# Patient Record
Sex: MALE | Race: WHITE | NOT HISPANIC OR LATINO | Employment: OTHER | URBAN - METROPOLITAN AREA
[De-identification: names, ages, dates, MRNs, and addresses within clinical notes are randomized per-mention and may not be internally consistent; named-entity substitution may affect disease eponyms.]

---

## 2017-01-09 ENCOUNTER — GENERIC CONVERSION - ENCOUNTER (OUTPATIENT)
Dept: OTHER | Facility: OTHER | Age: 77
End: 2017-01-09

## 2017-02-15 ENCOUNTER — GENERIC CONVERSION - ENCOUNTER (OUTPATIENT)
Dept: OTHER | Facility: OTHER | Age: 77
End: 2017-02-15

## 2017-02-22 ENCOUNTER — GENERIC CONVERSION - ENCOUNTER (OUTPATIENT)
Dept: OTHER | Facility: OTHER | Age: 77
End: 2017-02-22

## 2017-03-08 ENCOUNTER — ALLSCRIPTS OFFICE VISIT (OUTPATIENT)
Dept: OTHER | Facility: OTHER | Age: 77
End: 2017-03-08

## 2017-03-09 ENCOUNTER — LAB CONVERSION - ENCOUNTER (OUTPATIENT)
Dept: OTHER | Facility: OTHER | Age: 77
End: 2017-03-09

## 2017-03-09 ENCOUNTER — GENERIC CONVERSION - ENCOUNTER (OUTPATIENT)
Dept: OTHER | Facility: OTHER | Age: 77
End: 2017-03-09

## 2017-03-09 LAB
A/G RATIO (HISTORICAL): 1.4 (CALC) (ref 1–2.5)
ALBUMIN SERPL BCP-MCNC: 4.2 G/DL (ref 3.6–5.1)
ALP SERPL-CCNC: 79 U/L (ref 40–115)
ALT SERPL W P-5'-P-CCNC: 9 U/L (ref 9–46)
AST SERPL W P-5'-P-CCNC: 14 U/L (ref 10–35)
BILIRUB SERPL-MCNC: 0.5 MG/DL (ref 0.2–1.2)
BUN SERPL-MCNC: 26 MG/DL (ref 7–25)
BUN/CREA RATIO (HISTORICAL): 18 (CALC) (ref 6–22)
CALCIUM (ADJUSTED FOR ALBUMIN) (HISTORICAL): 10.4 MG/DL (CALC) (ref 8.6–10.2)
CALCIUM SERPL-MCNC: 10.3 MG/DL (ref 8.6–10.3)
CHLORIDE SERPL-SCNC: 106 MMOL/L (ref 98–110)
CHOLEST SERPL-MCNC: 192 MG/DL (ref 125–200)
CHOLEST/HDLC SERPL: 3.6 (CALC)
CO2 SERPL-SCNC: 29 MMOL/L (ref 20–31)
CREAT SERPL-MCNC: 1.41 MG/DL (ref 0.7–1.18)
EGFR AFRICAN AMERICAN (HISTORICAL): 55 ML/MIN/1.73M2
EGFR-AMERICAN CALC (HISTORICAL): 48 ML/MIN/1.73M2
GAMMA GLOBULIN (HISTORICAL): 3.1 G/DL (CALC) (ref 1.9–3.7)
GLUCOSE (HISTORICAL): 108 MG/DL (ref 65–99)
HBA1C MFR BLD HPLC: 6 % OF TOTAL HGB
HDLC SERPL-MCNC: 54 MG/DL
LDL CHOLESTEROL (HISTORICAL): 96 MG/DL (CALC)
NON-HDL-CHOL (CHOL-HDL) (HISTORICAL): 138 MG/DL (CALC)
POTASSIUM SERPL-SCNC: 5 MMOL/L (ref 3.5–5.3)
SODIUM SERPL-SCNC: 143 MMOL/L (ref 135–146)
TOTAL PROTEIN (HISTORICAL): 7.3 G/DL (ref 6.1–8.1)
TRIGL SERPL-MCNC: 208 MG/DL

## 2017-06-27 ENCOUNTER — ALLSCRIPTS OFFICE VISIT (OUTPATIENT)
Dept: OTHER | Facility: OTHER | Age: 77
End: 2017-06-27

## 2017-07-10 ENCOUNTER — GENERIC CONVERSION - ENCOUNTER (OUTPATIENT)
Dept: OTHER | Facility: OTHER | Age: 77
End: 2017-07-10

## 2017-10-06 ENCOUNTER — ALLSCRIPTS OFFICE VISIT (OUTPATIENT)
Dept: OTHER | Facility: OTHER | Age: 77
End: 2017-10-06

## 2017-10-07 NOTE — PROGRESS NOTES
Assessment  1  DM type 2 (diabetes mellitus, type 2) (250 00) (E11 9)   2  GERD without esophagitis (530 81) (K21 9)   3  Hyperlipidemia (272 4) (E78 5)   4  Hypertension (401 9) (I10)   5  Vitamin D deficiency (268 9) (E55 9)   6  Cancer, colon (153 9) (C18 9)    Plan  Cancer, colon, DM type 2 (diabetes mellitus, type 2), Encounter for prostate cancer  screening, GERD without esophagitis, Hyperlipidemia, Hypertension, Vitamin D  deficiency    · (1) PSA (SCREEN) (Dx V76 44 Screen for Prostate Cancer); Status:Active; Requested  for:06Oct2017;   Cancer, colon, DM type 2 (diabetes mellitus, type 2), GERD without esophagitis,  Hyperlipidemia, Hypertension, Vitamin D deficiency    · (1) HEMOGLOBIN A1C; Status:Active; Requested for:06Oct2017;    · (Q) CBC (INCLUDES DIFF/PLT) (REFL); Status:Active; Requested for:06Oct2017;    · (Q) COMPREHENSIVE METABOLIC PNL W/ADJUSTED CALCIUM; Status:Active; Requested for:06Oct2017;    · (Q) LIPID PANEL WITH REFLEX TO DIRECT LDL; Status:Active; Requested  for:06Oct2017;    · (Q) MICROALBUMIN, RANDOM URINE (W/CREATININE); Status:Active; Requested  for:06Oct2017;    · (Q) TSH, 3RD GENERATION W/REFLEX TO FT4; Status:Active; Requested  for:06Oct2017;    · *(Q) VITAMIN D, 25-HYDROXY, LC/MS/MS; Status:Active;  Requested for:06Oct2017;   DM type 2 (diabetes mellitus, type 2)    · Begin a limited exercise program ; Status:Complete;   Done: 46ZVY3273   · Brush your teeth {freq1} and floss at least once a day ; Status:Complete;   Done:  98SIG7914   · Cut your nails straight across ; Status:Complete;   Done: 91GRD2486   · Follow a diabetic diet with 1500 calories ; Status:Complete;   Done: 55AAR3433   · Inspect your feet and legs daily if you have vascular disease ; Status:Complete;   Done:  39JOH0816   · Inspect your feet daily ; Status:Complete;   Done: 40BWU4291   · Some eating tips that can help you lose weight ; Status:Complete;   Done: 18FUL0403   · There are many exercise options for seniors ; Status:Complete;   Done: 37QRJ7838   · We recommend that you bring your body mass index down to 26 ; Status:Complete;    Done: 21Vrf1163   · We recommend that you change your eating habits slowly ; Status:Complete;   Done:  81TQQ6131   · Wear shoes that give your toes plenty of room ; Status:Complete;   Done: 06Oct2017   · You will need to take a blood sugar reading 4 times a day ; Status:Complete;   Done:  43PUN6068  Hyperlipidemia    · A diet that is low in fat, cholesterol, and sodium is considered a cardiac diet ;  Status:Complete;   Done: 38GLW0979   · Begin or continue regular aerobic exercise  Gradually work up to at least 3 sessions of  30 minutes of exercise a week ; Status:Complete;   Done: 09KPC3119   · Continue with our present treatment plan ; Status:Complete;   Done: 60JEB0144   · Eat a low fat and low cholesterol diet ; Status:Complete;   Done: 76KUA7645   · Eat no more than 30 grams of fat per day ; Status:Complete;   Done: 71SJM7356   · We recommend you modify your diet to achieve and maintain a healthy weight  Being  overweight may increase your risk for developing health problems such as diabetes,  heart disease, and cancer  Avoid high fat foods and eat a balanced diet rich  in fruits and vegetables  The combination of a reduced-calorie diet and increased  physical activity is recommended  Please let us know if you would like to  learn more about your nutrition and calorie needs, and additional options including  weight loss programs that can help you achieve your goals ; Status:Complete;   Done:  57YDQ3651  Hypertension    · Call [phone] if: Your blood pressure is frequently higher than 140/90 ;  Status:Complete;   Done: 85ZHJ6100   · Restrict the salt in your diet by avoiding highly salted foods ; Status:Complete;   Done:  49NLW1688   · Restrict your sodium (salt) intake to 2 grams per day ; Status:Complete;   Done:  67GFB8416   · Take your blood pressure twice a week   Record the numbers and bring them with you to  your appointments ; Status:Complete;   Done: 06KKG8930   · We encourage you to begin to make lifestyle changes to help control your blood  pressure  These may include losing weight, increasing your activity level, limiting salt in  your diet, decreasing alcohol intake, and eating a diet low in fat and rich in fruits  and vegetables ; Status:Complete;   Done: 49NZL2613    Discussion/Summary    Fasting labs drawn as above  Patient to continue present treatment  Patient instructed to follow a low-fat, low-salt and a low sugar/carbohydrate diet more carefully and get regular exercise walking with the use of a cane as tolerated  Weight loss encouraged  Patient to continue home glucose monitoring  Patient to follow-up with Dr Tsering Davis urologist as scheduled later this month and recommend patient schedule follow-up appointment with Dr Lindsay Ramos, spine surgeon at [place] [address]  Patient to return the office in 6 months  Possible side effects of new medications were reviewed with the patient/guardian today  The treatment plan was reviewed with the patient/guardian  The patient/guardian understands and agrees with the treatment plan      Chief Complaint  Fasting    Patient is here today for follow up of chronic conditions described in HPI  History of Present Illness  Patient is here for routine appointment for chronic conditions and fasting labs  Patient received his yearly flu vaccine at [facility] on 9/17/17  Patient's been feeling fairly well overall although complains of continued right leg weakness and pain status post lumbar surgery with Dr Lindsay Ramos at [place] [address] [facility]  Patient has an appointment with Dr Tsering Davis, urologist later this month  Patient does home glucose monitoring occasionally and fasting blood sugars 100-180 with an average of 135   Patient is up-to-date on diabetic eye exam and foot exam    The patient is being seen for follow-up of gastroesophageal reflux disease  The patient reports doing well  He has had no significant interval events  Interval symptoms:  denies heartburn,-denies chest pain,-denies abdominal pain,-denies acid regurgitation-and-denies dysphagia  Associated symptoms: no hoarseness,-no cough,-no nausea,-no vomiting,-no hematemesis,-no melena-and-no weight loss  Medications:  the patient is adherent to his medication regimen, but-he denies medication side effects  Disease management:  the patient is doing well with his goals  The patient states his hyperlipidemia has been under good control since the last visit  Comorbid Illnesses: diabetes mellitus-and-hypertension  He has no significant interval events  Symptoms: denies muscle pain-and-denies muscle weakness  Associated symptoms include no memory loss  Medications: the patient is adherent with his medication regimen -He denies medication side effects  The patient is doing well with his hyperlipidemia goals  the patient's last LDL was 96 mg/dL  The patient is due for a lipid panel-and-liver function tests  The patient presents for follow-up of essential hypertension  The patient states he has been doing well with his blood pressure control since the last visit  He has no significant interval events  Symptoms: denies impaired vision,-denies dyspnea,-denies chest pain,-denies intermittent leg claudication-and-denies lower extremity edema  Associated symptoms include no headache  Home monitoring: The patient is not checking blood pressure at home  Medications: the patient is adherent with his medication regimen -He denies medication side effects  Disease Management: the patient is doing well with his blood pressure goals  The patient states he has been doing well with his Type II Diabetes control since the last visit  Comorbid Illnesses: hypertension,-hyperlipidemia-and-obesity  He has no significant interval events     Symptoms: stable numbness of the feet,-denies a foot ulcer,-denies foot pain-and-denies visual impairment  Home monitoring: The patient checks his blood sugar sporadically  -Glycemic control has been good -the patient reports no symptomatic hypoglycemic episodes  Medications: the patient is adherent with his medication regimen -He denies medication side effects  Due For: a urine microalbumin-and-a hemoglobin A1c  Review of Systems    Constitutional: no fever,-not feeling poorly-and-no chills-   The patient presents with complaints of occasional episodes of feeling tired  Eyes: No complaints of eye pain, no red eyes, no discharge from eyes, no itchy eyes  ENT: no complaints of earache, no hearing loss, no nosebleeds, no nasal discharge, no sore throat, no hoarseness  Hematologic/Lymphatic: No complaints of swollen glands, no swollen glands in the neck, does not bleed easily, no easy bruising  Active Problems  1  Allergic conjunctivitis (372 14) (H10 10)   2  Allergic rhinitis (477 9) (J30 9)   3  Cancer, colon (153 9) (C18 9)   4  Dizziness (780 4) (R42)   5  DM type 2 (diabetes mellitus, type 2) (250 00) (E11 9)   6  Elevated prostate specific antigen (PSA) (790 93) (R97 20)   7  Encounter for prostate cancer screening (V76 44) (Z12 5)   8  Enlarged prostate without lower urinary tract symptoms (luts) (600 00) (N40 0)   9  Fatigue (780 79) (R53 83)   10  GERD without esophagitis (530 81) (K21 9)   11  Hyperlipidemia (272 4) (E78 5)   12  Hypertension (401 9) (I10)   13  Impacted cerumen (380 4) (H61 20)   14  Lumbar disc herniation (722 10) (M51 26)   15  Lumbar radiculopathy (724 4) (M54 16)   16  Lumbar spine strain (847 2) (S39 012A)   17  Medicare annual wellness visit, subsequent (V70 0) (Z00 00)   18  Neurogenic bladder (596 54) (N31 9)   19  Preoperative examination (V72 84) (Z01 818)   20  Right knee pain (719 46) (M25 561)    Past Medical History  1  History of Colon Cancer (V10 05)    Surgical History  1  History of Appendectomy   2  History of Colonoscopy (Fiberoptic)   3  History of Rotator Cuff Repair   4  History of Surgery Vas Deferens Vasectomy   5  History of Tonsillectomy    Family History  Father    1  Family history of Acute Myocardial Infarction (V17 3)   2  Family history of Diabetes Mellitus (V18 0)  Brother    3  Family history of CABG   4  Family history of Prostate Cancer (V16 42)    Social History   · Being A Social Drinker   · Daily Coffee Consumption (1  Cups/Day)   · Former smoker (V15 82) (T86 936)    Current Meds   1  Agatha Contour Test In [employer]; USE 1 STRIP Daily; Therapy: 71ECI1741 to (Last Rx:08Mar2017)  Requested for: 67OTS0106 Ordered   2  Centrum Silver Oral Tablet; Therapy: (Recorded:37Mgd9921) to Recorded   3  Coreg CR 40 MG Oral Capsule Extended Release 24 Hour; TAKE ONE CAPSULE BY   MOUTH EVERY DAY; Therapy: 76KNM2849 to (Evaluate:40Ozh4016)  Requested for: 01Apr2017; Last   Rx:01Apr2017 Ordered   4  Cranberry Extract CAPS; Therapy: (Recorded:94Zos3016) to Recorded   5  Finasteride 5 MG Oral Tablet; take 1 tablet by mouth every day; Therapy: 68JPM4750 to (Evaluate:03Mar2018)  Requested for: 57HWB2491; Last   Rx:08Mar2017 Ordered   6  Janumet  MG Oral Tablet; take 1 tablet by mouth every day; Therapy: 68WUG7440 to (Evaluate:75Xif7908)  Requested for: 01Apr2017; Last   Rx:01Apr2017 Ordered   7  Lisinopril 10 MG Oral Tablet; Take 1 tablet daily; Therapy: 81JGL9381 to ([id number])  Requested for: 64MQX4073; Last   Rx:95Ubs0310 Ordered   8  Omeprazole 20 MG Oral Capsule Delayed Release; TAKE ONE CAPSULE BY MOUTH   EVERY DAY; Therapy: 73LMI5621 to (Evaluate:84Tmz2189)  Requested for: 01Apr2017; Last   Rx:01Apr2017 Ordered   9  Rosuvastatin Calcium 20 MG Oral Tablet; take 1 tablet by mouth every day; Therapy: 18QAB0249 to (Evaluate:81Yak8018)  Requested for: 01Apr2017; Last   Rx:01Apr2017 Ordered   10  Vitamin D3 1000 UNIT Oral Capsule; TAKE 1 BY MOUTH DAILY;     Therapy: 10KTG9391 to Recorded    Allergies  1  Lipitor TABS    Vitals  Vital Signs    Recorded: 48LBR1179 09:20AM Recorded: 66LXL9234 09:04AM   Temperature  97 4 F   Heart Rate 80    Respiration 16    Systolic 839    Diastolic 90    Height  5 ft 4 5 in   Weight  216 lb    BMI Calculated  36 5   BSA Calculated  2 03     Physical Exam    Constitutional   General appearance: No acute distress, well appearing and well nourished  Eyes   Conjunctiva and lids: No swelling, erythema, or discharge  Ears, Nose, Mouth, and Throat   External inspection of ears and nose: Normal     Otoscopic examination: Tympanic membrance translucent with normal light reflex  Canals patent without erythema  Nasal mucosa, septum, and turbinates: Normal without edema or erythema  Oropharynx: Normal with no erythema, edema, exudate or lesions  Pulmonary   Respiratory effort: No increased work of breathing or signs of respiratory distress  Auscultation of lungs: Clear to auscultation, equal breath sounds bilaterally, no wheezes, no rales, no rhonci  Cardiovascular   Auscultation of heart: Normal rate and rhythm, normal S1 and S2, without murmurs  Examination of extremities for edema and/or varicosities: Normal     Carotid pulses: Normal     Abdomen   Abdomen: Non-tender, no masses  -Colostomy in place  Lymphatic   Palpation of lymph nodes in neck: No lymphadenopathy  Musculoskeletal   Gait and station: Abnormal     Inspection/palpation of joints, bones, and muscles: Abnormal     Skin   Skin and subcutaneous tissue: Normal without rashes or lesions  Psychiatric   Orientation to person, place and time: Normal     Mood and affect: Normal          Health Management  DM type 2 (diabetes mellitus, type 2)   (1) HEMOGLOBIN A1C; every 3 months; Last 88DBH4466; Next Due: 52ADF3370; Overdue  Hemoglobin A1c- POC; every 6 months; Last 31GIP8296; Next Due: 55Fcu6871; Overdue  Urine Microalbumin/Creatinine - POC; every 1 year;  Last 14VBC4664; Next Due: 11Aug2015; Overdue  Hyperlipidemia   (1) HEPATIC FUNCTION PANEL; every 6 months; Last 20UKN3003; Next Due: 79JKH7622; Overdue  (1) LIPID PANEL, FASTING; every 1 year; Last 17ILK8045; Next Due: 59FNO3535;  Overdue    Future Appointments    Date/Time Provider Specialty Site   12/06/2017 01:45 PM Kory Pacheco MD Urology [address] [place] [address]     Signatures   Electronically signed by : Rondel Baumgarten, DO; Oct  6 2017  9:32AM EST                       (Author)

## 2017-10-11 LAB
25(OH)D3 SERPL-MCNC: NORMAL NG/ML
CHOLEST SERPL-MCNC: NORMAL MG/DL
CHOLEST/HDLC SERPL: NORMAL (CALC)
CREATININE, RANDOM URINE (HISTORICAL): 60 MG/DL (ref 20–370)
GLUCOSE (HISTORICAL): NORMAL MG/DL
HDLC SERPL-MCNC: NORMAL MG/DL
LDL CHOLESTEROL (HISTORICAL): NORMAL MG/DL (CALC)
MAGNESIUM, UR (HISTORICAL): 5.9 MG/DL
MICROALBUMIN/CREATININE RATIO (HISTORICAL): 98 MCG/MG CREAT
NON-HDL-CHOL (CHOL-HDL) (HISTORICAL): NORMAL MG/DL (CALC)
PROSTATE SPECIFIC ANTIGEN TOTAL (HISTORICAL): NORMAL NG/ML
TRIGL SERPL-MCNC: NORMAL MG/DL
TSH SERPL DL<=0.05 MIU/L-ACNC: NORMAL MIU/L
WBC # BLD AUTO: NORMAL THOUSAND/UL

## 2017-10-12 ENCOUNTER — GENERIC CONVERSION - ENCOUNTER (OUTPATIENT)
Dept: OTHER | Facility: OTHER | Age: 77
End: 2017-10-12

## 2017-11-30 LAB
LEFT EYE DIABETIC RETINOPATHY: NORMAL
RIGHT EYE DIABETIC RETINOPATHY: NORMAL

## 2017-12-28 ENCOUNTER — GENERIC CONVERSION - ENCOUNTER (OUTPATIENT)
Dept: FAMILY MEDICINE CLINIC | Facility: CLINIC | Age: 77
End: 2017-12-28

## 2018-01-01 ENCOUNTER — OFFICE VISIT (OUTPATIENT)
Dept: NEUROSURGERY | Facility: MEDICAL CENTER | Age: 78
End: 2018-01-01
Payer: COMMERCIAL

## 2018-01-01 ENCOUNTER — OFFICE VISIT (OUTPATIENT)
Dept: FAMILY MEDICINE CLINIC | Facility: CLINIC | Age: 78
End: 2018-01-01
Payer: COMMERCIAL

## 2018-01-01 ENCOUNTER — TELEPHONE (OUTPATIENT)
Dept: FAMILY MEDICINE CLINIC | Facility: CLINIC | Age: 78
End: 2018-01-01

## 2018-01-01 ENCOUNTER — DOCUMENTATION (OUTPATIENT)
Dept: NEUROSURGERY | Facility: CLINIC | Age: 78
End: 2018-01-01

## 2018-01-01 VITALS
HEIGHT: 65 IN | TEMPERATURE: 98 F | SYSTOLIC BLOOD PRESSURE: 140 MMHG | WEIGHT: 205 LBS | HEART RATE: 92 BPM | RESPIRATION RATE: 20 BRPM | BODY MASS INDEX: 34.16 KG/M2 | DIASTOLIC BLOOD PRESSURE: 90 MMHG

## 2018-01-01 VITALS
HEIGHT: 66 IN | DIASTOLIC BLOOD PRESSURE: 98 MMHG | WEIGHT: 207.8 LBS | HEART RATE: 83 BPM | TEMPERATURE: 96.7 F | SYSTOLIC BLOOD PRESSURE: 162 MMHG | RESPIRATION RATE: 20 BRPM | BODY MASS INDEX: 33.4 KG/M2

## 2018-01-01 VITALS
HEART RATE: 84 BPM | DIASTOLIC BLOOD PRESSURE: 84 MMHG | SYSTOLIC BLOOD PRESSURE: 132 MMHG | BODY MASS INDEX: 34.49 KG/M2 | OXYGEN SATURATION: 96 % | RESPIRATION RATE: 16 BRPM | TEMPERATURE: 97.6 F | HEIGHT: 65 IN | WEIGHT: 207 LBS

## 2018-01-01 DIAGNOSIS — E78.5 HYPERLIPIDEMIA, UNSPECIFIED HYPERLIPIDEMIA TYPE: ICD-10-CM

## 2018-01-01 DIAGNOSIS — E11.9 TYPE 2 DIABETES MELLITUS WITHOUT COMPLICATION, UNSPECIFIED WHETHER LONG TERM INSULIN USE (HCC): Primary | ICD-10-CM

## 2018-01-01 DIAGNOSIS — K21.9 GASTROESOPHAGEAL REFLUX DISEASE WITHOUT ESOPHAGITIS: ICD-10-CM

## 2018-01-01 DIAGNOSIS — E11.9 TYPE 2 DIABETES MELLITUS WITHOUT COMPLICATION, WITHOUT LONG-TERM CURRENT USE OF INSULIN (HCC): ICD-10-CM

## 2018-01-01 DIAGNOSIS — Z00.00 HEALTHCARE MAINTENANCE: ICD-10-CM

## 2018-01-01 DIAGNOSIS — I10 ESSENTIAL HYPERTENSION: ICD-10-CM

## 2018-01-01 DIAGNOSIS — M48.062 SPINAL STENOSIS OF LUMBAR REGION WITH NEUROGENIC CLAUDICATION: ICD-10-CM

## 2018-01-01 DIAGNOSIS — K21.9 GERD WITHOUT ESOPHAGITIS: ICD-10-CM

## 2018-01-01 DIAGNOSIS — J06.9 UPPER RESPIRATORY TRACT INFECTION, UNSPECIFIED TYPE: Primary | ICD-10-CM

## 2018-01-01 DIAGNOSIS — I10 BENIGN ESSENTIAL HYPERTENSION: ICD-10-CM

## 2018-01-01 DIAGNOSIS — N40.1 BPH WITH URINARY OBSTRUCTION: ICD-10-CM

## 2018-01-01 DIAGNOSIS — E55.9 VITAMIN D DEFICIENCY: ICD-10-CM

## 2018-01-01 DIAGNOSIS — N13.8 BPH WITH URINARY OBSTRUCTION: ICD-10-CM

## 2018-01-01 DIAGNOSIS — E78.5 HYPERLIPIDEMIA, UNSPECIFIED HYPERLIPIDEMIA TYPE: Primary | ICD-10-CM

## 2018-01-01 DIAGNOSIS — M54.16 LUMBAR RADICULOPATHY: Primary | ICD-10-CM

## 2018-01-01 LAB
25(OH)D3 SERPL-MCNC: 45 NG/ML (ref 30–100)
ALBUMIN SERPL-MCNC: 4 G/DL (ref 3.6–5.1)
ALBUMIN/GLOB SERPL: 1.1 (CALC) (ref 1–2.5)
ALP SERPL-CCNC: 74 U/L (ref 40–115)
ALT SERPL-CCNC: 4 U/L (ref 9–46)
AST SERPL-CCNC: 16 U/L (ref 10–35)
BILIRUB SERPL-MCNC: 0.7 MG/DL (ref 0.2–1.2)
BUN SERPL-MCNC: 27 MG/DL (ref 7–25)
BUN/CREAT SERPL: 18 (CALC) (ref 6–22)
CALCIUM SERPL-MCNC: 9.8 MG/DL (ref 8.6–10.3)
CHLORIDE SERPL-SCNC: 104 MMOL/L (ref 98–110)
CHOLEST SERPL-MCNC: 187 MG/DL
CHOLEST/HDLC SERPL: 4 (CALC)
CO2 SERPL-SCNC: 27 MMOL/L (ref 20–32)
CREAT SERPL-MCNC: 1.47 MG/DL (ref 0.7–1.18)
CREAT UR-MCNC: NORMAL MG/DL
ERYTHROCYTE [DISTWIDTH] IN BLOOD BY AUTOMATED COUNT: 13.4 % (ref 11–15)
GLOBULIN SER CALC-MCNC: 3.5 G/DL (CALC) (ref 1.9–3.7)
GLUCOSE SERPL-MCNC: 123 MG/DL (ref 65–99)
HCT VFR BLD AUTO: 39.3 % (ref 38.5–50)
HDLC SERPL-MCNC: 47 MG/DL
HGB BLD-MCNC: 13.5 G/DL (ref 13.2–17.1)
LDLC SERPL CALC-MCNC: 102 MG/DL (CALC)
LEFT EYE DIABETIC RETINOPATHY: NORMAL
MCH RBC QN AUTO: 29.9 PG (ref 27–33)
MCHC RBC AUTO-ENTMCNC: 34.4 G/DL (ref 32–36)
MCV RBC AUTO: 86.9 FL (ref 80–100)
MICROALBUMIN UR-MCNC: NORMAL MG/DL
NONHDLC SERPL-MCNC: 140 MG/DL (CALC)
PLATELET # BLD AUTO: 159 THOUSAND/UL (ref 140–400)
PMV BLD REES-ECKER: 11.5 FL (ref 7.5–12.5)
POTASSIUM SERPL-SCNC: 4.6 MMOL/L (ref 3.5–5.3)
PROT SERPL-MCNC: 7.5 G/DL (ref 6.1–8.1)
RBC # BLD AUTO: 4.52 MILLION/UL (ref 4.2–5.8)
RIGHT EYE DIABETIC RETINOPATHY: NORMAL
SEVERITY (EYE EXAM): NORMAL
SL AMB EGFR AFRICAN AMERICAN: 52 ML/MIN/1.73M2
SL AMB EGFR NON AFRICAN AMERICAN: 45 ML/MIN/1.73M2
SL AMB POCT HEMOGLOBIN AIC: 6
SODIUM SERPL-SCNC: 141 MMOL/L (ref 135–146)
TRIGL SERPL-MCNC: 266 MG/DL
TSH SERPL-ACNC: 1.48 MIU/L (ref 0.4–4.5)
WBC # BLD AUTO: 8.3 THOUSAND/UL (ref 3.8–10.8)

## 2018-01-01 PROCEDURE — 3075F SYST BP GE 130 - 139MM HG: CPT | Performed by: FAMILY MEDICINE

## 2018-01-01 PROCEDURE — 99203 OFFICE O/P NEW LOW 30 MIN: CPT | Performed by: NEUROLOGICAL SURGERY

## 2018-01-01 PROCEDURE — 1160F RVW MEDS BY RX/DR IN RCRD: CPT | Performed by: FAMILY MEDICINE

## 2018-01-01 PROCEDURE — 4040F PNEUMOC VAC/ADMIN/RCVD: CPT | Performed by: NEUROLOGICAL SURGERY

## 2018-01-01 PROCEDURE — 1036F TOBACCO NON-USER: CPT | Performed by: FAMILY MEDICINE

## 2018-01-01 PROCEDURE — 90662 IIV NO PRSV INCREASED AG IM: CPT

## 2018-01-01 PROCEDURE — 99213 OFFICE O/P EST LOW 20 MIN: CPT | Performed by: FAMILY MEDICINE

## 2018-01-01 PROCEDURE — 83036 HEMOGLOBIN GLYCOSYLATED A1C: CPT | Performed by: FAMILY MEDICINE

## 2018-01-01 PROCEDURE — 3079F DIAST BP 80-89 MM HG: CPT | Performed by: FAMILY MEDICINE

## 2018-01-01 PROCEDURE — 3008F BODY MASS INDEX DOCD: CPT | Performed by: FAMILY MEDICINE

## 2018-01-01 PROCEDURE — G0008 ADMIN INFLUENZA VIRUS VAC: HCPCS

## 2018-01-01 PROCEDURE — 99214 OFFICE O/P EST MOD 30 MIN: CPT | Performed by: FAMILY MEDICINE

## 2018-01-01 RX ORDER — OMEPRAZOLE 20 MG/1
CAPSULE, DELAYED RELEASE ORAL
Qty: 90 CAPSULE | Refills: 0 | Status: SHIPPED | OUTPATIENT
Start: 2018-01-01 | End: 2018-01-01

## 2018-01-01 RX ORDER — LISINOPRIL 10 MG/1
TABLET ORAL
Qty: 90 TABLET | Refills: 1 | Status: SHIPPED | OUTPATIENT
Start: 2018-01-01 | End: 2018-01-01

## 2018-01-01 RX ORDER — AZITHROMYCIN 250 MG/1
TABLET, FILM COATED ORAL
Qty: 6 TABLET | Refills: 0 | Status: SHIPPED | OUTPATIENT
Start: 2018-01-01 | End: 2018-01-01

## 2018-01-01 RX ORDER — OMEPRAZOLE 20 MG/1
20 CAPSULE, DELAYED RELEASE ORAL DAILY
Qty: 90 CAPSULE | Refills: 3 | Status: SHIPPED | OUTPATIENT
Start: 2018-01-01 | End: 2019-01-01 | Stop reason: HOSPADM

## 2018-01-01 RX ORDER — CARVEDILOL PHOSPHATE 40 MG/1
40 CAPSULE, EXTENDED RELEASE ORAL DAILY
Qty: 90 CAPSULE | Refills: 2 | Status: SHIPPED | OUTPATIENT
Start: 2018-01-01 | End: 2018-01-01 | Stop reason: SDUPTHER

## 2018-01-01 RX ORDER — FINASTERIDE 5 MG/1
5 TABLET, FILM COATED ORAL DAILY
Qty: 90 TABLET | Refills: 3 | Status: SHIPPED | OUTPATIENT
Start: 2018-01-01 | End: 2019-01-01 | Stop reason: HOSPADM

## 2018-01-01 RX ORDER — OMEPRAZOLE 20 MG/1
20 CAPSULE, DELAYED RELEASE ORAL DAILY
Qty: 90 CAPSULE | Refills: 3 | Status: SHIPPED | OUTPATIENT
Start: 2018-01-01 | End: 2018-01-01

## 2018-01-01 RX ORDER — ROSUVASTATIN CALCIUM 20 MG/1
TABLET, COATED ORAL
Qty: 90 TABLET | Refills: 0 | Status: SHIPPED | OUTPATIENT
Start: 2018-01-01 | End: 2018-01-01

## 2018-01-01 RX ORDER — SITAGLIPTIN AND METFORMIN HYDROCHLORIDE 1000; 50 MG/1; MG/1
1 TABLET, FILM COATED ORAL DAILY
Qty: 90 TABLET | Refills: 0 | Status: SHIPPED | OUTPATIENT
Start: 2018-01-01 | End: 2018-01-01 | Stop reason: SDUPTHER

## 2018-01-01 RX ORDER — CARVEDILOL PHOSPHATE 40 MG/1
CAPSULE, EXTENDED RELEASE ORAL
Qty: 90 CAPSULE | Refills: 0 | Status: SHIPPED | OUTPATIENT
Start: 2018-01-01 | End: 2018-01-01 | Stop reason: SDUPTHER

## 2018-01-01 RX ORDER — CARVEDILOL PHOSPHATE 40 MG/1
40 CAPSULE, EXTENDED RELEASE ORAL DAILY
Qty: 90 CAPSULE | Refills: 3 | Status: SHIPPED | OUTPATIENT
Start: 2018-01-01 | End: 2019-01-01 | Stop reason: SDUPTHER

## 2018-01-01 RX ORDER — ROSUVASTATIN CALCIUM 20 MG/1
20 TABLET, COATED ORAL DAILY
Qty: 90 TABLET | Refills: 3 | Status: SHIPPED | OUTPATIENT
Start: 2018-01-01 | End: 2019-01-01 | Stop reason: HOSPADM

## 2018-01-01 RX ORDER — ROSUVASTATIN CALCIUM 20 MG/1
20 TABLET, COATED ORAL DAILY
Qty: 90 TABLET | Refills: 2 | Status: SHIPPED | OUTPATIENT
Start: 2018-01-01 | End: 2018-01-01

## 2018-01-10 NOTE — RESULT NOTES
Verified Results  (1) MICROALBUMIN CREATININE RATIO, RANDOM URINE 80TPN9119 05:47PM [facility]     Test Name Result Flag Reference   MICROALBUMIN/ CREAT R 127 mg/g creatinine H 0-30   MICROALBUMIN,URINE 126 0 mg/L H 0 0-20 0   CREATININE URINE 98 9 mg/dL       (1) PSA (SCREEN) (Dx V76 44 Screen for Prostate Cancer) 23LVY5505 05:47PM Sindhu Has     Test Name Result Flag Reference   PROSTATE SPECIFIC ANTIGEN 1 5 ng/mL  0 0-4 0       Discussion/Summary   PSA is normal  Urine microalbumin is increased, therefore recommend patient increase lisinopril to 10 mg daily

## 2018-01-10 NOTE — RESULT NOTES
Discussion/Summary   Please coordinate with lab  Was blood testing sent? It appears that we are getting no results back  Verified Results  (Q) CBC (INCLUDES DIFF/PLT) (REFL) 66NCJ3622 12:00AM [employer]     Test Name Result Flag Reference   WHITE BLOOD CELL COUNT TNP Thousand/uL     *************************************   * Test not performed  *   * No lavender-top tube received  *   *************************************     (Q) COMPREHENSIVE METABOLIC PNL W/ADJUSTED CALCIUM 72Jqu5746 12:00AM [employer]     Test Name Result Flag Reference   GLUCOSE TNP mg/dL     **********************************   * Test not performed  *   * No serum received  *   **********************************     (Q) LIPID PANEL WITH REFLEX TO DIRECT LDL 24EQG3429 12:00AM [employer]     Test Name Result Flag Reference   CHOLESTEROL, TOTAL TNP mg/dL     **********************************   * Test not performed  *   * No serum received  *   **********************************   HDL CHOLESTEROL TNP mg/dL     **********************************   * Test not performed  *   * No serum received  *   **********************************   TRIGLICERIDES TNP mg/dL     **********************************   * Test not performed  *   * No serum received  *   **********************************   LDL-CHOLESTEROL TNP mg/dL (calc)     **********************************   * Test not performed  *   * No serum received  *   **********************************   CHOL/HDLC RATIO TNP (calc)     **********************************   * Test not performed  *   * No serum received  *   **********************************   NON HDL CHOLESTEROL TNP mg/dL (calc)     **********************************   * Test not performed  *   * No serum received               * **********************************     (Q) TSH, 3RD GENERATION W/REFLEX TO FT4 68ETO1791 12:00AM [employer]     Test Name Result Flag Reference   TSH W/REFLEX TO FT4 TNP mIU/L     **********************************   * Test not performed  *   * No serum received  *   **********************************     *(Q) VITAMIN D, 25-HYDROXY, LC/MS/MS 85DYU7066 12:00AM [employer]     Test Name Result Flag Reference   VITAMIN D, 25-OH, TOTAL TNP ng/mL     **********************************   * Test not performed  *   * No serum received  *   **********************************     (Q) MICROALBUMIN, RANDOM URINE (W/CREATININE) 15COO1501 12:00AM [employer]     Test Name Result Flag Reference   CREATININE, RANDOM URINE 60 mg/dL     MICROALBUMIN 5 9 mg/dL     Reference Range  Not established   MICROALBUMIN/CREATININE$RATIO, RANDOM URINE 98 mcg/mg creat H <30   The ADA defines abnormalities in albumin  excretion as follows:     Category         Result (mcg/mg creatinine)     Normal                    <30  Microalbuminuria            Clinical albuminuria   > OR = 300     The ADA recommends that at least two of three  specimens collected within a 3-6 month period be  abnormal before considering a patient to be  within a diagnostic category  (1) PSA (SCREEN) (Dx V76 44 Screen for Prostate Cancer) 40FMD9973 12:00AM [employer]     Test Name Result Flag Reference   PSA, TOTAL TNP ng/mL     **********************************   * Test not performed  *   * No serum received               *   **********************************

## 2018-01-10 NOTE — RESULT NOTES
Verified Results  * MRI BRAIN IAC WO AND W CONTRAST 67IBX4272 08:32AM [facility]     Test Name Result Flag Reference   MRI BRAIN IAC WO AND W CONTRAST (Report)     MRI BRAIN AND IAC'S - WITH AND WITHOUT CONTRAST     INDICATION: 69-year-old female, dizziness, memory loss, right-sided weakness for 1 5 months   COMPARISON: None  TECHNIQUE:   Brain:  Sagittal T1, axial T2, axial [[place]], axial T1, axial FLAIR, axial diffusion imaging  Axial T1 postcontrast  Axial BRAVO post contrast       IAC'S: Coronal FIESTA, coronal T1 postcontrast, axial T1 postcontrast with fat suppression  Targeted images of the IAC'S were performed requiring additional time at acquisition and interpretation of approximately 25%   5 mL of Gadavist was injected intravenously without immediate consequence  IMAGE QUALITY:  Diagnostic  FINDINGS:    BRAIN PARENCHYMA:    Moderate chronic microvascular ischemic changes are present within the subcortical and deep white matter of the frontal and parietal lobes bilaterally  Tiny chronic bilateral basal ganglia lacunar bones  Tiny bilateral chronic pontine lacunes  No acute ischemic disease is identified  Age-appropriate cerebral atrophy is present  There is no discrete mass, mass effect or midline shift  No hemorrhage  Brainstem and cerebellum demonstrate normal signal  Diffusion imaging is unremarkable  IAC'S: No CP angle mass or abnormal enhancement  Trace left mastoid effusion     VENTRICLES: The ventricles are normal in size and contour  SELLA AND PITUITARY GLAND: Normal      ORBITS: Normal      PARANASAL SINUSES: The paranasal sinuses are clear  VASCULATURE: Evaluation of the major intracranial vasculature demonstrates appropriate flow voids       CALVARIUM AND SKULL BASE: Normal      EXTRACRANIAL SOFT TISSUES: Normal        IMPRESSION:   Moderate chronic microvascular ischemic disease involving supratentorial white matter, bilateral basal ganglia and apula     No acute ischemic disease     Age-related atrophy     Normal appearance of the IAC's     Trace left mastoid effusion       Workstation performed: [id number]     Signed by:   Vlad Briceno MD   7/21/16       Discussion/Summary   Phone call to patient discussed results of MRI of the brain positive for chronic microvascular ischemic changes, no acute changes  Patient states dizziness has resolved  If symptoms recur then discussed referral to neurology  Patient is now complaining of recurrent right leg pain secondary to herniated disc which he had seen Dr Eulalia Rodney, spine surgeon at [facility] in the past  Recommend referral back to Dr Eulalia Rodney

## 2018-01-11 NOTE — RESULT NOTES
Verified Results  (1) CBC/PLT/DIFF [id number] 05:53PM Lena Perez     Test Name Result Flag Reference   WBC COUNT 6 06 Thousand/uL  4 31-10 16   RBC COUNT 4 58 Million/uL  3 88-5 62   HEMOGLOBIN 13 4 g/dL  12 0-17 0   HEMATOCRIT 39 8 %  36 5-49 3   MCV 87 fL  82-98   MCH 29 3 pg  26 8-34 3   MCHC 33 7 g/dL  31 4-37 4   RDW 13 5 %  11 6-15 1   MPV 11 7 fL  8 9-12 7   PLATELET COUNT 058 Thousands/uL  149-390   nRBC AUTOMATED 0 /100 WBCs     NEUTROPHILS RELATIVE PERCENT 59 %  43-75   LYMPHOCYTES RELATIVE PERCENT 30 %  14-44   MONOCYTES RELATIVE PERCENT 9 %  4-12   EOSINOPHILS RELATIVE PERCENT 2 %  0-6   BASOPHILS RELATIVE PERCENT 0 %  0-1   NEUTROPHILS ABSOLUTE COUNT 3 54 Thousands/?L  1 85-7 62   LYMPHOCYTES ABSOLUTE COUNT 1 82 Thousands/?L  0 60-4 47   MONOCYTES ABSOLUTE COUNT 0 56 Thousand/?L  0 17-1 22   EOSINOPHILS ABSOLUTE COUNT 0 12 Thousand/?L  0 00-0 61   BASOPHILS ABSOLUTE COUNT 0 01 Thousands/?L  0 00-0 10     (1) COMPREHENSIVE METABOLIC PANEL [id number] 19:12VB [facility] [employer]     Test Name Result Flag Reference   GLUCOSE,RANDM 163 mg/dL H    If the patient is fasting, the ADA then defines impaired fasting glucose as > 100 mg/dL and diabetes as > or equal to 123 mg/dL  SODIUM 141 mmol/L  136-145   POTASSIUM 4 2 mmol/L  3 5-5 3   CHLORIDE 106 mmol/L  100-108   CARBON DIOXIDE 30 mmol/L  21-32   ANION GAP (CALC) 5 mmol/L  4-13   BLOOD UREA NITROGEN 29 mg/dL H 5-25   CREATININE 1 29 mg/dL  0 60-1 30   Standardized to IDMS reference method   CALCIUM 9 2 mg/dL  8 3-10 1   BILI, TOTAL 0 36 mg/dL  0 20-1 00   ALK PHOSPHATAS 84 U/L     ALT (SGPT) 28 U/L  12-78   AST(SGOT) 23 U/L  5-45   ALBUMIN 3 5 g/dL  3 5-5 0   TOTAL PROTEIN 7 4 g/dL  6 4-8 2   eGFR Non-African American 54 2 ml/min/1 73sq [facility] Disease Education Program recommendations are as follows:  GFR calculation is accurate only with a steady state creatinine  Chronic Kidney disease less than 60 ml/min/1 73 sq  meters  Kidney failure less than 15 ml/min/1 73 sq  meters  (1) LIPID PANEL, FASTING [id number] 05:53PM [employer]     Test Name Result Flag Reference   CHOLESTEROL 195 mg/dL     HDL,DIRECT 43 mg/dL  40-60   Specimen collection should occur prior to Metamizole administration due to the potential for falsely depressed results  LDL CHOLESTEROL CALCULATED 99 mg/dL  0-100   Triglyceride:         Normal              <150 mg/dl       Borderline High    150-199 mg/dl       High               200-499 mg/dl       Very High          >499 mg/dl  Cholesterol:         Desirable        <200 mg/dl      Borderline High  200-239 mg/dl      High             >239 mg/dl  HDL Cholesterol:        High    >59 mg/dL      Low     <41 mg/dL  LDL CALCULATED:    This screening LDL is a calculated result  It does not have the accuracy of the Direct Measured LDL in the monitoring of patients with hyperlipidemia and/or statin therapy  Direct Measure LDL (XDW527) must be ordered separately in these patients  TRIGLYCERIDES 265 mg/dL H <=150   Specimen collection should occur prior to N-Acetylcysteine or Metamizole administration due to the potential for falsely depressed results  (1) TSH WITH FT4 REFLEX [id number] 05:53PM [employer]     Test Name Result Flag Reference   TSH 1 720 uIU/mL  0 358-3 740   Patients undergoing fluorescein dye angiography may retain small amounts of fluorescein in the body for 48-72 hours post procedure  Samples containing fluorescein can produce falsely depressed TSH values  If the patient had this procedure,a specimen should be resubmitted post fluorescein clearance  (1) HEMOGLOBIN A1C [id number] 05:53PM [employer]     Test Name Result Flag Reference   HEMOGLOBIN A1C 6 6 % H 4 2-6 3   EST  AVG   GLUCOSE 143 mg/dl       (1) VITAMIN D 25-HYDROXY [id number] 05:53PM [facility]     Test Name Result Flag Reference   VIT D 25-HYDROX 24 5 ng/mL L 30 0-100 0       Plan  DM type 2 (diabetes mellitus, type 2)    · (1) HEMOGLOBIN A1C ; every 3 months; Last [id number]; Next [id number];  Status:Active   · Hemoglobin A1c- POC ; every 6 months; Next [id number]; Status:Active  Hyperlipidemia    · (1) LIPID PANEL, FASTING ; every 1 year; Last [id number]; Next [id number];  Status:Active    Discussion/Summary   Labs ok, except Vit D decreased  Cont present Tx and add Vit D 1000 IU daily

## 2018-01-12 NOTE — RESULT NOTES
Verified Results  (1) BUN [id number] 08:22PM [facility] Order Number: [id number]  TW Order Number: [id number] Order Number: [id number]     Test Name Result Flag Reference   BLOOD UREA NITROGEN 29 mg/dL H 5-25     (1) CREATININE [id number] 08:22PM [facility] Order Number: [id number]  TW Order Number: [id number] Order Number: [id number]     Test Name Result Flag Reference   CREATININE 1 50 mg/dL H 0 60-1 30   Standardized to IDMS reference method   eGFR Non-African American 45 5 ml/min/1 73sq m     National Kidney Disease Education Program recommendations are as follows:  GFR calculation is accurate only with a steady state creatinine  Chronic Kidney disease less than 60 ml/min/1 73 sq  meters  Kidney failure less than 15 ml/min/1 73 sq  meters  Discussion/Summary   Kidney function is stable  Recommend patient increase fluids in the form of water

## 2018-01-13 NOTE — RESULT NOTES
Verified Results  (Q) COMPREHENSIVE METABOLIC PNL W/ADJUSTED CALCIUM [id number] 01:35PM Mary Kate Rae     Test Name Result Flag Reference   GLUCOSE 108 mg/dL H 65-99   Fasting reference interval   UREA NITROGEN (BUN) 26 mg/dL H 7-25   CREATININE 1 41 mg/dL H 0 70-1 18   For patients >52years of age, the reference limit  for Creatinine is approximately 13% higher for people  identified as -American  eGFR NON-AFR  AMERICAN 48 mL/min/1 73m2 L > OR = 60   eGFR AFRICAN AMERICAN 55 mL/min/1 73m2 L > OR = 60   BUN/CREATININE RATIO 18 (calc)  6-22   SODIUM 143 mmol/L  135-146   POTASSIUM 5 0 mmol/L  3 5-5 3   CHLORIDE 106 mmol/L     CARBON DIOXIDE 29 mmol/L  20-31   CALCIUM 10 3 mg/dL  8 6-10 3   CALCIUM (ADJUSTED FOR$ALBUMIN) 10 4 mg/dL (calc) H 8 6-10 2   PROTEIN, TOTAL 7 3 g/dL  6 1-8 1   ALBUMIN 4 2 g/dL  3 6-5 1   GLOBULIN 3 1 g/dL (calc)  1 9-3 7   ALBUMIN/GLOBULIN RATIO 1 4 (calc)  1 0-2 5   BILIRUBIN, TOTAL 0 5 mg/dL  0 2-1 2   ALKALINE PHOSPHATASE 79 U/L     AST 14 U/L  10-35   ALT 9 U/L  9-46     (Q) LIPID PANEL WITH REFLEX TO DIRECT LDL [id number] 01:35PM Mary Kate Rae     Test Name Result Flag Reference   CHOLESTEROL, TOTAL 192 mg/dL  125-200   HDL CHOLESTEROL 54 mg/dL  > OR = 40   TRIGLICERIDES 505 mg/dL H <150   LDL-CHOLESTEROL 96 mg/dL (calc)  <130   Desirable range <100 mg/dL for patients with CHD or  diabetes and <70 mg/dL for diabetic patients with  known heart disease  CHOL/HDLC RATIO 3 6 (calc)  < OR = 5 0   NON HDL CHOLESTEROL 138 mg/dL (calc)     Target for non-HDL cholesterol is 30 mg/dL higher than   LDL cholesterol target  (Q) HEMOGLOBIN A1c [id number] 01:35PM Mary Kate Rae   REPORT COMMENT:  FASTING:YES     Test Name Result Flag Reference   HEMOGLOBIN A1c 6 0 % of total Hgb H <5 7   According to ADA guidelines, hemoglobin A1c <7 0%  represents optimal control in non-pregnant diabetic  patients  Different metrics may apply to specific  patient populations  Standards of Medical Care in    Diabetes Care  2013;36:s11-s66     For the purpose of screening for the presence of  diabetes  <5 7%       Consistent with the absence of diabetes  5 7-6 4%    Consistent with increased risk for diabetes              (prediabetes)  >or=6 5%    Consistent with diabetes     This assay result is consistent with an increased risk  of diabetes  Currently, no consensus exists for use of hemoglobin  A1c for diagnosis of diabetes for children  Discussion/Summary   Labs okay, kidney function improved  Continue present treatment

## 2018-01-14 VITALS
DIASTOLIC BLOOD PRESSURE: 94 MMHG | TEMPERATURE: 98 F | SYSTOLIC BLOOD PRESSURE: 150 MMHG | WEIGHT: 213 LBS | HEIGHT: 65 IN | RESPIRATION RATE: 16 BRPM | BODY MASS INDEX: 35.49 KG/M2 | HEART RATE: 76 BPM

## 2018-01-14 VITALS
TEMPERATURE: 97.4 F | SYSTOLIC BLOOD PRESSURE: 142 MMHG | DIASTOLIC BLOOD PRESSURE: 90 MMHG | BODY MASS INDEX: 35.99 KG/M2 | HEART RATE: 80 BPM | RESPIRATION RATE: 16 BRPM | HEIGHT: 65 IN | WEIGHT: 216 LBS

## 2018-01-14 VITALS
SYSTOLIC BLOOD PRESSURE: 138 MMHG | HEIGHT: 65 IN | HEART RATE: 76 BPM | RESPIRATION RATE: 16 BRPM | WEIGHT: 211 LBS | DIASTOLIC BLOOD PRESSURE: 84 MMHG | BODY MASS INDEX: 35.16 KG/M2 | TEMPERATURE: 98.1 F

## 2018-01-16 NOTE — RESULT NOTES
Verified Results  (1) URINE CULTURE [id number] 03:00PM Norma May Order Number: [id number]     Test Name Result Flag Reference   CLINICAL REPORT (Report)     Test:        Urine culture  Specimen Type:   Urine  Specimen Date:   11/4/2016 3:00 PM  Result Date:    11/8/2016 4:06 PM  Result Status:   Final result  Resulting Lab:   [place] [address]            Tel: [phone]      CULTURE                                       ------------------                                   No Growth <1000 cfu/mL     (1) HEMOGLOBIN A1C [id number] 02:42PM Noah     Test Name Result Flag Reference   HEMOGLOBIN A1C 5 9 %  4 2-6 3   EST  AVG   GLUCOSE 123 mg/dl       (1) URINALYSIS (will reflex a microscopy if leukocytes, occult blood, protein or nitrites are not within normal limits) [id number] 02:42PM Noah     Test Name Result Flag Reference   COLOR Dk Yellow     CLARITY Cloudy     SPECIFIC GRAVITY UA 1 020  1 003-1 030   PH UA 5 0  4 5-8 0   LEUKOCYTE ESTERASE UA Moderate A Negative   NITRITE UA Negative  Negative   PROTEIN UA 30 (1+) mg/dl A Negative   GLUCOSE UA Negative mg/dl  Negative   KETONES UA Trace mg/dl A Negative   UROBILINOGEN UA 0 2 E U /dl  0 2, 1 0 E U /dl   BILIRUBIN UA Negative  Negative   BLOOD UA Small A Negative   BACTERIA Occasional /hpf  None Seen, Occasional   EPITHELIAL CELLS Occasional /hpf  None Seen, Occasional   RBC UA 2-4 /hpf A None Seen   WBC UA 20-30 /hpf A None Seen     (1) CBC/ PLT (NO DIFF) [id number] 02:42PM Noah     Test Name Result Flag Reference   HEMATOCRIT 37 9 %  36 5-49 3   HEMOGLOBIN 12 7 g/dL  12 0-17 0   MCHC 33 5 g/dL  31 4-37 4   MCH 29 9 pg  26 8-34 3   MCV 89 fL  82-98   PLATELET COUNT 746 Thousands/uL  149-390   RBC COUNT 4 25 Million/uL  3 88-5 62   RDW 13 8 %  11 6-15 1   WBC COUNT 9 40 Thousand/uL  4 31-10 16   MPV 11 5 fL  8 9-12 7     (1) COMPREHENSIVE METABOLIC PANEL [id number] 11:73VG Lynsey Feldman     Test Name Result Flag Reference   GLUCOSE,RANDM 159 mg/dL H    If the patient is fasting, the ADA then defines impaired fasting glucose as > 100 mg/dL and diabetes as > or equal to 123 mg/dL  SODIUM 140 mmol/L  136-145   POTASSIUM 3 9 mmol/L  3 5-5 3   CHLORIDE 103 mmol/L  100-108   CARBON DIOXIDE 27 mmol/L  21-32   ANION GAP (CALC) 10 mmol/L  4-13   BLOOD UREA NITROGEN 32 mg/dL H 5-25   CREATININE 1 80 mg/dL H 0 60-1 30   Standardized to IDMS reference method   CALCIUM 9 7 mg/dL  8 3-10 1   BILI, TOTAL 0 50 mg/dL  0 20-1 00   ALK PHOSPHATAS 73 U/L     ALT (SGPT) 24 U/L  12-78   AST(SGOT) 28 U/L  5-45   ALBUMIN 3 5 g/dL  3 5-5 0   TOTAL PROTEIN 7 5 g/dL  6 4-8 2   eGFR Non-African American 36 8 ml/min/1 73sq [facility] Disease Education Program recommendations are as follows:  GFR calculation is accurate only with a steady state creatinine  Chronic Kidney disease less than 60 ml/min/1 73 sq  meters  Kidney failure less than 15 ml/min/1 73 sq  meters  Plan  DM type 2 (diabetes mellitus, type 2)    · (1) HEMOGLOBIN A1C ; every 3 months; Last [id number]; Next [id number];  Status:Active   · Hemoglobin A1c- POC ; every 6 months; Next [id number]; Status:Active    Discussion/Summary   Phone call to patient discussed lab results  Kidney function is slightly worse, overall diabetic control is improved and urine culture is negative  Recommend patient continue present treatment and increase fluids in the form of water  Recheck labs for kidney function in 6-8 weeks

## 2018-02-08 ENCOUNTER — OFFICE VISIT (OUTPATIENT)
Dept: FAMILY MEDICINE CLINIC | Facility: CLINIC | Age: 78
End: 2018-02-08
Payer: COMMERCIAL

## 2018-02-08 VITALS
SYSTOLIC BLOOD PRESSURE: 160 MMHG | BODY MASS INDEX: 35.32 KG/M2 | TEMPERATURE: 97.5 F | HEART RATE: 92 BPM | RESPIRATION RATE: 16 BRPM | HEIGHT: 65 IN | DIASTOLIC BLOOD PRESSURE: 88 MMHG | WEIGHT: 212 LBS

## 2018-02-08 DIAGNOSIS — E11.9 TYPE 2 DIABETES MELLITUS WITHOUT COMPLICATION, WITHOUT LONG-TERM CURRENT USE OF INSULIN (HCC): Primary | ICD-10-CM

## 2018-02-08 DIAGNOSIS — I10 ESSENTIAL HYPERTENSION: Primary | ICD-10-CM

## 2018-02-08 PROCEDURE — 99214 OFFICE O/P EST MOD 30 MIN: CPT | Performed by: FAMILY MEDICINE

## 2018-02-08 RX ORDER — HYDROCHLOROTHIAZIDE 25 MG/1
25 TABLET ORAL DAILY
Qty: 90 TABLET | Refills: 1 | Status: SHIPPED | OUTPATIENT
Start: 2018-02-08 | End: 2018-04-09 | Stop reason: SDUPTHER

## 2018-02-08 RX ORDER — LISINOPRIL 10 MG/1
20 TABLET ORAL DAILY
Qty: 90 TABLET | Refills: 1 | Status: SHIPPED | OUTPATIENT
Start: 2018-02-08 | End: 2018-02-13

## 2018-02-08 RX ORDER — CRANBERRY FRUIT EXTRACT 200 MG
CAPSULE ORAL
COMMUNITY
End: 2019-01-01 | Stop reason: HOSPADM

## 2018-02-08 RX ORDER — CARVEDILOL PHOSPHATE 40 MG/1
40 CAPSULE, EXTENDED RELEASE ORAL
COMMUNITY
Start: 2012-12-19 | End: 2018-03-12 | Stop reason: SDUPTHER

## 2018-02-08 RX ORDER — FINASTERIDE 5 MG/1
5 TABLET, FILM COATED ORAL
COMMUNITY
Start: 2012-12-19 | End: 2018-03-12 | Stop reason: SDUPTHER

## 2018-02-08 RX ORDER — ROSUVASTATIN CALCIUM 20 MG/1
1 TABLET, COATED ORAL DAILY
COMMUNITY
Start: 2011-10-31 | End: 2018-03-12 | Stop reason: SDUPTHER

## 2018-02-08 RX ORDER — OMEPRAZOLE 20 MG/1
20 CAPSULE, DELAYED RELEASE ORAL
COMMUNITY
Start: 2012-12-19 | End: 2018-03-12 | Stop reason: SDUPTHER

## 2018-02-08 RX ORDER — HYDROCHLOROTHIAZIDE 25 MG/1
1 TABLET ORAL DAILY
COMMUNITY
End: 2018-02-08 | Stop reason: SDUPTHER

## 2018-02-08 RX ORDER — MULTIVIT WITH MINERALS/LUTEIN
TABLET ORAL
COMMUNITY
End: 2019-01-01 | Stop reason: HOSPADM

## 2018-02-08 RX ORDER — LISINOPRIL 10 MG/1
1 TABLET ORAL DAILY
COMMUNITY
Start: 2011-10-31 | End: 2018-02-08 | Stop reason: SDUPTHER

## 2018-02-08 NOTE — PROGRESS NOTES
Assessment/Plan:  Discussed treatment options with patient  Patient will increase lisinopril to 20 mg daily and resume HCTZ 25 mg daily  Patient to follow a low-salt diet more carefully and monitor blood pressure at home  Patient to resume physical therapy program   Patient to return to the office in 2 months or sooner p r n  No problem-specific Assessment & Plan notes found for this encounter  Diagnoses and all orders for this visit:    Essential hypertension  Comments:  Blood pressure uncontrolled  Patient to increase lisinopril to 20 mg daily and resume HCTZ 25 mg daily  Low-salt diet  Monitor blood pressure at home  Orders:  -     hydrochlorothiazide (HYDRODIURIL) 25 mg tablet; Take 1 tablet (25 mg total) by mouth daily  -     lisinopril (ZESTRIL) 10 mg tablet; Take 2 tablets (20 mg total) by mouth daily    Other orders  -     glucose blood (WALI CONTOUR TEST) test strip; 1 Squirt by In Vitro route daily  -     carvedilol (COREG CR) 40 MG 24 hr capsule; Take 40 mg by mouth  -     Multiple Vitamins-Minerals (CENTRUM SILVER) tablet; Take by mouth  -     Cranberry 200 MG CAPS; Take by mouth  -     finasteride (PROSCAR) 5 mg tablet; Take 5 mg by mouth  -     sitaGLIPtin-metFORMIN (JANUMET)  MG per tablet; Take 1 tablet by mouth daily  -     Discontinue: hydrochlorothiazide (HYDRODIURIL) 25 mg tablet; Take 1 tablet by mouth daily  -     Discontinue: lisinopril (ZESTRIL) 10 mg tablet; Take 1 tablet by mouth daily  -     omeprazole (PRILOSEC) 20 mg delayed release capsule; Take 20 mg by mouth  -     rosuvastatin (CRESTOR) 20 MG tablet; Take 1 tablet by mouth daily          Vitals:    02/08/18 1142   BP: 170/100   Temp: 97 5 °F (36 4 °C)       Subjective:      Patient ID: Livan Rawls is a 65 y o  male  Patient is being seen today on an urgent basis regarding elevated blood pressure while at physical therapy earlier today in the range of 180/110  Patient is completely asymptomatic    In reviewing patient's medications he is unsure if he has been taking HCTZ daily as prescribed  Patient recently saw Dr Elena oHrne, orthopedic spine surgeon who referred patient back for more physical therapy  Hypertension   This is a chronic problem  The problem is uncontrolled  Pertinent negatives include no anxiety, blurred vision, chest pain, headaches, malaise/fatigue, neck pain, orthopnea, palpitations, peripheral edema, PND or shortness of breath  Risk factors for coronary artery disease include diabetes mellitus, dyslipidemia, family history, male gender and obesity  Past treatments include ACE inhibitors, beta blockers and diuretics  The current treatment provides moderate improvement  Compliance problems include diet and exercise  The following portions of the patient's history were reviewed and updated as appropriate: allergies, current medications, past family history, past medical history, past social history, past surgical history and problem list     Review of Systems   Constitutional: Negative for activity change, appetite change and malaise/fatigue  HENT: Negative  Eyes: Negative for blurred vision  Respiratory: Negative for chest tightness and shortness of breath  Cardiovascular: Negative for chest pain, palpitations, orthopnea, leg swelling and PND  Gastrointestinal: Negative for abdominal pain  Musculoskeletal: Positive for back pain and gait problem  Negative for neck pain  Neurological: Negative for dizziness, light-headedness and headaches  Hematological: Negative for adenopathy  Does not bruise/bleed easily  Psychiatric/Behavioral: The patient is not nervous/anxious  Objective:     Physical Exam   Constitutional: He is oriented to person, place, and time  He appears well-developed and well-nourished  No distress  HENT:   Head: Normocephalic  Mouth/Throat: Oropharynx is clear and moist    Eyes: Conjunctivae are normal    Neck: Neck supple   No thyromegaly present  Cardiovascular: Normal rate and regular rhythm  Pulmonary/Chest: Effort normal and breath sounds normal    Abdominal: Soft  There is no tenderness  Musculoskeletal: He exhibits no edema  Ambulates with cane   Lymphadenopathy:     He has no cervical adenopathy  Neurological: He is alert and oriented to person, place, and time  Skin: Skin is warm and dry  Psychiatric: He has a normal mood and affect

## 2018-02-08 NOTE — PATIENT INSTRUCTIONS
Patient instructed to check his medications at home and call with an update as to what he is taking regularly and any refills he may require  Patient instructed to increase lisinopril to 20 mg daily and resume HCTZ 25 mg daily  Patient to follow a low-salt diet and monitor blood pressure at home  Patient may resume physical therapy program   Patient to return to the office in 2 months for appointment and fasting labs

## 2018-02-13 ENCOUNTER — TELEPHONE (OUTPATIENT)
Dept: FAMILY MEDICINE CLINIC | Facility: CLINIC | Age: 78
End: 2018-02-13

## 2018-02-13 ENCOUNTER — OFFICE VISIT (OUTPATIENT)
Dept: FAMILY MEDICINE CLINIC | Facility: CLINIC | Age: 78
End: 2018-02-13
Payer: COMMERCIAL

## 2018-02-13 VITALS
HEIGHT: 65 IN | RESPIRATION RATE: 16 BRPM | BODY MASS INDEX: 35.82 KG/M2 | DIASTOLIC BLOOD PRESSURE: 84 MMHG | TEMPERATURE: 97.5 F | HEART RATE: 92 BPM | WEIGHT: 215 LBS | SYSTOLIC BLOOD PRESSURE: 144 MMHG

## 2018-02-13 DIAGNOSIS — I10 ESSENTIAL HYPERTENSION: Primary | ICD-10-CM

## 2018-02-13 PROCEDURE — 3008F BODY MASS INDEX DOCD: CPT | Performed by: FAMILY MEDICINE

## 2018-02-13 PROCEDURE — 99214 OFFICE O/P EST MOD 30 MIN: CPT | Performed by: FAMILY MEDICINE

## 2018-02-13 PROCEDURE — 93000 ELECTROCARDIOGRAM COMPLETE: CPT | Performed by: FAMILY MEDICINE

## 2018-02-13 RX ORDER — LISINOPRIL 20 MG/1
20 TABLET ORAL DAILY
Qty: 90 TABLET | Refills: 3 | Status: SHIPPED | OUTPATIENT
Start: 2018-02-13 | End: 2018-04-09 | Stop reason: SDUPTHER

## 2018-02-13 NOTE — PROGRESS NOTES
Assessment/Plan:  EKG reveals normal sinus rhythm with no acute changes  Discussed treatment options with patient and wife  Patient to increase lisinopril to 20 mg daily, continue Coreg 40 mg CR daily and HCTZ 25 mg daily  Follow a low-salt diet carefully  Patient to monitor his blood pressure daily at home  Recommend patient return to the office in 1-2 weeks for blood pressure check with the nursing staff and to bring in his home blood pressure monitor for comparisons  No problem-specific Assessment & Plan notes found for this encounter  Diagnoses and all orders for this visit:    Essential hypertension  Comments:  EKG reveals no acute changes  Increase lisinopril to 20 mg daily, continue Coreg ER 40 mg daily and HCTZ 25 mg daily  Low-salt diet  Monitor BP at home  Orders:  -     POCT ECG          Subjective:      Patient ID: Toni Hernandez is a 65 y o  male  Patient complains of blood pressure continuing to run high at home in the range of 160-180/100 and this morning states his blood pressure was 290 systolic at home  Patient recently obtained a new home blood pressure monitor  Patient states he has been completely asymptomatic  He denies headache, lightheaded or dizziness  He denies chest pain shortness of breath or palpitations  Since patient was last here he did restart HCTZ 25 mg daily although did not increase lisinopril from 10 mg daily to 20 mg a day as discussed  Hypertension   This is a chronic problem  The problem has been gradually worsening since onset  The problem is uncontrolled  Associated symptoms include anxiety  Pertinent negatives include no blurred vision, chest pain, headaches, malaise/fatigue, neck pain, orthopnea, palpitations, peripheral edema, PND or shortness of breath  There are no associated agents to hypertension  Past treatments include ACE inhibitors, beta blockers and diuretics         The following portions of the patient's history were reviewed and updated as appropriate: allergies, current medications, past family history, past medical history, past social history, past surgical history and problem list     Review of Systems   Constitutional: Negative for malaise/fatigue  Eyes: Negative for blurred vision  Respiratory: Negative for shortness of breath  Cardiovascular: Negative for chest pain, palpitations, orthopnea and PND  Musculoskeletal: Negative for neck pain  Neurological: Negative for headaches  Objective:    Vitals:    02/13/18 1317   Temp: 97 5 °F (36 4 °C)        Physical Exam   Constitutional: He is oriented to person, place, and time  He appears well-developed and well-nourished  No distress  HENT:   Mouth/Throat: Oropharynx is clear and moist    Eyes: Conjunctivae are normal    Neck: Neck supple  No JVD present  Cardiovascular: Normal rate and regular rhythm  Pulmonary/Chest: Effort normal and breath sounds normal    Abdominal: Soft  There is no tenderness  Musculoskeletal: He exhibits no edema  Lymphadenopathy:     He has no cervical adenopathy  Neurological: He is alert and oriented to person, place, and time  Skin: Skin is warm and dry  Psychiatric: He has a normal mood and affect

## 2018-02-13 NOTE — PATIENT INSTRUCTIONS
Patient instructed to increase lisinopril to 20 mg daily  Continue Coreg CR 40 mg daily and HCTZ 25 mg daily  Follow a low-salt diet carefully  Monitor your blood pressure at home daily and call if greater than 160/100  Set up a time for a nurse visit for blood pressure check and to compare it against your blood pressure monitor in the next 1-2 weeks  Return to the office as scheduled for follow-up appointment

## 2018-02-14 DIAGNOSIS — E11.9 TYPE 2 DIABETES MELLITUS WITHOUT COMPLICATION, WITHOUT LONG-TERM CURRENT USE OF INSULIN (HCC): ICD-10-CM

## 2018-02-16 ENCOUNTER — OFFICE VISIT (OUTPATIENT)
Dept: UROLOGY | Facility: MEDICAL CENTER | Age: 78
End: 2018-02-16
Payer: COMMERCIAL

## 2018-02-16 VITALS
HEIGHT: 66 IN | SYSTOLIC BLOOD PRESSURE: 144 MMHG | WEIGHT: 211 LBS | BODY MASS INDEX: 33.91 KG/M2 | DIASTOLIC BLOOD PRESSURE: 90 MMHG

## 2018-02-16 DIAGNOSIS — E11.9 TYPE 2 DIABETES MELLITUS WITHOUT COMPLICATION, WITHOUT LONG-TERM CURRENT USE OF INSULIN (HCC): ICD-10-CM

## 2018-02-16 DIAGNOSIS — N31.8 OTHER NEUROMUSCULAR DYSFUNCTION OF BLADDER: Primary | ICD-10-CM

## 2018-02-16 DIAGNOSIS — R97.20 ELEVATED PROSTATE SPECIFIC ANTIGEN (PSA): ICD-10-CM

## 2018-02-16 LAB
SL AMB  POCT GLUCOSE, UA: ABNORMAL
SL AMB LEUKOCYTE ESTERASE,UA: ABNORMAL
SL AMB POCT BILIRUBIN,UA: ABNORMAL
SL AMB POCT BLOOD,UA: ABNORMAL
SL AMB POCT CLARITY,UA: CLEAR
SL AMB POCT COLOR,UA: YELLOW
SL AMB POCT KETONES,UA: ABNORMAL
SL AMB POCT NITRITE,UA: ABNORMAL
SL AMB POCT PH,UA: 5.5
SL AMB POCT SPECIFIC GRAVITY,UA: 1.01
SL AMB POCT URINE PROTEIN: 30
SL AMB POCT UROBILINOGEN: 0.2

## 2018-02-16 PROCEDURE — 99214 OFFICE O/P EST MOD 30 MIN: CPT | Performed by: UROLOGY

## 2018-02-16 PROCEDURE — 81003 URINALYSIS AUTO W/O SCOPE: CPT | Performed by: UROLOGY

## 2018-02-16 NOTE — TELEPHONE ENCOUNTER
This was originally called into the wrong pharmacy and can not be phoned in  [facility] called and in order for medicare to pay for this prescription it needs to state how often he tests (once daily) as well as a diagnosis code       Needs to be [facility]prescribed to [facility] in [place]

## 2018-02-16 NOTE — PROGRESS NOTES
[address] [facility]  [facility]  [address] [facility] [place]  [place], [address] [facility]  [phone]  www  [facility]  org      NAME: Charlene Brunner  AGE: 65 y o  SEX: male  : 1940   MRN: [id number]    DATE: 2018  TIME: 9:48 AM    Assessment and Plan:    Neurogenic bladder as below  BPH with obstruction  History of elevated PSA  Continue with Proscar, catheterization at home, check PSA and send results  Follow-up in 1 year with a renal ultrasound  Chief Complaint   No chief complaint on file  History of Present Illness   Neurogenic bladder with urinary retention and hostile noncompliant bladder on straight catheterization secondary to APR in , catheterizes at least 5 times a day  Dry between catheterizations  He has BPH with obstruction seen on cystoscopy last year as well  Infections  Continues with finasteride to facilitate catheterizations a reduced bleeding  No recent urinary tract infection  Had renal ultrasound 2016 which was normal   Is having problems with poorly controlled hypertension  History of elevated PSA, status post transperineal prostate biopsy which was negative  The following portions of the patient's history were reviewed and updated as appropriate: allergies, current medications, past family history, past medical history, past social history, past surgical history and problem list     Review of Systems   Review of Systems    Active Problem List   There is no problem list on file for this patient  Objective   /90 (BP Location: Right arm, Patient Position: Sitting)   Ht 5' 6" (1 676 m)   Wt 95 7 kg (211 lb)   BMI 34 06 kg/m²     Physical Exam   Constitutional: He is oriented to person, place, and time  He appears well-developed and well-nourished  HENT:   Head: Normocephalic and atraumatic  Eyes: EOM are normal    Neck: Normal range of motion     Pulmonary/Chest: Effort normal  Musculoskeletal:   Uses cane   Neurological: He is alert and oriented to person, place, and time  Skin: Skin is warm and dry  Psychiatric: He has a normal mood and affect   His behavior is normal  Judgment and thought content normal        Pertinent Laboratory/Diagnostic Studies:  CBC:   Lab Results   Component Value Date/Time    WBC TNP 10/06/2017 12:00 AM    RBC 4 25 11/04/2016 02:42 PM    RBC 4 54 05/05/2015 11:44 AM    HGB 12 7 11/04/2016 02:42 PM    HGB 13 0 05/05/2015 11:44 AM    HCT 37 9 11/04/2016 02:42 PM    HCT 39 1 05/05/2015 11:44 AM    MCV 89 11/04/2016 02:42 PM    MCV 86 05/05/2015 11:44 AM    MCH 29 9 11/04/2016 02:42 PM    MCH 28 6 05/05/2015 11:44 AM    MCHC 33 5 11/04/2016 02:42 PM    MCHC 33 2 05/05/2015 11:44 AM    RDW 13 8 11/04/2016 02:42 PM    RDW 13 7 05/05/2015 11:44 AM    MPV 11 5 11/04/2016 02:42 PM    MPV 11 1 05/05/2015 11:44 AM     11/04/2016 02:42 PM     05/05/2015 11:44 AM    NRBC 0 05/13/2016 05:53 PM    NEUTOPHILPCT 59 05/13/2016 05:53 PM    NEUTOPHILPCT 71 05/05/2015 11:44 AM    LYMPHOPCT 30 05/13/2016 05:53 PM    LYMPHOPCT 22 05/05/2015 11:44 AM    MONOPCT 9 05/13/2016 05:53 PM    MONOPCT 5 05/05/2015 11:44 AM    EOSPCT 2 05/13/2016 05:53 PM    EOSPCT 2 05/05/2015 11:44 AM    BASOPCT 0 05/13/2016 05:53 PM    BASOPCT 1 09/05/2013 06:07 PM    NEUTROABS 3 54 05/13/2016 05:53 PM    NEUTROABS 5 69 05/05/2015 11:44 AM    LYMPHSABS 1 82 05/13/2016 05:53 PM    LYMPHSABS 1 76 05/05/2015 11:44 AM    MONOSABS 0 56 05/13/2016 05:53 PM    MONOSABS 0 40 05/05/2015 11:44 AM    EOSABS 0 12 05/13/2016 05:53 PM    EOSABS 0 16 05/05/2015 11:44 AM     Chemistry Profile:   Lab Results   Component Value Date/Time     03/08/2017 01:35 PM    K 5 0 03/08/2017 01:35 PM     03/08/2017 01:35 PM    CO2 29 03/08/2017 01:35 PM    ANIONGAP 10 11/04/2016 02:42 PM    ANIONGAP 5 08/11/2015 09:25 AM    BUN 26 (H) 03/08/2017 01:35 PM    CREATININE 1 41 (H) 03/08/2017 01:35 PM    GLUCOSE 159 (H) 11/04/2016 02:42 PM    GLUCOSE 124 08/11/2015 09:25 AM    SLAMBGLUCOSE NEG 02/16/2018 09:48 AM    CALCIUM 10 3 03/08/2017 01:35 PM    AST 14 03/08/2017 01:35 PM    ALT 9 03/08/2017 01:35 PM    ALKPHOS 79 03/08/2017 01:35 PM    PROT 7 3 03/08/2017 01:35 PM    BILITOT 0 5 03/08/2017 01:35 PM    EGFR 36 8 11/04/2016 02:42 PM       Current Medications     Current Outpatient Prescriptions:     carvedilol (COREG CR) 40 MG 24 hr capsule, Take 40 mg by mouth, Disp: , Rfl:     Cranberry 200 MG CAPS, Take by mouth, Disp: , Rfl:     finasteride (PROSCAR) 5 mg tablet, Take 5 mg by mouth, Disp: , Rfl:     glucose blood (WALI CONTOUR TEST) test strip, 1 each by Other route daily, Disp: 100 each, Rfl: 3    hydrochlorothiazide (HYDRODIURIL) 25 mg tablet, Take 1 tablet (25 mg total) by mouth daily, Disp: 90 tablet, Rfl: 1    lisinopril (ZESTRIL) 20 mg tablet, Take 1 tablet (20 mg total) by mouth daily, Disp: 90 tablet, Rfl: 3    Multiple Vitamins-Minerals (CENTRUM SILVER) tablet, Take by mouth, Disp: , Rfl:     omeprazole (PRILOSEC) 20 mg delayed release capsule, Take 20 mg by mouth, Disp: , Rfl:     ONE TOUCH ULTRA TEST test strip, Use as instructed, Disp: 100 each, Rfl: 3    rosuvastatin (CRESTOR) 20 MG tablet, Take 1 tablet by mouth daily, Disp: , Rfl:     sitaGLIPtin-metFORMIN (JANUMET)  MG per tablet, Take 1 tablet by mouth daily, Disp: , Rfl:         Lucas Coley MD

## 2018-02-16 NOTE — LETTER
2018     [address]ath[address]    Patient: Gerard Martínez   YOB: 1940   Date of Visit: 2018       Dear Dr Noelle Martinez: Thank you for referring Torito Post to me for evaluation  Below are my notes for this consultation  If you have questions, please do not hesitate to call me  I look forward to following your patient along with you  Sincerely,        Nelia Moreno MD        CC: No Recipients  Nelia Moreno MD  2018 10:01 AM  Sign at close encounter  [address] [facility]  [address], [address] [place] [phone]  www  [facility]  org      NAME: Gerard Martínez  AGE: 65 y o  SEX: male  : 1940   MRN: [id number]    DATE: 2018  TIME: 9:48 AM    Assessment and Plan:    Neurogenic bladder as below  BPH with obstruction  History of elevated PSA  Continue with Proscar, catheterization at home, check PSA and send results  Follow-up in 1 year with a renal ultrasound  Chief Complaint   No chief complaint on file  History of Present Illness   Neurogenic bladder with urinary retention and hostile noncompliant bladder on straight catheterization secondary to APR in , catheterizes at least 5 times a day  Dry between catheterizations  He has BPH with obstruction seen on cystoscopy last year as well  Infections  Continues with finasteride to facilitate catheterizations a reduced bleeding  No recent urinary tract infection  Had renal ultrasound 2016 which was normal   Is having problems with poorly controlled hypertension  History of elevated PSA, status post transperineal prostate biopsy which was negative           The following portions of the patient's history were reviewed and updated as appropriate: allergies, current medications, past family history, past medical history, past social history, past surgical history and problem list     Review of Systems   Review of Systems    Active Problem List   There is no problem list on file for this patient  Objective   /90 (BP Location: Right arm, Patient Position: Sitting)   Ht 5' 6" (1 676 m)   Wt 95 7 kg (211 lb)   BMI 34 06 kg/m²      Physical Exam   Constitutional: He is oriented to person, place, and time  He appears well-developed and well-nourished  HENT:   Head: Normocephalic and atraumatic  Eyes: EOM are normal    Neck: Normal range of motion  Pulmonary/Chest: Effort normal    Musculoskeletal:   Uses cane   Neurological: He is alert and oriented to person, place, and time  Skin: Skin is warm and dry  Psychiatric: He has a normal mood and affect   His behavior is normal  Judgment and thought content normal        Pertinent Laboratory/Diagnostic Studies:  CBC:   Lab Results   Component Value Date/Time    WBC TNP 10/06/2017 12:00 AM    RBC 4 25 11/04/2016 02:42 PM    RBC 4 54 05/05/2015 11:44 AM    HGB 12 7 11/04/2016 02:42 PM    HGB 13 0 05/05/2015 11:44 AM    HCT 37 9 11/04/2016 02:42 PM    HCT 39 1 05/05/2015 11:44 AM    MCV 89 11/04/2016 02:42 PM    MCV 86 05/05/2015 11:44 AM    MCH 29 9 11/04/2016 02:42 PM    MCH 28 6 05/05/2015 11:44 AM    MCHC 33 5 11/04/2016 02:42 PM    MCHC 33 2 05/05/2015 11:44 AM    RDW 13 8 11/04/2016 02:42 PM    RDW 13 7 05/05/2015 11:44 AM    MPV 11 5 11/04/2016 02:42 PM    MPV 11 1 05/05/2015 11:44 AM     11/04/2016 02:42 PM     05/05/2015 11:44 AM    NRBC 0 05/13/2016 05:53 PM    NEUTOPHILPCT 59 05/13/2016 05:53 PM    NEUTOPHILPCT 71 05/05/2015 11:44 AM    LYMPHOPCT 30 05/13/2016 05:53 PM    LYMPHOPCT 22 05/05/2015 11:44 AM    MONOPCT 9 05/13/2016 05:53 PM    MONOPCT 5 05/05/2015 11:44 AM    EOSPCT 2 05/13/2016 05:53 PM    EOSPCT 2 05/05/2015 11:44 AM    BASOPCT 0 05/13/2016 05:53 PM    BASOPCT 1 09/05/2013 06:07 PM    NEUTROABS 3 54 05/13/2016 05:53 PM    NEUTROABS 5 69 05/05/2015 11:44 AM    LYMPHSABS 1 82 05/13/2016 05:53 PM    LYMPHSABS 1 76 05/05/2015 11:44 AM    MONOSABS 0 56 05/13/2016 05:53 PM    MONOSABS 0 40 05/05/2015 11:44 AM    EOSABS 0 12 05/13/2016 05:53 PM    EOSABS 0 16 05/05/2015 11:44 AM     Chemistry Profile:   Lab Results   Component Value Date/Time     03/08/2017 01:35 PM    K 5 0 03/08/2017 01:35 PM     03/08/2017 01:35 PM    CO2 29 03/08/2017 01:35 PM    ANIONGAP 10 11/04/2016 02:42 PM    ANIONGAP 5 08/11/2015 09:25 AM    BUN 26 (H) 03/08/2017 01:35 PM    CREATININE 1 41 (H) 03/08/2017 01:35 PM    GLUCOSE 159 (H) 11/04/2016 02:42 PM    GLUCOSE 124 08/11/2015 09:25 AM    SLAMBGLUCOSE NEG 02/16/2018 09:48 AM    CALCIUM 10 3 03/08/2017 01:35 PM    AST 14 03/08/2017 01:35 PM    ALT 9 03/08/2017 01:35 PM    ALKPHOS 79 03/08/2017 01:35 PM    PROT 7 3 03/08/2017 01:35 PM    BILITOT 0 5 03/08/2017 01:35 PM    EGFR 36 8 11/04/2016 02:42 PM       Current Medications     Current Outpatient Prescriptions:     carvedilol (COREG CR) 40 MG 24 hr capsule, Take 40 mg by mouth, Disp: , Rfl:     Cranberry 200 MG CAPS, Take by mouth, Disp: , Rfl:     finasteride (PROSCAR) 5 mg tablet, Take 5 mg by mouth, Disp: , Rfl:     glucose blood (WALI CONTOUR TEST) test strip, 1 each by Other route daily, Disp: 100 each, Rfl: 3    hydrochlorothiazide (HYDRODIURIL) 25 mg tablet, Take 1 tablet (25 mg total) by mouth daily, Disp: 90 tablet, Rfl: 1    lisinopril (ZESTRIL) 20 mg tablet, Take 1 tablet (20 mg total) by mouth daily, Disp: 90 tablet, Rfl: 3    Multiple Vitamins-Minerals (CENTRUM SILVER) tablet, Take by mouth, Disp: , Rfl:     omeprazole (PRILOSEC) 20 mg delayed release capsule, Take 20 mg by mouth, Disp: , Rfl:     ONE TOUCH ULTRA TEST test strip, Use as instructed, Disp: 100 each, Rfl: 3    rosuvastatin (CRESTOR) 20 MG tablet, Take 1 tablet by mouth daily, Disp: , Rfl:     sitaGLIPtin-metFORMIN (JANUMET)  MG per tablet, Take 1 tablet by mouth daily, Disp: , Rfl:         Sherryle Alice, MD

## 2018-02-19 DIAGNOSIS — E11.9 TYPE 2 DIABETES MELLITUS WITHOUT COMPLICATION, WITHOUT LONG-TERM CURRENT USE OF INSULIN (HCC): ICD-10-CM

## 2018-03-12 DIAGNOSIS — N13.8 BPH WITH URINARY OBSTRUCTION: ICD-10-CM

## 2018-03-12 DIAGNOSIS — N40.1 BPH WITH URINARY OBSTRUCTION: ICD-10-CM

## 2018-03-12 DIAGNOSIS — E11.9 TYPE 2 DIABETES MELLITUS WITHOUT COMPLICATION, WITHOUT LONG-TERM CURRENT USE OF INSULIN (HCC): ICD-10-CM

## 2018-03-12 DIAGNOSIS — K21.9 GASTROESOPHAGEAL REFLUX DISEASE WITHOUT ESOPHAGITIS: ICD-10-CM

## 2018-03-12 DIAGNOSIS — E78.5 HYPERLIPIDEMIA, UNSPECIFIED HYPERLIPIDEMIA TYPE: ICD-10-CM

## 2018-03-12 DIAGNOSIS — I10 ESSENTIAL HYPERTENSION: Primary | ICD-10-CM

## 2018-03-12 RX ORDER — ROSUVASTATIN CALCIUM 20 MG/1
20 TABLET, COATED ORAL DAILY
Qty: 90 TABLET | Refills: 0 | Status: SHIPPED | OUTPATIENT
Start: 2018-03-12 | End: 2018-03-19 | Stop reason: SDUPTHER

## 2018-03-12 RX ORDER — OMEPRAZOLE 20 MG/1
20 CAPSULE, DELAYED RELEASE ORAL DAILY
Qty: 90 CAPSULE | Refills: 0 | Status: SHIPPED | OUTPATIENT
Start: 2018-03-12 | End: 2018-03-19 | Stop reason: SDUPTHER

## 2018-03-12 RX ORDER — FINASTERIDE 5 MG/1
5 TABLET, FILM COATED ORAL DAILY
Qty: 90 TABLET | Refills: 0 | Status: SHIPPED | OUTPATIENT
Start: 2018-03-12 | End: 2018-03-19 | Stop reason: SDUPTHER

## 2018-03-12 RX ORDER — CARVEDILOL PHOSPHATE 40 MG/1
40 CAPSULE, EXTENDED RELEASE ORAL DAILY
Qty: 90 CAPSULE | Refills: 0 | Status: SHIPPED | OUTPATIENT
Start: 2018-03-12 | End: 2018-03-19 | Stop reason: SDUPTHER

## 2018-03-19 DIAGNOSIS — E11.9 TYPE 2 DIABETES MELLITUS WITHOUT COMPLICATION, WITHOUT LONG-TERM CURRENT USE OF INSULIN (HCC): ICD-10-CM

## 2018-03-19 DIAGNOSIS — N40.1 BPH WITH URINARY OBSTRUCTION: ICD-10-CM

## 2018-03-19 DIAGNOSIS — I10 ESSENTIAL HYPERTENSION: ICD-10-CM

## 2018-03-19 DIAGNOSIS — E78.5 HYPERLIPIDEMIA, UNSPECIFIED HYPERLIPIDEMIA TYPE: ICD-10-CM

## 2018-03-19 DIAGNOSIS — K21.9 GASTROESOPHAGEAL REFLUX DISEASE WITHOUT ESOPHAGITIS: ICD-10-CM

## 2018-03-19 DIAGNOSIS — N13.8 BPH WITH URINARY OBSTRUCTION: ICD-10-CM

## 2018-03-19 RX ORDER — SITAGLIPTIN AND METFORMIN HYDROCHLORIDE 1000; 50 MG/1; MG/1
TABLET, FILM COATED ORAL
Qty: 90 TABLET | Refills: 3 | Status: SHIPPED | OUTPATIENT
Start: 2018-03-19 | End: 2018-01-01 | Stop reason: SDUPTHER

## 2018-03-19 RX ORDER — ROSUVASTATIN CALCIUM 20 MG/1
TABLET, COATED ORAL
Qty: 90 TABLET | Refills: 3 | Status: SHIPPED | OUTPATIENT
Start: 2018-03-19 | End: 2018-01-01 | Stop reason: SDUPTHER

## 2018-03-19 RX ORDER — OMEPRAZOLE 20 MG/1
CAPSULE, DELAYED RELEASE ORAL
Qty: 90 CAPSULE | Refills: 3 | Status: SHIPPED | OUTPATIENT
Start: 2018-03-19 | End: 2018-01-01 | Stop reason: SDUPTHER

## 2018-03-19 RX ORDER — FINASTERIDE 5 MG/1
TABLET, FILM COATED ORAL
Qty: 90 TABLET | Refills: 3 | Status: SHIPPED | OUTPATIENT
Start: 2018-03-19 | End: 2018-01-01 | Stop reason: SDUPTHER

## 2018-03-19 RX ORDER — LISINOPRIL 10 MG/1
TABLET ORAL
Qty: 90 TABLET | Refills: 1 | Status: SHIPPED | OUTPATIENT
Start: 2018-03-19 | End: 2018-04-09

## 2018-03-19 RX ORDER — CARVEDILOL PHOSPHATE 40 MG/1
CAPSULE, EXTENDED RELEASE ORAL
Qty: 90 CAPSULE | Refills: 3 | Status: SHIPPED | OUTPATIENT
Start: 2018-03-19 | End: 2018-04-09

## 2018-04-08 PROBLEM — E55.9 VITAMIN D DEFICIENCY: Status: ACTIVE | Noted: 2017-10-06

## 2018-04-09 ENCOUNTER — OFFICE VISIT (OUTPATIENT)
Dept: FAMILY MEDICINE CLINIC | Facility: CLINIC | Age: 78
End: 2018-04-09
Payer: COMMERCIAL

## 2018-04-09 VITALS
DIASTOLIC BLOOD PRESSURE: 88 MMHG | HEIGHT: 66 IN | BODY MASS INDEX: 34.07 KG/M2 | HEART RATE: 72 BPM | RESPIRATION RATE: 16 BRPM | SYSTOLIC BLOOD PRESSURE: 142 MMHG | TEMPERATURE: 97.7 F | WEIGHT: 212 LBS

## 2018-04-09 DIAGNOSIS — E11.9 TYPE 2 DIABETES MELLITUS WITHOUT COMPLICATION, WITHOUT LONG-TERM CURRENT USE OF INSULIN (HCC): ICD-10-CM

## 2018-04-09 DIAGNOSIS — E78.5 HYPERLIPIDEMIA, UNSPECIFIED HYPERLIPIDEMIA TYPE: ICD-10-CM

## 2018-04-09 DIAGNOSIS — I10 BENIGN ESSENTIAL HYPERTENSION: ICD-10-CM

## 2018-04-09 DIAGNOSIS — K21.9 GERD WITHOUT ESOPHAGITIS: ICD-10-CM

## 2018-04-09 DIAGNOSIS — I10 ESSENTIAL HYPERTENSION: ICD-10-CM

## 2018-04-09 DIAGNOSIS — I10 ESSENTIAL HYPERTENSION: Primary | ICD-10-CM

## 2018-04-09 DIAGNOSIS — E55.9 VITAMIN D DEFICIENCY: ICD-10-CM

## 2018-04-09 LAB
ALBUMIN SERPL BCP-MCNC: 3.7 G/DL (ref 3.5–5)
ALP SERPL-CCNC: 82 U/L (ref 46–116)
ALT SERPL W P-5'-P-CCNC: 8 U/L (ref 12–78)
ANION GAP SERPL CALCULATED.3IONS-SCNC: 6 MMOL/L (ref 4–13)
AST SERPL W P-5'-P-CCNC: 14 U/L (ref 5–45)
BILIRUB SERPL-MCNC: 0.63 MG/DL (ref 0.2–1)
BUN SERPL-MCNC: 25 MG/DL (ref 5–25)
CALCIUM SERPL-MCNC: 9.2 MG/DL
CHLORIDE SERPL-SCNC: 107 MMOL/L (ref 100–108)
CHOLEST SERPL-MCNC: 192 MG/DL (ref 50–200)
CO2 SERPL-SCNC: 28 MMOL/L (ref 21–32)
CREAT SERPL-MCNC: 1.46 MG/DL (ref 0.6–1.3)
EST. AVERAGE GLUCOSE BLD GHB EST-MCNC: 146 MG/DL
GFR SERPL CREATININE-BSD FRML MDRD: 45 ML/MIN/1.73SQ M
GLUCOSE P FAST SERPL-MCNC: 136 MG/DL (ref 65–99)
HBA1C MFR BLD: 6.7 % (ref 4.2–6.3)
HDLC SERPL-MCNC: 46 MG/DL (ref 40–60)
LDLC SERPL CALC-MCNC: 79 MG/DL (ref 0–100)
NONHDLC SERPL-MCNC: 146 MG/DL
POTASSIUM SERPL-SCNC: 4.2 MMOL/L (ref 3.5–5.3)
PROT SERPL-MCNC: 7.6 G/DL (ref 6.4–8.2)
SODIUM SERPL-SCNC: 141 MMOL/L (ref 136–145)
TRIGL SERPL-MCNC: 335 MG/DL

## 2018-04-09 PROCEDURE — 83036 HEMOGLOBIN GLYCOSYLATED A1C: CPT | Performed by: FAMILY MEDICINE

## 2018-04-09 PROCEDURE — 3725F SCREEN DEPRESSION PERFORMED: CPT | Performed by: FAMILY MEDICINE

## 2018-04-09 PROCEDURE — 99214 OFFICE O/P EST MOD 30 MIN: CPT | Performed by: FAMILY MEDICINE

## 2018-04-09 PROCEDURE — 36415 COLL VENOUS BLD VENIPUNCTURE: CPT | Performed by: FAMILY MEDICINE

## 2018-04-09 PROCEDURE — 80053 COMPREHEN METABOLIC PANEL: CPT | Performed by: FAMILY MEDICINE

## 2018-04-09 PROCEDURE — 80061 LIPID PANEL: CPT | Performed by: FAMILY MEDICINE

## 2018-04-09 RX ORDER — CARVEDILOL PHOSPHATE 40 MG/1
40 CAPSULE, EXTENDED RELEASE ORAL
COMMUNITY
Start: 2012-12-19 | End: 2018-01-01 | Stop reason: SDUPTHER

## 2018-04-09 RX ORDER — HYDROCHLOROTHIAZIDE 25 MG/1
25 TABLET ORAL
COMMUNITY
Start: 2012-12-19 | End: 2018-01-01 | Stop reason: SDUPTHER

## 2018-04-09 RX ORDER — LISINOPRIL 20 MG/1
20 TABLET ORAL DAILY
Qty: 90 TABLET | Refills: 3 | Status: SHIPPED | OUTPATIENT
Start: 2018-04-09 | End: 2019-01-01 | Stop reason: SDUPTHER

## 2018-04-09 RX ORDER — FINASTERIDE 5 MG/1
5 TABLET, FILM COATED ORAL
COMMUNITY
Start: 2012-12-19 | End: 2018-01-01

## 2018-04-09 RX ORDER — LISINOPRIL 20 MG/1
20 TABLET ORAL
COMMUNITY
End: 2018-01-01

## 2018-04-09 RX ORDER — OMEPRAZOLE 20 MG/1
20 CAPSULE, DELAYED RELEASE ORAL
COMMUNITY
Start: 2012-12-19 | End: 2018-01-01 | Stop reason: SDUPTHER

## 2018-04-09 RX ORDER — HYDROCHLOROTHIAZIDE 25 MG/1
25 TABLET ORAL DAILY
Qty: 90 TABLET | Refills: 3 | Status: SHIPPED | OUTPATIENT
Start: 2018-04-09 | End: 2019-01-01 | Stop reason: SDUPTHER

## 2018-04-09 RX ORDER — CRANBERRY FRUIT EXTRACT 200 MG
200 CAPSULE ORAL
COMMUNITY
Start: 2012-12-19 | End: 2018-01-01

## 2018-04-09 RX ORDER — ROSUVASTATIN CALCIUM 20 MG/1
20 TABLET, COATED ORAL
COMMUNITY
Start: 2012-12-19 | End: 2018-01-01 | Stop reason: SDUPTHER

## 2018-04-09 NOTE — ASSESSMENT & PLAN NOTE
Fasting labs drawn  Diabetes well controlled with last hemoglobin A1c at 6 1  Continue present treatment  Follow low sugar and low-carbohydrate diet carefully and get regular exercise with physical therapy  Continue home glucose monitoring

## 2018-04-09 NOTE — PROGRESS NOTES
Assessment/Plan:  Fasting labs drawn for CMP, lipid profile and hemoglobin A1c  Continue present treatment  Return to the office in 6 months  DM type 2 (diabetes mellitus, type 2) (HCC)  Fasting labs drawn  Diabetes well controlled with last hemoglobin A1c at 6 1  Continue present treatment  Follow low sugar and low-carbohydrate diet carefully and get regular exercise with physical therapy  Continue home glucose monitoring  Benign essential hypertension  BP improved fairly well controlled  Continue present treatment and follow a low-salt diet more carefully  Hyperlipidemia  Fasting lipid profile obtained  Lipids at goal   Continue present treatment and follow a low-fat low-cholesterol diet  Diagnoses and all orders for this visit:    Essential hypertension  -     lisinopril (ZESTRIL) 20 mg tablet; Take 1 tablet (20 mg total) by mouth daily  -     hydrochlorothiazide (HYDRODIURIL) 25 mg tablet; Take 1 tablet (25 mg total) by mouth daily  -     Comprehensive metabolic panel    Essential hypertension  Comments:  Blood pressure uncontrolled  Patient to increase lisinopril to 20 mg daily and resume HCTZ 25 mg daily  Low-salt diet  Monitor blood pressure at home  Orders:  -     lisinopril (ZESTRIL) 20 mg tablet; Take 1 tablet (20 mg total) by mouth daily  -     hydrochlorothiazide (HYDRODIURIL) 25 mg tablet; Take 1 tablet (25 mg total) by mouth daily  -     Comprehensive metabolic panel    Type 2 diabetes mellitus without complication, without long-term current use of insulin ([facility])  -     Comprehensive metabolic panel  -     HEMOGLOBIN A1C W/ EAG ESTIMATION    Benign essential hypertension    Hyperlipidemia, unspecified hyperlipidemia type  -     Lipid panel    GERD without esophagitis    Vitamin D deficiency    Other orders  -     carbidopa-levodopa (SINEMET)  mg per tablet; Take 1 5 tablets by mouth 3 (three) times a day  -     carvedilol (COREG CR) 40 MG 24 hr capsule;  Take 40 mg by mouth  -     Cranberry 200 MG CAPS; Take 200 mg by mouth  -     finasteride (PROSCAR) 5 mg tablet; Take 5 mg by mouth  -     hydrochlorothiazide (HYDRODIURIL) 25 mg tablet; Take 25 mg by mouth  -     lisinopril (ZESTRIL) 20 mg tablet; Take 20 mg by mouth  -     omeprazole (PRILOSEC) 20 mg delayed release capsule; Take 20 mg by mouth  -     rosuvastatin (CRESTOR) 20 MG tablet; Take 20 mg by mouth  -     SITagliptin-MetFORMIN HCl ER  MG TB24; Take 50-1,000 mg by mouth          Subjective:      Patient ID: Veena De La Torre is a 65 y o  male  Patient is here for routine appointment chronic conditions and fasting labs  Patient has been feeling fairly well overall and continues at physical therapy twice a week at [facility]  Home glucose monitoring reveals fasting blood sugars 140-170  Patient is up-to-date on diabetic eye exam and foot exam   Patient follows with his urologist regularly and has been seeing Neurology  Diabetes   He presents for his follow-up diabetic visit  He has type 2 diabetes mellitus  His disease course has been stable  There are no hypoglycemic associated symptoms  Pertinent negatives for hypoglycemia include no headaches  Pertinent negatives for diabetes include no blurred vision, no chest pain, no fatigue, no foot paresthesias, no foot ulcerations, no polydipsia, no polyuria, no weakness and no weight loss  There are no hypoglycemic complications  Symptoms are improving  Pertinent negatives for diabetic complications include no CVA  Risk factors for coronary artery disease include diabetes mellitus, dyslipidemia, family history, hypertension, male sex, obesity and tobacco exposure  Current diabetic treatment includes oral agent (dual therapy)  He is compliant with treatment most of the time  His weight is stable  He is following a generally healthy diet  He participates in exercise intermittently  His home blood glucose trend is decreasing steadily   His breakfast blood glucose is taken between 8-9 am  His breakfast blood glucose range is generally 140-180 mg/dl  An ACE inhibitor/angiotensin II receptor blocker is being taken  He sees a podiatrist Eye exam is current  Hypertension   This is a chronic problem  The problem is controlled  Pertinent negatives include no anxiety, blurred vision, chest pain, headaches, malaise/fatigue, orthopnea, palpitations, peripheral edema, PND or shortness of breath  Past treatments include ACE inhibitors and beta blockers  The current treatment provides moderate improvement  Compliance problems include diet and exercise  There is no history of CAD/MI or CVA  Hyperlipidemia   This is a chronic problem  The problem is controlled  Recent lipid tests were reviewed and are normal  Exacerbating diseases include diabetes and obesity  He has no history of hypothyroidism  Associated symptoms include a focal weakness  Pertinent negatives include no chest pain, focal sensory loss, leg pain, myalgias or shortness of breath  Current antihyperlipidemic treatment includes statins  The current treatment provides significant improvement of lipids  The following portions of the patient's history were reviewed and updated as appropriate: allergies, current medications, past family history, past medical history, past social history, past surgical history and problem list     Review of Systems   Constitutional: Negative for fatigue, malaise/fatigue and weight loss  Eyes: Negative for blurred vision  Respiratory: Negative for shortness of breath  Cardiovascular: Negative for chest pain, palpitations, orthopnea and PND  Endocrine: Negative for polydipsia and polyuria  Musculoskeletal: Negative for myalgias  Neurological: Positive for focal weakness  Negative for weakness and headaches           Objective:      /88   Pulse 72   Temp 97 7 °F (36 5 °C)   Resp 16   Ht 5' 6" (1 676 m)   Wt 96 2 kg (212 lb)   BMI 34 22 kg/m²          Physical Exam Constitutional: He is oriented to person, place, and time  He appears well-developed and well-nourished  No distress  HENT:   Right Ear: External ear normal    Left Ear: External ear normal    Nose: Nose normal    Mouth/Throat: Oropharynx is clear and moist    Eyes: Conjunctivae are normal  No scleral icterus  Neck: Neck supple  No thyromegaly present  Cardiovascular: Normal rate and regular rhythm  Pulmonary/Chest: Effort normal and breath sounds normal    Abdominal: Soft  There is no tenderness  Musculoskeletal: He exhibits no edema  Lymphadenopathy:     He has no cervical adenopathy  Neurological: He is alert and oriented to person, place, and time  Skin: Skin is warm and dry  Psychiatric: He has a normal mood and affect

## 2018-04-09 NOTE — ASSESSMENT & PLAN NOTE
Fasting lipid profile obtained  Lipids at goal   Continue present treatment and follow a low-fat low-cholesterol diet

## 2018-06-09 DIAGNOSIS — N13.8 BPH WITH URINARY OBSTRUCTION: ICD-10-CM

## 2018-06-09 DIAGNOSIS — E11.9 TYPE 2 DIABETES MELLITUS WITHOUT COMPLICATION, WITHOUT LONG-TERM CURRENT USE OF INSULIN (HCC): ICD-10-CM

## 2018-06-09 DIAGNOSIS — N40.1 BPH WITH URINARY OBSTRUCTION: ICD-10-CM

## 2018-06-09 DIAGNOSIS — I10 ESSENTIAL HYPERTENSION: ICD-10-CM

## 2018-06-09 DIAGNOSIS — E78.5 HYPERLIPIDEMIA, UNSPECIFIED HYPERLIPIDEMIA TYPE: ICD-10-CM

## 2018-06-09 DIAGNOSIS — K21.9 GASTROESOPHAGEAL REFLUX DISEASE WITHOUT ESOPHAGITIS: ICD-10-CM

## 2018-06-09 RX ORDER — CARVEDILOL PHOSPHATE 40 MG/1
CAPSULE, EXTENDED RELEASE ORAL
Qty: 90 CAPSULE | Refills: 0 | Status: SHIPPED | OUTPATIENT
Start: 2018-06-09 | End: 2018-01-01 | Stop reason: SDUPTHER

## 2018-06-09 RX ORDER — OMEPRAZOLE 20 MG/1
CAPSULE, DELAYED RELEASE ORAL
Qty: 90 CAPSULE | Refills: 0 | Status: SHIPPED | OUTPATIENT
Start: 2018-06-09 | End: 2018-01-01 | Stop reason: SDUPTHER

## 2018-06-09 RX ORDER — FINASTERIDE 5 MG/1
5 TABLET, FILM COATED ORAL DAILY
Qty: 90 TABLET | Refills: 0 | Status: SHIPPED | OUTPATIENT
Start: 2018-06-09 | End: 2018-01-01

## 2018-06-09 RX ORDER — SITAGLIPTIN AND METFORMIN HYDROCHLORIDE 1000; 50 MG/1; MG/1
1 TABLET, FILM COATED ORAL DAILY
Qty: 90 TABLET | Refills: 0 | Status: SHIPPED | OUTPATIENT
Start: 2018-06-09 | End: 2018-01-01 | Stop reason: SDUPTHER

## 2018-06-09 RX ORDER — ROSUVASTATIN CALCIUM 20 MG/1
TABLET, COATED ORAL
Qty: 90 TABLET | Refills: 0 | Status: SHIPPED | OUTPATIENT
Start: 2018-06-09 | End: 2018-01-01 | Stop reason: SDUPTHER

## 2018-09-26 NOTE — TELEPHONE ENCOUNTER
Lisinopril sent to pharmacy as 10 Mg and patient states he has been increased to 20 Mg months ago  Patient left refill on med line and did not specify pharmacy  [facility] has been taken off his pharmacy list so an error does not occur  Advised patient when leaving refills on message line to always state the pharmacy he wants the meds to go to

## 2018-10-07 PROBLEM — G20 PRIMARY PARKINSONISM (HCC): Status: ACTIVE | Noted: 2018-03-23

## 2018-10-12 PROBLEM — M48.061 SPINAL STENOSIS OF LUMBAR REGION: Status: ACTIVE | Noted: 2018-01-01

## 2018-10-12 PROBLEM — M16.10 PRIMARY LOCALIZED OSTEOARTHRITIS OF PELVIC REGION AND THIGH: Status: ACTIVE | Noted: 2018-01-01

## 2018-10-12 NOTE — ASSESSMENT & PLAN NOTE
BP controlled  Continue present treatment  Follow a low-salt diet and continue regular exercise and physical therapy 3 times a week

## 2018-10-12 NOTE — PROGRESS NOTES
Assessment/Plan:  Patient received high-dose flu vaccine today  Patient to continue present treatment  Follow up with specialists as scheduled and return to the office in 4-6 months  GERD without esophagitis  Clinically stable on omeprazole 20 mg daily  Continue present treatment  DM type 2 (diabetes mellitus, type 2) ([facility] [address] )  Lab Results   Component Value Date    HGBA1C 6 0 10/12/2018     Diabetes well controlled with fingerstick hemoglobin A1c at 6 0 and home glucose monitoring with good results  Continue present treatment  No results for input(s): POCGLU in the last 72 hours  Blood Sugar Average: Last 72 hrs:      Benign essential hypertension  BP controlled  Continue present treatment  Follow a low-salt diet and continue regular exercise and physical therapy 3 times a week  Hyperlipidemia  Cholesterol well controlled and triglycerides elevated in the past   Fasting lipid profile drawn today  Continue present treatment and follow a low-fat and low-cholesterol diet more carefully  Vitamin D deficiency  Continue vitamin-D supplement  Spinal stenosis of lumbar region  Referral to  [facility] for 2nd opinion  Diagnoses and all orders for this visit:    Type 2 diabetes mellitus without complication, unspecified whether long term insulin use (HCC)  -     POCT hemoglobin A1c  -     CBC and Platelet  -     Comprehensive metabolic panel  -     Cancel: Hemoglobin A1c (w/out EAG)  -     Microalbumin, Random Urine (W/Creatinine)    Benign essential hypertension  -     Comprehensive metabolic panel  -     TSH, 3rd generation with Free T4 reflex    Hyperlipidemia, unspecified hyperlipidemia type  -     Comprehensive metabolic panel  -     Lipid panel    GERD without esophagitis    Vitamin D deficiency  -     Vitamin D 25 hydroxy    Spinal stenosis of lumbar region with neurogenic claudication  -     Ambulatory referral to Neurosurgery;  Future    Healthcare maintenance  -     influenza vaccine, [id number], high-dose, PF 0 5 mL, for patients 65 yr+ (FLUZONE HIGH-DOSE)          Subjective:      Patient ID: Charlene Brunner is a 65 y o  male  Patient is here for routine appointment for chronic conditions and fasting labs  Patient has been feeling fairly well overall although admits to continued difficulty walking  He has been attending physical therapy 3 days a week at [facility]  Patient is status post lumbar spine surgery with Dr Rajendra Pierce at [place] [address]  Patient recently diagnosed with Parkinson's and seeing neurologist at [facility]   Patient has a follow-up appoint with Dr Patrick Singh urologist in December of this year  Patient is up-to-date on diabetic eye exam with Dr Kelly Cabral ophthalmologist and foot exam with Dr Beti Díaz podiatrist   Home glucose monitoring reveals fasting blood sugars 130-160  Patient requests flu vaccine  Diabetes   He presents for his follow-up diabetic visit  He has type 2 diabetes mellitus  His disease course has been stable  Pertinent negatives for hypoglycemia include no headaches  Associated symptoms include fatigue and weakness  Pertinent negatives for diabetes include no blurred vision, no chest pain, no foot paresthesias, no foot ulcerations, no polydipsia, no polyuria, no visual change and no weight loss  There are no hypoglycemic complications  Symptoms are stable  Pertinent negatives for diabetic complications include no CVA, heart disease, peripheral neuropathy or retinopathy  Risk factors for coronary artery disease include dyslipidemia, diabetes mellitus, family history, hypertension, male sex, obesity and tobacco exposure  Current diabetic treatment includes oral agent (dual therapy)  He is compliant with treatment all of the time  His weight is stable  He participates in exercise three times a week  His breakfast blood glucose is taken between 7-8 am  His breakfast blood glucose range is generally 130-140 mg/dl   An ACE inhibitor/angiotensin II receptor blocker is being taken  He sees a podiatrist Eye exam is current  Hypertension   This is a chronic problem  The problem is uncontrolled  Associated symptoms include anxiety and malaise/fatigue  Pertinent negatives include no blurred vision, chest pain, headaches, orthopnea, palpitations, peripheral edema, PND or shortness of breath  Past treatments include ACE inhibitors and diuretics  The current treatment provides moderate improvement  There are no compliance problems  There is no history of CAD/MI, CVA or retinopathy  The following portions of the patient's history were reviewed and updated as appropriate: allergies, current medications, past family history, past medical history, past social history, past surgical history and problem list     Review of Systems   Constitutional: Positive for fatigue and malaise/fatigue  Negative for weight loss  Eyes: Negative for blurred vision  Respiratory: Negative for shortness of breath  Cardiovascular: Negative for chest pain, palpitations, orthopnea and PND  Endocrine: Negative for polydipsia and polyuria  Neurological: Positive for weakness  Negative for headaches  Objective:      /84   Pulse 84   Temp 97 6 °F (36 4 °C) (Tympanic)   Resp 16   Ht 5' 5 35" (1 66 m)   Wt 93 9 kg (207 lb)   SpO2 96%   BMI 34 07 kg/m²          Physical Exam   Constitutional: He is oriented to person, place, and time  He appears well-developed and well-nourished  No distress  HENT:   Head: Normocephalic  Right Ear: External ear normal    Left Ear: External ear normal    Nose: Nose normal    Mouth/Throat: Oropharynx is clear and moist    Eyes: Conjunctivae are normal  No scleral icterus  Neck: Neck supple  No thyromegaly present  Cardiovascular: Normal rate and regular rhythm  Pulmonary/Chest: Effort normal and breath sounds normal    Abdominal: Soft  There is no tenderness     Musculoskeletal: He exhibits no edema    Lymphadenopathy:     He has no cervical adenopathy  Neurological: He is alert and oriented to person, place, and time  Ambulates with cane  Skin: Skin is warm and dry  Psychiatric: He has a normal mood and affect

## 2018-10-12 NOTE — ASSESSMENT & PLAN NOTE
Lab Results   Component Value Date    HGBA1C 6 0 10/12/2018     Diabetes well controlled with fingerstick hemoglobin A1c at 6 0 and home glucose monitoring with good results  Continue present treatment  No results for input(s): POCGLU in the last 72 hours      Blood Sugar Average: Last 72 hrs:

## 2018-10-12 NOTE — ASSESSMENT & PLAN NOTE
Cholesterol well controlled and triglycerides elevated in the past   Fasting lipid profile drawn today  Continue present treatment and follow a low-fat and low-cholesterol diet more carefully

## 2018-10-16 NOTE — TELEPHONE ENCOUNTER
PATIENT LABS DRAWN IN 10/12/2018 [facility] CALLED LEAKED IN TRANSIT SHOULD WE HAVE PATIENT COME IN FOR RE DRAW? PLEASE ADVISE FOR CREATININE, URINE    Amelia Thompson

## 2018-11-09 PROBLEM — Z98.890 STATUS POST LUMBAR DISCECTOMY: Status: ACTIVE | Noted: 2018-01-01

## 2018-11-09 NOTE — LETTER
November 9, 2018     Shane Shine, [address] [facility]  [address] [place] [address]    Patient: Danielle Oleary   YOB: 1940   Date of Visit: 11/9/2018       Dear Dr Socorro Robert: Thank you for referring Allisoncornelio Boogie to me for evaluation  Below are my notes for this consultation  If you have questions, please do not hesitate to call me  I look forward to following your patient along with you  Sincerely,        Ho Dickinson MD        CC: No Recipients  Ho Dickinson MD  11/9/2018  2:31 PM  Sign at close encounter  Office Note - Neurosurgery   Danielle Oleary 77 y o  male MRN: [id number]      Assessment:    Patient is stable  78-year-old gentleman with persistent but improved leg weakness post right lumbar microdiskectomy  I explained that this is consistent with the natural history as weakness is less likely to improve compared to radicular pain following surgical intervention  Fortunately he has had improvement since surgery but is unlikely that he will ever have full strength on his right hip flexion and is likely at his neurological baseline  I encouraged him to remain active and maintain range of motion is hip  He will follow up through this office on a p r n  Basis  History, physical examination and diagnostic tests were reviewed and questions answered  Diagnosis, care plan and treatment options were discussed  The patient and spouse/SO understand instructions and will follow up as directed  Plan:    Follow-up: prn    Problem List Items Addressed This Visit        Nervous and Auditory    Lumbar radiculopathy - Primary       Other    Spinal stenosis of lumbar region          Subjective/Objective     Chief Complaint    Right leg weakness  HPI    78-year-old gentleman who 1st noticed right leg weakness in 2016  He could not flex his hip to lift his foot off the ground    He underwent surgery in the form of right L3-4 microdiskectomy with Dr Remberto Wright and had significant improvement in his right hip flexor and quadriceps function  However after about a year it plateaued and has not improved despite ongoing physical therapy and activity  He has some numbness in his right anterior thigh  Otherwise he denies any pain, weakness or numbness in his legs or lower back pain  He has a neurogenic bladder for the past 8 years and has a colostomy  He presents today for 2nd opinion regarding potential for improvement in his right leg weakness  ROS    Review of Systems   Constitutional: Positive for fatigue  HENT: Negative  Eyes: Negative  Respiratory: Negative  Cardiovascular: Negative  Gastrointestinal: Negative  Endocrine: Negative  Genitourinary: Negative  Neurogenic bladder, self catheter   Musculoskeletal: Positive for back pain (right side lower back, right leg/thigh)  Skin: Negative  Allergic/Immunologic: Negative  Neurological: Positive for weakness (severe weakness in the right leg ) and numbness (right leg, below the knee )  Negative for dizziness, seizures, syncope and headaches  Hematological: Negative  Psychiatric/Behavioral: Positive for confusion  Negative for sleep disturbance  Family History    Family History   Problem Relation Age of Onset    Kidney failure Mother         renal failure    Heart attack Father         MI    Diabetes Father         DM    Hyperlipidemia Father         hypercholesterolemia    Other Sister         mesothelioma    Coronary artery disease Brother         CABG    Cancer Brother         urinary bladder    Prostate cancer Brother        Social History    Social History     Social History    Marital status: /Civil Union     Spouse name: N/A    Number of children: N/A    Years of education: N/A     Occupational History    Not on file       Social History Main Topics    Smoking status: Former Smoker     Quit date: 1980    Smokeless tobacco: Never Used      Comment: per [employer] 'former smoker- negative tobacco x 25 yrs, prev 1 ppd x 25 rs'    Alcohol use Yes      Comment: SOCIAL    Drug use: No    Sexual activity: Not on file     Other Topics Concern    Not on file     Social History Narrative    Daily coffee consumption (1 cup/day)           Past Medical History    Past Medical History:   Diagnosis Date    Benign prostatic hyperplasia with urinary obstruction and other lower urinary tract symptoms     Colon cancer ([facility] ) 1998    Elevated PSA     last assessed 3/11/2014    Hypercholesterolemia     Hypertension     Malignant neoplasm of skin     Microhematuria     Neurogenic bladder     last assessed 6/13/2014    Type 2 diabetes mellitus ([facility] )     Urinary incontinence        Surgical History    Past Surgical History:   Procedure Laterality Date    APPENDECTOMY      5 y/o    COLOSTOMY      PROSTATE BIOPSY      ROTATOR CUFF REPAIR      resolved 1999    SKIN BIOPSY      TONSILLECTOMY      7 y/o    VASECTOMY         Medications      Current Outpatient Prescriptions:     carbidopa-levodopa (SINEMET)  mg per tablet, Take 1 5 tablets by mouth 3 (three) times a day, Disp: , Rfl: 3    carvedilol (COREG CR) 40 MG 24 hr capsule, Take 1 capsule (40 mg total) by mouth daily, Disp: 90 capsule, Rfl: 2    Cranberry 200 MG CAPS, Take by mouth, Disp: , Rfl:     finasteride (PROSCAR) 5 mg tablet, Take 1 tablet (5 mg total) by mouth daily, Disp: 90 tablet, Rfl: 3    glucose blood (WALI CONTOUR TEST) test strip, 1 each by Other route daily, Disp: 100 each, Rfl: 3    hydrochlorothiazide (HYDRODIURIL) 25 mg tablet, Take 1 tablet (25 mg total) by mouth daily, Disp: 90 tablet, Rfl: 3    lisinopril (ZESTRIL) 20 mg tablet, Take 1 tablet (20 mg total) by mouth daily, Disp: 90 tablet, Rfl: 3    Multiple Vitamins-Minerals (CENTRUM SILVER) tablet, Take by mouth, Disp: , Rfl:     omeprazole (PriLOSEC) 20 mg delayed release capsule, Take 1 capsule (20 mg total) by mouth daily, Disp: 90 capsule, Rfl: 3    rosuvastatin (CRESTOR) 20 MG tablet, Take 1 tablet (20 mg total) by mouth daily, Disp: 90 tablet, Rfl: 3    sitaGLIPtin-metFORMIN (JANUMET)  MG per tablet, Take 1 tablet by mouth daily, Disp: 90 tablet, Rfl: 3    Allergies    Allergies   Allergen Reactions    Atorvastatin Myalgia       The following portions of the patient's history were reviewed and updated as appropriate: allergies, current medications, past family history, past medical history, past social history, past surgical history and problem list     Physical Exam    Vitals:  Blood pressure 162/98, pulse 83, temperature (!) 96 7 °F (35 9 °C), resp  rate 20, height 5' 6" (1 676 m), weight 94 3 kg (207 lb 12 8 oz)  ,Body mass index is 33 54 kg/m²  Physical Exam   Constitutional: He is oriented to person, place, and time  He appears well-developed and well-nourished  No distress  Musculoskeletal:        Right hip: He exhibits decreased range of motion  No pain and right hip with external and internal rotation  Neurological: He is alert and oriented to person, place, and time  5/5 power in lower extremities aside from 3 to 4/5 power on right hip flexor  Walks with a steady gait with mild limp on the right  Reports normal light touch sensation lower extremities aside from anterior right thigh  Skin: Skin is warm and dry  Psychiatric: He has a normal mood and affect  His behavior is normal    Vitals reviewed  Neurologic Exam     Mental Status   Oriented to person, place, and time

## 2018-11-09 NOTE — PROGRESS NOTES
Office Note - Neurosurgery   Amari Davalos 77 y o  male MRN: [id number]      Assessment:    Patient is stable  23-year-old gentleman with persistent but improved leg weakness post right lumbar microdiskectomy  I explained that this is consistent with the natural history as weakness is less likely to improve compared to radicular pain following surgical intervention  Fortunately he has had improvement since surgery but is unlikely that he will ever have full strength on his right hip flexion and is likely at his neurological baseline  I encouraged him to remain active and maintain range of motion is hip  He will follow up through this office on a p r n  Basis  History, physical examination and diagnostic tests were reviewed and questions answered  Diagnosis, care plan and treatment options were discussed  The patient and spouse/SO understand instructions and will follow up as directed  Plan:    Follow-up: prn    Problem List Items Addressed This Visit        Nervous and Auditory    Lumbar radiculopathy - Primary       Other    Spinal stenosis of lumbar region          Subjective/Objective     Chief Complaint    Right leg weakness  HPI    23-year-old gentleman who 1st noticed right leg weakness in 2016  He could not flex his hip to lift his foot off the ground  He underwent surgery in the form of right L3-4 microdiskectomy with Dr Ade Mancia and had significant improvement in his right hip flexor and quadriceps function  However after about a year it plateaued and has not improved despite ongoing physical therapy and activity  He has some numbness in his right anterior thigh  Otherwise he denies any pain, weakness or numbness in his legs or lower back pain  He has a neurogenic bladder for the past 8 years and has a colostomy  He presents today for 2nd opinion regarding potential for improvement in his right leg weakness  ROS    Review of Systems   Constitutional: Positive for fatigue  HENT: Negative  Eyes: Negative  Respiratory: Negative  Cardiovascular: Negative  Gastrointestinal: Negative  Endocrine: Negative  Genitourinary: Negative  Neurogenic bladder, self catheter   Musculoskeletal: Positive for back pain (right side lower back, right leg/thigh)  Skin: Negative  Allergic/Immunologic: Negative  Neurological: Positive for weakness (severe weakness in the right leg ) and numbness (right leg, below the knee )  Negative for dizziness, seizures, syncope and headaches  Hematological: Negative  Psychiatric/Behavioral: Positive for confusion  Negative for sleep disturbance  Family History    Family History   Problem Relation Age of Onset    Kidney failure Mother         renal failure    Heart attack Father         MI    Diabetes Father         DM    Hyperlipidemia Father         hypercholesterolemia    Other Sister         mesothelioma    Coronary artery disease Brother         CABG    Cancer Brother         urinary bladder    Prostate cancer Brother        Social History    Social History     Social History    Marital status: /Civil Union     Spouse name: N/A    Number of children: N/A    Years of education: N/A     Occupational History    Not on file       Social History Main Topics    Smoking status: Former Smoker     Quit date: 1980    Smokeless tobacco: Never Used      Comment: per [employer] 'former smoker- negative tobacco x 25 yrs, prev 1 ppd x 25 rs'    Alcohol use Yes      Comment: SOCIAL    Drug use: No    Sexual activity: Not on file     Other Topics Concern    Not on file     Social History Narrative    Daily coffee consumption (1 cup/day)           Past Medical History    Past Medical History:   Diagnosis Date    Benign prostatic hyperplasia with urinary obstruction and other lower urinary tract symptoms     Colon cancer ([facility] [address] ) 1998    Elevated PSA     last assessed 3/11/2014    Hypercholesterolemia     Hypertension     Malignant neoplasm of skin     Microhematuria     Neurogenic bladder     last assessed 6/13/2014    Type 2 diabetes mellitus ([facility] [address] )     Urinary incontinence        Surgical History    Past Surgical History:   Procedure Laterality Date    APPENDECTOMY      3 y/o    COLOSTOMY      PROSTATE BIOPSY      ROTATOR CUFF REPAIR      resolved 1999    SKIN BIOPSY      TONSILLECTOMY      7 y/o    VASECTOMY         Medications      Current Outpatient Prescriptions:     carbidopa-levodopa (SINEMET)  mg per tablet, Take 1 5 tablets by mouth 3 (three) times a day, Disp: , Rfl: 3    carvedilol (COREG CR) 40 MG 24 hr capsule, Take 1 capsule (40 mg total) by mouth daily, Disp: 90 capsule, Rfl: 2    Cranberry 200 MG CAPS, Take by mouth, Disp: , Rfl:     finasteride (PROSCAR) 5 mg tablet, Take 1 tablet (5 mg total) by mouth daily, Disp: 90 tablet, Rfl: 3    glucose blood (WALI CONTOUR TEST) test strip, 1 each by Other route daily, Disp: 100 each, Rfl: 3    hydrochlorothiazide (HYDRODIURIL) 25 mg tablet, Take 1 tablet (25 mg total) by mouth daily, Disp: 90 tablet, Rfl: 3    lisinopril (ZESTRIL) 20 mg tablet, Take 1 tablet (20 mg total) by mouth daily, Disp: 90 tablet, Rfl: 3    Multiple Vitamins-Minerals (CENTRUM SILVER) tablet, Take by mouth, Disp: , Rfl:     omeprazole (PriLOSEC) 20 mg delayed release capsule, Take 1 capsule (20 mg total) by mouth daily, Disp: 90 capsule, Rfl: 3    rosuvastatin (CRESTOR) 20 MG tablet, Take 1 tablet (20 mg total) by mouth daily, Disp: 90 tablet, Rfl: 3    sitaGLIPtin-metFORMIN (JANUMET)  MG per tablet, Take 1 tablet by mouth daily, Disp: 90 tablet, Rfl: 3    Allergies    Allergies   Allergen Reactions    Atorvastatin Myalgia       The following portions of the patient's history were reviewed and updated as appropriate: allergies, current medications, past family history, past medical history, past social history, past surgical history and problem list     Physical Exam    Vitals:  Blood pressure 162/98, pulse 83, temperature (!) 96 7 °F (35 9 °C), resp  rate 20, height 5' 6" (1 676 m), weight 94 3 kg (207 lb 12 8 oz)  ,Body mass index is 33 54 kg/m²  Physical Exam   Constitutional: He is oriented to person, place, and time  He appears well-developed and well-nourished  No distress  Musculoskeletal:        Right hip: He exhibits decreased range of motion  No pain and right hip with external and internal rotation  Neurological: He is alert and oriented to person, place, and time  5/5 power in lower extremities aside from 3 to 4/5 power on right hip flexor  Walks with a steady gait with mild limp on the right  Reports normal light touch sensation lower extremities aside from anterior right thigh  Skin: Skin is warm and dry  Psychiatric: He has a normal mood and affect  His behavior is normal    Vitals reviewed  Neurologic Exam     Mental Status   Oriented to person, place, and time

## 2018-11-13 NOTE — TELEPHONE ENCOUNTER
Spoke with a provider representative with patients insurance and verified on [address] that patients preferred lab is [facility], so I am not sure if this has anything to do with how it was coded

## 2018-11-13 NOTE — PROGRESS NOTES
Received MRI L-Spine disc from patient at 11/9/18 appointment  Returned disc to patient at the time of appointment  Mailing patient copy of report

## 2018-11-13 NOTE — TELEPHONE ENCOUNTER
Patient called stating that for his office visit on 10/12/18 his bloodwork was submitted to [address] which his insurance does not cover as he just received a bill for almost $600  If possible, he would like this to be reviewed  He stated he has the [facility]

## 2018-11-14 NOTE — TELEPHONE ENCOUNTER
I called and spoke with patient who confirmed the bill came from [place]  I called [facility], spoke to Aisha Guajardo who verified it was billed to [employer] and not his current insurance of [employer]  Per Aisha Guajardo, patient can disregard bill that was received  She will resubmit to the correct insurance  I did call Aaliyah Bennett and notify him to disregard the bill

## 2018-11-14 NOTE — TELEPHONE ENCOUNTER
Please call billing to see what the patient was billed for and why he was billed (deductible, coins, etc)  Ask if it was due to diagnosis related denial  Please call patient to advise you are working on this for him

## 2018-12-21 NOTE — PROGRESS NOTES
Assessment/Plan:    Patient was started on a Z-Edwin and instructed to take Mucinex DM p r n  He is encouraged to drink plenty of fluids and rest   Return to the office in 1 week or sooner p r n  Kari Poe Diagnoses and all orders for this visit:    Upper respiratory tract infection, unspecified type  Comments:  Z-Edwin and Mucinex DM p r n   Increase fluids and rest   Orders:  -     azithromycin (ZITHROMAX) 250 mg tablet; Take 2 tablets today then 1 tablet daily x 4 days    Essential hypertension  -     carvedilol (COREG CR) 40 MG 24 hr capsule; Take 1 capsule (40 mg total) by mouth daily    Benign essential hypertension          Subjective:      Patient ID: Danielle Oleary is a 77 y o  male  Patient complains of cold symptoms for the past 3 days  He complains of nasal congestion, sore throat and cough productive of slightly yellowish mucus  Patient denies fever  URI    This is a new problem  The current episode started in the past 7 days  The problem has been unchanged  There has been no fever  Associated symptoms include congestion, coughing, rhinorrhea, sneezing and a sore throat  Pertinent negatives include no ear pain, headaches, plugged ear sensation, sinus pain or wheezing  He has tried nothing for the symptoms  Cough   Associated symptoms include rhinorrhea and a sore throat  Pertinent negatives include no ear pain, headaches or wheezing  The following portions of the patient's history were reviewed and updated as appropriate: allergies, current medications, past family history, past medical history, past social history, past surgical history and problem list     Review of Systems   HENT: Positive for congestion, rhinorrhea, sneezing and sore throat  Negative for ear pain and sinus pain  Respiratory: Positive for cough  Negative for wheezing  Neurological: Negative for headaches           Objective:      /90 (BP Location: Left arm, Patient Position: Sitting, Cuff Size: Large) Pulse 92   Temp 98 °F (36 7 °C) (Tympanic)   Resp 20   Ht 5' 4 5" (1 638 m)   Wt 93 kg (205 lb)   BMI 34 64 kg/m²          Physical Exam   Constitutional: He is oriented to person, place, and time  He appears well-developed and well-nourished  HENT:   Head: Normocephalic  Right Ear: External ear normal    Left Ear: External ear normal    Positive turbinates swelling with mucoid drainage  Throat postnasal drainage and injected  Mucous membranes moist    Eyes: Conjunctivae are normal  No scleral icterus  Neck: Neck supple  Cardiovascular: Normal rate and regular rhythm  Pulmonary/Chest: Effort normal and breath sounds normal  He has no wheezes  Abdominal: Soft  There is no tenderness  Musculoskeletal: He exhibits no edema  Lymphadenopathy:     He has no cervical adenopathy  Neurological: He is alert and oriented to person, place, and time  Skin: Skin is warm and dry  Psychiatric: He has a normal mood and affect

## 2019-01-01 ENCOUNTER — APPOINTMENT (INPATIENT)
Dept: RADIOLOGY | Facility: HOSPITAL | Age: 79
DRG: 329 | End: 2019-01-01
Payer: COMMERCIAL

## 2019-01-01 ENCOUNTER — APPOINTMENT (OUTPATIENT)
Dept: NEUROLOGY | Facility: CLINIC | Age: 79
DRG: 329 | End: 2019-01-01
Payer: COMMERCIAL

## 2019-01-01 ENCOUNTER — TELEPHONE (OUTPATIENT)
Dept: UROLOGY | Facility: AMBULATORY SURGERY CENTER | Age: 79
End: 2019-01-01

## 2019-01-01 ENCOUNTER — APPOINTMENT (INPATIENT)
Dept: NON INVASIVE DIAGNOSTICS | Facility: HOSPITAL | Age: 79
DRG: 329 | End: 2019-01-01
Payer: COMMERCIAL

## 2019-01-01 ENCOUNTER — ANESTHESIA (INPATIENT)
Dept: PERIOP | Facility: HOSPITAL | Age: 79
DRG: 329 | End: 2019-01-01
Payer: COMMERCIAL

## 2019-01-01 ENCOUNTER — OFFICE VISIT (OUTPATIENT)
Dept: UROLOGY | Facility: MEDICAL CENTER | Age: 79
End: 2019-01-01
Payer: COMMERCIAL

## 2019-01-01 ENCOUNTER — ANESTHESIA EVENT (INPATIENT)
Dept: PERIOP | Facility: HOSPITAL | Age: 79
DRG: 329 | End: 2019-01-01
Payer: COMMERCIAL

## 2019-01-01 ENCOUNTER — TELEPHONE (OUTPATIENT)
Dept: FAMILY MEDICINE CLINIC | Facility: CLINIC | Age: 79
End: 2019-01-01

## 2019-01-01 ENCOUNTER — HOSPITAL ENCOUNTER (EMERGENCY)
Facility: HOSPITAL | Age: 79
DRG: 329 | End: 2019-07-15
Attending: EMERGENCY MEDICINE | Admitting: EMERGENCY MEDICINE
Payer: COMMERCIAL

## 2019-01-01 ENCOUNTER — OFFICE VISIT (OUTPATIENT)
Dept: FAMILY MEDICINE CLINIC | Facility: CLINIC | Age: 79
End: 2019-01-01
Payer: COMMERCIAL

## 2019-01-01 ENCOUNTER — APPOINTMENT (EMERGENCY)
Dept: CT IMAGING | Facility: HOSPITAL | Age: 79
DRG: 329 | End: 2019-01-01
Payer: COMMERCIAL

## 2019-01-01 ENCOUNTER — TELEPHONE (OUTPATIENT)
Dept: UROLOGY | Facility: MEDICAL CENTER | Age: 79
End: 2019-01-01

## 2019-01-01 ENCOUNTER — HOSPITAL ENCOUNTER (OUTPATIENT)
Dept: ULTRASOUND IMAGING | Facility: MEDICAL CENTER | Age: 79
Discharge: HOME | End: 2019-02-25
Attending: UROLOGY
Payer: COMMERCIAL

## 2019-01-01 ENCOUNTER — HOSPITAL ENCOUNTER (INPATIENT)
Facility: HOSPITAL | Age: 79
LOS: 22 days | DRG: 329 | End: 2019-08-06
Attending: COLON & RECTAL SURGERY | Admitting: COLON & RECTAL SURGERY
Payer: COMMERCIAL

## 2019-01-01 ENCOUNTER — APPOINTMENT (OUTPATIENT)
Dept: LAB | Facility: MEDICAL CENTER | Age: 79
End: 2019-01-01
Attending: UROLOGY
Payer: COMMERCIAL

## 2019-01-01 VITALS
SYSTOLIC BLOOD PRESSURE: 160 MMHG | HEIGHT: 65 IN | HEART RATE: 93 BPM | BODY MASS INDEX: 34.99 KG/M2 | DIASTOLIC BLOOD PRESSURE: 88 MMHG | WEIGHT: 210 LBS

## 2019-01-01 VITALS
HEART RATE: 107 BPM | WEIGHT: 197.97 LBS | BODY MASS INDEX: 32.44 KG/M2 | TEMPERATURE: 98 F | OXYGEN SATURATION: 96 % | RESPIRATION RATE: 17 BRPM | DIASTOLIC BLOOD PRESSURE: 86 MMHG | SYSTOLIC BLOOD PRESSURE: 112 MMHG

## 2019-01-01 VITALS
WEIGHT: 211 LBS | HEART RATE: 84 BPM | RESPIRATION RATE: 16 BRPM | BODY MASS INDEX: 36.02 KG/M2 | SYSTOLIC BLOOD PRESSURE: 118 MMHG | HEIGHT: 64 IN | DIASTOLIC BLOOD PRESSURE: 74 MMHG | TEMPERATURE: 97.6 F

## 2019-01-01 VITALS
HEIGHT: 66 IN | DIASTOLIC BLOOD PRESSURE: 84 MMHG | BODY MASS INDEX: 33.91 KG/M2 | SYSTOLIC BLOOD PRESSURE: 132 MMHG | TEMPERATURE: 97.8 F | WEIGHT: 211 LBS | RESPIRATION RATE: 16 BRPM | HEART RATE: 76 BPM

## 2019-01-01 VITALS
TEMPERATURE: 97.8 F | HEIGHT: 66 IN | HEART RATE: 88 BPM | RESPIRATION RATE: 33 BRPM | OXYGEN SATURATION: 94 % | BODY MASS INDEX: 38.58 KG/M2 | DIASTOLIC BLOOD PRESSURE: 77 MMHG | WEIGHT: 240.08 LBS | SYSTOLIC BLOOD PRESSURE: 116 MMHG

## 2019-01-01 DIAGNOSIS — G20 PRIMARY PARKINSONISM (HCC): ICD-10-CM

## 2019-01-01 DIAGNOSIS — R65.10 SIRS (SYSTEMIC INFLAMMATORY RESPONSE SYNDROME) (HCC): ICD-10-CM

## 2019-01-01 DIAGNOSIS — K43.5 PARASTOMAL HERNIA WITHOUT OBSTRUCTION OR GANGRENE: ICD-10-CM

## 2019-01-01 DIAGNOSIS — I10 HYPERTENSION, UNSPECIFIED TYPE: ICD-10-CM

## 2019-01-01 DIAGNOSIS — A41.9 SEPTIC SHOCK (HCC): ICD-10-CM

## 2019-01-01 DIAGNOSIS — E78.5 HYPERLIPIDEMIA, UNSPECIFIED HYPERLIPIDEMIA TYPE: ICD-10-CM

## 2019-01-01 DIAGNOSIS — I10 ESSENTIAL HYPERTENSION: ICD-10-CM

## 2019-01-01 DIAGNOSIS — N18.9 CHRONIC KIDNEY DISEASE, UNSPECIFIED CKD STAGE: ICD-10-CM

## 2019-01-01 DIAGNOSIS — K43.6 INCARCERATED VENTRAL HERNIA: Primary | ICD-10-CM

## 2019-01-01 DIAGNOSIS — R97.20 ELEVATED PSA: ICD-10-CM

## 2019-01-01 DIAGNOSIS — E11.9 TYPE 2 DIABETES MELLITUS WITHOUT COMPLICATION, WITHOUT LONG-TERM CURRENT USE OF INSULIN (HCC): Primary | ICD-10-CM

## 2019-01-01 DIAGNOSIS — J96.00 ACUTE RESPIRATORY FAILURE (HCC): ICD-10-CM

## 2019-01-01 DIAGNOSIS — K56.609 SMALL BOWEL OBSTRUCTION (HCC): ICD-10-CM

## 2019-01-01 DIAGNOSIS — E55.9 VITAMIN D DEFICIENCY: ICD-10-CM

## 2019-01-01 DIAGNOSIS — N31.8 OTHER NEUROMUSCULAR DYSFUNCTION OF BLADDER: ICD-10-CM

## 2019-01-01 DIAGNOSIS — N17.9 ACUTE KIDNEY INJURY (HCC): ICD-10-CM

## 2019-01-01 DIAGNOSIS — H61.23 IMPACTED CERUMEN OF BOTH EARS: Primary | ICD-10-CM

## 2019-01-01 DIAGNOSIS — Z00.00 MEDICARE ANNUAL WELLNESS VISIT, SUBSEQUENT: ICD-10-CM

## 2019-01-01 DIAGNOSIS — R97.20 ELEVATED PSA: Primary | ICD-10-CM

## 2019-01-01 DIAGNOSIS — C18.9 MALIGNANT NEOPLASM OF COLON, UNSPECIFIED PART OF COLON (HCC): Primary | ICD-10-CM

## 2019-01-01 DIAGNOSIS — I10 BENIGN ESSENTIAL HYPERTENSION: ICD-10-CM

## 2019-01-01 DIAGNOSIS — R65.21 SEPTIC SHOCK (HCC): ICD-10-CM

## 2019-01-01 DIAGNOSIS — M16.10 PRIMARY LOCALIZED OSTEOARTHRITIS OF PELVIC REGION AND THIGH: ICD-10-CM

## 2019-01-01 DIAGNOSIS — N31.8 OTHER NEUROMUSCULAR DYSFUNCTION OF BLADDER: Primary | ICD-10-CM

## 2019-01-01 DIAGNOSIS — K21.9 GERD WITHOUT ESOPHAGITIS: ICD-10-CM

## 2019-01-01 LAB
ABO GROUP BLD BPU: NORMAL
ABO GROUP BLD: NORMAL
ALBUMIN SERPL BCP-MCNC: 1 G/DL (ref 3.5–5)
ALBUMIN SERPL BCP-MCNC: 1.2 G/DL (ref 3.5–5)
ALBUMIN SERPL BCP-MCNC: 4 G/DL (ref 3.5–5)
ALBUMIN SERPL-MCNC: 4.2 G/DL (ref 3.6–5.1)
ALBUMIN/GLOB SERPL: 1.4 (CALC) (ref 1–2.5)
ALP SERPL-CCNC: 108 U/L (ref 46–116)
ALP SERPL-CCNC: 66 U/L (ref 46–116)
ALP SERPL-CCNC: 77 U/L (ref 40–115)
ALP SERPL-CCNC: 81 U/L (ref 46–116)
ALT SERPL W P-5'-P-CCNC: 12 U/L (ref 12–78)
ALT SERPL W P-5'-P-CCNC: 15 U/L (ref 12–78)
ALT SERPL W P-5'-P-CCNC: <6 U/L (ref 12–78)
ALT SERPL-CCNC: 5 U/L (ref 9–46)
ANION GAP SERPL CALCULATED.3IONS-SCNC: 10 MMOL/L (ref 4–13)
ANION GAP SERPL CALCULATED.3IONS-SCNC: 11 MMOL/L (ref 4–13)
ANION GAP SERPL CALCULATED.3IONS-SCNC: 12 MMOL/L (ref 4–13)
ANION GAP SERPL CALCULATED.3IONS-SCNC: 12 MMOL/L (ref 4–13)
ANION GAP SERPL CALCULATED.3IONS-SCNC: 13 MMOL/L (ref 4–13)
ANION GAP SERPL CALCULATED.3IONS-SCNC: 14 MMOL/L (ref 4–13)
ANION GAP SERPL CALCULATED.3IONS-SCNC: 14 MMOL/L (ref 4–13)
ANION GAP SERPL CALCULATED.3IONS-SCNC: 15 MMOL/L (ref 4–13)
ANION GAP SERPL CALCULATED.3IONS-SCNC: 18 MMOL/L (ref 4–13)
ANION GAP SERPL CALCULATED.3IONS-SCNC: 19 MMOL/L (ref 4–13)
ANION GAP SERPL CALCULATED.3IONS-SCNC: 5 MMOL/L (ref 4–13)
ANION GAP SERPL CALCULATED.3IONS-SCNC: 5 MMOL/L (ref 4–13)
ANION GAP SERPL CALCULATED.3IONS-SCNC: 6 MMOL/L (ref 4–13)
ANION GAP SERPL CALCULATED.3IONS-SCNC: 7 MMOL/L (ref 4–13)
ANION GAP SERPL CALCULATED.3IONS-SCNC: 8 MMOL/L (ref 4–13)
ANION GAP SERPL CALCULATED.3IONS-SCNC: 9 MMOL/L (ref 4–13)
ANISOCYTOSIS BLD QL SMEAR: PRESENT
APTT PPP: 28 SECONDS (ref 23–37)
ARTERIAL PATENCY WRIST A: YES
ARTERIAL PATENCY WRIST A: YES
AST SERPL W P-5'-P-CCNC: 20 U/L (ref 5–45)
AST SERPL W P-5'-P-CCNC: 21 U/L (ref 5–45)
AST SERPL W P-5'-P-CCNC: 24 U/L (ref 5–45)
AST SERPL-CCNC: 14 U/L (ref 10–35)
ATRIAL RATE: 101 BPM
ATRIAL RATE: 149 BPM
ATRIAL RATE: 65 BPM
ATRIAL RATE: 81 BPM
ATRIAL RATE: 96 BPM
BACTERIA BLD CULT: ABNORMAL
BACTERIA BLD CULT: NORMAL
BACTERIA UR CULT: ABNORMAL
BACTERIA UR CULT: ABNORMAL
BACTERIA UR QL AUTO: ABNORMAL /HPF
BASE EX.OXY STD BLDV CALC-SCNC: 60 % (ref 60–80)
BASE EX.OXY STD BLDV CALC-SCNC: 68.4 % (ref 60–80)
BASE EX.OXY STD BLDV CALC-SCNC: 79.2 % (ref 60–80)
BASE EX.OXY STD BLDV CALC-SCNC: 89.1 % (ref 60–80)
BASE EXCESS BLDA CALC-SCNC: -1.5 MMOL/L
BASE EXCESS BLDA CALC-SCNC: -1.8 MMOL/L
BASE EXCESS BLDA CALC-SCNC: -10 MMOL/L (ref -2–3)
BASE EXCESS BLDA CALC-SCNC: -12.4 MMOL/L
BASE EXCESS BLDA CALC-SCNC: -12.4 MMOL/L
BASE EXCESS BLDA CALC-SCNC: -13.1 MMOL/L
BASE EXCESS BLDA CALC-SCNC: -2 MMOL/L (ref -2–3)
BASE EXCESS BLDA CALC-SCNC: -4 MMOL/L (ref -2–3)
BASE EXCESS BLDA CALC-SCNC: -5 MMOL/L (ref -2–3)
BASE EXCESS BLDA CALC-SCNC: -6 MMOL/L (ref -2–3)
BASE EXCESS BLDA CALC-SCNC: -7 MMOL/L (ref -2–3)
BASE EXCESS BLDA CALC-SCNC: -7.1 MMOL/L
BASE EXCESS BLDA CALC-SCNC: -7.4 MMOL/L
BASE EXCESS BLDV CALC-SCNC: -0.6 MMOL/L
BASE EXCESS BLDV CALC-SCNC: -13.4 MMOL/L
BASE EXCESS BLDV CALC-SCNC: -13.4 MMOL/L
BASE EXCESS BLDV CALC-SCNC: -6.1 MMOL/L
BASOPHILS # BLD AUTO: 0.01 THOUSANDS/ΜL (ref 0–0.1)
BASOPHILS # BLD AUTO: 0.02 THOUSANDS/ΜL (ref 0–0.1)
BASOPHILS # BLD AUTO: 0.03 THOUSANDS/ΜL (ref 0–0.1)
BASOPHILS # BLD AUTO: 0.04 THOUSANDS/ΜL (ref 0–0.1)
BASOPHILS # BLD AUTO: 0.05 THOUSANDS/ΜL (ref 0–0.1)
BASOPHILS # BLD AUTO: 0.05 THOUSANDS/ΜL (ref 0–0.1)
BASOPHILS # BLD MANUAL: 0 THOUSAND/UL (ref 0–0.1)
BASOPHILS NFR BLD AUTO: 0 % (ref 0–1)
BASOPHILS NFR MAR MANUAL: 0 % (ref 0–1)
BILIRUB DIRECT SERPL-MCNC: 0.17 MG/DL (ref 0–0.2)
BILIRUB SERPL-MCNC: 0.32 MG/DL (ref 0.2–1)
BILIRUB SERPL-MCNC: 0.5 MG/DL (ref 0.2–1.2)
BILIRUB SERPL-MCNC: 0.63 MG/DL (ref 0.2–1)
BILIRUB SERPL-MCNC: 0.91 MG/DL (ref 0.2–1)
BILIRUB UR QL STRIP: ABNORMAL
BILIRUB UR QL STRIP: NEGATIVE
BILIRUB UR QL STRIP: NEGATIVE
BLD GP AB SCN SERPL QL: NEGATIVE
BLD SMEAR INTERP: NORMAL
BODY TEMPERATURE: 100.1 DEGREES FEHRENHEIT
BODY TEMPERATURE: 94.5 DEGREES FEHRENHEIT
BODY TEMPERATURE: 97.4 DEGREES FEHRENHEIT
BODY TEMPERATURE: 98.8 DEGREES FEHRENHEIT
BODY TEMPERATURE: 99.1 DEGREES FEHRENHEIT
BPU ID: NORMAL
BUN SERPL-MCNC: 23 MG/DL (ref 5–25)
BUN SERPL-MCNC: 25 MG/DL (ref 5–25)
BUN SERPL-MCNC: 26 MG/DL (ref 5–25)
BUN SERPL-MCNC: 29 MG/DL (ref 5–25)
BUN SERPL-MCNC: 30 MG/DL (ref 5–25)
BUN SERPL-MCNC: 30 MG/DL (ref 7–25)
BUN SERPL-MCNC: 31 MG/DL (ref 5–25)
BUN SERPL-MCNC: 32 MG/DL (ref 5–25)
BUN SERPL-MCNC: 32 MG/DL (ref 5–25)
BUN SERPL-MCNC: 33 MG/DL (ref 5–25)
BUN SERPL-MCNC: 34 MG/DL (ref 5–25)
BUN SERPL-MCNC: 35 MG/DL (ref 5–25)
BUN SERPL-MCNC: 37 MG/DL (ref 5–25)
BUN SERPL-MCNC: 38 MG/DL (ref 5–25)
BUN SERPL-MCNC: 40 MG/DL (ref 5–25)
BUN SERPL-MCNC: 41 MG/DL (ref 5–25)
BUN SERPL-MCNC: 41 MG/DL (ref 5–25)
BUN SERPL-MCNC: 46 MG/DL (ref 5–25)
BUN SERPL-MCNC: 49 MG/DL (ref 5–25)
BUN SERPL-MCNC: 51 MG/DL (ref 5–25)
BUN SERPL-MCNC: 55 MG/DL (ref 5–25)
BUN SERPL-MCNC: 55 MG/DL (ref 5–25)
BUN SERPL-MCNC: 56 MG/DL (ref 5–25)
BUN SERPL-MCNC: 56 MG/DL (ref 5–25)
BUN SERPL-MCNC: 58 MG/DL (ref 5–25)
BUN SERPL-MCNC: 58 MG/DL (ref 5–25)
BUN SERPL-MCNC: 59 MG/DL (ref 5–25)
BUN SERPL-MCNC: 60 MG/DL (ref 5–25)
BUN SERPL-MCNC: 61 MG/DL (ref 5–25)
BUN SERPL-MCNC: 62 MG/DL (ref 5–25)
BUN SERPL-MCNC: 62 MG/DL (ref 5–25)
BUN SERPL-MCNC: 63 MG/DL (ref 5–25)
BUN SERPL-MCNC: 64 MG/DL (ref 5–25)
BUN/CREAT SERPL: 20 (CALC) (ref 6–22)
CA-I BLD-SCNC: 0.99 MMOL/L (ref 1.12–1.32)
CA-I BLD-SCNC: 1.02 MMOL/L (ref 1.12–1.32)
CA-I BLD-SCNC: 1.04 MMOL/L (ref 1.12–1.32)
CA-I BLD-SCNC: 1.07 MMOL/L (ref 1.12–1.32)
CA-I BLD-SCNC: 1.09 MMOL/L (ref 1.12–1.32)
CA-I BLD-SCNC: 1.1 MMOL/L (ref 1.12–1.32)
CA-I BLD-SCNC: 1.11 MMOL/L (ref 1.12–1.32)
CA-I BLD-SCNC: 1.12 MMOL/L (ref 1.12–1.32)
CA-I BLD-SCNC: 1.12 MMOL/L (ref 1.12–1.32)
CA-I BLD-SCNC: 1.13 MMOL/L (ref 1.12–1.32)
CA-I BLD-SCNC: 1.13 MMOL/L (ref 1.12–1.32)
CA-I BLD-SCNC: 1.14 MMOL/L (ref 1.12–1.32)
CA-I BLD-SCNC: 1.15 MMOL/L (ref 1.12–1.32)
CA-I BLD-SCNC: 1.18 MMOL/L (ref 1.12–1.32)
CA-I BLD-SCNC: 1.18 MMOL/L (ref 1.12–1.32)
CA-I BLD-SCNC: 1.19 MMOL/L (ref 1.12–1.32)
CA-I BLD-SCNC: 1.2 MMOL/L (ref 1.12–1.32)
CALCIUM SERPL-MCNC: 11.3 MG/DL (ref 8.3–10.1)
CALCIUM SERPL-MCNC: 7.2 MG/DL (ref 8.3–10.1)
CALCIUM SERPL-MCNC: 7.2 MG/DL (ref 8.3–10.1)
CALCIUM SERPL-MCNC: 7.3 MG/DL (ref 8.3–10.1)
CALCIUM SERPL-MCNC: 7.4 MG/DL (ref 8.3–10.1)
CALCIUM SERPL-MCNC: 7.5 MG/DL (ref 8.3–10.1)
CALCIUM SERPL-MCNC: 7.6 MG/DL (ref 8.3–10.1)
CALCIUM SERPL-MCNC: 7.7 MG/DL (ref 8.3–10.1)
CALCIUM SERPL-MCNC: 7.8 MG/DL (ref 8.3–10.1)
CALCIUM SERPL-MCNC: 7.9 MG/DL (ref 8.3–10.1)
CALCIUM SERPL-MCNC: 8 MG/DL (ref 8.3–10.1)
CALCIUM SERPL-MCNC: 8.1 MG/DL (ref 8.3–10.1)
CALCIUM SERPL-MCNC: 8.2 MG/DL (ref 8.3–10.1)
CALCIUM SERPL-MCNC: 8.2 MG/DL (ref 8.3–10.1)
CALCIUM SERPL-MCNC: 8.3 MG/DL (ref 8.3–10.1)
CALCIUM SERPL-MCNC: 8.3 MG/DL (ref 8.3–10.1)
CALCIUM SERPL-MCNC: 8.4 MG/DL (ref 8.3–10.1)
CALCIUM SERPL-MCNC: 8.5 MG/DL (ref 8.3–10.1)
CALCIUM SERPL-MCNC: 8.5 MG/DL (ref 8.3–10.1)
CALCIUM SERPL-MCNC: 8.8 MG/DL (ref 8.3–10.1)
CALCIUM SERPL-MCNC: 9.6 MG/DL (ref 8.6–10.3)
CAOX CRY URNS QL MICRO: ABNORMAL /HPF
CHLORIDE SERPL-SCNC: 104 MMOL/L (ref 100–108)
CHLORIDE SERPL-SCNC: 104 MMOL/L (ref 98–110)
CHLORIDE SERPL-SCNC: 106 MMOL/L (ref 100–108)
CHLORIDE SERPL-SCNC: 107 MMOL/L (ref 100–108)
CHLORIDE SERPL-SCNC: 107 MMOL/L (ref 100–108)
CHLORIDE SERPL-SCNC: 108 MMOL/L (ref 100–108)
CHLORIDE SERPL-SCNC: 109 MMOL/L (ref 100–108)
CHLORIDE SERPL-SCNC: 110 MMOL/L (ref 100–108)
CHLORIDE SERPL-SCNC: 111 MMOL/L (ref 100–108)
CHLORIDE SERPL-SCNC: 112 MMOL/L (ref 100–108)
CHLORIDE SERPL-SCNC: 113 MMOL/L (ref 100–108)
CHLORIDE SERPL-SCNC: 114 MMOL/L (ref 100–108)
CHLORIDE SERPL-SCNC: 116 MMOL/L (ref 100–108)
CHLORIDE SERPL-SCNC: 116 MMOL/L (ref 100–108)
CHLORIDE SERPL-SCNC: 118 MMOL/L (ref 100–108)
CHLORIDE SERPL-SCNC: 118 MMOL/L (ref 100–108)
CHLORIDE SERPL-SCNC: 120 MMOL/L (ref 100–108)
CHLORIDE SERPL-SCNC: 121 MMOL/L (ref 100–108)
CHLORIDE SERPL-SCNC: 121 MMOL/L (ref 100–108)
CHLORIDE SERPL-SCNC: 96 MMOL/L (ref 100–108)
CHOLEST SERPL-MCNC: 184 MG/DL
CHOLEST/HDLC SERPL: 4 (CALC)
CLARITY UR: ABNORMAL
CO2 SERPL-SCNC: 13 MMOL/L (ref 21–32)
CO2 SERPL-SCNC: 13 MMOL/L (ref 21–32)
CO2 SERPL-SCNC: 14 MMOL/L (ref 21–32)
CO2 SERPL-SCNC: 15 MMOL/L (ref 21–32)
CO2 SERPL-SCNC: 16 MMOL/L (ref 21–32)
CO2 SERPL-SCNC: 19 MMOL/L (ref 21–32)
CO2 SERPL-SCNC: 20 MMOL/L (ref 21–32)
CO2 SERPL-SCNC: 21 MMOL/L (ref 21–32)
CO2 SERPL-SCNC: 22 MMOL/L (ref 21–32)
CO2 SERPL-SCNC: 23 MMOL/L (ref 21–32)
CO2 SERPL-SCNC: 24 MMOL/L (ref 20–32)
CO2 SERPL-SCNC: 24 MMOL/L (ref 21–32)
CO2 SERPL-SCNC: 25 MMOL/L (ref 21–32)
CO2 SERPL-SCNC: 25 MMOL/L (ref 21–32)
COARSE GRAN CASTS URNS QL MICRO: ABNORMAL /LPF
COLOR UR: YELLOW
CREAT SERPL-MCNC: 1.28 MG/DL (ref 0.6–1.3)
CREAT SERPL-MCNC: 1.32 MG/DL (ref 0.6–1.3)
CREAT SERPL-MCNC: 1.41 MG/DL (ref 0.6–1.3)
CREAT SERPL-MCNC: 1.45 MG/DL (ref 0.6–1.3)
CREAT SERPL-MCNC: 1.48 MG/DL (ref 0.6–1.3)
CREAT SERPL-MCNC: 1.49 MG/DL (ref 0.7–1.18)
CREAT SERPL-MCNC: 1.57 MG/DL (ref 0.6–1.3)
CREAT SERPL-MCNC: 1.59 MG/DL (ref 0.6–1.3)
CREAT SERPL-MCNC: 1.62 MG/DL (ref 0.6–1.3)
CREAT SERPL-MCNC: 1.64 MG/DL (ref 0.6–1.3)
CREAT SERPL-MCNC: 1.69 MG/DL (ref 0.6–1.3)
CREAT SERPL-MCNC: 1.69 MG/DL (ref 0.6–1.3)
CREAT SERPL-MCNC: 1.75 MG/DL (ref 0.6–1.3)
CREAT SERPL-MCNC: 1.79 MG/DL (ref 0.6–1.3)
CREAT SERPL-MCNC: 1.81 MG/DL (ref 0.6–1.3)
CREAT SERPL-MCNC: 1.82 MG/DL (ref 0.6–1.3)
CREAT SERPL-MCNC: 1.89 MG/DL (ref 0.6–1.3)
CREAT SERPL-MCNC: 1.93 MG/DL (ref 0.6–1.3)
CREAT SERPL-MCNC: 1.97 MG/DL (ref 0.6–1.3)
CREAT SERPL-MCNC: 2.01 MG/DL (ref 0.6–1.3)
CREAT SERPL-MCNC: 2.1 MG/DL (ref 0.6–1.3)
CREAT SERPL-MCNC: 2.16 MG/DL (ref 0.6–1.3)
CREAT SERPL-MCNC: 2.22 MG/DL (ref 0.6–1.3)
CREAT SERPL-MCNC: 2.24 MG/DL (ref 0.6–1.3)
CREAT SERPL-MCNC: 2.25 MG/DL (ref 0.6–1.3)
CREAT SERPL-MCNC: 2.45 MG/DL (ref 0.6–1.3)
CREAT SERPL-MCNC: 2.45 MG/DL (ref 0.6–1.3)
CREAT SERPL-MCNC: 2.61 MG/DL (ref 0.6–1.3)
CREAT SERPL-MCNC: 2.61 MG/DL (ref 0.6–1.3)
CREAT SERPL-MCNC: 2.62 MG/DL (ref 0.6–1.3)
CREAT SERPL-MCNC: 2.65 MG/DL (ref 0.6–1.3)
CREAT SERPL-MCNC: 2.72 MG/DL (ref 0.6–1.3)
CREAT SERPL-MCNC: 2.76 MG/DL (ref 0.6–1.3)
CREAT SERPL-MCNC: 2.83 MG/DL (ref 0.6–1.3)
CREAT SERPL-MCNC: 2.96 MG/DL (ref 0.6–1.3)
CREAT SERPL-MCNC: 3.16 MG/DL (ref 0.6–1.3)
CREAT SERPL-MCNC: 3.18 MG/DL (ref 0.6–1.3)
CREAT SERPL-MCNC: 3.43 MG/DL (ref 0.6–1.3)
CREAT SERPL-MCNC: 3.66 MG/DL (ref 0.6–1.3)
CREAT UR-MCNC: 193 MG/DL
CROSSMATCH: NORMAL
EOSINOPHIL # BLD AUTO: 0 THOUSAND/ΜL (ref 0–0.61)
EOSINOPHIL # BLD AUTO: 0.01 THOUSAND/ΜL (ref 0–0.61)
EOSINOPHIL # BLD AUTO: 0.02 THOUSAND/ΜL (ref 0–0.61)
EOSINOPHIL # BLD AUTO: 0.02 THOUSAND/ΜL (ref 0–0.61)
EOSINOPHIL # BLD AUTO: 0.03 THOUSAND/ΜL (ref 0–0.61)
EOSINOPHIL # BLD AUTO: 0.05 THOUSAND/ΜL (ref 0–0.61)
EOSINOPHIL # BLD AUTO: 0.06 THOUSAND/ΜL (ref 0–0.61)
EOSINOPHIL # BLD AUTO: 0.09 THOUSAND/ΜL (ref 0–0.61)
EOSINOPHIL # BLD AUTO: 0.1 THOUSAND/ΜL (ref 0–0.61)
EOSINOPHIL # BLD AUTO: 0.11 THOUSAND/ΜL (ref 0–0.61)
EOSINOPHIL # BLD AUTO: 0.12 THOUSAND/ΜL (ref 0–0.61)
EOSINOPHIL # BLD AUTO: 0.12 THOUSAND/ΜL (ref 0–0.61)
EOSINOPHIL # BLD AUTO: 0.14 THOUSAND/ΜL (ref 0–0.61)
EOSINOPHIL # BLD MANUAL: 0 THOUSAND/UL (ref 0–0.4)
EOSINOPHIL # BLD MANUAL: 0.33 THOUSAND/UL (ref 0–0.4)
EOSINOPHIL NFR BLD AUTO: 0 % (ref 0–6)
EOSINOPHIL NFR BLD AUTO: 1 % (ref 0–6)
EOSINOPHIL NFR BLD AUTO: 2 % (ref 0–6)
EOSINOPHIL NFR BLD MANUAL: 0 % (ref 0–6)
EOSINOPHIL NFR BLD MANUAL: 2 % (ref 0–6)
ERYTHROCYTE [DISTWIDTH] IN BLOOD BY AUTOMATED COUNT: 13.2 % (ref 11.6–15.1)
ERYTHROCYTE [DISTWIDTH] IN BLOOD BY AUTOMATED COUNT: 13.4 % (ref 11.6–15.1)
ERYTHROCYTE [DISTWIDTH] IN BLOOD BY AUTOMATED COUNT: 13.6 % (ref 11.6–15.1)
ERYTHROCYTE [DISTWIDTH] IN BLOOD BY AUTOMATED COUNT: 13.7 % (ref 11.6–15.1)
ERYTHROCYTE [DISTWIDTH] IN BLOOD BY AUTOMATED COUNT: 13.9 % (ref 11.6–15.1)
ERYTHROCYTE [DISTWIDTH] IN BLOOD BY AUTOMATED COUNT: 13.9 % (ref 11.6–15.1)
ERYTHROCYTE [DISTWIDTH] IN BLOOD BY AUTOMATED COUNT: 14.1 % (ref 11.6–15.1)
ERYTHROCYTE [DISTWIDTH] IN BLOOD BY AUTOMATED COUNT: 14.2 % (ref 11.6–15.1)
ERYTHROCYTE [DISTWIDTH] IN BLOOD BY AUTOMATED COUNT: 14.3 % (ref 11.6–15.1)
ERYTHROCYTE [DISTWIDTH] IN BLOOD BY AUTOMATED COUNT: 14.7 % (ref 11.6–15.1)
ERYTHROCYTE [DISTWIDTH] IN BLOOD BY AUTOMATED COUNT: 14.7 % (ref 11.6–15.1)
ERYTHROCYTE [DISTWIDTH] IN BLOOD BY AUTOMATED COUNT: 14.9 % (ref 11.6–15.1)
ERYTHROCYTE [DISTWIDTH] IN BLOOD BY AUTOMATED COUNT: 15.1 % (ref 11.6–15.1)
ERYTHROCYTE [DISTWIDTH] IN BLOOD BY AUTOMATED COUNT: 15.3 % (ref 11.6–15.1)
ERYTHROCYTE [DISTWIDTH] IN BLOOD BY AUTOMATED COUNT: 15.3 % (ref 11.6–15.1)
ERYTHROCYTE [DISTWIDTH] IN BLOOD BY AUTOMATED COUNT: 15.4 % (ref 11.6–15.1)
EST. AVERAGE GLUCOSE BLD GHB EST-MCNC: 128 (CALC)
EST. AVERAGE GLUCOSE BLD GHB EST-SCNC: 7.1 (CALC)
FINE GRAN CASTS URNS QL MICRO: ABNORMAL /LPF
FIO2 GAS DIL.REBREATH: 55 L
GFR SERPL CREATININE-BSD FRML MDRD: 15 ML/MIN/1.73SQ M
GFR SERPL CREATININE-BSD FRML MDRD: 16 ML/MIN/1.73SQ M
GFR SERPL CREATININE-BSD FRML MDRD: 18 ML/MIN/1.73SQ M
GFR SERPL CREATININE-BSD FRML MDRD: 18 ML/MIN/1.73SQ M
GFR SERPL CREATININE-BSD FRML MDRD: 19 ML/MIN/1.73SQ M
GFR SERPL CREATININE-BSD FRML MDRD: 20 ML/MIN/1.73SQ M
GFR SERPL CREATININE-BSD FRML MDRD: 21 ML/MIN/1.73SQ M
GFR SERPL CREATININE-BSD FRML MDRD: 21 ML/MIN/1.73SQ M
GFR SERPL CREATININE-BSD FRML MDRD: 22 ML/MIN/1.73SQ M
GFR SERPL CREATININE-BSD FRML MDRD: 24 ML/MIN/1.73SQ M
GFR SERPL CREATININE-BSD FRML MDRD: 24 ML/MIN/1.73SQ M
GFR SERPL CREATININE-BSD FRML MDRD: 27 ML/MIN/1.73SQ M
GFR SERPL CREATININE-BSD FRML MDRD: 28 ML/MIN/1.73SQ M
GFR SERPL CREATININE-BSD FRML MDRD: 29 ML/MIN/1.73SQ M
GFR SERPL CREATININE-BSD FRML MDRD: 31 ML/MIN/1.73SQ M
GFR SERPL CREATININE-BSD FRML MDRD: 31 ML/MIN/1.73SQ M
GFR SERPL CREATININE-BSD FRML MDRD: 32 ML/MIN/1.73SQ M
GFR SERPL CREATININE-BSD FRML MDRD: 33 ML/MIN/1.73SQ M
GFR SERPL CREATININE-BSD FRML MDRD: 35 ML/MIN/1.73SQ M
GFR SERPL CREATININE-BSD FRML MDRD: 36 ML/MIN/1.73SQ M
GFR SERPL CREATININE-BSD FRML MDRD: 38 ML/MIN/1.73SQ M
GFR SERPL CREATININE-BSD FRML MDRD: 38 ML/MIN/1.73SQ M
GFR SERPL CREATININE-BSD FRML MDRD: 39 ML/MIN/1.73SQ M
GFR SERPL CREATININE-BSD FRML MDRD: 40 ML/MIN/1.73SQ M
GFR SERPL CREATININE-BSD FRML MDRD: 41 ML/MIN/1.73SQ M
GFR SERPL CREATININE-BSD FRML MDRD: 41 ML/MIN/1.73SQ M
GFR SERPL CREATININE-BSD FRML MDRD: 44 ML/MIN/1.73SQ M
GFR SERPL CREATININE-BSD FRML MDRD: 45 ML/MIN/1.73SQ M
GFR SERPL CREATININE-BSD FRML MDRD: 47 ML/MIN/1.73SQ M
GFR SERPL CREATININE-BSD FRML MDRD: 51 ML/MIN/1.73SQ M
GFR SERPL CREATININE-BSD FRML MDRD: 53 ML/MIN/1.73SQ M
GIANT PLATELETS BLD QL SMEAR: PRESENT
GLOBULIN SER CALC-MCNC: 3 G/DL (CALC) (ref 1.9–3.7)
GLUCOSE SERPL-MCNC: 100 MG/DL (ref 65–140)
GLUCOSE SERPL-MCNC: 101 MG/DL (ref 65–140)
GLUCOSE SERPL-MCNC: 101 MG/DL (ref 65–140)
GLUCOSE SERPL-MCNC: 102 MG/DL (ref 65–140)
GLUCOSE SERPL-MCNC: 108 MG/DL (ref 65–140)
GLUCOSE SERPL-MCNC: 108 MG/DL (ref 65–140)
GLUCOSE SERPL-MCNC: 110 MG/DL (ref 65–140)
GLUCOSE SERPL-MCNC: 112 MG/DL (ref 65–140)
GLUCOSE SERPL-MCNC: 113 MG/DL (ref 65–140)
GLUCOSE SERPL-MCNC: 115 MG/DL (ref 65–140)
GLUCOSE SERPL-MCNC: 116 MG/DL (ref 65–140)
GLUCOSE SERPL-MCNC: 116 MG/DL (ref 65–140)
GLUCOSE SERPL-MCNC: 117 MG/DL (ref 65–140)
GLUCOSE SERPL-MCNC: 117 MG/DL (ref 65–140)
GLUCOSE SERPL-MCNC: 118 MG/DL (ref 65–140)
GLUCOSE SERPL-MCNC: 119 MG/DL (ref 65–140)
GLUCOSE SERPL-MCNC: 121 MG/DL (ref 65–140)
GLUCOSE SERPL-MCNC: 122 MG/DL (ref 65–140)
GLUCOSE SERPL-MCNC: 123 MG/DL (ref 65–140)
GLUCOSE SERPL-MCNC: 124 MG/DL (ref 65–140)
GLUCOSE SERPL-MCNC: 125 MG/DL (ref 65–140)
GLUCOSE SERPL-MCNC: 125 MG/DL (ref 65–140)
GLUCOSE SERPL-MCNC: 126 MG/DL (ref 65–140)
GLUCOSE SERPL-MCNC: 127 MG/DL (ref 65–140)
GLUCOSE SERPL-MCNC: 130 MG/DL (ref 65–140)
GLUCOSE SERPL-MCNC: 130 MG/DL (ref 65–99)
GLUCOSE SERPL-MCNC: 131 MG/DL (ref 65–140)
GLUCOSE SERPL-MCNC: 131 MG/DL (ref 65–140)
GLUCOSE SERPL-MCNC: 132 MG/DL (ref 65–140)
GLUCOSE SERPL-MCNC: 133 MG/DL (ref 65–140)
GLUCOSE SERPL-MCNC: 134 MG/DL (ref 65–140)
GLUCOSE SERPL-MCNC: 134 MG/DL (ref 65–140)
GLUCOSE SERPL-MCNC: 136 MG/DL (ref 65–140)
GLUCOSE SERPL-MCNC: 136 MG/DL (ref 65–140)
GLUCOSE SERPL-MCNC: 137 MG/DL (ref 65–140)
GLUCOSE SERPL-MCNC: 137 MG/DL (ref 65–140)
GLUCOSE SERPL-MCNC: 138 MG/DL (ref 65–140)
GLUCOSE SERPL-MCNC: 139 MG/DL (ref 65–140)
GLUCOSE SERPL-MCNC: 140 MG/DL (ref 65–140)
GLUCOSE SERPL-MCNC: 141 MG/DL (ref 65–140)
GLUCOSE SERPL-MCNC: 143 MG/DL (ref 65–140)
GLUCOSE SERPL-MCNC: 144 MG/DL (ref 65–140)
GLUCOSE SERPL-MCNC: 145 MG/DL (ref 65–140)
GLUCOSE SERPL-MCNC: 146 MG/DL (ref 65–140)
GLUCOSE SERPL-MCNC: 146 MG/DL (ref 65–140)
GLUCOSE SERPL-MCNC: 147 MG/DL (ref 65–140)
GLUCOSE SERPL-MCNC: 147 MG/DL (ref 65–140)
GLUCOSE SERPL-MCNC: 148 MG/DL (ref 65–140)
GLUCOSE SERPL-MCNC: 148 MG/DL (ref 65–140)
GLUCOSE SERPL-MCNC: 149 MG/DL (ref 65–140)
GLUCOSE SERPL-MCNC: 149 MG/DL (ref 65–140)
GLUCOSE SERPL-MCNC: 150 MG/DL (ref 65–140)
GLUCOSE SERPL-MCNC: 150 MG/DL (ref 65–140)
GLUCOSE SERPL-MCNC: 151 MG/DL (ref 65–140)
GLUCOSE SERPL-MCNC: 152 MG/DL (ref 65–140)
GLUCOSE SERPL-MCNC: 153 MG/DL (ref 65–140)
GLUCOSE SERPL-MCNC: 154 MG/DL (ref 65–140)
GLUCOSE SERPL-MCNC: 154 MG/DL (ref 65–140)
GLUCOSE SERPL-MCNC: 155 MG/DL (ref 65–140)
GLUCOSE SERPL-MCNC: 156 MG/DL (ref 65–140)
GLUCOSE SERPL-MCNC: 156 MG/DL (ref 65–140)
GLUCOSE SERPL-MCNC: 157 MG/DL (ref 65–140)
GLUCOSE SERPL-MCNC: 158 MG/DL (ref 65–140)
GLUCOSE SERPL-MCNC: 159 MG/DL (ref 65–140)
GLUCOSE SERPL-MCNC: 159 MG/DL (ref 65–140)
GLUCOSE SERPL-MCNC: 160 MG/DL (ref 65–140)
GLUCOSE SERPL-MCNC: 160 MG/DL (ref 65–140)
GLUCOSE SERPL-MCNC: 161 MG/DL (ref 65–140)
GLUCOSE SERPL-MCNC: 162 MG/DL (ref 65–140)
GLUCOSE SERPL-MCNC: 164 MG/DL (ref 65–140)
GLUCOSE SERPL-MCNC: 165 MG/DL (ref 65–140)
GLUCOSE SERPL-MCNC: 166 MG/DL (ref 65–140)
GLUCOSE SERPL-MCNC: 167 MG/DL (ref 65–140)
GLUCOSE SERPL-MCNC: 167 MG/DL (ref 65–140)
GLUCOSE SERPL-MCNC: 169 MG/DL (ref 65–140)
GLUCOSE SERPL-MCNC: 171 MG/DL (ref 65–140)
GLUCOSE SERPL-MCNC: 172 MG/DL (ref 65–140)
GLUCOSE SERPL-MCNC: 173 MG/DL (ref 65–140)
GLUCOSE SERPL-MCNC: 173 MG/DL (ref 65–140)
GLUCOSE SERPL-MCNC: 174 MG/DL (ref 65–140)
GLUCOSE SERPL-MCNC: 176 MG/DL (ref 65–140)
GLUCOSE SERPL-MCNC: 177 MG/DL (ref 65–140)
GLUCOSE SERPL-MCNC: 179 MG/DL (ref 65–140)
GLUCOSE SERPL-MCNC: 180 MG/DL (ref 65–140)
GLUCOSE SERPL-MCNC: 181 MG/DL (ref 65–140)
GLUCOSE SERPL-MCNC: 181 MG/DL (ref 65–140)
GLUCOSE SERPL-MCNC: 182 MG/DL (ref 65–140)
GLUCOSE SERPL-MCNC: 183 MG/DL (ref 65–140)
GLUCOSE SERPL-MCNC: 184 MG/DL (ref 65–140)
GLUCOSE SERPL-MCNC: 185 MG/DL (ref 65–140)
GLUCOSE SERPL-MCNC: 185 MG/DL (ref 65–140)
GLUCOSE SERPL-MCNC: 186 MG/DL (ref 65–140)
GLUCOSE SERPL-MCNC: 192 MG/DL (ref 65–140)
GLUCOSE SERPL-MCNC: 192 MG/DL (ref 65–140)
GLUCOSE SERPL-MCNC: 194 MG/DL (ref 65–140)
GLUCOSE SERPL-MCNC: 194 MG/DL (ref 65–140)
GLUCOSE SERPL-MCNC: 195 MG/DL (ref 65–140)
GLUCOSE SERPL-MCNC: 196 MG/DL (ref 65–140)
GLUCOSE SERPL-MCNC: 199 MG/DL (ref 65–140)
GLUCOSE SERPL-MCNC: 200 MG/DL (ref 65–140)
GLUCOSE SERPL-MCNC: 200 MG/DL (ref 65–140)
GLUCOSE SERPL-MCNC: 202 MG/DL (ref 65–140)
GLUCOSE SERPL-MCNC: 204 MG/DL (ref 65–140)
GLUCOSE SERPL-MCNC: 205 MG/DL (ref 65–140)
GLUCOSE SERPL-MCNC: 208 MG/DL (ref 65–140)
GLUCOSE SERPL-MCNC: 209 MG/DL (ref 65–140)
GLUCOSE SERPL-MCNC: 210 MG/DL (ref 65–140)
GLUCOSE SERPL-MCNC: 213 MG/DL (ref 65–140)
GLUCOSE SERPL-MCNC: 217 MG/DL (ref 65–140)
GLUCOSE SERPL-MCNC: 217 MG/DL (ref 65–140)
GLUCOSE SERPL-MCNC: 221 MG/DL (ref 65–140)
GLUCOSE SERPL-MCNC: 221 MG/DL (ref 65–140)
GLUCOSE SERPL-MCNC: 222 MG/DL (ref 65–140)
GLUCOSE SERPL-MCNC: 226 MG/DL (ref 65–140)
GLUCOSE SERPL-MCNC: 227 MG/DL (ref 65–140)
GLUCOSE SERPL-MCNC: 228 MG/DL (ref 65–140)
GLUCOSE SERPL-MCNC: 229 MG/DL (ref 65–140)
GLUCOSE SERPL-MCNC: 229 MG/DL (ref 65–140)
GLUCOSE SERPL-MCNC: 230 MG/DL (ref 65–140)
GLUCOSE SERPL-MCNC: 235 MG/DL (ref 65–140)
GLUCOSE SERPL-MCNC: 244 MG/DL (ref 65–140)
GLUCOSE SERPL-MCNC: 253 MG/DL (ref 65–140)
GLUCOSE SERPL-MCNC: 255 MG/DL (ref 65–140)
GLUCOSE SERPL-MCNC: 256 MG/DL (ref 65–140)
GLUCOSE SERPL-MCNC: 268 MG/DL (ref 65–140)
GLUCOSE SERPL-MCNC: 277 MG/DL (ref 65–140)
GLUCOSE SERPL-MCNC: 285 MG/DL (ref 65–140)
GLUCOSE SERPL-MCNC: 287 MG/DL (ref 65–140)
GLUCOSE SERPL-MCNC: 310 MG/DL (ref 65–140)
GLUCOSE SERPL-MCNC: 324 MG/DL (ref 65–140)
GLUCOSE SERPL-MCNC: 333 MG/DL (ref 65–140)
GLUCOSE SERPL-MCNC: 91 MG/DL (ref 65–140)
GLUCOSE SERPL-MCNC: 99 MG/DL (ref 65–140)
GLUCOSE UR STRIP-MCNC: NEGATIVE MG/DL
GRAM STN SPEC: ABNORMAL
HBA1C MFR BLD: 6.1 % OF TOTAL HGB
HCO3 BLDA-SCNC: 12 MMOL/L (ref 22–28)
HCO3 BLDA-SCNC: 12.9 MMOL/L (ref 22–28)
HCO3 BLDA-SCNC: 12.9 MMOL/L (ref 22–28)
HCO3 BLDA-SCNC: 15.1 MMOL/L (ref 24–30)
HCO3 BLDA-SCNC: 17.8 MMOL/L (ref 22–28)
HCO3 BLDA-SCNC: 17.9 MMOL/L (ref 22–28)
HCO3 BLDA-SCNC: 18.6 MMOL/L (ref 24–30)
HCO3 BLDA-SCNC: 19.8 MMOL/L (ref 22–28)
HCO3 BLDA-SCNC: 20.1 MMOL/L (ref 22–28)
HCO3 BLDA-SCNC: 20.9 MMOL/L (ref 22–28)
HCO3 BLDA-SCNC: 21.4 MMOL/L (ref 22–28)
HCO3 BLDA-SCNC: 22.4 MMOL/L (ref 22–28)
HCO3 BLDA-SCNC: 23.1 MMOL/L (ref 24–30)
HCO3 BLDV-SCNC: 12.7 MMOL/L (ref 24–30)
HCO3 BLDV-SCNC: 13.1 MMOL/L (ref 24–30)
HCO3 BLDV-SCNC: 21.2 MMOL/L (ref 24–30)
HCO3 BLDV-SCNC: 25.1 MMOL/L (ref 24–30)
HCT VFR BLD AUTO: 24.1 % (ref 36.5–49.3)
HCT VFR BLD AUTO: 24.2 % (ref 36.5–49.3)
HCT VFR BLD AUTO: 24.7 % (ref 36.5–49.3)
HCT VFR BLD AUTO: 25 % (ref 36.5–49.3)
HCT VFR BLD AUTO: 25.1 % (ref 36.5–49.3)
HCT VFR BLD AUTO: 25.5 % (ref 36.5–49.3)
HCT VFR BLD AUTO: 26 % (ref 36.5–49.3)
HCT VFR BLD AUTO: 26.2 % (ref 36.5–49.3)
HCT VFR BLD AUTO: 26.4 % (ref 36.5–49.3)
HCT VFR BLD AUTO: 26.5 % (ref 36.5–49.3)
HCT VFR BLD AUTO: 27.2 % (ref 36.5–49.3)
HCT VFR BLD AUTO: 27.3 % (ref 36.5–49.3)
HCT VFR BLD AUTO: 28.1 % (ref 36.5–49.3)
HCT VFR BLD AUTO: 28.4 % (ref 36.5–49.3)
HCT VFR BLD AUTO: 28.7 % (ref 36.5–49.3)
HCT VFR BLD AUTO: 28.7 % (ref 36.5–49.3)
HCT VFR BLD AUTO: 29.9 % (ref 36.5–49.3)
HCT VFR BLD AUTO: 30 % (ref 36.5–49.3)
HCT VFR BLD AUTO: 30 % (ref 36.5–49.3)
HCT VFR BLD AUTO: 30.8 % (ref 36.5–49.3)
HCT VFR BLD AUTO: 30.9 % (ref 36.5–49.3)
HCT VFR BLD AUTO: 31.3 % (ref 36.5–49.3)
HCT VFR BLD AUTO: 31.4 % (ref 36.5–49.3)
HCT VFR BLD AUTO: 31.5 % (ref 36.5–49.3)
HCT VFR BLD AUTO: 31.6 % (ref 36.5–49.3)
HCT VFR BLD AUTO: 31.9 % (ref 36.5–49.3)
HCT VFR BLD AUTO: 33.4 % (ref 36.5–49.3)
HCT VFR BLD AUTO: 33.9 % (ref 36.5–49.3)
HCT VFR BLD AUTO: 34.6 % (ref 36.5–49.3)
HCT VFR BLD AUTO: 39.3 % (ref 36.5–49.3)
HCT VFR BLD AUTO: 47 % (ref 36.5–49.3)
HCT VFR BLD CALC: 21 % (ref 36.5–49.3)
HCT VFR BLD CALC: 25 % (ref 36.5–49.3)
HCT VFR BLD CALC: 26 % (ref 36.5–49.3)
HCT VFR BLD CALC: 37 % (ref 36.5–49.3)
HDLC SERPL-MCNC: 46 MG/DL
HGB BLD-MCNC: 10 G/DL (ref 12–17)
HGB BLD-MCNC: 10 G/DL (ref 12–17)
HGB BLD-MCNC: 10.2 G/DL (ref 12–17)
HGB BLD-MCNC: 10.8 G/DL (ref 12–17)
HGB BLD-MCNC: 10.8 G/DL (ref 12–17)
HGB BLD-MCNC: 11 G/DL (ref 12–17)
HGB BLD-MCNC: 12.8 G/DL (ref 12–17)
HGB BLD-MCNC: 15.4 G/DL (ref 12–17)
HGB BLD-MCNC: 7 G/DL (ref 12–17)
HGB BLD-MCNC: 7.4 G/DL (ref 12–17)
HGB BLD-MCNC: 7.5 G/DL (ref 12–17)
HGB BLD-MCNC: 7.6 G/DL (ref 12–17)
HGB BLD-MCNC: 7.6 G/DL (ref 12–17)
HGB BLD-MCNC: 7.8 G/DL (ref 12–17)
HGB BLD-MCNC: 7.8 G/DL (ref 12–17)
HGB BLD-MCNC: 7.9 G/DL (ref 12–17)
HGB BLD-MCNC: 8 G/DL (ref 12–17)
HGB BLD-MCNC: 8.2 G/DL (ref 12–17)
HGB BLD-MCNC: 8.3 G/DL (ref 12–17)
HGB BLD-MCNC: 8.4 G/DL (ref 12–17)
HGB BLD-MCNC: 8.5 G/DL (ref 12–17)
HGB BLD-MCNC: 8.9 G/DL (ref 12–17)
HGB BLD-MCNC: 9.2 G/DL (ref 12–17)
HGB BLD-MCNC: 9.4 G/DL (ref 12–17)
HGB BLD-MCNC: 9.7 G/DL (ref 12–17)
HGB BLD-MCNC: 9.9 G/DL (ref 12–17)
HGB BLDA-MCNC: 12.6 G/DL (ref 12–17)
HGB BLDA-MCNC: 7.1 G/DL (ref 12–17)
HGB BLDA-MCNC: 8.5 G/DL (ref 12–17)
HGB BLDA-MCNC: 8.8 G/DL (ref 12–17)
HGB UR QL STRIP.AUTO: ABNORMAL
HOROWITZ INDEX BLDA+IHG-RTO: 30 MM[HG]
HOROWITZ INDEX BLDA+IHG-RTO: 90 MM[HG]
HYALINE CASTS #/AREA URNS LPF: ABNORMAL /LPF
I-TIME: 0.9
IMM GRANULOCYTES # BLD AUTO: 0.03 THOUSAND/UL (ref 0–0.2)
IMM GRANULOCYTES # BLD AUTO: 0.06 THOUSAND/UL (ref 0–0.2)
IMM GRANULOCYTES # BLD AUTO: 0.08 THOUSAND/UL (ref 0–0.2)
IMM GRANULOCYTES # BLD AUTO: 0.09 THOUSAND/UL (ref 0–0.2)
IMM GRANULOCYTES # BLD AUTO: 0.09 THOUSAND/UL (ref 0–0.2)
IMM GRANULOCYTES # BLD AUTO: 0.1 THOUSAND/UL (ref 0–0.2)
IMM GRANULOCYTES # BLD AUTO: 0.11 THOUSAND/UL (ref 0–0.2)
IMM GRANULOCYTES # BLD AUTO: 0.11 THOUSAND/UL (ref 0–0.2)
IMM GRANULOCYTES # BLD AUTO: 0.12 THOUSAND/UL (ref 0–0.2)
IMM GRANULOCYTES # BLD AUTO: 0.12 THOUSAND/UL (ref 0–0.2)
IMM GRANULOCYTES # BLD AUTO: 0.14 THOUSAND/UL (ref 0–0.2)
IMM GRANULOCYTES # BLD AUTO: 0.17 THOUSAND/UL (ref 0–0.2)
IMM GRANULOCYTES # BLD AUTO: 0.21 THOUSAND/UL (ref 0–0.2)
IMM GRANULOCYTES # BLD AUTO: 0.31 THOUSAND/UL (ref 0–0.2)
IMM GRANULOCYTES # BLD AUTO: 0.33 THOUSAND/UL (ref 0–0.2)
IMM GRANULOCYTES # BLD AUTO: 0.43 THOUSAND/UL (ref 0–0.2)
IMM GRANULOCYTES # BLD AUTO: 0.44 THOUSAND/UL (ref 0–0.2)
IMM GRANULOCYTES # BLD AUTO: 0.5 THOUSAND/UL (ref 0–0.2)
IMM GRANULOCYTES # BLD AUTO: >0.5 THOUSAND/UL (ref 0–0.2)
IMM GRANULOCYTES NFR BLD AUTO: 0 % (ref 0–2)
IMM GRANULOCYTES NFR BLD AUTO: 1 % (ref 0–2)
IMM GRANULOCYTES NFR BLD AUTO: 2 % (ref 0–2)
IMM GRANULOCYTES NFR BLD AUTO: 3 % (ref 0–2)
IMM GRANULOCYTES NFR BLD AUTO: 5 % (ref 0–2)
IMM GRANULOCYTES NFR BLD AUTO: 6 % (ref 0–2)
IMM GRANULOCYTES NFR BLD AUTO: 8 % (ref 0–2)
INR PPP: 1.06 (ref 0.84–1.19)
INR PPP: 1.69 (ref 0.84–1.19)
KETONES UR STRIP-MCNC: ABNORMAL MG/DL
KETONES UR STRIP-MCNC: NEGATIVE MG/DL
KETONES UR STRIP-MCNC: NEGATIVE MG/DL
LACTATE SERPL-SCNC: 1 MMOL/L (ref 0.5–2)
LACTATE SERPL-SCNC: 1 MMOL/L (ref 0.5–2)
LACTATE SERPL-SCNC: 1.1 MMOL/L (ref 0.5–2)
LACTATE SERPL-SCNC: 1.1 MMOL/L (ref 0.5–2)
LACTATE SERPL-SCNC: 1.2 MMOL/L (ref 0.5–2)
LACTATE SERPL-SCNC: 1.7 MMOL/L (ref 0.5–2)
LACTATE SERPL-SCNC: 2.2 MMOL/L (ref 0.5–2)
LACTATE SERPL-SCNC: 2.5 MMOL/L (ref 0.5–2)
LDLC SERPL CALC-MCNC: 103 MG/DL (CALC)
LEUKOCYTE ESTERASE UR QL STRIP: ABNORMAL
LEUKOCYTE ESTERASE UR QL STRIP: ABNORMAL
LEUKOCYTE ESTERASE UR QL STRIP: NEGATIVE
LIPASE SERPL-CCNC: 209 U/L (ref 73–393)
LYMPHOCYTES # BLD AUTO: 0.17 THOUSAND/UL (ref 0.6–4.47)
LYMPHOCYTES # BLD AUTO: 0.31 THOUSANDS/ΜL (ref 0.6–4.47)
LYMPHOCYTES # BLD AUTO: 0.33 THOUSAND/UL (ref 0.6–4.47)
LYMPHOCYTES # BLD AUTO: 0.42 THOUSANDS/ΜL (ref 0.6–4.47)
LYMPHOCYTES # BLD AUTO: 0.43 THOUSAND/UL (ref 0.6–4.47)
LYMPHOCYTES # BLD AUTO: 0.44 THOUSAND/UL (ref 0.6–4.47)
LYMPHOCYTES # BLD AUTO: 0.47 THOUSANDS/ΜL (ref 0.6–4.47)
LYMPHOCYTES # BLD AUTO: 0.49 THOUSANDS/ΜL (ref 0.6–4.47)
LYMPHOCYTES # BLD AUTO: 0.52 THOUSANDS/ΜL (ref 0.6–4.47)
LYMPHOCYTES # BLD AUTO: 0.66 THOUSANDS/ΜL (ref 0.6–4.47)
LYMPHOCYTES # BLD AUTO: 0.67 THOUSANDS/ΜL (ref 0.6–4.47)
LYMPHOCYTES # BLD AUTO: 0.69 THOUSANDS/ΜL (ref 0.6–4.47)
LYMPHOCYTES # BLD AUTO: 0.72 THOUSANDS/ΜL (ref 0.6–4.47)
LYMPHOCYTES # BLD AUTO: 0.74 THOUSANDS/ΜL (ref 0.6–4.47)
LYMPHOCYTES # BLD AUTO: 0.8 THOUSANDS/ΜL (ref 0.6–4.47)
LYMPHOCYTES # BLD AUTO: 0.84 THOUSANDS/ΜL (ref 0.6–4.47)
LYMPHOCYTES # BLD AUTO: 0.88 THOUSANDS/ΜL (ref 0.6–4.47)
LYMPHOCYTES # BLD AUTO: 0.93 THOUSANDS/ΜL (ref 0.6–4.47)
LYMPHOCYTES # BLD AUTO: 0.96 THOUSANDS/ΜL (ref 0.6–4.47)
LYMPHOCYTES # BLD AUTO: 0.99 THOUSANDS/ΜL (ref 0.6–4.47)
LYMPHOCYTES # BLD AUTO: 1.02 THOUSANDS/ΜL (ref 0.6–4.47)
LYMPHOCYTES # BLD AUTO: 1.06 THOUSANDS/ΜL (ref 0.6–4.47)
LYMPHOCYTES # BLD AUTO: 1.09 THOUSANDS/ΜL (ref 0.6–4.47)
LYMPHOCYTES # BLD AUTO: 1.31 THOUSANDS/ΜL (ref 0.6–4.47)
LYMPHOCYTES # BLD AUTO: 1.54 THOUSANDS/ΜL (ref 0.6–4.47)
LYMPHOCYTES # BLD AUTO: 1.77 THOUSANDS/ΜL (ref 0.6–4.47)
LYMPHOCYTES # BLD AUTO: 2 % (ref 14–44)
LYMPHOCYTES # BLD AUTO: 3 % (ref 14–44)
LYMPHOCYTES # BLD AUTO: 3 % (ref 14–44)
LYMPHOCYTES # BLD AUTO: 4 % (ref 14–44)
LYMPHOCYTES NFR BLD AUTO: 10 % (ref 14–44)
LYMPHOCYTES NFR BLD AUTO: 2 % (ref 14–44)
LYMPHOCYTES NFR BLD AUTO: 3 % (ref 14–44)
LYMPHOCYTES NFR BLD AUTO: 4 % (ref 14–44)
LYMPHOCYTES NFR BLD AUTO: 5 % (ref 14–44)
LYMPHOCYTES NFR BLD AUTO: 7 % (ref 14–44)
LYMPHOCYTES NFR BLD AUTO: 7 % (ref 14–44)
LYMPHOCYTES NFR BLD AUTO: 8 % (ref 14–44)
LYMPHOCYTES NFR BLD AUTO: 9 % (ref 14–44)
LYMPHOCYTES NFR BLD AUTO: 9 % (ref 14–44)
MAGNESIUM SERPL-MCNC: 1.3 MG/DL (ref 1.6–2.6)
MAGNESIUM SERPL-MCNC: 1.4 MG/DL (ref 1.6–2.6)
MAGNESIUM SERPL-MCNC: 1.7 MG/DL (ref 1.6–2.6)
MAGNESIUM SERPL-MCNC: 1.7 MG/DL (ref 1.6–2.6)
MAGNESIUM SERPL-MCNC: 1.8 MG/DL (ref 1.6–2.6)
MAGNESIUM SERPL-MCNC: 1.9 MG/DL (ref 1.6–2.6)
MAGNESIUM SERPL-MCNC: 2 MG/DL (ref 1.6–2.6)
MAGNESIUM SERPL-MCNC: 2.1 MG/DL (ref 1.6–2.6)
MAGNESIUM SERPL-MCNC: 2.2 MG/DL (ref 1.6–2.6)
MAGNESIUM SERPL-MCNC: 2.3 MG/DL (ref 1.6–2.6)
MAGNESIUM SERPL-MCNC: 2.4 MG/DL (ref 1.6–2.6)
MCH RBC QN AUTO: 28.1 PG (ref 26.8–34.3)
MCH RBC QN AUTO: 28.1 PG (ref 26.8–34.3)
MCH RBC QN AUTO: 28.2 PG (ref 26.8–34.3)
MCH RBC QN AUTO: 28.3 PG (ref 26.8–34.3)
MCH RBC QN AUTO: 28.4 PG (ref 26.8–34.3)
MCH RBC QN AUTO: 28.4 PG (ref 26.8–34.3)
MCH RBC QN AUTO: 28.5 PG (ref 26.8–34.3)
MCH RBC QN AUTO: 28.5 PG (ref 26.8–34.3)
MCH RBC QN AUTO: 28.6 PG (ref 26.8–34.3)
MCH RBC QN AUTO: 28.7 PG (ref 26.8–34.3)
MCH RBC QN AUTO: 28.8 PG (ref 26.8–34.3)
MCH RBC QN AUTO: 28.8 PG (ref 26.8–34.3)
MCH RBC QN AUTO: 28.9 PG (ref 26.8–34.3)
MCH RBC QN AUTO: 29.2 PG (ref 26.8–34.3)
MCH RBC QN AUTO: 29.2 PG (ref 26.8–34.3)
MCH RBC QN AUTO: 29.3 PG (ref 26.8–34.3)
MCH RBC QN AUTO: 29.3 PG (ref 26.8–34.3)
MCH RBC QN AUTO: 29.4 PG (ref 26.8–34.3)
MCHC RBC AUTO-ENTMCNC: 29.4 G/DL (ref 31.4–37.4)
MCHC RBC AUTO-ENTMCNC: 29.6 G/DL (ref 31.4–37.4)
MCHC RBC AUTO-ENTMCNC: 29.8 G/DL (ref 31.4–37.4)
MCHC RBC AUTO-ENTMCNC: 29.9 G/DL (ref 31.4–37.4)
MCHC RBC AUTO-ENTMCNC: 29.9 G/DL (ref 31.4–37.4)
MCHC RBC AUTO-ENTMCNC: 30 G/DL (ref 31.4–37.4)
MCHC RBC AUTO-ENTMCNC: 30.1 G/DL (ref 31.4–37.4)
MCHC RBC AUTO-ENTMCNC: 30.2 G/DL (ref 31.4–37.4)
MCHC RBC AUTO-ENTMCNC: 30.2 G/DL (ref 31.4–37.4)
MCHC RBC AUTO-ENTMCNC: 30.4 G/DL (ref 31.4–37.4)
MCHC RBC AUTO-ENTMCNC: 30.4 G/DL (ref 31.4–37.4)
MCHC RBC AUTO-ENTMCNC: 30.6 G/DL (ref 31.4–37.4)
MCHC RBC AUTO-ENTMCNC: 30.8 G/DL (ref 31.4–37.4)
MCHC RBC AUTO-ENTMCNC: 31 G/DL (ref 31.4–37.4)
MCHC RBC AUTO-ENTMCNC: 31.1 G/DL (ref 31.4–37.4)
MCHC RBC AUTO-ENTMCNC: 31.3 G/DL (ref 31.4–37.4)
MCHC RBC AUTO-ENTMCNC: 31.4 G/DL (ref 31.4–37.4)
MCHC RBC AUTO-ENTMCNC: 31.5 G/DL (ref 31.4–37.4)
MCHC RBC AUTO-ENTMCNC: 31.5 G/DL (ref 31.4–37.4)
MCHC RBC AUTO-ENTMCNC: 31.6 G/DL (ref 31.4–37.4)
MCHC RBC AUTO-ENTMCNC: 31.8 G/DL (ref 31.4–37.4)
MCHC RBC AUTO-ENTMCNC: 31.9 G/DL (ref 31.4–37.4)
MCHC RBC AUTO-ENTMCNC: 32.1 G/DL (ref 31.4–37.4)
MCHC RBC AUTO-ENTMCNC: 32.3 G/DL (ref 31.4–37.4)
MCHC RBC AUTO-ENTMCNC: 32.4 G/DL (ref 31.4–37.4)
MCHC RBC AUTO-ENTMCNC: 32.6 G/DL (ref 31.4–37.4)
MCHC RBC AUTO-ENTMCNC: 32.8 G/DL (ref 31.4–37.4)
MCV RBC AUTO: 88 FL (ref 82–98)
MCV RBC AUTO: 89 FL (ref 82–98)
MCV RBC AUTO: 90 FL (ref 82–98)
MCV RBC AUTO: 90 FL (ref 82–98)
MCV RBC AUTO: 91 FL (ref 82–98)
MCV RBC AUTO: 92 FL (ref 82–98)
MCV RBC AUTO: 93 FL (ref 82–98)
MCV RBC AUTO: 94 FL (ref 82–98)
MCV RBC AUTO: 95 FL (ref 82–98)
MCV RBC AUTO: 96 FL (ref 82–98)
METAMYELOCYTES NFR BLD MANUAL: 1 % (ref 0–1)
METAMYELOCYTES NFR BLD MANUAL: 1 % (ref 0–1)
MICROCYTES BLD QL AUTO: PRESENT
MONOCYTES # BLD AUTO: 0.09 THOUSAND/UL (ref 0–1.22)
MONOCYTES # BLD AUTO: 0.18 THOUSAND/ΜL (ref 0.17–1.22)
MONOCYTES # BLD AUTO: 0.29 THOUSAND/UL (ref 0–1.22)
MONOCYTES # BLD AUTO: 0.61 THOUSAND/ΜL (ref 0.17–1.22)
MONOCYTES # BLD AUTO: 0.63 THOUSAND/ΜL (ref 0.17–1.22)
MONOCYTES # BLD AUTO: 0.63 THOUSAND/ΜL (ref 0.17–1.22)
MONOCYTES # BLD AUTO: 0.65 THOUSAND/UL (ref 0–1.22)
MONOCYTES # BLD AUTO: 0.66 THOUSAND/ΜL (ref 0.17–1.22)
MONOCYTES # BLD AUTO: 0.73 THOUSAND/UL (ref 0–1.22)
MONOCYTES # BLD AUTO: 0.81 THOUSAND/ΜL (ref 0.17–1.22)
MONOCYTES # BLD AUTO: 0.81 THOUSAND/ΜL (ref 0.17–1.22)
MONOCYTES # BLD AUTO: 0.85 THOUSAND/ΜL (ref 0.17–1.22)
MONOCYTES # BLD AUTO: 0.89 THOUSAND/ΜL (ref 0.17–1.22)
MONOCYTES # BLD AUTO: 0.93 THOUSAND/ΜL (ref 0.17–1.22)
MONOCYTES # BLD AUTO: 0.93 THOUSAND/ΜL (ref 0.17–1.22)
MONOCYTES # BLD AUTO: 1.08 THOUSAND/ΜL (ref 0.17–1.22)
MONOCYTES # BLD AUTO: 1.1 THOUSAND/ΜL (ref 0.17–1.22)
MONOCYTES # BLD AUTO: 1.12 THOUSAND/ΜL (ref 0.17–1.22)
MONOCYTES # BLD AUTO: 1.16 THOUSAND/ΜL (ref 0.17–1.22)
MONOCYTES # BLD AUTO: 1.19 THOUSAND/ΜL (ref 0.17–1.22)
MONOCYTES # BLD AUTO: 1.22 THOUSAND/ΜL (ref 0.17–1.22)
MONOCYTES # BLD AUTO: 1.4 THOUSAND/ΜL (ref 0.17–1.22)
MONOCYTES # BLD AUTO: 1.61 THOUSAND/ΜL (ref 0.17–1.22)
MONOCYTES # BLD AUTO: 1.62 THOUSAND/ΜL (ref 0.17–1.22)
MONOCYTES NFR BLD AUTO: 10 % (ref 4–12)
MONOCYTES NFR BLD AUTO: 12 % (ref 4–12)
MONOCYTES NFR BLD AUTO: 2 % (ref 4–12)
MONOCYTES NFR BLD AUTO: 3 % (ref 4–12)
MONOCYTES NFR BLD AUTO: 4 % (ref 4–12)
MONOCYTES NFR BLD AUTO: 4 % (ref 4–12)
MONOCYTES NFR BLD AUTO: 5 % (ref 4–12)
MONOCYTES NFR BLD AUTO: 6 % (ref 4–12)
MONOCYTES NFR BLD AUTO: 7 % (ref 4–12)
MONOCYTES NFR BLD AUTO: 8 % (ref 4–12)
MONOCYTES NFR BLD AUTO: 8 % (ref 4–12)
MONOCYTES NFR BLD AUTO: 9 % (ref 4–12)
MONOCYTES NFR BLD: 2 % (ref 4–12)
MONOCYTES NFR BLD: 2 % (ref 4–12)
MONOCYTES NFR BLD: 4 % (ref 4–12)
MONOCYTES NFR BLD: 5 % (ref 4–12)
NASAL CANNULA: 6
NEUTROPHILS # BLD AUTO: 10 THOUSANDS/ΜL (ref 1.85–7.62)
NEUTROPHILS # BLD AUTO: 11.33 THOUSANDS/ΜL (ref 1.85–7.62)
NEUTROPHILS # BLD AUTO: 11.4 THOUSANDS/ΜL (ref 1.85–7.62)
NEUTROPHILS # BLD AUTO: 11.63 THOUSANDS/ΜL (ref 1.85–7.62)
NEUTROPHILS # BLD AUTO: 11.68 THOUSANDS/ΜL (ref 1.85–7.62)
NEUTROPHILS # BLD AUTO: 12.51 THOUSANDS/ΜL (ref 1.85–7.62)
NEUTROPHILS # BLD AUTO: 12.7 THOUSANDS/ΜL (ref 1.85–7.62)
NEUTROPHILS # BLD AUTO: 14.49 THOUSANDS/ΜL (ref 1.85–7.62)
NEUTROPHILS # BLD AUTO: 16.05 THOUSANDS/ΜL (ref 1.85–7.62)
NEUTROPHILS # BLD AUTO: 17.01 THOUSANDS/ΜL (ref 1.85–7.62)
NEUTROPHILS # BLD AUTO: 17.74 THOUSANDS/ΜL (ref 1.85–7.62)
NEUTROPHILS # BLD AUTO: 18.45 THOUSANDS/ΜL (ref 1.85–7.62)
NEUTROPHILS # BLD AUTO: 18.9 THOUSANDS/ΜL (ref 1.85–7.62)
NEUTROPHILS # BLD AUTO: 20.07 THOUSANDS/ΜL (ref 1.85–7.62)
NEUTROPHILS # BLD AUTO: 20.78 THOUSANDS/ΜL (ref 1.85–7.62)
NEUTROPHILS # BLD AUTO: 6.63 THOUSANDS/ΜL (ref 1.85–7.62)
NEUTROPHILS # BLD AUTO: 6.74 THOUSANDS/ΜL (ref 1.85–7.62)
NEUTROPHILS # BLD AUTO: 6.98 THOUSANDS/ΜL (ref 1.85–7.62)
NEUTROPHILS # BLD AUTO: 7.36 THOUSANDS/ΜL (ref 1.85–7.62)
NEUTROPHILS # BLD AUTO: 7.4 THOUSANDS/ΜL (ref 1.85–7.62)
NEUTROPHILS # BLD AUTO: 8.4 THOUSANDS/ΜL (ref 1.85–7.62)
NEUTROPHILS # BLD AUTO: 9.37 THOUSANDS/ΜL (ref 1.85–7.62)
NEUTROPHILS # BLD MANUAL: 13.34 THOUSAND/UL (ref 1.85–7.62)
NEUTROPHILS # BLD MANUAL: 13.62 THOUSAND/UL (ref 1.85–7.62)
NEUTROPHILS # BLD MANUAL: 14.55 THOUSAND/UL (ref 1.85–7.62)
NEUTROPHILS # BLD MANUAL: 3.95 THOUSAND/UL (ref 1.85–7.62)
NEUTS BAND NFR BLD MANUAL: 1 % (ref 0–8)
NEUTS BAND NFR BLD MANUAL: 12 % (ref 0–8)
NEUTS SEG NFR BLD AUTO: 76 % (ref 43–75)
NEUTS SEG NFR BLD AUTO: 76 % (ref 43–75)
NEUTS SEG NFR BLD AUTO: 77 % (ref 43–75)
NEUTS SEG NFR BLD AUTO: 79 % (ref 43–75)
NEUTS SEG NFR BLD AUTO: 80 % (ref 43–75)
NEUTS SEG NFR BLD AUTO: 81 % (ref 43–75)
NEUTS SEG NFR BLD AUTO: 81 % (ref 43–75)
NEUTS SEG NFR BLD AUTO: 83 % (ref 43–75)
NEUTS SEG NFR BLD AUTO: 84 % (ref 43–75)
NEUTS SEG NFR BLD AUTO: 85 % (ref 43–75)
NEUTS SEG NFR BLD AUTO: 86 % (ref 43–75)
NEUTS SEG NFR BLD AUTO: 88 % (ref 43–75)
NEUTS SEG NFR BLD AUTO: 88 % (ref 43–75)
NEUTS SEG NFR BLD AUTO: 90 % (ref 43–75)
NEUTS SEG NFR BLD AUTO: 91 % (ref 43–75)
NEUTS SEG NFR BLD AUTO: 92 % (ref 43–75)
NEUTS SEG NFR BLD AUTO: 94 % (ref 43–75)
NEUTS SEG NFR BLD AUTO: 94 % (ref 43–75)
NITRITE UR QL STRIP: NEGATIVE
NON-SQ EPI CELLS URNS QL MICRO: ABNORMAL /HPF
NONHDLC SERPL-MCNC: 138 MG/DL (CALC)
NRBC BLD AUTO-RTO: 0 /100 WBCS
O2 CT BLDA-SCNC: 12.3 ML/DL (ref 16–23)
O2 CT BLDA-SCNC: 12.4 ML/DL (ref 16–23)
O2 CT BLDA-SCNC: 12.5 ML/DL (ref 16–23)
O2 CT BLDA-SCNC: 12.5 ML/DL (ref 16–23)
O2 CT BLDA-SCNC: 12.8 ML/DL (ref 16–23)
O2 CT BLDA-SCNC: 12.8 ML/DL (ref 16–23)
O2 CT BLDA-SCNC: 12.9 ML/DL (ref 16–23)
O2 CT BLDV-SCNC: 10.1 ML/DL
O2 CT BLDV-SCNC: 12 ML/DL
O2 CT BLDV-SCNC: 8.6 ML/DL
O2 CT BLDV-SCNC: 9.4 ML/DL
OXYHGB MFR BLDA: 93.2 % (ref 94–97)
OXYHGB MFR BLDA: 93.2 % (ref 94–97)
OXYHGB MFR BLDA: 93.9 % (ref 94–97)
OXYHGB MFR BLDA: 94.3 % (ref 94–97)
OXYHGB MFR BLDA: 95 % (ref 94–97)
OXYHGB MFR BLDA: 97.9 % (ref 94–97)
OXYHGB MFR BLDA: 98.9 % (ref 94–97)
P AXIS: 23 DEGREES
P AXIS: 32 DEGREES
P AXIS: 47 DEGREES
P AXIS: 56 DEGREES
PCO2 BLD: 16 MMOL/L (ref 21–32)
PCO2 BLD: 20 MMOL/L (ref 21–32)
PCO2 BLD: 21 MMOL/L (ref 21–32)
PCO2 BLD: 21 MMOL/L (ref 21–32)
PCO2 BLD: 23 MMOL/L (ref 21–32)
PCO2 BLD: 24 MMOL/L (ref 21–32)
PCO2 BLD: 31.3 MM HG (ref 42–50)
PCO2 BLD: 36.1 MM HG (ref 36–44)
PCO2 BLD: 37.5 MM HG (ref 42–50)
PCO2 BLD: 38.4 MM HG (ref 36–44)
PCO2 BLD: 41.9 MM HG (ref 36–44)
PCO2 BLD: 41.9 MM HG (ref 42–50)
PCO2 BLDA: 25.6 MM HG (ref 36–44)
PCO2 BLDA: 27.2 MM HG (ref 36–44)
PCO2 BLDA: 27.3 MM HG (ref 36–44)
PCO2 BLDA: 27.7 MM HG (ref 36–44)
PCO2 BLDA: 34.1 MM HG (ref 36–44)
PCO2 BLDA: 34.6 MM HG (ref 36–44)
PCO2 BLDA: 35.9 MM HG (ref 36–44)
PCO2 BLDV: 30.2 MM HG (ref 42–50)
PCO2 BLDV: 32.6 MM HG (ref 42–50)
PCO2 BLDV: 46 MM HG (ref 42–50)
PCO2 BLDV: 50.4 MM HG (ref 42–50)
PCO2 TEMP ADJ BLDA: 34.6 MM HG (ref 36–44)
PCO2 TEMP ADJ BLDA: 35.8 MM HG (ref 36–44)
PEEP RESPIRATORY: 8 CM[H2O]
PH BLD: 7.29 [PH] (ref 7.3–7.4)
PH BLD: 7.3 [PH] (ref 7.3–7.4)
PH BLD: 7.32 [PH] (ref 7.35–7.45)
PH BLD: 7.33 [PH] (ref 7.35–7.45)
PH BLD: 7.35 [PH] (ref 7.35–7.45)
PH BLD: 7.35 [PH] (ref 7.3–7.4)
PH BLDA: 7.29 [PH] (ref 7.35–7.45)
PH BLDA: 7.33 [PH] (ref 7.35–7.45)
PH BLDA: 7.34 [PH] (ref 7.35–7.45)
PH BLDA: 7.41 [PH] (ref 7.35–7.45)
PH BLDA: 7.5 [PH] (ref 7.35–7.45)
PH BLDV: 7.22 [PH] (ref 7.3–7.4)
PH BLDV: 7.24 [PH] (ref 7.3–7.4)
PH BLDV: 7.24 [PH] (ref 7.3–7.4)
PH BLDV: 7.36 [PH] (ref 7.3–7.4)
PH UR STRIP.AUTO: 5 [PH]
PH UR STRIP.AUTO: 5.5 [PH]
PH UR STRIP.AUTO: 5.5 [PH]
PHOSPHATE SERPL-MCNC: 1.4 MG/DL (ref 2.3–4.1)
PHOSPHATE SERPL-MCNC: 2 MG/DL (ref 2.3–4.1)
PHOSPHATE SERPL-MCNC: 2.2 MG/DL (ref 2.3–4.1)
PHOSPHATE SERPL-MCNC: 2.9 MG/DL (ref 2.3–4.1)
PHOSPHATE SERPL-MCNC: 3 MG/DL (ref 2.3–4.1)
PHOSPHATE SERPL-MCNC: 3 MG/DL (ref 2.3–4.1)
PHOSPHATE SERPL-MCNC: 3.1 MG/DL (ref 2.3–4.1)
PHOSPHATE SERPL-MCNC: 3.3 MG/DL (ref 2.3–4.1)
PHOSPHATE SERPL-MCNC: 3.7 MG/DL (ref 2.3–4.1)
PHOSPHATE SERPL-MCNC: 4 MG/DL (ref 2.3–4.1)
PHOSPHATE SERPL-MCNC: 4.2 MG/DL (ref 2.3–4.1)
PHOSPHATE SERPL-MCNC: 4.2 MG/DL (ref 2.3–4.1)
PHOSPHATE SERPL-MCNC: 4.3 MG/DL (ref 2.3–4.1)
PHOSPHATE SERPL-MCNC: 4.3 MG/DL (ref 2.3–4.1)
PHOSPHATE SERPL-MCNC: 4.4 MG/DL (ref 2.3–4.1)
PHOSPHATE SERPL-MCNC: 4.7 MG/DL (ref 2.3–4.1)
PHOSPHATE SERPL-MCNC: 4.8 MG/DL (ref 2.3–4.1)
PHOSPHATE SERPL-MCNC: 5 MG/DL (ref 2.3–4.1)
PHOSPHATE SERPL-MCNC: 5.1 MG/DL (ref 2.3–4.1)
PHOSPHATE SERPL-MCNC: 5.2 MG/DL (ref 2.3–4.1)
PHOSPHATE SERPL-MCNC: 5.3 MG/DL (ref 2.3–4.1)
PLATELET # BLD AUTO: 100 THOUSANDS/UL (ref 149–390)
PLATELET # BLD AUTO: 102 THOUSANDS/UL (ref 149–390)
PLATELET # BLD AUTO: 110 THOUSANDS/UL (ref 149–390)
PLATELET # BLD AUTO: 118 THOUSANDS/UL (ref 149–390)
PLATELET # BLD AUTO: 128 THOUSANDS/UL (ref 149–390)
PLATELET # BLD AUTO: 142 THOUSANDS/UL (ref 149–390)
PLATELET # BLD AUTO: 158 THOUSANDS/UL (ref 149–390)
PLATELET # BLD AUTO: 163 THOUSANDS/UL (ref 149–390)
PLATELET # BLD AUTO: 163 THOUSANDS/UL (ref 149–390)
PLATELET # BLD AUTO: 164 THOUSANDS/UL (ref 149–390)
PLATELET # BLD AUTO: 175 THOUSANDS/UL (ref 149–390)
PLATELET # BLD AUTO: 179 THOUSANDS/UL (ref 149–390)
PLATELET # BLD AUTO: 183 THOUSANDS/UL (ref 149–390)
PLATELET # BLD AUTO: 184 THOUSANDS/UL (ref 149–390)
PLATELET # BLD AUTO: 209 THOUSANDS/UL (ref 149–390)
PLATELET # BLD AUTO: 215 THOUSANDS/UL (ref 149–390)
PLATELET # BLD AUTO: 217 THOUSANDS/UL (ref 149–390)
PLATELET # BLD AUTO: 220 THOUSANDS/UL (ref 149–390)
PLATELET # BLD AUTO: 237 THOUSANDS/UL (ref 149–390)
PLATELET # BLD AUTO: 250 THOUSANDS/UL (ref 149–390)
PLATELET # BLD AUTO: 255 THOUSANDS/UL (ref 149–390)
PLATELET # BLD AUTO: 260 THOUSANDS/UL (ref 149–390)
PLATELET # BLD AUTO: 263 THOUSANDS/UL (ref 149–390)
PLATELET # BLD AUTO: 273 THOUSANDS/UL (ref 149–390)
PLATELET # BLD AUTO: 50 THOUSANDS/UL (ref 149–390)
PLATELET # BLD AUTO: 58 THOUSANDS/UL (ref 149–390)
PLATELET # BLD AUTO: 62 THOUSANDS/UL (ref 149–390)
PLATELET # BLD AUTO: 67 THOUSANDS/UL (ref 149–390)
PLATELET # BLD AUTO: 79 THOUSANDS/UL (ref 149–390)
PLATELET BLD QL SMEAR: ABNORMAL
PLATELET BLD QL SMEAR: ABNORMAL
PLATELET BLD QL SMEAR: ADEQUATE
PLATELET BLD QL SMEAR: ADEQUATE
PMV BLD AUTO: 10.4 FL (ref 8.9–12.7)
PMV BLD AUTO: 10.5 FL (ref 8.9–12.7)
PMV BLD AUTO: 10.5 FL (ref 8.9–12.7)
PMV BLD AUTO: 10.7 FL (ref 8.9–12.7)
PMV BLD AUTO: 10.8 FL (ref 8.9–12.7)
PMV BLD AUTO: 10.9 FL (ref 8.9–12.7)
PMV BLD AUTO: 11 FL (ref 8.9–12.7)
PMV BLD AUTO: 11.1 FL (ref 8.9–12.7)
PMV BLD AUTO: 11.2 FL (ref 8.9–12.7)
PMV BLD AUTO: 11.2 FL (ref 8.9–12.7)
PMV BLD AUTO: 11.4 FL (ref 8.9–12.7)
PMV BLD AUTO: 11.5 FL (ref 8.9–12.7)
PMV BLD AUTO: 11.6 FL (ref 8.9–12.7)
PMV BLD AUTO: 11.9 FL (ref 8.9–12.7)
PMV BLD AUTO: 12.1 FL (ref 8.9–12.7)
PMV BLD AUTO: 12.1 FL (ref 8.9–12.7)
PMV BLD AUTO: 12.2 FL (ref 8.9–12.7)
PMV BLD AUTO: 12.6 FL (ref 8.9–12.7)
PO2 BLD: 121 MM HG (ref 35–45)
PO2 BLD: 60 MM HG (ref 35–45)
PO2 BLD: 74 MM HG (ref 75–129)
PO2 BLD: 75 MM HG (ref 75–129)
PO2 BLD: 78.9 MM HG (ref 75–129)
PO2 BLD: 80.2 MM HG (ref 75–129)
PO2 BLD: 84 MM HG (ref 35–45)
PO2 BLD: 95 MM HG (ref 75–129)
PO2 BLDA: 126.3 MM HG (ref 75–129)
PO2 BLDA: 176.6 MM HG (ref 75–129)
PO2 BLDA: 69 MM HG (ref 75–129)
PO2 BLDA: 74.8 MM HG (ref 75–129)
PO2 BLDA: 78.1 MM HG (ref 75–129)
PO2 BLDA: 78.6 MM HG (ref 75–129)
PO2 BLDA: 83.4 MM HG (ref 75–129)
PO2 BLDV: 32.5 MM HG (ref 35–45)
PO2 BLDV: 42.7 MM HG (ref 35–45)
PO2 BLDV: 51.3 MM HG (ref 35–45)
PO2 BLDV: 72.6 MM HG (ref 35–45)
POIKILOCYTOSIS BLD QL SMEAR: PRESENT
POLYCHROMASIA BLD QL SMEAR: PRESENT
POLYCHROMASIA BLD QL SMEAR: PRESENT
POTASSIUM BLD-SCNC: 3.7 MMOL/L (ref 3.5–5.3)
POTASSIUM BLD-SCNC: 3.8 MMOL/L (ref 3.5–5.3)
POTASSIUM BLD-SCNC: 3.9 MMOL/L (ref 3.5–5.3)
POTASSIUM BLD-SCNC: 3.9 MMOL/L (ref 3.5–5.3)
POTASSIUM BLD-SCNC: 4 MMOL/L (ref 3.5–5.3)
POTASSIUM BLD-SCNC: 4.5 MMOL/L (ref 3.5–5.3)
POTASSIUM SERPL-SCNC: 3.3 MMOL/L (ref 3.5–5.3)
POTASSIUM SERPL-SCNC: 3.4 MMOL/L (ref 3.5–5.3)
POTASSIUM SERPL-SCNC: 3.5 MMOL/L (ref 3.5–5.3)
POTASSIUM SERPL-SCNC: 3.5 MMOL/L (ref 3.5–5.3)
POTASSIUM SERPL-SCNC: 3.6 MMOL/L (ref 3.5–5.3)
POTASSIUM SERPL-SCNC: 3.7 MMOL/L (ref 3.5–5.3)
POTASSIUM SERPL-SCNC: 3.7 MMOL/L (ref 3.5–5.3)
POTASSIUM SERPL-SCNC: 3.8 MMOL/L (ref 3.5–5.3)
POTASSIUM SERPL-SCNC: 3.9 MMOL/L (ref 3.5–5.3)
POTASSIUM SERPL-SCNC: 3.9 MMOL/L (ref 3.5–5.3)
POTASSIUM SERPL-SCNC: 4 MMOL/L (ref 3.5–5.3)
POTASSIUM SERPL-SCNC: 4.1 MMOL/L (ref 3.5–5.3)
POTASSIUM SERPL-SCNC: 4.1 MMOL/L (ref 3.5–5.3)
POTASSIUM SERPL-SCNC: 4.2 MMOL/L (ref 3.5–5.3)
POTASSIUM SERPL-SCNC: 4.2 MMOL/L (ref 3.5–5.3)
POTASSIUM SERPL-SCNC: 4.3 MMOL/L (ref 3.5–5.3)
POTASSIUM SERPL-SCNC: 4.4 MMOL/L (ref 3.5–5.3)
POTASSIUM SERPL-SCNC: 4.4 MMOL/L (ref 3.5–5.3)
POTASSIUM SERPL-SCNC: 4.5 MMOL/L (ref 3.5–5.3)
POTASSIUM SERPL-SCNC: 4.5 MMOL/L (ref 3.5–5.3)
PR INTERVAL: 148 MS
PR INTERVAL: 154 MS
PR INTERVAL: 158 MS
PR INTERVAL: 163 MS
PRESSURE CONTROL: 20
PROMYELOCYTES NFR BLD MANUAL: 1 % (ref 0–0)
PROT SERPL-MCNC: 4.7 G/DL (ref 6.4–8.2)
PROT SERPL-MCNC: 5.1 G/DL (ref 6.4–8.2)
PROT SERPL-MCNC: 7.2 G/DL (ref 6.1–8.1)
PROT SERPL-MCNC: 9 G/DL (ref 6.4–8.2)
PROT UR STRIP-MCNC: ABNORMAL MG/DL
PROTHROMBIN TIME: 13.9 SECONDS (ref 11.6–14.5)
PROTHROMBIN TIME: 19.4 SECONDS (ref 11.6–14.5)
PSA SERPL-MCNC: 2 NG/ML (ref 0–4)
QRS AXIS: 1 DEGREES
QRS AXIS: 2 DEGREES
QRS AXIS: 20 DEGREES
QRS AXIS: 23 DEGREES
QRS AXIS: 28 DEGREES
QRSD INTERVAL: 100 MS
QRSD INTERVAL: 75 MS
QRSD INTERVAL: 76 MS
QRSD INTERVAL: 79 MS
QRSD INTERVAL: 80 MS
QT INTERVAL: 263 MS
QT INTERVAL: 350 MS
QT INTERVAL: 360 MS
QT INTERVAL: 362 MS
QT INTERVAL: 438 MS
QTC INTERVAL: 407 MS
QTC INTERVAL: 414 MS
QTC INTERVAL: 454 MS
QTC INTERVAL: 456 MS
QTC INTERVAL: 469 MS
RBC # BLD AUTO: 2.53 MILLION/UL (ref 3.88–5.62)
RBC # BLD AUTO: 2.6 MILLION/UL (ref 3.88–5.62)
RBC # BLD AUTO: 2.65 MILLION/UL (ref 3.88–5.62)
RBC # BLD AUTO: 2.65 MILLION/UL (ref 3.88–5.62)
RBC # BLD AUTO: 2.76 MILLION/UL (ref 3.88–5.62)
RBC # BLD AUTO: 2.78 MILLION/UL (ref 3.88–5.62)
RBC # BLD AUTO: 2.8 MILLION/UL (ref 3.88–5.62)
RBC # BLD AUTO: 2.87 MILLION/UL (ref 3.88–5.62)
RBC # BLD AUTO: 2.89 MILLION/UL (ref 3.88–5.62)
RBC # BLD AUTO: 2.92 MILLION/UL (ref 3.88–5.62)
RBC # BLD AUTO: 2.98 MILLION/UL (ref 3.88–5.62)
RBC # BLD AUTO: 3.01 MILLION/UL (ref 3.88–5.62)
RBC # BLD AUTO: 3.02 MILLION/UL (ref 3.88–5.62)
RBC # BLD AUTO: 3.08 MILLION/UL (ref 3.88–5.62)
RBC # BLD AUTO: 3.21 MILLION/UL (ref 3.88–5.62)
RBC # BLD AUTO: 3.26 MILLION/UL (ref 3.88–5.62)
RBC # BLD AUTO: 3.26 MILLION/UL (ref 3.88–5.62)
RBC # BLD AUTO: 3.27 MILLION/UL (ref 3.88–5.62)
RBC # BLD AUTO: 3.32 MILLION/UL (ref 3.88–5.62)
RBC # BLD AUTO: 3.43 MILLION/UL (ref 3.88–5.62)
RBC # BLD AUTO: 3.45 MILLION/UL (ref 3.88–5.62)
RBC # BLD AUTO: 3.48 MILLION/UL (ref 3.88–5.62)
RBC # BLD AUTO: 3.53 MILLION/UL (ref 3.88–5.62)
RBC # BLD AUTO: 3.55 MILLION/UL (ref 3.88–5.62)
RBC # BLD AUTO: 3.67 MILLION/UL (ref 3.88–5.62)
RBC # BLD AUTO: 3.79 MILLION/UL (ref 3.88–5.62)
RBC # BLD AUTO: 3.87 MILLION/UL (ref 3.88–5.62)
RBC # BLD AUTO: 4.44 MILLION/UL (ref 3.88–5.62)
RBC # BLD AUTO: 5.33 MILLION/UL (ref 3.88–5.62)
RBC #/AREA URNS AUTO: ABNORMAL /HPF
RBC MORPH BLD: NORMAL
RBC MORPH BLD: PRESENT
RH BLD: NEGATIVE
SAO2 % BLD FROM PO2: 88 % (ref 95–98)
SAO2 % BLD FROM PO2: 94 % (ref 95–98)
SAO2 % BLD FROM PO2: 94 % (ref 95–98)
SAO2 % BLD FROM PO2: 96 % (ref 95–98)
SAO2 % BLD FROM PO2: 97 % (ref 95–98)
SAO2 % BLD FROM PO2: 98 % (ref 95–98)
SL AMB  POCT GLUCOSE, UA: ABNORMAL
SL AMB EGFR AFRICAN AMERICAN: 51 ML/MIN/1.73M2
SL AMB EGFR NON AFRICAN AMERICAN: 44 ML/MIN/1.73M2
SL AMB LEUKOCYTE ESTERASE,UA: ABNORMAL
SL AMB POCT BILIRUBIN,UA: ABNORMAL
SL AMB POCT BLOOD,UA: ABNORMAL
SL AMB POCT CLARITY,UA: CLEAR
SL AMB POCT COLOR,UA: YELLOW
SL AMB POCT KETONES,UA: ABNORMAL
SL AMB POCT NITRITE,UA: POSITIVE
SL AMB POCT PH,UA: 5.5
SL AMB POCT SPECIFIC GRAVITY,UA: 1.01
SL AMB POCT URINE PROTEIN: ABNORMAL
SL AMB POCT UROBILINOGEN: 0.2
SODIUM 24H UR-SCNC: 28 MOL/L
SODIUM BLD-SCNC: 137 MMOL/L (ref 136–145)
SODIUM BLD-SCNC: 141 MMOL/L (ref 136–145)
SODIUM BLD-SCNC: 142 MMOL/L (ref 136–145)
SODIUM BLD-SCNC: 143 MMOL/L (ref 136–145)
SODIUM SERPL-SCNC: 136 MMOL/L (ref 136–145)
SODIUM SERPL-SCNC: 136 MMOL/L (ref 136–145)
SODIUM SERPL-SCNC: 138 MMOL/L (ref 136–145)
SODIUM SERPL-SCNC: 139 MMOL/L (ref 136–145)
SODIUM SERPL-SCNC: 140 MMOL/L (ref 135–146)
SODIUM SERPL-SCNC: 140 MMOL/L (ref 136–145)
SODIUM SERPL-SCNC: 140 MMOL/L (ref 136–145)
SODIUM SERPL-SCNC: 141 MMOL/L (ref 136–145)
SODIUM SERPL-SCNC: 142 MMOL/L (ref 136–145)
SODIUM SERPL-SCNC: 143 MMOL/L (ref 136–145)
SODIUM SERPL-SCNC: 144 MMOL/L (ref 136–145)
SODIUM SERPL-SCNC: 145 MMOL/L (ref 136–145)
SODIUM SERPL-SCNC: 146 MMOL/L (ref 136–145)
SODIUM SERPL-SCNC: 147 MMOL/L (ref 136–145)
SODIUM SERPL-SCNC: 148 MMOL/L (ref 136–145)
SODIUM SERPL-SCNC: 149 MMOL/L (ref 136–145)
SODIUM SERPL-SCNC: 150 MMOL/L (ref 136–145)
SP GR UR STRIP.AUTO: 1.01 (ref 1–1.03)
SP GR UR STRIP.AUTO: 1.01 (ref 1–1.03)
SP GR UR STRIP.AUTO: 1.02 (ref 1–1.03)
SPECIMEN EXPIRATION DATE: NORMAL
SPECIMEN SOURCE: ABNORMAL
T WAVE AXIS: 12 DEGREES
T WAVE AXIS: 23 DEGREES
T WAVE AXIS: 24 DEGREES
T WAVE AXIS: 254 DEGREES
T WAVE AXIS: 44 DEGREES
TRIGL SERPL-MCNC: 129 MG/DL
TRIGL SERPL-MCNC: 230 MG/DL
TROPONIN I SERPL-MCNC: 0.02 NG/ML
TROPONIN I SERPL-MCNC: 0.03 NG/ML
TROPONIN I SERPL-MCNC: 0.03 NG/ML
UNIT DISPENSE STATUS: NORMAL
UNIT PRODUCT CODE: NORMAL
UNIT RH: NORMAL
UROBILINOGEN UR QL STRIP.AUTO: 0.2 E.U./DL
UROBILINOGEN UR QL STRIP.AUTO: 1 E.U./DL
UROBILINOGEN UR QL STRIP.AUTO: 1 E.U./DL
VARIANT LYMPHS # BLD AUTO: 2 %
VENT AC: 14
VENT- AC: AC
VENTRICULAR RATE: 101 BPM
VENTRICULAR RATE: 149 BPM
VENTRICULAR RATE: 65 BPM
VENTRICULAR RATE: 81 BPM
VENTRICULAR RATE: 96 BPM
VT SETTING VENT: 550 ML
WBC # BLD AUTO: 11.59 THOUSAND/UL (ref 4.31–10.16)
WBC # BLD AUTO: 12.42 THOUSAND/UL (ref 4.31–10.16)
WBC # BLD AUTO: 12.89 THOUSAND/UL (ref 4.31–10.16)
WBC # BLD AUTO: 13.51 THOUSAND/UL (ref 4.31–10.16)
WBC # BLD AUTO: 14.43 THOUSAND/UL (ref 4.31–10.16)
WBC # BLD AUTO: 14.49 THOUSAND/UL (ref 4.31–10.16)
WBC # BLD AUTO: 14.5 THOUSAND/UL (ref 4.31–10.16)
WBC # BLD AUTO: 14.57 THOUSAND/UL (ref 4.31–10.16)
WBC # BLD AUTO: 14.83 THOUSAND/UL (ref 4.31–10.16)
WBC # BLD AUTO: 15.92 THOUSAND/UL (ref 4.31–10.16)
WBC # BLD AUTO: 16.35 THOUSAND/UL (ref 4.31–10.16)
WBC # BLD AUTO: 16.57 THOUSAND/UL (ref 4.31–10.16)
WBC # BLD AUTO: 18.89 THOUSAND/UL (ref 4.31–10.16)
WBC # BLD AUTO: 19.23 THOUSAND/UL (ref 4.31–10.16)
WBC # BLD AUTO: 19.24 THOUSAND/UL (ref 4.31–10.16)
WBC # BLD AUTO: 20.09 THOUSAND/UL (ref 4.31–10.16)
WBC # BLD AUTO: 20.2 THOUSAND/UL (ref 4.31–10.16)
WBC # BLD AUTO: 22.43 THOUSAND/UL (ref 4.31–10.16)
WBC # BLD AUTO: 22.48 THOUSAND/UL (ref 4.31–10.16)
WBC # BLD AUTO: 23.34 THOUSAND/UL (ref 4.31–10.16)
WBC # BLD AUTO: 4.25 THOUSAND/UL (ref 4.31–10.16)
WBC # BLD AUTO: 8.28 THOUSAND/UL (ref 4.31–10.16)
WBC # BLD AUTO: 8.64 THOUSAND/UL (ref 4.31–10.16)
WBC # BLD AUTO: 8.66 THOUSAND/UL (ref 4.31–10.16)
WBC # BLD AUTO: 8.74 THOUSAND/UL (ref 4.31–10.16)
WBC # BLD AUTO: 9 THOUSAND/UL (ref 4.31–10.16)
WBC # BLD AUTO: 9.23 THOUSAND/UL (ref 4.31–10.16)
WBC # BLD AUTO: 9.23 THOUSAND/UL (ref 4.31–10.16)
WBC # BLD AUTO: 9.32 THOUSAND/UL (ref 4.31–10.16)
WBC #/AREA URNS AUTO: ABNORMAL /HPF

## 2019-01-01 PROCEDURE — NC001 PR NO CHARGE: Performed by: SURGERY

## 2019-01-01 PROCEDURE — C9113 INJ PANTOPRAZOLE SODIUM, VIA: HCPCS | Performed by: SURGERY

## 2019-01-01 PROCEDURE — 85025 COMPLETE CBC W/AUTO DIFF WBC: CPT | Performed by: STUDENT IN AN ORGANIZED HEALTH CARE EDUCATION/TRAINING PROGRAM

## 2019-01-01 PROCEDURE — 82948 REAGENT STRIP/BLOOD GLUCOSE: CPT

## 2019-01-01 PROCEDURE — 99233 SBSQ HOSP IP/OBS HIGH 50: CPT | Performed by: INTERNAL MEDICINE

## 2019-01-01 PROCEDURE — 71045 X-RAY EXAM CHEST 1 VIEW: CPT

## 2019-01-01 PROCEDURE — 0DB80ZZ EXCISION OF SMALL INTESTINE, OPEN APPROACH: ICD-10-PCS | Performed by: COLON & RECTAL SURGERY

## 2019-01-01 PROCEDURE — 69210 REMOVE IMPACTED EAR WAX UNI: CPT | Performed by: FAMILY MEDICINE

## 2019-01-01 PROCEDURE — 97116 GAIT TRAINING THERAPY: CPT

## 2019-01-01 PROCEDURE — 0WQF0ZZ REPAIR ABDOMINAL WALL, OPEN APPROACH: ICD-10-PCS | Performed by: COLON & RECTAL SURGERY

## 2019-01-01 PROCEDURE — 85025 COMPLETE CBC W/AUTO DIFF WBC: CPT | Performed by: PHYSICIAN ASSISTANT

## 2019-01-01 PROCEDURE — 99213 OFFICE O/P EST LOW 20 MIN: CPT | Performed by: FAMILY MEDICINE

## 2019-01-01 PROCEDURE — 85014 HEMATOCRIT: CPT

## 2019-01-01 PROCEDURE — 88307 TISSUE EXAM BY PATHOLOGIST: CPT | Performed by: PATHOLOGY

## 2019-01-01 PROCEDURE — 87186 SC STD MICRODIL/AGAR DIL: CPT | Performed by: INTERNAL MEDICINE

## 2019-01-01 PROCEDURE — 99232 SBSQ HOSP IP/OBS MODERATE 35: CPT | Performed by: SURGERY

## 2019-01-01 PROCEDURE — 84132 ASSAY OF SERUM POTASSIUM: CPT

## 2019-01-01 PROCEDURE — 36415 COLL VENOUS BLD VENIPUNCTURE: CPT | Performed by: EMERGENCY MEDICINE

## 2019-01-01 PROCEDURE — 84100 ASSAY OF PHOSPHORUS: CPT | Performed by: PHYSICIAN ASSISTANT

## 2019-01-01 PROCEDURE — P9016 RBC LEUKOCYTES REDUCED: HCPCS

## 2019-01-01 PROCEDURE — 70450 CT HEAD/BRAIN W/O DYE: CPT

## 2019-01-01 PROCEDURE — 80048 BASIC METABOLIC PNL TOTAL CA: CPT | Performed by: INTERNAL MEDICINE

## 2019-01-01 PROCEDURE — 80048 BASIC METABOLIC PNL TOTAL CA: CPT | Performed by: PHYSICIAN ASSISTANT

## 2019-01-01 PROCEDURE — 94760 N-INVAS EAR/PLS OXIMETRY 1: CPT

## 2019-01-01 PROCEDURE — 99232 SBSQ HOSP IP/OBS MODERATE 35: CPT | Performed by: INTERNAL MEDICINE

## 2019-01-01 PROCEDURE — 93306 TTE W/DOPPLER COMPLETE: CPT | Performed by: INTERNAL MEDICINE

## 2019-01-01 PROCEDURE — 85007 BL SMEAR W/DIFF WBC COUNT: CPT | Performed by: STUDENT IN AN ORGANIZED HEALTH CARE EDUCATION/TRAINING PROGRAM

## 2019-01-01 PROCEDURE — 1170F FXNL STATUS ASSESSED: CPT | Performed by: FAMILY MEDICINE

## 2019-01-01 PROCEDURE — 82803 BLOOD GASES ANY COMBINATION: CPT

## 2019-01-01 PROCEDURE — 86900 BLOOD TYPING SEROLOGIC ABO: CPT | Performed by: SURGERY

## 2019-01-01 PROCEDURE — 99291 CRITICAL CARE FIRST HOUR: CPT | Performed by: SURGERY

## 2019-01-01 PROCEDURE — 94762 N-INVAS EAR/PLS OXIMTRY CONT: CPT

## 2019-01-01 PROCEDURE — 30233N1 TRANSFUSION OF NONAUTOLOGOUS RED BLOOD CELLS INTO PERIPHERAL VEIN, PERCUTANEOUS APPROACH: ICD-10-PCS | Performed by: COLON & RECTAL SURGERY

## 2019-01-01 PROCEDURE — 94003 VENT MGMT INPAT SUBQ DAY: CPT

## 2019-01-01 PROCEDURE — 80048 BASIC METABOLIC PNL TOTAL CA: CPT | Performed by: STUDENT IN AN ORGANIZED HEALTH CARE EDUCATION/TRAINING PROGRAM

## 2019-01-01 PROCEDURE — 97163 PT EVAL HIGH COMPLEX 45 MIN: CPT

## 2019-01-01 PROCEDURE — 81001 URINALYSIS AUTO W/SCOPE: CPT | Performed by: SURGERY

## 2019-01-01 PROCEDURE — 5A1955Z RESPIRATORY VENTILATION, GREATER THAN 96 CONSECUTIVE HOURS: ICD-10-PCS | Performed by: COLON & RECTAL SURGERY

## 2019-01-01 PROCEDURE — 94660 CPAP INITIATION&MGMT: CPT

## 2019-01-01 PROCEDURE — 83735 ASSAY OF MAGNESIUM: CPT | Performed by: COLON & RECTAL SURGERY

## 2019-01-01 PROCEDURE — 90945 DIALYSIS ONE EVALUATION: CPT | Performed by: NURSE PRACTITIONER

## 2019-01-01 PROCEDURE — 83605 ASSAY OF LACTIC ACID: CPT | Performed by: EMERGENCY MEDICINE

## 2019-01-01 PROCEDURE — 97164 PT RE-EVAL EST PLAN CARE: CPT

## 2019-01-01 PROCEDURE — 83735 ASSAY OF MAGNESIUM: CPT | Performed by: PHYSICIAN ASSISTANT

## 2019-01-01 PROCEDURE — 84100 ASSAY OF PHOSPHORUS: CPT | Performed by: INTERNAL MEDICINE

## 2019-01-01 PROCEDURE — 93010 ELECTROCARDIOGRAM REPORT: CPT | Performed by: INTERNAL MEDICINE

## 2019-01-01 PROCEDURE — 97535 SELF CARE MNGMENT TRAINING: CPT

## 2019-01-01 PROCEDURE — 87040 BLOOD CULTURE FOR BACTERIA: CPT | Performed by: EMERGENCY MEDICINE

## 2019-01-01 PROCEDURE — 97168 OT RE-EVAL EST PLAN CARE: CPT

## 2019-01-01 PROCEDURE — C9113 INJ PANTOPRAZOLE SODIUM, VIA: HCPCS | Performed by: EMERGENCY MEDICINE

## 2019-01-01 PROCEDURE — 85014 HEMATOCRIT: CPT | Performed by: PHYSICIAN ASSISTANT

## 2019-01-01 PROCEDURE — 83605 ASSAY OF LACTIC ACID: CPT | Performed by: STUDENT IN AN ORGANIZED HEALTH CARE EDUCATION/TRAINING PROGRAM

## 2019-01-01 PROCEDURE — 87040 BLOOD CULTURE FOR BACTERIA: CPT | Performed by: PHYSICIAN ASSISTANT

## 2019-01-01 PROCEDURE — 93005 ELECTROCARDIOGRAM TRACING: CPT

## 2019-01-01 PROCEDURE — G8979 MOBILITY GOAL STATUS: HCPCS

## 2019-01-01 PROCEDURE — 85007 BL SMEAR W/DIFF WBC COUNT: CPT | Performed by: SURGERY

## 2019-01-01 PROCEDURE — 99284 EMERGENCY DEPT VISIT MOD MDM: CPT | Performed by: EMERGENCY MEDICINE

## 2019-01-01 PROCEDURE — 82330 ASSAY OF CALCIUM: CPT | Performed by: INTERNAL MEDICINE

## 2019-01-01 PROCEDURE — 86900 BLOOD TYPING SEROLOGIC ABO: CPT | Performed by: EMERGENCY MEDICINE

## 2019-01-01 PROCEDURE — 86901 BLOOD TYPING SEROLOGIC RH(D): CPT | Performed by: EMERGENCY MEDICINE

## 2019-01-01 PROCEDURE — 85027 COMPLETE CBC AUTOMATED: CPT | Performed by: STUDENT IN AN ORGANIZED HEALTH CARE EDUCATION/TRAINING PROGRAM

## 2019-01-01 PROCEDURE — 83735 ASSAY OF MAGNESIUM: CPT | Performed by: INTERNAL MEDICINE

## 2019-01-01 PROCEDURE — 97167 OT EVAL HIGH COMPLEX 60 MIN: CPT

## 2019-01-01 PROCEDURE — 82947 ASSAY GLUCOSE BLOOD QUANT: CPT

## 2019-01-01 PROCEDURE — 96365 THER/PROPH/DIAG IV INF INIT: CPT

## 2019-01-01 PROCEDURE — G8997 SWALLOW GOAL STATUS: HCPCS

## 2019-01-01 PROCEDURE — 93306 TTE W/DOPPLER COMPLETE: CPT

## 2019-01-01 PROCEDURE — 97110 THERAPEUTIC EXERCISES: CPT

## 2019-01-01 PROCEDURE — 84100 ASSAY OF PHOSPHORUS: CPT | Performed by: STUDENT IN AN ORGANIZED HEALTH CARE EDUCATION/TRAINING PROGRAM

## 2019-01-01 PROCEDURE — 85007 BL SMEAR W/DIFF WBC COUNT: CPT | Performed by: PHYSICIAN ASSISTANT

## 2019-01-01 PROCEDURE — 80053 COMPREHEN METABOLIC PANEL: CPT | Performed by: PHYSICIAN ASSISTANT

## 2019-01-01 PROCEDURE — 97606 NEG PRS WND THER DME>50 SQCM: CPT | Performed by: COLON & RECTAL SURGERY

## 2019-01-01 PROCEDURE — 92610 EVALUATE SWALLOWING FUNCTION: CPT

## 2019-01-01 PROCEDURE — 82330 ASSAY OF CALCIUM: CPT | Performed by: SURGERY

## 2019-01-01 PROCEDURE — 36556 INSERT NON-TUNNEL CV CATH: CPT | Performed by: SURGERY

## 2019-01-01 PROCEDURE — 82805 BLOOD GASES W/O2 SATURATION: CPT | Performed by: PHYSICIAN ASSISTANT

## 2019-01-01 PROCEDURE — 36600 WITHDRAWAL OF ARTERIAL BLOOD: CPT

## 2019-01-01 PROCEDURE — 94640 AIRWAY INHALATION TREATMENT: CPT | Performed by: SOCIAL WORKER

## 2019-01-01 PROCEDURE — 82805 BLOOD GASES W/O2 SATURATION: CPT | Performed by: STUDENT IN AN ORGANIZED HEALTH CARE EDUCATION/TRAINING PROGRAM

## 2019-01-01 PROCEDURE — 96361 HYDRATE IV INFUSION ADD-ON: CPT

## 2019-01-01 PROCEDURE — 80048 BASIC METABOLIC PNL TOTAL CA: CPT | Performed by: SURGERY

## 2019-01-01 PROCEDURE — 44346 REVISION OF COLOSTOMY: CPT | Performed by: COLON & RECTAL SURGERY

## 2019-01-01 PROCEDURE — 5A1D90Z PERFORMANCE OF URINARY FILTRATION, CONTINUOUS, GREATER THAN 18 HOURS PER DAY: ICD-10-PCS | Performed by: COLON & RECTAL SURGERY

## 2019-01-01 PROCEDURE — 44140 PARTIAL REMOVAL OF COLON: CPT | Performed by: COLON & RECTAL SURGERY

## 2019-01-01 PROCEDURE — 3E0G76Z INTRODUCTION OF NUTRITIONAL SUBSTANCE INTO UPPER GI, VIA NATURAL OR ARTIFICIAL OPENING: ICD-10-PCS | Performed by: COLON & RECTAL SURGERY

## 2019-01-01 PROCEDURE — 82805 BLOOD GASES W/O2 SATURATION: CPT | Performed by: GENERAL PRACTICE

## 2019-01-01 PROCEDURE — 87077 CULTURE AEROBIC IDENTIFY: CPT | Performed by: STUDENT IN AN ORGANIZED HEALTH CARE EDUCATION/TRAINING PROGRAM

## 2019-01-01 PROCEDURE — C9113 INJ PANTOPRAZOLE SODIUM, VIA: HCPCS | Performed by: PHYSICIAN ASSISTANT

## 2019-01-01 PROCEDURE — A6550 NEG PRES WOUND THER DRSG SET: HCPCS | Performed by: COLON & RECTAL SURGERY

## 2019-01-01 PROCEDURE — 87086 URINE CULTURE/COLONY COUNT: CPT | Performed by: INTERNAL MEDICINE

## 2019-01-01 PROCEDURE — 94668 MNPJ CHEST WALL SBSQ: CPT | Performed by: SOCIAL WORKER

## 2019-01-01 PROCEDURE — 81003 URINALYSIS AUTO W/O SCOPE: CPT | Performed by: UROLOGY

## 2019-01-01 PROCEDURE — 86850 RBC ANTIBODY SCREEN: CPT | Performed by: EMERGENCY MEDICINE

## 2019-01-01 PROCEDURE — 86901 BLOOD TYPING SEROLOGIC RH(D): CPT | Performed by: STUDENT IN AN ORGANIZED HEALTH CARE EDUCATION/TRAINING PROGRAM

## 2019-01-01 PROCEDURE — 3079F DIAST BP 80-89 MM HG: CPT | Performed by: FAMILY MEDICINE

## 2019-01-01 PROCEDURE — G8978 MOBILITY CURRENT STATUS: HCPCS

## 2019-01-01 PROCEDURE — 99223 1ST HOSP IP/OBS HIGH 75: CPT | Performed by: SURGERY

## 2019-01-01 PROCEDURE — 83735 ASSAY OF MAGNESIUM: CPT | Performed by: SURGERY

## 2019-01-01 PROCEDURE — 90945 DIALYSIS ONE EVALUATION: CPT | Performed by: INTERNAL MEDICINE

## 2019-01-01 PROCEDURE — NC001 PR NO CHARGE: Performed by: EMERGENCY MEDICINE

## 2019-01-01 PROCEDURE — 96375 TX/PRO/DX INJ NEW DRUG ADDON: CPT

## 2019-01-01 PROCEDURE — 84295 ASSAY OF SERUM SODIUM: CPT

## 2019-01-01 PROCEDURE — G8988 SELF CARE GOAL STATUS: HCPCS

## 2019-01-01 PROCEDURE — 85027 COMPLETE CBC AUTOMATED: CPT | Performed by: SURGERY

## 2019-01-01 PROCEDURE — 99233 SBSQ HOSP IP/OBS HIGH 50: CPT | Performed by: SURGERY

## 2019-01-01 PROCEDURE — 36620 INSERTION CATHETER ARTERY: CPT | Performed by: SURGERY

## 2019-01-01 PROCEDURE — 94760 N-INVAS EAR/PLS OXIMETRY 1: CPT | Performed by: SOCIAL WORKER

## 2019-01-01 PROCEDURE — 94640 AIRWAY INHALATION TREATMENT: CPT

## 2019-01-01 PROCEDURE — 1125F AMNT PAIN NOTED PAIN PRSNT: CPT | Performed by: FAMILY MEDICINE

## 2019-01-01 PROCEDURE — 3075F SYST BP GE 130 - 139MM HG: CPT | Performed by: FAMILY MEDICINE

## 2019-01-01 PROCEDURE — 97530 THERAPEUTIC ACTIVITIES: CPT

## 2019-01-01 PROCEDURE — 82330 ASSAY OF CALCIUM: CPT | Performed by: PHYSICIAN ASSISTANT

## 2019-01-01 PROCEDURE — 84153 ASSAY OF PSA TOTAL: CPT

## 2019-01-01 PROCEDURE — 94660 CPAP INITIATION&MGMT: CPT | Performed by: SOCIAL WORKER

## 2019-01-01 PROCEDURE — 86920 COMPATIBILITY TEST SPIN: CPT

## 2019-01-01 PROCEDURE — 85027 COMPLETE CBC AUTOMATED: CPT | Performed by: COLON & RECTAL SURGERY

## 2019-01-01 PROCEDURE — 82330 ASSAY OF CALCIUM: CPT

## 2019-01-01 PROCEDURE — 99238 HOSP IP/OBS DSCHRG MGMT 30/<: CPT | Performed by: SURGERY

## 2019-01-01 PROCEDURE — 90945 DIALYSIS ONE EVALUATION: CPT

## 2019-01-01 PROCEDURE — 85018 HEMOGLOBIN: CPT | Performed by: PHYSICIAN ASSISTANT

## 2019-01-01 PROCEDURE — 99232 SBSQ HOSP IP/OBS MODERATE 35: CPT | Performed by: UROLOGY

## 2019-01-01 PROCEDURE — 0BH18EZ INSERTION OF ENDOTRACHEAL AIRWAY INTO TRACHEA, VIA NATURAL OR ARTIFICIAL OPENING ENDOSCOPIC: ICD-10-PCS | Performed by: COLON & RECTAL SURGERY

## 2019-01-01 PROCEDURE — 99213 OFFICE O/P EST LOW 20 MIN: CPT | Performed by: UROLOGY

## 2019-01-01 PROCEDURE — 86850 RBC ANTIBODY SCREEN: CPT | Performed by: SURGERY

## 2019-01-01 PROCEDURE — 99232 SBSQ HOSP IP/OBS MODERATE 35: CPT | Performed by: PHYSICIAN ASSISTANT

## 2019-01-01 PROCEDURE — 36600 WITHDRAWAL OF ARTERIAL BLOOD: CPT | Performed by: SOCIAL WORKER

## 2019-01-01 PROCEDURE — 87147 CULTURE TYPE IMMUNOLOGIC: CPT | Performed by: STUDENT IN AN ORGANIZED HEALTH CARE EDUCATION/TRAINING PROGRAM

## 2019-01-01 PROCEDURE — 99214 OFFICE O/P EST MOD 30 MIN: CPT | Performed by: FAMILY MEDICINE

## 2019-01-01 PROCEDURE — 82805 BLOOD GASES W/O2 SATURATION: CPT | Performed by: SURGERY

## 2019-01-01 PROCEDURE — 85730 THROMBOPLASTIN TIME PARTIAL: CPT | Performed by: EMERGENCY MEDICINE

## 2019-01-01 PROCEDURE — G8987 SELF CARE CURRENT STATUS: HCPCS

## 2019-01-01 PROCEDURE — 86900 BLOOD TYPING SEROLOGIC ABO: CPT | Performed by: STUDENT IN AN ORGANIZED HEALTH CARE EDUCATION/TRAINING PROGRAM

## 2019-01-01 PROCEDURE — NC001 PR NO CHARGE: Performed by: INTERNAL MEDICINE

## 2019-01-01 PROCEDURE — 99285 EMERGENCY DEPT VISIT HI MDM: CPT

## 2019-01-01 PROCEDURE — 86850 RBC ANTIBODY SCREEN: CPT | Performed by: STUDENT IN AN ORGANIZED HEALTH CARE EDUCATION/TRAINING PROGRAM

## 2019-01-01 PROCEDURE — 84300 ASSAY OF URINE SODIUM: CPT | Performed by: INTERNAL MEDICINE

## 2019-01-01 PROCEDURE — 81001 URINALYSIS AUTO W/SCOPE: CPT | Performed by: INTERNAL MEDICINE

## 2019-01-01 PROCEDURE — 94668 MNPJ CHEST WALL SBSQ: CPT

## 2019-01-01 PROCEDURE — 86901 BLOOD TYPING SEROLOGIC RH(D): CPT | Performed by: SURGERY

## 2019-01-01 PROCEDURE — 1036F TOBACCO NON-USER: CPT | Performed by: FAMILY MEDICINE

## 2019-01-01 PROCEDURE — 85025 COMPLETE CBC W/AUTO DIFF WBC: CPT | Performed by: EMERGENCY MEDICINE

## 2019-01-01 PROCEDURE — 97535 SELF CARE MNGMENT TRAINING: CPT | Performed by: STUDENT IN AN ORGANIZED HEALTH CARE EDUCATION/TRAINING PROGRAM

## 2019-01-01 PROCEDURE — 85027 COMPLETE CBC AUTOMATED: CPT | Performed by: PHYSICIAN ASSISTANT

## 2019-01-01 PROCEDURE — 85610 PROTHROMBIN TIME: CPT | Performed by: EMERGENCY MEDICINE

## 2019-01-01 PROCEDURE — 84100 ASSAY OF PHOSPHORUS: CPT | Performed by: COLON & RECTAL SURGERY

## 2019-01-01 PROCEDURE — 85025 COMPLETE CBC W/AUTO DIFF WBC: CPT | Performed by: SURGERY

## 2019-01-01 PROCEDURE — G0439 PPPS, SUBSEQ VISIT: HCPCS | Performed by: FAMILY MEDICINE

## 2019-01-01 PROCEDURE — 80053 COMPREHEN METABOLIC PANEL: CPT | Performed by: EMERGENCY MEDICINE

## 2019-01-01 PROCEDURE — 99222 1ST HOSP IP/OBS MODERATE 55: CPT | Performed by: UROLOGY

## 2019-01-01 PROCEDURE — 83690 ASSAY OF LIPASE: CPT | Performed by: EMERGENCY MEDICINE

## 2019-01-01 PROCEDURE — 31500 INSERT EMERGENCY AIRWAY: CPT | Performed by: SURGERY

## 2019-01-01 PROCEDURE — 87077 CULTURE AEROBIC IDENTIFY: CPT | Performed by: INTERNAL MEDICINE

## 2019-01-01 PROCEDURE — 99232 SBSQ HOSP IP/OBS MODERATE 35: CPT | Performed by: EMERGENCY MEDICINE

## 2019-01-01 PROCEDURE — 74018 RADEX ABDOMEN 1 VIEW: CPT

## 2019-01-01 PROCEDURE — 84478 ASSAY OF TRIGLYCERIDES: CPT | Performed by: SURGERY

## 2019-01-01 PROCEDURE — 74176 CT ABD & PELVIS W/O CONTRAST: CPT

## 2019-01-01 PROCEDURE — 44130 BOWEL TO BOWEL FUSION: CPT | Performed by: COLON & RECTAL SURGERY

## 2019-01-01 PROCEDURE — 85610 PROTHROMBIN TIME: CPT | Performed by: PHYSICIAN ASSISTANT

## 2019-01-01 PROCEDURE — 0DNE0ZZ RELEASE LARGE INTESTINE, OPEN APPROACH: ICD-10-PCS | Performed by: COLON & RECTAL SURGERY

## 2019-01-01 PROCEDURE — 95816 EEG AWAKE AND DROWSY: CPT | Performed by: PSYCHIATRY & NEUROLOGY

## 2019-01-01 PROCEDURE — 95819 EEG AWAKE AND ASLEEP: CPT

## 2019-01-01 PROCEDURE — 94002 VENT MGMT INPAT INIT DAY: CPT

## 2019-01-01 PROCEDURE — 3E1M38Z IRRIGATION OF PERITONEAL CAVITY USING IRRIGATING SUBSTANCE, PERCUTANEOUS APPROACH: ICD-10-PCS | Performed by: COLON & RECTAL SURGERY

## 2019-01-01 PROCEDURE — 84484 ASSAY OF TROPONIN QUANT: CPT | Performed by: SURGERY

## 2019-01-01 PROCEDURE — 86923 COMPATIBILITY TEST ELECTRIC: CPT

## 2019-01-01 PROCEDURE — 83605 ASSAY OF LACTIC ACID: CPT | Performed by: SURGERY

## 2019-01-01 PROCEDURE — 82570 ASSAY OF URINE CREATININE: CPT | Performed by: INTERNAL MEDICINE

## 2019-01-01 PROCEDURE — 83735 ASSAY OF MAGNESIUM: CPT | Performed by: STUDENT IN AN ORGANIZED HEALTH CARE EDUCATION/TRAINING PROGRAM

## 2019-01-01 PROCEDURE — 83605 ASSAY OF LACTIC ACID: CPT | Performed by: PHYSICIAN ASSISTANT

## 2019-01-01 PROCEDURE — G8996 SWALLOW CURRENT STATUS: HCPCS

## 2019-01-01 PROCEDURE — 76770 US EXAM ABDO BACK WALL COMP: CPT

## 2019-01-01 PROCEDURE — 84484 ASSAY OF TROPONIN QUANT: CPT | Performed by: PHYSICIAN ASSISTANT

## 2019-01-01 PROCEDURE — 80048 BASIC METABOLIC PNL TOTAL CA: CPT | Performed by: COLON & RECTAL SURGERY

## 2019-01-01 PROCEDURE — 02HV33Z INSERTION OF INFUSION DEVICE INTO SUPERIOR VENA CAVA, PERCUTANEOUS APPROACH: ICD-10-PCS | Performed by: COLON & RECTAL SURGERY

## 2019-01-01 PROCEDURE — 49020 DRAINAGE ABDOM ABSCESS OPEN: CPT | Performed by: COLON & RECTAL SURGERY

## 2019-01-01 PROCEDURE — 87040 BLOOD CULTURE FOR BACTERIA: CPT | Performed by: STUDENT IN AN ORGANIZED HEALTH CARE EDUCATION/TRAINING PROGRAM

## 2019-01-01 PROCEDURE — 99223 1ST HOSP IP/OBS HIGH 75: CPT | Performed by: INTERNAL MEDICINE

## 2019-01-01 PROCEDURE — 84100 ASSAY OF PHOSPHORUS: CPT | Performed by: SURGERY

## 2019-01-01 PROCEDURE — 87186 SC STD MICRODIL/AGAR DIL: CPT | Performed by: STUDENT IN AN ORGANIZED HEALTH CARE EDUCATION/TRAINING PROGRAM

## 2019-01-01 PROCEDURE — 80076 HEPATIC FUNCTION PANEL: CPT | Performed by: STUDENT IN AN ORGANIZED HEALTH CARE EDUCATION/TRAINING PROGRAM

## 2019-01-01 PROCEDURE — C9290 INJ, BUPIVACAINE LIPOSOME: HCPCS | Performed by: COLON & RECTAL SURGERY

## 2019-01-01 PROCEDURE — 0DN80ZZ RELEASE SMALL INTESTINE, OPEN APPROACH: ICD-10-PCS | Performed by: COLON & RECTAL SURGERY

## 2019-01-01 PROCEDURE — 97530 THERAPEUTIC ACTIVITIES: CPT | Performed by: STUDENT IN AN ORGANIZED HEALTH CARE EDUCATION/TRAINING PROGRAM

## 2019-01-01 RX ORDER — POTASSIUM CHLORIDE 14.9 MG/ML
20 INJECTION INTRAVENOUS ONCE
Status: COMPLETED | OUTPATIENT
Start: 2019-01-01 | End: 2019-01-01

## 2019-01-01 RX ORDER — PROPOFOL 10 MG/ML
INJECTION, EMULSION INTRAVENOUS
Status: COMPLETED
Start: 2019-01-01 | End: 2019-01-01

## 2019-01-01 RX ORDER — LORAZEPAM 2 MG/ML
2 INJECTION INTRAMUSCULAR ONCE
Status: COMPLETED | OUTPATIENT
Start: 2019-01-01 | End: 2019-01-01

## 2019-01-01 RX ORDER — MAGNESIUM SULFATE HEPTAHYDRATE 40 MG/ML
2 INJECTION, SOLUTION INTRAVENOUS ONCE
Status: COMPLETED | OUTPATIENT
Start: 2019-01-01 | End: 2019-01-01

## 2019-01-01 RX ORDER — SODIUM CHLORIDE 9 MG/ML
150 INJECTION, SOLUTION INTRAVENOUS CONTINUOUS
Status: DISCONTINUED | OUTPATIENT
Start: 2019-01-01 | End: 2019-01-01

## 2019-01-01 RX ORDER — POTASSIUM CHLORIDE 20 MEQ/1
40 TABLET, EXTENDED RELEASE ORAL ONCE
Status: COMPLETED | OUTPATIENT
Start: 2019-01-01 | End: 2019-01-01

## 2019-01-01 RX ORDER — SODIUM CHLORIDE, SODIUM GLUCONATE, SODIUM ACETATE, POTASSIUM CHLORIDE, MAGNESIUM CHLORIDE, SODIUM PHOSPHATE, DIBASIC, AND POTASSIUM PHOSPHATE .53; .5; .37; .037; .03; .012; .00082 G/100ML; G/100ML; G/100ML; G/100ML; G/100ML; G/100ML; G/100ML
60 INJECTION, SOLUTION INTRAVENOUS CONTINUOUS
Status: DISCONTINUED | OUTPATIENT
Start: 2019-01-01 | End: 2019-01-01

## 2019-01-01 RX ORDER — POTASSIUM CHLORIDE 14.9 MG/ML
20 INJECTION INTRAVENOUS
Status: COMPLETED | OUTPATIENT
Start: 2019-01-01 | End: 2019-01-01

## 2019-01-01 RX ORDER — FUROSEMIDE 10 MG/ML
40 INJECTION INTRAMUSCULAR; INTRAVENOUS ONCE
Status: COMPLETED | OUTPATIENT
Start: 2019-01-01 | End: 2019-01-01

## 2019-01-01 RX ORDER — FENTANYL CITRATE 50 UG/ML
25 INJECTION, SOLUTION INTRAMUSCULAR; INTRAVENOUS EVERY 2 HOUR PRN
Status: DISCONTINUED | OUTPATIENT
Start: 2019-01-01 | End: 2019-01-01

## 2019-01-01 RX ORDER — ATORVASTATIN CALCIUM 40 MG/1
40 TABLET, FILM COATED ORAL
Status: DISCONTINUED | OUTPATIENT
Start: 2019-01-01 | End: 2019-01-01

## 2019-01-01 RX ORDER — OXYCODONE HYDROCHLORIDE 5 MG/1
5 TABLET ORAL EVERY 4 HOURS PRN
Status: DISCONTINUED | OUTPATIENT
Start: 2019-01-01 | End: 2019-01-01

## 2019-01-01 RX ORDER — LANOLIN ALCOHOL/MO/W.PET/CERES
3 CREAM (GRAM) TOPICAL
Status: DISCONTINUED | OUTPATIENT
Start: 2019-01-01 | End: 2019-01-01

## 2019-01-01 RX ORDER — POTASSIUM CHLORIDE 29.8 MG/ML
40 INJECTION INTRAVENOUS ONCE
Status: COMPLETED | OUTPATIENT
Start: 2019-01-01 | End: 2019-01-01

## 2019-01-01 RX ORDER — FENTANYL CITRATE 50 UG/ML
INJECTION, SOLUTION INTRAMUSCULAR; INTRAVENOUS AS NEEDED
Status: DISCONTINUED | OUTPATIENT
Start: 2019-01-01 | End: 2019-01-01 | Stop reason: SURG

## 2019-01-01 RX ORDER — HYDRALAZINE HYDROCHLORIDE 20 MG/ML
5 INJECTION INTRAMUSCULAR; INTRAVENOUS ONCE
Status: COMPLETED | OUTPATIENT
Start: 2019-01-01 | End: 2019-01-01

## 2019-01-01 RX ORDER — HYDROMORPHONE HCL/PF 1 MG/ML
0.5 SYRINGE (ML) INJECTION EVERY 2 HOUR PRN
Status: DISCONTINUED | OUTPATIENT
Start: 2019-01-01 | End: 2019-01-01

## 2019-01-01 RX ORDER — PANTOPRAZOLE SODIUM 40 MG/1
40 INJECTION, POWDER, FOR SOLUTION INTRAVENOUS EVERY 12 HOURS SCHEDULED
Status: DISCONTINUED | OUTPATIENT
Start: 2019-01-01 | End: 2019-01-01

## 2019-01-01 RX ORDER — SODIUM CHLORIDE FOR INHALATION 0.9 %
3 VIAL, NEBULIZER (ML) INHALATION
Status: DISCONTINUED | OUTPATIENT
Start: 2019-01-01 | End: 2019-01-01

## 2019-01-01 RX ORDER — LORAZEPAM 2 MG/ML
1 INJECTION INTRAMUSCULAR
Status: DISCONTINUED | OUTPATIENT
Start: 2019-01-01 | End: 2019-01-01

## 2019-01-01 RX ORDER — SODIUM CHLORIDE, SODIUM GLUCONATE, SODIUM ACETATE, POTASSIUM CHLORIDE, MAGNESIUM CHLORIDE, SODIUM PHOSPHATE, DIBASIC, AND POTASSIUM PHOSPHATE .53; .5; .37; .037; .03; .012; .00082 G/100ML; G/100ML; G/100ML; G/100ML; G/100ML; G/100ML; G/100ML
50 INJECTION, SOLUTION INTRAVENOUS CONTINUOUS
Status: DISCONTINUED | OUTPATIENT
Start: 2019-01-01 | End: 2019-01-01

## 2019-01-01 RX ORDER — ONDANSETRON 2 MG/ML
INJECTION INTRAMUSCULAR; INTRAVENOUS AS NEEDED
Status: DISCONTINUED | OUTPATIENT
Start: 2019-01-01 | End: 2019-01-01 | Stop reason: SURG

## 2019-01-01 RX ORDER — MAGNESIUM SULFATE HEPTAHYDRATE 40 MG/ML
2 INJECTION, SOLUTION INTRAVENOUS ONCE
Status: DISCONTINUED | OUTPATIENT
Start: 2019-01-01 | End: 2019-01-01

## 2019-01-01 RX ORDER — CHLORHEXIDINE GLUCONATE 0.12 MG/ML
15 RINSE ORAL EVERY 12 HOURS SCHEDULED
Status: DISCONTINUED | OUTPATIENT
Start: 2019-01-01 | End: 2019-01-01

## 2019-01-01 RX ORDER — ROCURONIUM BROMIDE 10 MG/ML
INJECTION, SOLUTION INTRAVENOUS AS NEEDED
Status: DISCONTINUED | OUTPATIENT
Start: 2019-01-01 | End: 2019-01-01 | Stop reason: SURG

## 2019-01-01 RX ORDER — MAGNESIUM HYDROXIDE 1200 MG/15ML
LIQUID ORAL AS NEEDED
Status: DISCONTINUED | OUTPATIENT
Start: 2019-01-01 | End: 2019-01-01 | Stop reason: HOSPADM

## 2019-01-01 RX ORDER — HYDRALAZINE HYDROCHLORIDE 20 MG/ML
5 INJECTION INTRAMUSCULAR; INTRAVENOUS EVERY 6 HOURS PRN
Status: DISCONTINUED | OUTPATIENT
Start: 2019-01-01 | End: 2019-01-01

## 2019-01-01 RX ORDER — AMLODIPINE BESYLATE 5 MG/1
5 TABLET ORAL DAILY
Status: DISCONTINUED | OUTPATIENT
Start: 2019-01-01 | End: 2019-01-01

## 2019-01-01 RX ORDER — HYDROMORPHONE HCL/PF 1 MG/ML
SYRINGE (ML) INJECTION AS NEEDED
Status: DISCONTINUED | OUTPATIENT
Start: 2019-01-01 | End: 2019-01-01 | Stop reason: SURG

## 2019-01-01 RX ORDER — GLYCOPYRROLATE 0.2 MG/ML
INJECTION INTRAMUSCULAR; INTRAVENOUS AS NEEDED
Status: DISCONTINUED | OUTPATIENT
Start: 2019-01-01 | End: 2019-01-01 | Stop reason: SURG

## 2019-01-01 RX ORDER — OXYCODONE HYDROCHLORIDE 10 MG/1
10 TABLET ORAL EVERY 6 HOURS PRN
Status: DISCONTINUED | OUTPATIENT
Start: 2019-01-01 | End: 2019-01-01

## 2019-01-01 RX ORDER — CEFAZOLIN SODIUM 1 G/3ML
INJECTION, POWDER, FOR SOLUTION INTRAMUSCULAR; INTRAVENOUS AS NEEDED
Status: DISCONTINUED | OUTPATIENT
Start: 2019-01-01 | End: 2019-01-01 | Stop reason: SURG

## 2019-01-01 RX ORDER — DOCUSATE SODIUM 100 MG/1
100 CAPSULE, LIQUID FILLED ORAL 2 TIMES DAILY
Status: DISCONTINUED | OUTPATIENT
Start: 2019-01-01 | End: 2019-01-01

## 2019-01-01 RX ORDER — ACETAMINOPHEN 160 MG/5ML
650 SUSPENSION, ORAL (FINAL DOSE FORM) ORAL EVERY 6 HOURS PRN
Status: DISCONTINUED | OUTPATIENT
Start: 2019-01-01 | End: 2019-01-01

## 2019-01-01 RX ORDER — HYDROMORPHONE HCL/PF 1 MG/ML
0.5 SYRINGE (ML) INJECTION EVERY 12 HOURS PRN
Status: DISCONTINUED | OUTPATIENT
Start: 2019-01-01 | End: 2019-01-01

## 2019-01-01 RX ORDER — LIDOCAINE HYDROCHLORIDE 10 MG/ML
INJECTION, SOLUTION EPIDURAL; INFILTRATION; INTRACAUDAL; PERINEURAL
Status: COMPLETED
Start: 2019-01-01 | End: 2019-01-01

## 2019-01-01 RX ORDER — PROPOFOL 10 MG/ML
5-50 INJECTION, EMULSION INTRAVENOUS
Status: DISCONTINUED | OUTPATIENT
Start: 2019-01-01 | End: 2019-01-01

## 2019-01-01 RX ORDER — HYDROMORPHONE HCL/PF 1 MG/ML
0.2 SYRINGE (ML) INJECTION EVERY 4 HOURS PRN
Status: DISCONTINUED | OUTPATIENT
Start: 2019-01-01 | End: 2019-01-01

## 2019-01-01 RX ORDER — LISINOPRIL 20 MG/1
TABLET ORAL
Qty: 90 TABLET | Refills: 3 | Status: SHIPPED | OUTPATIENT
Start: 2019-01-01 | End: 2019-01-01 | Stop reason: HOSPADM

## 2019-01-01 RX ORDER — LORAZEPAM 2 MG/ML
INJECTION INTRAMUSCULAR
Status: COMPLETED
Start: 2019-01-01 | End: 2019-01-01

## 2019-01-01 RX ORDER — MAGNESIUM SULFATE 1 G/100ML
1 INJECTION INTRAVENOUS ONCE
Status: COMPLETED | OUTPATIENT
Start: 2019-01-01 | End: 2019-01-01

## 2019-01-01 RX ORDER — SCOLOPAMINE TRANSDERMAL SYSTEM 1 MG/1
1 PATCH, EXTENDED RELEASE TRANSDERMAL
Status: DISCONTINUED | OUTPATIENT
Start: 2019-01-01 | End: 2019-01-01 | Stop reason: HOSPADM

## 2019-01-01 RX ORDER — HALOPERIDOL 5 MG/ML
2 INJECTION INTRAMUSCULAR ONCE
Status: COMPLETED | OUTPATIENT
Start: 2019-01-01 | End: 2019-01-01

## 2019-01-01 RX ORDER — ACETYLCYSTEINE 200 MG/ML
3 SOLUTION ORAL; RESPIRATORY (INHALATION)
Status: DISCONTINUED | OUTPATIENT
Start: 2019-01-01 | End: 2019-01-01

## 2019-01-01 RX ORDER — FINASTERIDE 5 MG/1
5 TABLET, FILM COATED ORAL DAILY
Status: DISCONTINUED | OUTPATIENT
Start: 2019-01-01 | End: 2019-01-01

## 2019-01-01 RX ORDER — FENTANYL CITRATE 50 UG/ML
50 INJECTION, SOLUTION INTRAMUSCULAR; INTRAVENOUS ONCE
Status: COMPLETED | OUTPATIENT
Start: 2019-01-01 | End: 2019-01-01

## 2019-01-01 RX ORDER — HYDROMORPHONE HCL/PF 1 MG/ML
0.5 SYRINGE (ML) INJECTION
Status: DISCONTINUED | OUTPATIENT
Start: 2019-01-01 | End: 2019-01-01

## 2019-01-01 RX ORDER — NEOSTIGMINE METHYLSULFATE 1 MG/ML
INJECTION INTRAVENOUS AS NEEDED
Status: DISCONTINUED | OUTPATIENT
Start: 2019-01-01 | End: 2019-01-01 | Stop reason: SURG

## 2019-01-01 RX ORDER — PANTOPRAZOLE SODIUM 40 MG/1
40 TABLET, DELAYED RELEASE ORAL
Status: DISCONTINUED | OUTPATIENT
Start: 2019-01-01 | End: 2019-01-01

## 2019-01-01 RX ORDER — LORAZEPAM 2 MG/ML
2 INJECTION INTRAMUSCULAR ONCE
Status: DISCONTINUED | OUTPATIENT
Start: 2019-01-01 | End: 2019-01-01

## 2019-01-01 RX ORDER — LIDOCAINE HYDROCHLORIDE 10 MG/ML
5 INJECTION, SOLUTION EPIDURAL; INFILTRATION; INTRACAUDAL; PERINEURAL ONCE
Status: COMPLETED | OUTPATIENT
Start: 2019-01-01 | End: 2019-01-01

## 2019-01-01 RX ORDER — ONDANSETRON 2 MG/ML
4 INJECTION INTRAMUSCULAR; INTRAVENOUS ONCE
Status: COMPLETED | OUTPATIENT
Start: 2019-01-01 | End: 2019-01-01

## 2019-01-01 RX ORDER — MIDAZOLAM HYDROCHLORIDE 1 MG/ML
INJECTION INTRAMUSCULAR; INTRAVENOUS AS NEEDED
Status: DISCONTINUED | OUTPATIENT
Start: 2019-01-01 | End: 2019-01-01 | Stop reason: SURG

## 2019-01-01 RX ORDER — FUROSEMIDE 10 MG/ML
40 INJECTION INTRAMUSCULAR; INTRAVENOUS
Status: DISCONTINUED | OUTPATIENT
Start: 2019-01-01 | End: 2019-01-01

## 2019-01-01 RX ORDER — SODIUM CHLORIDE, SODIUM GLUCONATE, SODIUM ACETATE, POTASSIUM CHLORIDE, MAGNESIUM CHLORIDE, SODIUM PHOSPHATE, DIBASIC, AND POTASSIUM PHOSPHATE .53; .5; .37; .037; .03; .012; .00082 G/100ML; G/100ML; G/100ML; G/100ML; G/100ML; G/100ML; G/100ML
75 INJECTION, SOLUTION INTRAVENOUS CONTINUOUS
Status: DISCONTINUED | OUTPATIENT
Start: 2019-01-01 | End: 2019-01-01

## 2019-01-01 RX ORDER — SODIUM CHLORIDE, SODIUM GLUCONATE, SODIUM ACETATE, POTASSIUM CHLORIDE, MAGNESIUM CHLORIDE, SODIUM PHOSPHATE, DIBASIC, AND POTASSIUM PHOSPHATE .53; .5; .37; .037; .03; .012; .00082 G/100ML; G/100ML; G/100ML; G/100ML; G/100ML; G/100ML; G/100ML
1000 INJECTION, SOLUTION INTRAVENOUS ONCE
Status: COMPLETED | OUTPATIENT
Start: 2019-01-01 | End: 2019-01-01

## 2019-01-01 RX ORDER — POTASSIUM CHLORIDE 29.8 MG/ML
40 INJECTION INTRAVENOUS ONCE
Status: DISCONTINUED | OUTPATIENT
Start: 2019-01-01 | End: 2019-01-01 | Stop reason: SDUPTHER

## 2019-01-01 RX ORDER — SODIUM CHLORIDE 450 MG/100ML
150 INJECTION, SOLUTION INTRAVENOUS CONTINUOUS
Status: DISCONTINUED | OUTPATIENT
Start: 2019-01-01 | End: 2019-01-01

## 2019-01-01 RX ORDER — FUROSEMIDE 10 MG/ML
40 INJECTION INTRAMUSCULAR; INTRAVENOUS ONCE
Status: DISCONTINUED | OUTPATIENT
Start: 2019-01-01 | End: 2019-01-01

## 2019-01-01 RX ORDER — CARVEDILOL 12.5 MG/1
12.5 TABLET ORAL 2 TIMES DAILY WITH MEALS
Status: DISCONTINUED | OUTPATIENT
Start: 2019-01-01 | End: 2019-01-01

## 2019-01-01 RX ORDER — SODIUM CHLORIDE FOR INHALATION 0.9 %
VIAL, NEBULIZER (ML) INHALATION
Status: COMPLETED
Start: 2019-01-01 | End: 2019-01-01

## 2019-01-01 RX ORDER — ACETAMINOPHEN 325 MG/1
650 TABLET ORAL EVERY 6 HOURS PRN
Status: DISCONTINUED | OUTPATIENT
Start: 2019-01-01 | End: 2019-01-01

## 2019-01-01 RX ORDER — DOBUTAMINE HYDROCHLORIDE 200 MG/100ML
5 INJECTION INTRAVENOUS CONTINUOUS
Status: DISCONTINUED | OUTPATIENT
Start: 2019-01-01 | End: 2019-01-01

## 2019-01-01 RX ORDER — HEPARIN SODIUM 5000 [USP'U]/ML
5000 INJECTION, SOLUTION INTRAVENOUS; SUBCUTANEOUS EVERY 8 HOURS SCHEDULED
Status: DISCONTINUED | OUTPATIENT
Start: 2019-01-01 | End: 2019-01-01

## 2019-01-01 RX ORDER — LIDOCAINE HYDROCHLORIDE 10 MG/ML
INJECTION, SOLUTION INFILTRATION; PERINEURAL AS NEEDED
Status: DISCONTINUED | OUTPATIENT
Start: 2019-01-01 | End: 2019-01-01 | Stop reason: SURG

## 2019-01-01 RX ORDER — SODIUM CHLORIDE 450 MG/100ML
75 INJECTION, SOLUTION INTRAVENOUS CONTINUOUS
Status: DISCONTINUED | OUTPATIENT
Start: 2019-01-01 | End: 2019-01-01

## 2019-01-01 RX ORDER — CARVEDILOL 12.5 MG/1
25 TABLET ORAL 2 TIMES DAILY WITH MEALS
Status: DISCONTINUED | OUTPATIENT
Start: 2019-01-01 | End: 2019-01-01

## 2019-01-01 RX ORDER — CALCIUM CARBONATE 200(500)MG
750 TABLET,CHEWABLE ORAL 3 TIMES DAILY PRN
Status: DISCONTINUED | OUTPATIENT
Start: 2019-01-01 | End: 2019-01-01

## 2019-01-01 RX ORDER — FUROSEMIDE 10 MG/ML
20 INJECTION INTRAMUSCULAR; INTRAVENOUS ONCE
Status: DISCONTINUED | OUTPATIENT
Start: 2019-01-01 | End: 2019-01-01

## 2019-01-01 RX ORDER — METOPROLOL TARTRATE 5 MG/5ML
2.5 INJECTION INTRAVENOUS EVERY 6 HOURS PRN
Status: DISCONTINUED | OUTPATIENT
Start: 2019-01-01 | End: 2019-01-01

## 2019-01-01 RX ORDER — HEPARIN SODIUM 5000 [USP'U]/ML
7500 INJECTION, SOLUTION INTRAVENOUS; SUBCUTANEOUS EVERY 8 HOURS SCHEDULED
Status: DISCONTINUED | OUTPATIENT
Start: 2019-01-01 | End: 2019-01-01

## 2019-01-01 RX ORDER — ALBUMIN, HUMAN INJ 5% 5 %
SOLUTION INTRAVENOUS CONTINUOUS PRN
Status: DISCONTINUED | OUTPATIENT
Start: 2019-01-01 | End: 2019-01-01 | Stop reason: SURG

## 2019-01-01 RX ORDER — LEVALBUTEROL 1.25 MG/.5ML
1.25 SOLUTION, CONCENTRATE RESPIRATORY (INHALATION)
Status: DISCONTINUED | OUTPATIENT
Start: 2019-01-01 | End: 2019-01-01

## 2019-01-01 RX ORDER — ADENOSINE 3 MG/ML
INJECTION, SOLUTION INTRAVENOUS
Status: COMPLETED
Start: 2019-01-01 | End: 2019-01-01

## 2019-01-01 RX ORDER — SODIUM CHLORIDE 9 MG/ML
INJECTION, SOLUTION INTRAVENOUS CONTINUOUS PRN
Status: DISCONTINUED | OUTPATIENT
Start: 2019-01-01 | End: 2019-01-01 | Stop reason: SURG

## 2019-01-01 RX ORDER — ONDANSETRON 2 MG/ML
4 INJECTION INTRAMUSCULAR; INTRAVENOUS EVERY 4 HOURS PRN
Status: DISCONTINUED | OUTPATIENT
Start: 2019-01-01 | End: 2019-01-01 | Stop reason: HOSPADM

## 2019-01-01 RX ORDER — OLANZAPINE 10 MG/1
10 TABLET, ORALLY DISINTEGRATING ORAL
Status: DISCONTINUED | OUTPATIENT
Start: 2019-01-01 | End: 2019-01-01 | Stop reason: HOSPADM

## 2019-01-01 RX ORDER — PANTOPRAZOLE SODIUM 40 MG/1
40 INJECTION, POWDER, FOR SOLUTION INTRAVENOUS
Status: DISCONTINUED | OUTPATIENT
Start: 2019-01-01 | End: 2019-01-01 | Stop reason: HOSPADM

## 2019-01-01 RX ORDER — SODIUM PHOSPHATE, DIBASIC AND SODIUM PHOSPHATE, MONOBASIC 7; 19 G/133ML; G/133ML
1 ENEMA RECTAL ONCE
Status: DISCONTINUED | OUTPATIENT
Start: 2019-01-01 | End: 2019-01-01

## 2019-01-01 RX ORDER — FUROSEMIDE 10 MG/ML
60 INJECTION INTRAMUSCULAR; INTRAVENOUS ONCE
Status: DISCONTINUED | OUTPATIENT
Start: 2019-01-01 | End: 2019-01-01

## 2019-01-01 RX ORDER — HALOPERIDOL 5 MG/ML
INJECTION INTRAMUSCULAR
Status: COMPLETED
Start: 2019-01-01 | End: 2019-01-01

## 2019-01-01 RX ORDER — METOPROLOL TARTRATE 5 MG/5ML
2.5 INJECTION INTRAVENOUS EVERY 6 HOURS
Status: DISCONTINUED | OUTPATIENT
Start: 2019-01-01 | End: 2019-01-01

## 2019-01-01 RX ORDER — PANTOPRAZOLE SODIUM 40 MG/1
40 INJECTION, POWDER, FOR SOLUTION INTRAVENOUS
Status: DISCONTINUED | OUTPATIENT
Start: 2019-01-01 | End: 2019-01-01

## 2019-01-01 RX ORDER — SODIUM CHLORIDE, SODIUM GLUCONATE, SODIUM ACETATE, POTASSIUM CHLORIDE, MAGNESIUM CHLORIDE, SODIUM PHOSPHATE, DIBASIC, AND POTASSIUM PHOSPHATE .53; .5; .37; .037; .03; .012; .00082 G/100ML; G/100ML; G/100ML; G/100ML; G/100ML; G/100ML; G/100ML
125 INJECTION, SOLUTION INTRAVENOUS CONTINUOUS
Status: DISCONTINUED | OUTPATIENT
Start: 2019-01-01 | End: 2019-01-01

## 2019-01-01 RX ORDER — HYDROMORPHONE HCL/PF 1 MG/ML
1 SYRINGE (ML) INJECTION
Status: DISCONTINUED | OUTPATIENT
Start: 2019-01-01 | End: 2019-01-01 | Stop reason: HOSPADM

## 2019-01-01 RX ORDER — HYDROMORPHONE HCL/PF 1 MG/ML
0.5 SYRINGE (ML) INJECTION EVERY 4 HOURS PRN
Status: DISCONTINUED | OUTPATIENT
Start: 2019-01-01 | End: 2019-01-01

## 2019-01-01 RX ORDER — LORAZEPAM 2 MG/ML
0.5 INJECTION INTRAMUSCULAR ONCE
Status: COMPLETED | OUTPATIENT
Start: 2019-01-01 | End: 2019-01-01

## 2019-01-01 RX ORDER — FENTANYL CITRATE/PF 50 MCG/ML
25 SYRINGE (ML) INJECTION
Status: DISCONTINUED | OUTPATIENT
Start: 2019-01-01 | End: 2019-01-01 | Stop reason: HOSPADM

## 2019-01-01 RX ORDER — MAGNESIUM HYDROXIDE/ALUMINUM HYDROXICE/SIMETHICONE 120; 1200; 1200 MG/30ML; MG/30ML; MG/30ML
30 SUSPENSION ORAL EVERY 4 HOURS PRN
Status: DISCONTINUED | OUTPATIENT
Start: 2019-01-01 | End: 2019-01-01

## 2019-01-01 RX ORDER — HALOPERIDOL 5 MG/ML
INJECTION INTRAMUSCULAR
Status: DISPENSED
Start: 2019-01-01 | End: 2019-01-01

## 2019-01-01 RX ORDER — DEXTROSE MONOHYDRATE 50 MG/ML
80 INJECTION, SOLUTION INTRAVENOUS CONTINUOUS
Status: DISCONTINUED | OUTPATIENT
Start: 2019-01-01 | End: 2019-01-01

## 2019-01-01 RX ORDER — DEXTROSE, SODIUM CHLORIDE, AND POTASSIUM CHLORIDE 5; .9; .15 G/100ML; G/100ML; G/100ML
125 INJECTION INTRAVENOUS CONTINUOUS
Status: DISCONTINUED | OUTPATIENT
Start: 2019-01-01 | End: 2019-01-01

## 2019-01-01 RX ORDER — OXYCODONE HYDROCHLORIDE 10 MG/1
10 TABLET ORAL EVERY 4 HOURS PRN
Status: DISCONTINUED | OUTPATIENT
Start: 2019-01-01 | End: 2019-01-01

## 2019-01-01 RX ORDER — FENTANYL CITRATE-0.9 % NACL/PF 10 MCG/ML
100 PLASTIC BAG, INJECTION (ML) INTRAVENOUS CONTINUOUS
Status: DISCONTINUED | OUTPATIENT
Start: 2019-01-01 | End: 2019-01-01

## 2019-01-01 RX ORDER — FENTANYL CITRATE 50 UG/ML
INJECTION, SOLUTION INTRAMUSCULAR; INTRAVENOUS
Status: COMPLETED
Start: 2019-01-01 | End: 2019-01-01

## 2019-01-01 RX ORDER — CARVEDILOL PHOSPHATE 40 MG/1
40 CAPSULE, EXTENDED RELEASE ORAL DAILY
Qty: 90 CAPSULE | Refills: 3 | Status: SHIPPED | OUTPATIENT
Start: 2019-01-01 | End: 2019-01-01 | Stop reason: HOSPADM

## 2019-01-01 RX ORDER — DOBUTAMINE HYDROCHLORIDE 200 MG/100ML
2.5 INJECTION INTRAVENOUS CONTINUOUS
Status: DISCONTINUED | OUTPATIENT
Start: 2019-01-01 | End: 2019-01-01

## 2019-01-01 RX ORDER — POTASSIUM CHLORIDE 14.9 MG/ML
60 INJECTION INTRAVENOUS ONCE
Status: DISCONTINUED | OUTPATIENT
Start: 2019-01-01 | End: 2019-01-01

## 2019-01-01 RX ORDER — DEXTROSE, SODIUM CHLORIDE, AND POTASSIUM CHLORIDE 5; .45; .15 G/100ML; G/100ML; G/100ML
75 INJECTION INTRAVENOUS CONTINUOUS
Status: DISCONTINUED | OUTPATIENT
Start: 2019-01-01 | End: 2019-01-01

## 2019-01-01 RX ORDER — LORAZEPAM 2 MG/ML
2 INJECTION INTRAMUSCULAR
Status: DISCONTINUED | OUTPATIENT
Start: 2019-01-01 | End: 2019-01-01 | Stop reason: HOSPADM

## 2019-01-01 RX ORDER — METOCLOPRAMIDE HYDROCHLORIDE 5 MG/ML
INJECTION INTRAMUSCULAR; INTRAVENOUS AS NEEDED
Status: DISCONTINUED | OUTPATIENT
Start: 2019-01-01 | End: 2019-01-01 | Stop reason: SURG

## 2019-01-01 RX ORDER — HYDROCHLOROTHIAZIDE 25 MG/1
25 TABLET ORAL DAILY
Qty: 90 TABLET | Refills: 3 | Status: SHIPPED | OUTPATIENT
Start: 2019-01-01 | End: 2019-01-01 | Stop reason: HOSPADM

## 2019-01-01 RX ORDER — FUROSEMIDE 10 MG/ML
INJECTION INTRAMUSCULAR; INTRAVENOUS
Status: DISPENSED
Start: 2019-01-01 | End: 2019-01-01

## 2019-01-01 RX ORDER — LANOLIN ALCOHOL/MO/W.PET/CERES
6 CREAM (GRAM) TOPICAL
Status: DISCONTINUED | OUTPATIENT
Start: 2019-01-01 | End: 2019-01-01

## 2019-01-01 RX ORDER — SODIUM CHLORIDE 9 MG/ML
250 INJECTION, SOLUTION INTRAVENOUS CONTINUOUS
Status: DISCONTINUED | OUTPATIENT
Start: 2019-01-01 | End: 2019-01-01 | Stop reason: HOSPADM

## 2019-01-01 RX ORDER — POTASSIUM CHLORIDE 14.9 MG/ML
20 INJECTION INTRAVENOUS
Status: DISPENSED | OUTPATIENT
Start: 2019-01-01 | End: 2019-01-01

## 2019-01-01 RX ORDER — FENTANYL CITRATE-0.9 % NACL/PF 10 MCG/ML
100 PLASTIC BAG, INJECTION (ML) INTRAVENOUS CONTINUOUS
Status: DISCONTINUED | OUTPATIENT
Start: 2019-01-01 | End: 2019-01-01 | Stop reason: HOSPADM

## 2019-01-01 RX ORDER — BUPIVACAINE HYDROCHLORIDE 5 MG/ML
INJECTION, SOLUTION PERINEURAL
Status: COMPLETED | OUTPATIENT
Start: 2019-01-01 | End: 2019-01-01

## 2019-01-01 RX ORDER — HYDROMORPHONE HCL/PF 1 MG/ML
1 SYRINGE (ML) INJECTION
Status: DISCONTINUED | OUTPATIENT
Start: 2019-01-01 | End: 2019-01-01

## 2019-01-01 RX ORDER — PROPOFOL 10 MG/ML
INJECTION, EMULSION INTRAVENOUS AS NEEDED
Status: DISCONTINUED | OUTPATIENT
Start: 2019-01-01 | End: 2019-01-01 | Stop reason: SURG

## 2019-01-01 RX ORDER — PRAVASTATIN SODIUM 80 MG/1
80 TABLET ORAL
Status: DISCONTINUED | OUTPATIENT
Start: 2019-01-01 | End: 2019-01-01

## 2019-01-01 RX ORDER — OXYCODONE HYDROCHLORIDE 5 MG/1
2.5 TABLET ORAL EVERY 4 HOURS PRN
Status: DISCONTINUED | OUTPATIENT
Start: 2019-01-01 | End: 2019-01-01

## 2019-01-01 RX ORDER — CEFAZOLIN SODIUM 2 G/50ML
2000 SOLUTION INTRAVENOUS ONCE
Status: CANCELLED | OUTPATIENT
Start: 2019-01-01

## 2019-01-01 RX ADMIN — ASCORBIC ACID: 500 INJECTION, SOLUTION INTRAMUSCULAR; INTRAVENOUS; SUBCUTANEOUS at 21:54

## 2019-01-01 RX ADMIN — POTASSIUM CHLORIDE 20 MEQ: 200 INJECTION, SOLUTION INTRAVENOUS at 12:41

## 2019-01-01 RX ADMIN — PANTOPRAZOLE SODIUM 40 MG: 40 INJECTION, POWDER, FOR SOLUTION INTRAVENOUS at 09:46

## 2019-01-01 RX ADMIN — PROPOFOL 20 MCG/KG/MIN: 10 INJECTION, EMULSION INTRAVENOUS at 07:23

## 2019-01-01 RX ADMIN — CARBIDOPA AND LEVODOPA 1.5 TABLET: 25; 100 TABLET ORAL at 16:37

## 2019-01-01 RX ADMIN — SODIUM PHOSPHATE, MONOBASIC, MONOHYDRATE 6 MMOL: 276; 142 INJECTION, SOLUTION INTRAVENOUS at 06:21

## 2019-01-01 RX ADMIN — ASCORBIC ACID 1500 MG: 500 INJECTION, SOLUTION INTRAMUSCULAR; INTRAVENOUS; SUBCUTANEOUS at 08:43

## 2019-01-01 RX ADMIN — CARBIDOPA AND LEVODOPA 1.5 TABLET: 25; 100 TABLET ORAL at 08:16

## 2019-01-01 RX ADMIN — SODIUM CHLORIDE, SODIUM GLUCONATE, SODIUM ACETATE, POTASSIUM CHLORIDE, MAGNESIUM CHLORIDE, SODIUM PHOSPHATE, DIBASIC, AND POTASSIUM PHOSPHATE 100 ML/HR: .53; .5; .37; .037; .03; .012; .00082 INJECTION, SOLUTION INTRAVENOUS at 12:11

## 2019-01-01 RX ADMIN — HYDROCORTISONE SODIUM SUCCINATE 50 MG: 100 INJECTION, POWDER, FOR SOLUTION INTRAMUSCULAR; INTRAVENOUS at 03:14

## 2019-01-01 RX ADMIN — CARBIDOPA AND LEVODOPA 1.5 TABLET: 25; 100 TABLET ORAL at 17:18

## 2019-01-01 RX ADMIN — SODIUM CHLORIDE, SODIUM GLUCONATE, SODIUM ACETATE, POTASSIUM CHLORIDE, MAGNESIUM CHLORIDE, SODIUM PHOSPHATE, DIBASIC, AND POTASSIUM PHOSPHATE 75 ML/HR: .53; .5; .37; .037; .03; .012; .00082 INJECTION, SOLUTION INTRAVENOUS at 01:35

## 2019-01-01 RX ADMIN — Medication 1 SPRAY: at 08:15

## 2019-01-01 RX ADMIN — SODIUM CHLORIDE, SODIUM GLUCONATE, SODIUM ACETATE, POTASSIUM CHLORIDE, MAGNESIUM CHLORIDE, SODIUM PHOSPHATE, DIBASIC, AND POTASSIUM PHOSPHATE 50 ML/HR: .53; .5; .37; .037; .03; .012; .00082 INJECTION, SOLUTION INTRAVENOUS at 12:56

## 2019-01-01 RX ADMIN — PHENYLEPHRINE HYDROCHLORIDE 200 MCG: 10 INJECTION INTRAVENOUS at 05:46

## 2019-01-01 RX ADMIN — LORAZEPAM 2 MG: 2 INJECTION INTRAMUSCULAR at 17:33

## 2019-01-01 RX ADMIN — AMLODIPINE BESYLATE 5 MG: 5 TABLET ORAL at 11:19

## 2019-01-01 RX ADMIN — CHLORHEXIDINE GLUCONATE 0.12% ORAL RINSE 15 ML: 1.2 LIQUID ORAL at 21:07

## 2019-01-01 RX ADMIN — INSULIN LISPRO 1 UNITS: 100 INJECTION, SOLUTION INTRAVENOUS; SUBCUTANEOUS at 00:26

## 2019-01-01 RX ADMIN — EPINEPHRINE 10 MCG/MIN: 1 INJECTION, SOLUTION, CONCENTRATE INTRAVENOUS at 00:55

## 2019-01-01 RX ADMIN — METHYLNALTREXONE BROMIDE 6 MG: 12 INJECTION, SOLUTION SUBCUTANEOUS at 11:05

## 2019-01-01 RX ADMIN — CARBIDOPA AND LEVODOPA 1.5 TABLET: 25; 100 TABLET ORAL at 11:26

## 2019-01-01 RX ADMIN — HEPARIN SODIUM 5000 UNITS: 5000 INJECTION INTRAVENOUS; SUBCUTANEOUS at 13:18

## 2019-01-01 RX ADMIN — THIAMINE HYDROCHLORIDE 200 MG: 100 INJECTION, SOLUTION INTRAMUSCULAR; INTRAVENOUS at 15:02

## 2019-01-01 RX ADMIN — NOREPINEPHRINE BITARTRATE 30 MCG/MIN: 1 INJECTION INTRAVENOUS at 00:56

## 2019-01-01 RX ADMIN — ATORVASTATIN CALCIUM 40 MG: 40 TABLET, FILM COATED ORAL at 16:37

## 2019-01-01 RX ADMIN — AMPICILLIN 2000 MG: 2 INJECTION, POWDER, FOR SOLUTION INTRAVENOUS at 05:00

## 2019-01-01 RX ADMIN — POTASSIUM CHLORIDE 20 MEQ: 200 INJECTION, SOLUTION INTRAVENOUS at 08:33

## 2019-01-01 RX ADMIN — INSULIN LISPRO 6 UNITS: 100 INJECTION, SOLUTION INTRAVENOUS; SUBCUTANEOUS at 17:23

## 2019-01-01 RX ADMIN — ONDANSETRON 4 MG: 2 INJECTION INTRAMUSCULAR; INTRAVENOUS at 20:21

## 2019-01-01 RX ADMIN — SODIUM CHLORIDE: 0.9 INJECTION, SOLUTION INTRAVENOUS at 07:23

## 2019-01-01 RX ADMIN — GLYCOPYRROLATE 0.6 MG: 0.2 INJECTION, SOLUTION INTRAMUSCULAR; INTRAVENOUS at 12:53

## 2019-01-01 RX ADMIN — PANTOPRAZOLE SODIUM 40 MG: 40 TABLET, DELAYED RELEASE ORAL at 06:14

## 2019-01-01 RX ADMIN — MELATONIN 3 MG: 3 TAB ORAL at 21:22

## 2019-01-01 RX ADMIN — ASCORBIC ACID 1500 MG: 500 INJECTION, SOLUTION INTRAMUSCULAR; INTRAVENOUS; SUBCUTANEOUS at 18:09

## 2019-01-01 RX ADMIN — INSULIN HUMAN 2 UNITS: 100 INJECTION, SOLUTION PARENTERAL at 12:21

## 2019-01-01 RX ADMIN — LEVALBUTEROL HYDROCHLORIDE 1.25 MG: 1.25 SOLUTION, CONCENTRATE RESPIRATORY (INHALATION) at 07:25

## 2019-01-01 RX ADMIN — THIAMINE HYDROCHLORIDE 200 MG: 100 INJECTION, SOLUTION INTRAMUSCULAR; INTRAVENOUS at 01:07

## 2019-01-01 RX ADMIN — EPINEPHRINE 22 MCG/MIN: 1 INJECTION, SOLUTION, CONCENTRATE INTRAVENOUS at 11:38

## 2019-01-01 RX ADMIN — ASCORBIC ACID 1500 MG: 500 INJECTION, SOLUTION INTRAMUSCULAR; INTRAVENOUS; SUBCUTANEOUS at 01:58

## 2019-01-01 RX ADMIN — NOREPINEPHRINE BITARTRATE 35 MCG/MIN: 1 INJECTION INTRAVENOUS at 13:19

## 2019-01-01 RX ADMIN — CHLORHEXIDINE GLUCONATE 0.12% ORAL RINSE 15 ML: 1.2 LIQUID ORAL at 09:24

## 2019-01-01 RX ADMIN — PANTOPRAZOLE SODIUM 40 MG: 40 INJECTION, POWDER, FOR SOLUTION INTRAVENOUS at 09:31

## 2019-01-01 RX ADMIN — OXYCODONE HYDROCHLORIDE 5 MG: 5 TABLET ORAL at 20:19

## 2019-01-01 RX ADMIN — CARBIDOPA AND LEVODOPA 1.5 TABLET: 25; 100 TABLET ORAL at 17:38

## 2019-01-01 RX ADMIN — CEFEPIME HYDROCHLORIDE 2000 MG: 2 INJECTION, POWDER, FOR SOLUTION INTRAVENOUS at 22:04

## 2019-01-01 RX ADMIN — METRONIDAZOLE 500 MG: 500 INJECTION, SOLUTION INTRAVENOUS at 08:56

## 2019-01-01 RX ADMIN — PIPERACILLIN SODIUM AND TAZOBACTAM SODIUM 3.38 G: 36; 4.5 INJECTION, POWDER, FOR SOLUTION INTRAVENOUS at 17:10

## 2019-01-01 RX ADMIN — SODIUM CHLORIDE 6 UNITS/HR: 9 INJECTION, SOLUTION INTRAVENOUS at 01:03

## 2019-01-01 RX ADMIN — METOCLOPRAMIDE 10 MG: 5 INJECTION, SOLUTION INTRAMUSCULAR; INTRAVENOUS at 06:57

## 2019-01-01 RX ADMIN — ROCURONIUM BROMIDE 50 MG: 10 INJECTION INTRAVENOUS at 10:00

## 2019-01-01 RX ADMIN — CARBIDOPA AND LEVODOPA 1.5 TABLET: 25; 100 TABLET ORAL at 08:01

## 2019-01-01 RX ADMIN — SODIUM CHLORIDE 3 UNITS/HR: 9 INJECTION, SOLUTION INTRAVENOUS at 00:42

## 2019-01-01 RX ADMIN — PANTOPRAZOLE SODIUM 40 MG: 40 INJECTION, POWDER, FOR SOLUTION INTRAVENOUS at 10:01

## 2019-01-01 RX ADMIN — MAGNESIUM SULFATE HEPTAHYDRATE 1 G: 1 INJECTION, SOLUTION INTRAVENOUS at 04:10

## 2019-01-01 RX ADMIN — SODIUM BICARBONATE 50 MEQ: 84 INJECTION, SOLUTION INTRAVENOUS at 06:50

## 2019-01-01 RX ADMIN — AMPICILLIN 2000 MG: 2 INJECTION, POWDER, FOR SOLUTION INTRAVENOUS at 17:59

## 2019-01-01 RX ADMIN — HEPARIN SODIUM 7500 UNITS: 5000 INJECTION INTRAVENOUS; SUBCUTANEOUS at 21:34

## 2019-01-01 RX ADMIN — CALCIUM CARBONATE (ANTACID) CHEW TAB 500 MG 750 MG: 500 CHEW TAB at 17:45

## 2019-01-01 RX ADMIN — HYDROCORTISONE SODIUM SUCCINATE 50 MG: 100 INJECTION, POWDER, FOR SOLUTION INTRAMUSCULAR; INTRAVENOUS at 11:59

## 2019-01-01 RX ADMIN — NOREPINEPHRINE BITARTRATE 35 MCG/MIN: 1 INJECTION INTRAVENOUS at 18:49

## 2019-01-01 RX ADMIN — VASOPRESSIN 0.04 UNITS/MIN: 20 INJECTION INTRAVENOUS at 13:19

## 2019-01-01 RX ADMIN — AMPICILLIN 2000 MG: 2 INJECTION, POWDER, FOR SOLUTION INTRAVENOUS at 06:18

## 2019-01-01 RX ADMIN — HEPARIN SODIUM 5000 UNITS: 5000 INJECTION INTRAVENOUS; SUBCUTANEOUS at 05:12

## 2019-01-01 RX ADMIN — POTASSIUM CHLORIDE 40 MEQ: 400 INJECTION, SOLUTION INTRAVENOUS at 09:57

## 2019-01-01 RX ADMIN — ATORVASTATIN CALCIUM 40 MG: 40 TABLET, FILM COATED ORAL at 17:11

## 2019-01-01 RX ADMIN — BUPIVACAINE HYDROCHLORIDE 30 ML: 5 INJECTION, SOLUTION PERINEURAL at 07:35

## 2019-01-01 RX ADMIN — PIPERACILLIN SODIUM AND TAZOBACTAM SODIUM 4.5 G: 4; .5 INJECTION, POWDER, LYOPHILIZED, FOR SOLUTION INTRAVENOUS at 13:41

## 2019-01-01 RX ADMIN — CARBIDOPA AND LEVODOPA 1.5 TABLET: 25; 100 TABLET ORAL at 08:13

## 2019-01-01 RX ADMIN — VASOPRESSIN 0.04 UNITS/MIN: 20 INJECTION INTRAVENOUS at 19:55

## 2019-01-01 RX ADMIN — THIAMINE HYDROCHLORIDE 200 MG: 100 INJECTION, SOLUTION INTRAMUSCULAR; INTRAVENOUS at 01:52

## 2019-01-01 RX ADMIN — MAGNESIUM SULFATE HEPTAHYDRATE 1 G: 1 INJECTION, SOLUTION INTRAVENOUS at 21:28

## 2019-01-01 RX ADMIN — HEPARIN SODIUM 5000 UNITS: 5000 INJECTION INTRAVENOUS; SUBCUTANEOUS at 05:05

## 2019-01-01 RX ADMIN — NOREPINEPHRINE BITARTRATE 35 MCG/MIN: 1 INJECTION INTRAVENOUS at 11:38

## 2019-01-01 RX ADMIN — FENTANYL CITRATE 25 MCG: 50 INJECTION INTRAMUSCULAR; INTRAVENOUS at 16:48

## 2019-01-01 RX ADMIN — NEOSTIGMINE METHYLSULFATE 3 MG: 1 INJECTION, SOLUTION INTRAVENOUS at 12:53

## 2019-01-01 RX ADMIN — THIAMINE HYDROCHLORIDE 200 MG: 100 INJECTION, SOLUTION INTRAMUSCULAR; INTRAVENOUS at 14:21

## 2019-01-01 RX ADMIN — CARVEDILOL 12.5 MG: 12.5 TABLET, FILM COATED ORAL at 08:01

## 2019-01-01 RX ADMIN — HEPARIN SODIUM 5000 UNITS: 5000 INJECTION INTRAVENOUS; SUBCUTANEOUS at 21:08

## 2019-01-01 RX ADMIN — HYDROMORPHONE HYDROCHLORIDE 0.5 MG: 1 INJECTION, SOLUTION INTRAMUSCULAR; INTRAVENOUS; SUBCUTANEOUS at 09:46

## 2019-01-01 RX ADMIN — CARVEDILOL 12.5 MG: 12.5 TABLET, FILM COATED ORAL at 15:53

## 2019-01-01 RX ADMIN — HYDROCORTISONE SODIUM SUCCINATE 50 MG: 100 INJECTION, POWDER, FOR SOLUTION INTRAMUSCULAR; INTRAVENOUS at 05:48

## 2019-01-01 RX ADMIN — Medication 75 MCG/HR: at 02:57

## 2019-01-01 RX ADMIN — INSULIN HUMAN 2 UNITS: 100 INJECTION, SOLUTION PARENTERAL at 10:55

## 2019-01-01 RX ADMIN — CARBIDOPA AND LEVODOPA 1.5 TABLET: 25; 100 TABLET ORAL at 11:15

## 2019-01-01 RX ADMIN — LORAZEPAM 1 MG: 2 INJECTION INTRAMUSCULAR; INTRAVENOUS at 21:07

## 2019-01-01 RX ADMIN — CHLORHEXIDINE GLUCONATE 0.12% ORAL RINSE 15 ML: 1.2 LIQUID ORAL at 21:12

## 2019-01-01 RX ADMIN — NOREPINEPHRINE BITARTRATE 13 MCG/MIN: 1 INJECTION INTRAVENOUS at 18:33

## 2019-01-01 RX ADMIN — HALOPERIDOL 2 MG: 5 INJECTION INTRAMUSCULAR at 15:48

## 2019-01-01 RX ADMIN — POTASSIUM CHLORIDE 20 MEQ: 200 INJECTION, SOLUTION INTRAVENOUS at 04:07

## 2019-01-01 RX ADMIN — HYDROCORTISONE SODIUM SUCCINATE 50 MG: 100 INJECTION, POWDER, FOR SOLUTION INTRAMUSCULAR; INTRAVENOUS at 17:45

## 2019-01-01 RX ADMIN — HEPARIN SODIUM 7500 UNITS: 5000 INJECTION INTRAVENOUS; SUBCUTANEOUS at 21:17

## 2019-01-01 RX ADMIN — ACETAMINOPHEN 650 MG: 160 SUSPENSION ORAL at 04:43

## 2019-01-01 RX ADMIN — CALCIUM GLUCONATE: 94 INJECTION, SOLUTION INTRAVENOUS at 20:29

## 2019-01-01 RX ADMIN — PANTOPRAZOLE SODIUM 40 MG: 40 INJECTION, POWDER, FOR SOLUTION INTRAVENOUS at 08:17

## 2019-01-01 RX ADMIN — PANTOPRAZOLE SODIUM 40 MG: 40 TABLET, DELAYED RELEASE ORAL at 17:19

## 2019-01-01 RX ADMIN — CALCIUM GLUCONATE 1 G: 98 INJECTION, SOLUTION INTRAVENOUS at 08:12

## 2019-01-01 RX ADMIN — Medication 20000 ML: at 07:03

## 2019-01-01 RX ADMIN — HYDROMORPHONE HYDROCHLORIDE 1 MG: 1 INJECTION, SOLUTION INTRAMUSCULAR; INTRAVENOUS; SUBCUTANEOUS at 21:12

## 2019-01-01 RX ADMIN — ASCORBIC ACID 1500 MG: 500 INJECTION, SOLUTION INTRAMUSCULAR; INTRAVENOUS; SUBCUTANEOUS at 07:27

## 2019-01-01 RX ADMIN — Medication 20000 ML: at 17:02

## 2019-01-01 RX ADMIN — NOREPINEPHRINE BITARTRATE 35 MCG/MIN: 1 INJECTION INTRAVENOUS at 06:01

## 2019-01-01 RX ADMIN — CARVEDILOL 12.5 MG: 12.5 TABLET, FILM COATED ORAL at 17:21

## 2019-01-01 RX ADMIN — CHLORHEXIDINE GLUCONATE 0.12% ORAL RINSE 15 ML: 1.2 LIQUID ORAL at 09:45

## 2019-01-01 RX ADMIN — PANTOPRAZOLE SODIUM 40 MG: 40 INJECTION, POWDER, FOR SOLUTION INTRAVENOUS at 09:24

## 2019-01-01 RX ADMIN — CARVEDILOL 12.5 MG: 12.5 TABLET, FILM COATED ORAL at 08:36

## 2019-01-01 RX ADMIN — CALCIUM GLUCONATE 1 G: 98 INJECTION, SOLUTION INTRAVENOUS at 05:33

## 2019-01-01 RX ADMIN — PANTOPRAZOLE SODIUM 40 MG: 40 INJECTION, POWDER, FOR SOLUTION INTRAVENOUS at 08:11

## 2019-01-01 RX ADMIN — HEPARIN SODIUM 5000 UNITS: 5000 INJECTION INTRAVENOUS; SUBCUTANEOUS at 22:29

## 2019-01-01 RX ADMIN — FUROSEMIDE 40 MG: 10 INJECTION, SOLUTION INTRAMUSCULAR; INTRAVENOUS at 10:58

## 2019-01-01 RX ADMIN — DOBUTAMINE IN DEXTROSE 2.5 MCG/KG/MIN: 200 INJECTION, SOLUTION INTRAVENOUS at 17:51

## 2019-01-01 RX ADMIN — CARVEDILOL 12.5 MG: 12.5 TABLET, FILM COATED ORAL at 18:10

## 2019-01-01 RX ADMIN — CARBIDOPA AND LEVODOPA 1.5 TABLET: 25; 100 TABLET ORAL at 08:47

## 2019-01-01 RX ADMIN — HEPARIN SODIUM 5000 UNITS: 5000 INJECTION INTRAVENOUS; SUBCUTANEOUS at 21:47

## 2019-01-01 RX ADMIN — NOREPINEPHRINE BITARTRATE 5 MCG/MIN: 1 INJECTION INTRAVENOUS at 22:50

## 2019-01-01 RX ADMIN — HEPARIN SODIUM 7500 UNITS: 5000 INJECTION INTRAVENOUS; SUBCUTANEOUS at 15:57

## 2019-01-01 RX ADMIN — PROPOFOL 200 MG: 10 INJECTION, EMULSION INTRAVENOUS at 05:33

## 2019-01-01 RX ADMIN — SODIUM CHLORIDE 1000 ML: 0.9 INJECTION, SOLUTION INTRAVENOUS at 06:33

## 2019-01-01 RX ADMIN — SODIUM CHLORIDE, SODIUM GLUCONATE, SODIUM ACETATE, POTASSIUM CHLORIDE, MAGNESIUM CHLORIDE, SODIUM PHOSPHATE, DIBASIC, AND POTASSIUM PHOSPHATE 75 ML/HR: .53; .5; .37; .037; .03; .012; .00082 INJECTION, SOLUTION INTRAVENOUS at 11:15

## 2019-01-01 RX ADMIN — INSULIN LISPRO 15 UNITS: 100 INJECTION, SOLUTION INTRAVENOUS; SUBCUTANEOUS at 05:45

## 2019-01-01 RX ADMIN — CARVEDILOL 12.5 MG: 12.5 TABLET, FILM COATED ORAL at 08:13

## 2019-01-01 RX ADMIN — ATORVASTATIN CALCIUM 40 MG: 40 TABLET, FILM COATED ORAL at 17:21

## 2019-01-01 RX ADMIN — PHENYLEPHRINE HYDROCHLORIDE 200 MCG: 10 INJECTION INTRAVENOUS at 01:07

## 2019-01-01 RX ADMIN — CARBIDOPA AND LEVODOPA 1.5 TABLET: 25; 100 TABLET ORAL at 21:34

## 2019-01-01 RX ADMIN — HYDROMORPHONE HYDROCHLORIDE 0.5 MG: 1 INJECTION, SOLUTION INTRAMUSCULAR; INTRAVENOUS; SUBCUTANEOUS at 20:20

## 2019-01-01 RX ADMIN — PROPOFOL 20 MCG/KG/MIN: 10 INJECTION, EMULSION INTRAVENOUS at 13:00

## 2019-01-01 RX ADMIN — HYDROMORPHONE HYDROCHLORIDE 1 MG: 1 INJECTION, SOLUTION INTRAMUSCULAR; INTRAVENOUS; SUBCUTANEOUS at 17:13

## 2019-01-01 RX ADMIN — SODIUM CHLORIDE, SODIUM GLUCONATE, SODIUM ACETATE, POTASSIUM CHLORIDE, MAGNESIUM CHLORIDE, SODIUM PHOSPHATE, DIBASIC, AND POTASSIUM PHOSPHATE 75 ML/HR: .53; .5; .37; .037; .03; .012; .00082 INJECTION, SOLUTION INTRAVENOUS at 12:35

## 2019-01-01 RX ADMIN — AMPICILLIN 2000 MG: 2 INJECTION, POWDER, FOR SOLUTION INTRAVENOUS at 21:37

## 2019-01-01 RX ADMIN — HEPARIN SODIUM 5000 UNITS: 5000 INJECTION INTRAVENOUS; SUBCUTANEOUS at 21:38

## 2019-01-01 RX ADMIN — FUROSEMIDE 40 MG: 10 INJECTION, SOLUTION INTRAMUSCULAR; INTRAVENOUS at 14:10

## 2019-01-01 RX ADMIN — HEPARIN SODIUM 5000 UNITS: 5000 INJECTION INTRAVENOUS; SUBCUTANEOUS at 06:31

## 2019-01-01 RX ADMIN — MAGNESIUM SULFATE HEPTAHYDRATE 2 G: 40 INJECTION, SOLUTION INTRAVENOUS at 06:20

## 2019-01-01 RX ADMIN — EPINEPHRINE 10 MCG/MIN: 1 INJECTION, SOLUTION, CONCENTRATE INTRAVENOUS at 23:10

## 2019-01-01 RX ADMIN — FENTANYL CITRATE 25 MCG: 50 INJECTION INTRAMUSCULAR; INTRAVENOUS at 21:56

## 2019-01-01 RX ADMIN — HYDROMORPHONE HYDROCHLORIDE 1 MG: 1 INJECTION, SOLUTION INTRAMUSCULAR; INTRAVENOUS; SUBCUTANEOUS at 21:55

## 2019-01-01 RX ADMIN — Medication 100 MCG/HR: at 09:04

## 2019-01-01 RX ADMIN — CARBIDOPA AND LEVODOPA 1.5 TABLET: 25; 100 TABLET ORAL at 13:25

## 2019-01-01 RX ADMIN — LIDOCAINE HYDROCHLORIDE 1 ML: 10 INJECTION, SOLUTION EPIDURAL; INFILTRATION; INTRACAUDAL; PERINEURAL at 15:19

## 2019-01-01 RX ADMIN — SODIUM CHLORIDE: 0.9 INJECTION, SOLUTION INTRAVENOUS at 07:09

## 2019-01-01 RX ADMIN — ASCORBIC ACID 48.6 ML/HR: 500 INJECTION, SOLUTION INTRAMUSCULAR; INTRAVENOUS; SUBCUTANEOUS at 09:26

## 2019-01-01 RX ADMIN — CARBIDOPA AND LEVODOPA 1.5 TABLET: 25; 100 TABLET ORAL at 08:02

## 2019-01-01 RX ADMIN — CHLORHEXIDINE GLUCONATE 0.12% ORAL RINSE 15 ML: 1.2 LIQUID ORAL at 08:11

## 2019-01-01 RX ADMIN — HYDROMORPHONE HYDROCHLORIDE 1 MG: 1 INJECTION, SOLUTION INTRAMUSCULAR; INTRAVENOUS; SUBCUTANEOUS at 18:58

## 2019-01-01 RX ADMIN — POTASSIUM CHLORIDE 20 MEQ: 200 INJECTION, SOLUTION INTRAVENOUS at 12:43

## 2019-01-01 RX ADMIN — FENTANYL CITRATE 25 MCG: 50 INJECTION INTRAMUSCULAR; INTRAVENOUS at 04:07

## 2019-01-01 RX ADMIN — SODIUM CHLORIDE, SODIUM GLUCONATE, SODIUM ACETATE, POTASSIUM CHLORIDE, MAGNESIUM CHLORIDE, SODIUM PHOSPHATE, DIBASIC, AND POTASSIUM PHOSPHATE 125 ML/HR: .53; .5; .37; .037; .03; .012; .00082 INJECTION, SOLUTION INTRAVENOUS at 09:21

## 2019-01-01 RX ADMIN — CEFEPIME HYDROCHLORIDE 2000 MG: 2 INJECTION, POWDER, FOR SOLUTION INTRAVENOUS at 10:05

## 2019-01-01 RX ADMIN — SODIUM CHLORIDE 150 ML/HR: 0.45 INJECTION, SOLUTION INTRAVENOUS at 00:13

## 2019-01-01 RX ADMIN — HYDROMORPHONE HYDROCHLORIDE 1 MG: 1 INJECTION, SOLUTION INTRAMUSCULAR; INTRAVENOUS; SUBCUTANEOUS at 20:30

## 2019-01-01 RX ADMIN — DOBUTAMINE IN DEXTROSE 2.5 MCG/KG/MIN: 200 INJECTION, SOLUTION INTRAVENOUS at 03:26

## 2019-01-01 RX ADMIN — PIPERACILLIN SODIUM AND TAZOBACTAM SODIUM 3.38 G: 36; 4.5 INJECTION, POWDER, FOR SOLUTION INTRAVENOUS at 05:14

## 2019-01-01 RX ADMIN — LORAZEPAM 2 MG: 2 INJECTION INTRAMUSCULAR; INTRAVENOUS at 17:33

## 2019-01-01 RX ADMIN — POTASSIUM CHLORIDE 20 MEQ: 200 INJECTION, SOLUTION INTRAVENOUS at 10:02

## 2019-01-01 RX ADMIN — ROCURONIUM BROMIDE 50 MG: 10 INJECTION, SOLUTION INTRAVENOUS at 05:33

## 2019-01-01 RX ADMIN — NOREPINEPHRINE BITARTRATE 14 MCG/MIN: 1 INJECTION INTRAVENOUS at 23:22

## 2019-01-01 RX ADMIN — POTASSIUM CHLORIDE 20 MEQ: 200 INJECTION, SOLUTION INTRAVENOUS at 11:38

## 2019-01-01 RX ADMIN — HYDROMORPHONE HYDROCHLORIDE 0.2 MG: 1 INJECTION, SOLUTION INTRAMUSCULAR; INTRAVENOUS; SUBCUTANEOUS at 21:20

## 2019-01-01 RX ADMIN — AMLODIPINE BESYLATE 5 MG: 5 TABLET ORAL at 08:29

## 2019-01-01 RX ADMIN — HEPARIN SODIUM 5000 UNITS: 5000 INJECTION INTRAVENOUS; SUBCUTANEOUS at 22:47

## 2019-01-01 RX ADMIN — EPINEPHRINE 24 MCG/MIN: 1 INJECTION, SOLUTION, CONCENTRATE INTRAVENOUS at 13:19

## 2019-01-01 RX ADMIN — PROPOFOL 20 MCG/KG/MIN: 10 INJECTION, EMULSION INTRAVENOUS at 18:24

## 2019-01-01 RX ADMIN — MAGNESIUM SULFATE HEPTAHYDRATE 2 G: 40 INJECTION, SOLUTION INTRAVENOUS at 09:30

## 2019-01-01 RX ADMIN — ATORVASTATIN CALCIUM 40 MG: 40 TABLET, FILM COATED ORAL at 17:19

## 2019-01-01 RX ADMIN — HYDROMORPHONE HYDROCHLORIDE 0.5 MG: 1 INJECTION, SOLUTION INTRAMUSCULAR; INTRAVENOUS; SUBCUTANEOUS at 07:11

## 2019-01-01 RX ADMIN — LORAZEPAM 0.5 MG: 2 INJECTION INTRAMUSCULAR; INTRAVENOUS at 11:56

## 2019-01-01 RX ADMIN — NOREPINEPHRINE BITARTRATE 40 MCG/MIN: 1 INJECTION INTRAVENOUS at 02:37

## 2019-01-01 RX ADMIN — EPINEPHRINE 24 MCG/MIN: 1 INJECTION, SOLUTION, CONCENTRATE INTRAVENOUS at 07:04

## 2019-01-01 RX ADMIN — HYDROMORPHONE HYDROCHLORIDE 0.5 MG: 1 INJECTION, SOLUTION INTRAMUSCULAR; INTRAVENOUS; SUBCUTANEOUS at 07:23

## 2019-01-01 RX ADMIN — CARVEDILOL 12.5 MG: 12.5 TABLET, FILM COATED ORAL at 09:21

## 2019-01-01 RX ADMIN — Medication 500 MG: at 05:48

## 2019-01-01 RX ADMIN — HEPARIN SODIUM 5000 UNITS: 5000 INJECTION INTRAVENOUS; SUBCUTANEOUS at 21:00

## 2019-01-01 RX ADMIN — Medication 20000 ML: at 13:39

## 2019-01-01 RX ADMIN — Medication 20000 ML: at 23:57

## 2019-01-01 RX ADMIN — NOREPINEPHRINE BITARTRATE 24 MCG/MIN: 1 INJECTION INTRAVENOUS at 03:34

## 2019-01-01 RX ADMIN — FUROSEMIDE 40 MG: 10 INJECTION, SOLUTION INTRAMUSCULAR; INTRAVENOUS at 16:33

## 2019-01-01 RX ADMIN — ROCURONIUM BROMIDE 30 MG: 10 INJECTION INTRAVENOUS at 12:15

## 2019-01-01 RX ADMIN — EPINEPHRINE 24 MCG/MIN: 1 INJECTION, SOLUTION, CONCENTRATE INTRAVENOUS at 05:05

## 2019-01-01 RX ADMIN — FENTANYL CITRATE 100 MCG/HR: 50 INJECTION INTRAVENOUS at 19:14

## 2019-01-01 RX ADMIN — CHLORHEXIDINE GLUCONATE 0.12% ORAL RINSE 15 ML: 1.2 LIQUID ORAL at 08:18

## 2019-01-01 RX ADMIN — HYDROMORPHONE HYDROCHLORIDE 1 MG: 1 INJECTION, SOLUTION INTRAMUSCULAR; INTRAVENOUS; SUBCUTANEOUS at 00:21

## 2019-01-01 RX ADMIN — LEVALBUTEROL HYDROCHLORIDE 1.25 MG: 1.25 SOLUTION, CONCENTRATE RESPIRATORY (INHALATION) at 14:12

## 2019-01-01 RX ADMIN — EPINEPHRINE 22 MCG/MIN: 1 INJECTION, SOLUTION, CONCENTRATE INTRAVENOUS at 09:33

## 2019-01-01 RX ADMIN — CARVEDILOL 12.5 MG: 12.5 TABLET, FILM COATED ORAL at 13:18

## 2019-01-01 RX ADMIN — POTASSIUM CHLORIDE 20 MEQ: 200 INJECTION, SOLUTION INTRAVENOUS at 02:15

## 2019-01-01 RX ADMIN — THIAMINE HYDROCHLORIDE 200 MG: 100 INJECTION, SOLUTION INTRAMUSCULAR; INTRAVENOUS at 13:32

## 2019-01-01 RX ADMIN — AMPICILLIN 2000 MG: 2 INJECTION, POWDER, FOR SOLUTION INTRAVENOUS at 17:03

## 2019-01-01 RX ADMIN — ASCORBIC ACID 1500 MG: 500 INJECTION, SOLUTION INTRAMUSCULAR; INTRAVENOUS; SUBCUTANEOUS at 07:16

## 2019-01-01 RX ADMIN — PANTOPRAZOLE SODIUM 40 MG: 40 INJECTION, POWDER, FOR SOLUTION INTRAVENOUS at 21:18

## 2019-01-01 RX ADMIN — CARBIDOPA AND LEVODOPA 1.5 TABLET: 25; 100 TABLET ORAL at 12:19

## 2019-01-01 RX ADMIN — OXYCODONE HYDROCHLORIDE 5 MG: 5 TABLET ORAL at 18:28

## 2019-01-01 RX ADMIN — SODIUM CHLORIDE 11 UNITS/HR: 9 INJECTION, SOLUTION INTRAVENOUS at 11:55

## 2019-01-01 RX ADMIN — POTASSIUM CHLORIDE 20 MEQ: 200 INJECTION, SOLUTION INTRAVENOUS at 15:07

## 2019-01-01 RX ADMIN — HEPARIN SODIUM 7500 UNITS: 5000 INJECTION INTRAVENOUS; SUBCUTANEOUS at 05:56

## 2019-01-01 RX ADMIN — SODIUM CHLORIDE, SODIUM GLUCONATE, SODIUM ACETATE, POTASSIUM CHLORIDE, MAGNESIUM CHLORIDE, SODIUM PHOSPHATE, DIBASIC, AND POTASSIUM PHOSPHATE 60 ML/HR: .53; .5; .37; .037; .03; .012; .00082 INJECTION, SOLUTION INTRAVENOUS at 11:58

## 2019-01-01 RX ADMIN — CEFAZOLIN 2000 MG: 1 INJECTION, POWDER, FOR SOLUTION INTRAVENOUS at 05:31

## 2019-01-01 RX ADMIN — CALCIUM GLUCONATE 1 G: 98 INJECTION, SOLUTION INTRAVENOUS at 00:58

## 2019-01-01 RX ADMIN — SODIUM CHLORIDE 150 ML/HR: 0.9 INJECTION, SOLUTION INTRAVENOUS at 20:32

## 2019-01-01 RX ADMIN — HEPARIN SODIUM 5000 UNITS: 5000 INJECTION INTRAVENOUS; SUBCUTANEOUS at 22:04

## 2019-01-01 RX ADMIN — Medication 100 MCG/HR: at 02:39

## 2019-01-01 RX ADMIN — SODIUM CHLORIDE, SODIUM GLUCONATE, SODIUM ACETATE, POTASSIUM CHLORIDE, MAGNESIUM CHLORIDE, SODIUM PHOSPHATE, DIBASIC, AND POTASSIUM PHOSPHATE 125 ML/HR: .53; .5; .37; .037; .03; .012; .00082 INJECTION, SOLUTION INTRAVENOUS at 16:26

## 2019-01-01 RX ADMIN — Medication 75 MCG/HR: at 17:36

## 2019-01-01 RX ADMIN — NOREPINEPHRINE BITARTRATE 35 MCG/MIN: 1 INJECTION INTRAVENOUS at 20:44

## 2019-01-01 RX ADMIN — POTASSIUM CHLORIDE 20 MEQ: 200 INJECTION, SOLUTION INTRAVENOUS at 12:44

## 2019-01-01 RX ADMIN — ONDANSETRON 4 MG: 2 INJECTION INTRAMUSCULAR; INTRAVENOUS at 06:57

## 2019-01-01 RX ADMIN — SODIUM CHLORIDE: 0.9 INJECTION, SOLUTION INTRAVENOUS at 06:00

## 2019-01-01 RX ADMIN — HEPARIN SODIUM 5000 UNITS: 5000 INJECTION INTRAVENOUS; SUBCUTANEOUS at 13:19

## 2019-01-01 RX ADMIN — Medication 100 MCG/HR: at 05:01

## 2019-01-01 RX ADMIN — HEPARIN SODIUM 5000 UNITS: 5000 INJECTION INTRAVENOUS; SUBCUTANEOUS at 22:36

## 2019-01-01 RX ADMIN — PIPERACILLIN SODIUM AND TAZOBACTAM SODIUM 3.38 G: 36; 4.5 INJECTION, POWDER, FOR SOLUTION INTRAVENOUS at 16:59

## 2019-01-01 RX ADMIN — CARVEDILOL 12.5 MG: 12.5 TABLET, FILM COATED ORAL at 17:19

## 2019-01-01 RX ADMIN — HYDRALAZINE HYDROCHLORIDE 5 MG: 20 INJECTION INTRAMUSCULAR; INTRAVENOUS at 19:07

## 2019-01-01 RX ADMIN — NOREPINEPHRINE BITARTRATE 22 MCG/MIN: 1 INJECTION INTRAVENOUS at 06:59

## 2019-01-01 RX ADMIN — POTASSIUM CHLORIDE 40 MEQ: 1500 TABLET, EXTENDED RELEASE ORAL at 09:30

## 2019-01-01 RX ADMIN — FUROSEMIDE 40 MG: 10 INJECTION, SOLUTION INTRAMUSCULAR; INTRAVENOUS at 14:17

## 2019-01-01 RX ADMIN — HYDROMORPHONE HYDROCHLORIDE 1 MG: 1 INJECTION, SOLUTION INTRAMUSCULAR; INTRAVENOUS; SUBCUTANEOUS at 23:51

## 2019-01-01 RX ADMIN — DOBUTAMINE IN DEXTROSE 5 MCG/KG/MIN: 200 INJECTION, SOLUTION INTRAVENOUS at 21:28

## 2019-01-01 RX ADMIN — AMPICILLIN 2000 MG: 2 INJECTION, POWDER, FOR SOLUTION INTRAVENOUS at 21:54

## 2019-01-01 RX ADMIN — FINASTERIDE 5 MG: 5 TABLET, FILM COATED ORAL at 09:21

## 2019-01-01 RX ADMIN — HYDROMORPHONE HYDROCHLORIDE 0.5 MG: 1 INJECTION, SOLUTION INTRAMUSCULAR; INTRAVENOUS; SUBCUTANEOUS at 01:19

## 2019-01-01 RX ADMIN — ACETAMINOPHEN 650 MG: 160 SUSPENSION ORAL at 20:02

## 2019-01-01 RX ADMIN — DEXTROSE 80 ML/HR: 5 SOLUTION INTRAVENOUS at 10:21

## 2019-01-01 RX ADMIN — INSULIN LISPRO 6 UNITS: 100 INJECTION, SOLUTION INTRAVENOUS; SUBCUTANEOUS at 13:42

## 2019-01-01 RX ADMIN — CARBIDOPA AND LEVODOPA 1.5 TABLET: 25; 100 TABLET ORAL at 15:52

## 2019-01-01 RX ADMIN — PANTOPRAZOLE SODIUM 40 MG: 40 INJECTION, POWDER, FOR SOLUTION INTRAVENOUS at 08:56

## 2019-01-01 RX ADMIN — CALCIUM GLUCONATE 2 G: 98 INJECTION, SOLUTION INTRAVENOUS at 21:39

## 2019-01-01 RX ADMIN — PANTOPRAZOLE SODIUM 40 MG: 40 INJECTION, POWDER, FOR SOLUTION INTRAVENOUS at 08:19

## 2019-01-01 RX ADMIN — INSULIN LISPRO 1 UNITS: 100 INJECTION, SOLUTION INTRAVENOUS; SUBCUTANEOUS at 19:22

## 2019-01-01 RX ADMIN — PIPERACILLIN SODIUM AND TAZOBACTAM SODIUM 3.38 G: 36; 4.5 INJECTION, POWDER, FOR SOLUTION INTRAVENOUS at 04:45

## 2019-01-01 RX ADMIN — HEPARIN SODIUM 5000 UNITS: 5000 INJECTION INTRAVENOUS; SUBCUTANEOUS at 05:09

## 2019-01-01 RX ADMIN — METOPROLOL TARTRATE 2.5 MG: 5 INJECTION INTRAVENOUS at 20:33

## 2019-01-01 RX ADMIN — PIPERACILLIN SODIUM AND TAZOBACTAM SODIUM 3.38 G: 36; 4.5 INJECTION, POWDER, FOR SOLUTION INTRAVENOUS at 10:22

## 2019-01-01 RX ADMIN — PANTOPRAZOLE SODIUM 40 MG: 40 TABLET, DELAYED RELEASE ORAL at 16:37

## 2019-01-01 RX ADMIN — HYDROCORTISONE SODIUM SUCCINATE 50 MG: 100 INJECTION, POWDER, FOR SOLUTION INTRAMUSCULAR; INTRAVENOUS at 00:50

## 2019-01-01 RX ADMIN — ACETYLCYSTEINE 600 MG: 200 SOLUTION ORAL; RESPIRATORY (INHALATION) at 19:37

## 2019-01-01 RX ADMIN — SODIUM CHLORIDE 6 UNITS/HR: 9 INJECTION, SOLUTION INTRAVENOUS at 19:23

## 2019-01-01 RX ADMIN — CHLORHEXIDINE GLUCONATE 0.12% ORAL RINSE 15 ML: 1.2 LIQUID ORAL at 21:47

## 2019-01-01 RX ADMIN — LORAZEPAM 2 MG: 2 INJECTION INTRAMUSCULAR; INTRAVENOUS at 09:43

## 2019-01-01 RX ADMIN — SODIUM CHLORIDE 150 ML/HR: 0.45 INJECTION, SOLUTION INTRAVENOUS at 18:51

## 2019-01-01 RX ADMIN — FUROSEMIDE 40 MG: 10 INJECTION, SOLUTION INTRAMUSCULAR; INTRAVENOUS at 18:13

## 2019-01-01 RX ADMIN — PHENYLEPHRINE HYDROCHLORIDE 50 MCG/MIN: 10 INJECTION INTRAVENOUS at 05:46

## 2019-01-01 RX ADMIN — SODIUM CHLORIDE 125 ML/HR: 0.9 INJECTION, SOLUTION INTRAVENOUS at 13:30

## 2019-01-01 RX ADMIN — ADENOSINE 6 MG: 3 INJECTION, SOLUTION INTRAVENOUS at 06:28

## 2019-01-01 RX ADMIN — SODIUM CHLORIDE, SODIUM GLUCONATE, SODIUM ACETATE, POTASSIUM CHLORIDE, MAGNESIUM CHLORIDE, SODIUM PHOSPHATE, DIBASIC, AND POTASSIUM PHOSPHATE 125 ML/HR: .53; .5; .37; .037; .03; .012; .00082 INJECTION, SOLUTION INTRAVENOUS at 00:13

## 2019-01-01 RX ADMIN — ACETYLCYSTEINE 600 MG: 200 SOLUTION ORAL; RESPIRATORY (INHALATION) at 10:26

## 2019-01-01 RX ADMIN — HEPARIN SODIUM 5000 UNITS: 5000 INJECTION INTRAVENOUS; SUBCUTANEOUS at 15:02

## 2019-01-01 RX ADMIN — HYDROCORTISONE SODIUM SUCCINATE 50 MG: 100 INJECTION, POWDER, FOR SOLUTION INTRAMUSCULAR; INTRAVENOUS at 00:32

## 2019-01-01 RX ADMIN — HEPARIN SODIUM 7500 UNITS: 5000 INJECTION INTRAVENOUS; SUBCUTANEOUS at 05:09

## 2019-01-01 RX ADMIN — PANTOPRAZOLE SODIUM 40 MG: 40 INJECTION, POWDER, FOR SOLUTION INTRAVENOUS at 09:13

## 2019-01-01 RX ADMIN — PANTOPRAZOLE SODIUM 40 MG: 40 INJECTION, POWDER, FOR SOLUTION INTRAVENOUS at 21:10

## 2019-01-01 RX ADMIN — HEPARIN SODIUM 5000 UNITS: 5000 INJECTION INTRAVENOUS; SUBCUTANEOUS at 21:12

## 2019-01-01 RX ADMIN — ACETYLCYSTEINE 600 MG: 200 SOLUTION ORAL; RESPIRATORY (INHALATION) at 14:12

## 2019-01-01 RX ADMIN — DEXTROSE, SODIUM CHLORIDE, AND POTASSIUM CHLORIDE 75 ML/HR: 5; .45; .15 INJECTION INTRAVENOUS at 08:29

## 2019-01-01 RX ADMIN — NEOSTIGMINE METHYLSULFATE 5 MG: 1 INJECTION, SOLUTION INTRAVENOUS at 07:15

## 2019-01-01 RX ADMIN — SODIUM CHLORIDE 500 ML: 0.9 INJECTION, SOLUTION INTRAVENOUS at 09:31

## 2019-01-01 RX ADMIN — POTASSIUM CHLORIDE 20 MEQ: 200 INJECTION, SOLUTION INTRAVENOUS at 12:59

## 2019-01-01 RX ADMIN — CHLORHEXIDINE GLUCONATE 0.12% ORAL RINSE 15 ML: 1.2 LIQUID ORAL at 20:05

## 2019-01-01 RX ADMIN — PANTOPRAZOLE SODIUM 40 MG: 40 TABLET, DELAYED RELEASE ORAL at 06:25

## 2019-01-01 RX ADMIN — FENTANYL CITRATE 100 MCG/HR: 50 INJECTION INTRAVENOUS at 22:11

## 2019-01-01 RX ADMIN — HEPARIN SODIUM 7500 UNITS: 5000 INJECTION INTRAVENOUS; SUBCUTANEOUS at 21:20

## 2019-01-01 RX ADMIN — VANCOMYCIN HYDROCHLORIDE 2000 MG: 10 INJECTION, POWDER, LYOPHILIZED, FOR SOLUTION INTRAVENOUS at 10:41

## 2019-01-01 RX ADMIN — POTASSIUM CHLORIDE 40 MEQ: 1500 TABLET, EXTENDED RELEASE ORAL at 08:13

## 2019-01-01 RX ADMIN — ASCORBIC ACID: 500 INJECTION, SOLUTION INTRAMUSCULAR; INTRAVENOUS; SUBCUTANEOUS at 21:09

## 2019-01-01 RX ADMIN — PHENYLEPHRINE HYDROCHLORIDE 50 MCG/MIN: 10 INJECTION INTRAVENOUS at 10:06

## 2019-01-01 RX ADMIN — CARBIDOPA AND LEVODOPA 1.5 TABLET: 25; 100 TABLET ORAL at 08:53

## 2019-01-01 RX ADMIN — POTASSIUM CHLORIDE 40 MEQ: 1500 TABLET, EXTENDED RELEASE ORAL at 12:31

## 2019-01-01 RX ADMIN — SODIUM CHLORIDE 1000 ML: 0.9 INJECTION, SOLUTION INTRAVENOUS at 20:14

## 2019-01-01 RX ADMIN — ROCURONIUM BROMIDE 30 MG: 10 INJECTION, SOLUTION INTRAVENOUS at 06:28

## 2019-01-01 RX ADMIN — SODIUM CHLORIDE: 0.9 INJECTION, SOLUTION INTRAVENOUS at 09:48

## 2019-01-01 RX ADMIN — PHENYLEPHRINE HYDROCHLORIDE 200 MCG: 10 INJECTION INTRAVENOUS at 05:56

## 2019-01-01 RX ADMIN — CALCIUM GLUCONATE 2 G: 98 INJECTION, SOLUTION INTRAVENOUS at 14:22

## 2019-01-01 RX ADMIN — PIPERACILLIN SODIUM AND TAZOBACTAM SODIUM 3.38 G: 36; 4.5 INJECTION, POWDER, FOR SOLUTION INTRAVENOUS at 23:30

## 2019-01-01 RX ADMIN — AMLODIPINE BESYLATE 5 MG: 5 TABLET ORAL at 11:16

## 2019-01-01 RX ADMIN — MAGNESIUM SULFATE HEPTAHYDRATE 1 G: 1 INJECTION, SOLUTION INTRAVENOUS at 03:28

## 2019-01-01 RX ADMIN — PANTOPRAZOLE SODIUM 40 MG: 40 INJECTION, POWDER, FOR SOLUTION INTRAVENOUS at 08:18

## 2019-01-01 RX ADMIN — ALBUMIN (HUMAN): 12.5 SOLUTION INTRAVENOUS at 01:23

## 2019-01-01 RX ADMIN — PIPERACILLIN SODIUM AND TAZOBACTAM SODIUM 3.38 G: 36; 4.5 INJECTION, POWDER, FOR SOLUTION INTRAVENOUS at 10:42

## 2019-01-01 RX ADMIN — CARVEDILOL 12.5 MG: 12.5 TABLET, FILM COATED ORAL at 11:19

## 2019-01-01 RX ADMIN — HEPARIN SODIUM 5000 UNITS: 5000 INJECTION INTRAVENOUS; SUBCUTANEOUS at 06:06

## 2019-01-01 RX ADMIN — LEVALBUTEROL HYDROCHLORIDE 1.25 MG: 1.25 SOLUTION, CONCENTRATE RESPIRATORY (INHALATION) at 10:25

## 2019-01-01 RX ADMIN — CARVEDILOL 12.5 MG: 12.5 TABLET, FILM COATED ORAL at 08:31

## 2019-01-01 RX ADMIN — VASOPRESSIN 0.04 UNITS/MIN: 20 INJECTION INTRAVENOUS at 06:26

## 2019-01-01 RX ADMIN — POTASSIUM CHLORIDE 20 MEQ: 200 INJECTION, SOLUTION INTRAVENOUS at 09:00

## 2019-01-01 RX ADMIN — SODIUM CHLORIDE: 0.9 INJECTION, SOLUTION INTRAVENOUS at 05:45

## 2019-01-01 RX ADMIN — HYDROMORPHONE HYDROCHLORIDE 1 MG: 1 INJECTION, SOLUTION INTRAMUSCULAR; INTRAVENOUS; SUBCUTANEOUS at 04:43

## 2019-01-01 RX ADMIN — CALCIUM GLUCONATE 1 G: 98 INJECTION, SOLUTION INTRAVENOUS at 03:21

## 2019-01-01 RX ADMIN — HYDROMORPHONE HYDROCHLORIDE 0.5 MG: 1 INJECTION, SOLUTION INTRAMUSCULAR; INTRAVENOUS; SUBCUTANEOUS at 18:14

## 2019-01-01 RX ADMIN — SODIUM CHLORIDE, SODIUM GLUCONATE, SODIUM ACETATE, POTASSIUM CHLORIDE, MAGNESIUM CHLORIDE, SODIUM PHOSPHATE, DIBASIC, AND POTASSIUM PHOSPHATE 125 ML/HR: .53; .5; .37; .037; .03; .012; .00082 INJECTION, SOLUTION INTRAVENOUS at 16:57

## 2019-01-01 RX ADMIN — AMLODIPINE BESYLATE 5 MG: 5 TABLET ORAL at 08:09

## 2019-01-01 RX ADMIN — HYDROCORTISONE SODIUM SUCCINATE 50 MG: 100 INJECTION, POWDER, FOR SOLUTION INTRAMUSCULAR; INTRAVENOUS at 18:15

## 2019-01-01 RX ADMIN — ASCORBIC ACID 1500 MG: 500 INJECTION, SOLUTION INTRAMUSCULAR; INTRAVENOUS; SUBCUTANEOUS at 12:19

## 2019-01-01 RX ADMIN — HEPARIN SODIUM 5000 UNITS: 5000 INJECTION INTRAVENOUS; SUBCUTANEOUS at 15:22

## 2019-01-01 RX ADMIN — CEFEPIME HYDROCHLORIDE 500 MG: 1 INJECTION, POWDER, FOR SOLUTION INTRAMUSCULAR; INTRAVENOUS at 17:12

## 2019-01-01 RX ADMIN — PANTOPRAZOLE SODIUM 40 MG: 40 INJECTION, POWDER, FOR SOLUTION INTRAVENOUS at 08:02

## 2019-01-01 RX ADMIN — POTASSIUM CHLORIDE 40 MEQ: 400 INJECTION, SOLUTION INTRAVENOUS at 21:16

## 2019-01-01 RX ADMIN — ASCORBIC ACID 48.6 ML/HR: 500 INJECTION, SOLUTION INTRAMUSCULAR; INTRAVENOUS; SUBCUTANEOUS at 10:26

## 2019-01-01 RX ADMIN — SODIUM BICARBONATE 50 MEQ: 84 INJECTION, SOLUTION INTRAVENOUS at 06:49

## 2019-01-01 RX ADMIN — POTASSIUM CHLORIDE 20 MEQ: 200 INJECTION, SOLUTION INTRAVENOUS at 12:49

## 2019-01-01 RX ADMIN — SODIUM CHLORIDE: 0.9 INJECTION, SOLUTION INTRAVENOUS at 00:54

## 2019-01-01 RX ADMIN — PROPOFOL 20 MCG/KG/MIN: 10 INJECTION, EMULSION INTRAVENOUS at 11:32

## 2019-01-01 RX ADMIN — POTASSIUM CHLORIDE 40 MEQ: 400 INJECTION, SOLUTION INTRAVENOUS at 10:22

## 2019-01-01 RX ADMIN — HEPARIN SODIUM 5000 UNITS: 5000 INJECTION INTRAVENOUS; SUBCUTANEOUS at 13:40

## 2019-01-01 RX ADMIN — HYDROMORPHONE HYDROCHLORIDE 1 MG: 1 INJECTION, SOLUTION INTRAMUSCULAR; INTRAVENOUS; SUBCUTANEOUS at 12:16

## 2019-01-01 RX ADMIN — HYDRALAZINE HYDROCHLORIDE 5 MG: 20 INJECTION INTRAMUSCULAR; INTRAVENOUS at 05:09

## 2019-01-01 RX ADMIN — HYDROMORPHONE HYDROCHLORIDE: 10 INJECTION INTRAMUSCULAR; INTRAVENOUS; SUBCUTANEOUS at 08:18

## 2019-01-01 RX ADMIN — AMLODIPINE BESYLATE 5 MG: 5 TABLET ORAL at 08:36

## 2019-01-01 RX ADMIN — HEPARIN SODIUM 5000 UNITS: 5000 INJECTION INTRAVENOUS; SUBCUTANEOUS at 05:00

## 2019-01-01 RX ADMIN — SODIUM CHLORIDE 150 ML/HR: 0.9 INJECTION, SOLUTION INTRAVENOUS at 08:46

## 2019-01-01 RX ADMIN — SODIUM CHLORIDE, SODIUM GLUCONATE, SODIUM ACETATE, POTASSIUM CHLORIDE, MAGNESIUM CHLORIDE, SODIUM PHOSPHATE, DIBASIC, AND POTASSIUM PHOSPHATE 50 ML/HR: .53; .5; .37; .037; .03; .012; .00082 INJECTION, SOLUTION INTRAVENOUS at 11:16

## 2019-01-01 RX ADMIN — CARBIDOPA AND LEVODOPA 1.5 TABLET: 25; 100 TABLET ORAL at 08:36

## 2019-01-01 RX ADMIN — CARVEDILOL 12.5 MG: 12.5 TABLET, FILM COATED ORAL at 16:37

## 2019-01-01 RX ADMIN — ONDANSETRON 4 MG: 2 INJECTION INTRAMUSCULAR; INTRAVENOUS at 04:10

## 2019-01-01 RX ADMIN — SODIUM CHLORIDE, SODIUM GLUCONATE, SODIUM ACETATE, POTASSIUM CHLORIDE, MAGNESIUM CHLORIDE, SODIUM PHOSPHATE, DIBASIC, AND POTASSIUM PHOSPHATE 75 ML/HR: .53; .5; .37; .037; .03; .012; .00082 INJECTION, SOLUTION INTRAVENOUS at 06:18

## 2019-01-01 RX ADMIN — POTASSIUM CHLORIDE 20 MEQ: 200 INJECTION, SOLUTION INTRAVENOUS at 11:04

## 2019-01-01 RX ADMIN — POTASSIUM CHLORIDE 20 MEQ: 200 INJECTION, SOLUTION INTRAVENOUS at 09:30

## 2019-01-01 RX ADMIN — FINASTERIDE 5 MG: 5 TABLET, FILM COATED ORAL at 08:01

## 2019-01-01 RX ADMIN — HEPARIN SODIUM 5000 UNITS: 5000 INJECTION INTRAVENOUS; SUBCUTANEOUS at 13:16

## 2019-01-01 RX ADMIN — CALCIUM GLUCONATE 1 G: 98 INJECTION, SOLUTION INTRAVENOUS at 15:19

## 2019-01-01 RX ADMIN — CARBIDOPA AND LEVODOPA 1.5 TABLET: 25; 100 TABLET ORAL at 17:21

## 2019-01-01 RX ADMIN — CARBIDOPA AND LEVODOPA 1.5 TABLET: 25; 100 TABLET ORAL at 17:14

## 2019-01-01 RX ADMIN — HEPARIN SODIUM 5000 UNITS: 5000 INJECTION INTRAVENOUS; SUBCUTANEOUS at 13:26

## 2019-01-01 RX ADMIN — HYDROCORTISONE SODIUM SUCCINATE 50 MG: 100 INJECTION, POWDER, FOR SOLUTION INTRAMUSCULAR; INTRAVENOUS at 06:51

## 2019-01-01 RX ADMIN — PIPERACILLIN SODIUM AND TAZOBACTAM SODIUM 3.38 G: 36; 4.5 INJECTION, POWDER, FOR SOLUTION INTRAVENOUS at 04:16

## 2019-01-01 RX ADMIN — HYDROCORTISONE SODIUM SUCCINATE 50 MG: 100 INJECTION, POWDER, FOR SOLUTION INTRAMUSCULAR; INTRAVENOUS at 00:45

## 2019-01-01 RX ADMIN — AMPICILLIN 2000 MG: 2 INJECTION, POWDER, FOR SOLUTION INTRAVENOUS at 21:21

## 2019-01-01 RX ADMIN — CHLORHEXIDINE GLUCONATE 0.12% ORAL RINSE 15 ML: 1.2 LIQUID ORAL at 20:14

## 2019-01-01 RX ADMIN — PHENYLEPHRINE HYDROCHLORIDE 200 MCG: 10 INJECTION INTRAVENOUS at 06:36

## 2019-01-01 RX ADMIN — HYDROMORPHONE HYDROCHLORIDE: 10 INJECTION INTRAMUSCULAR; INTRAVENOUS; SUBCUTANEOUS at 05:27

## 2019-01-01 RX ADMIN — VASOPRESSIN 0.04 UNITS/MIN: 20 INJECTION INTRAVENOUS at 14:24

## 2019-01-01 RX ADMIN — HYDROMORPHONE HYDROCHLORIDE 1 MG: 1 INJECTION, SOLUTION INTRAMUSCULAR; INTRAVENOUS; SUBCUTANEOUS at 19:29

## 2019-01-01 RX ADMIN — CALCIUM GLUCONATE 1 G: 98 INJECTION, SOLUTION INTRAVENOUS at 14:06

## 2019-01-01 RX ADMIN — FENTANYL CITRATE 25 MCG: 50 INJECTION INTRAMUSCULAR; INTRAVENOUS at 22:15

## 2019-01-01 RX ADMIN — FENTANYL CITRATE 100 MCG: 50 INJECTION, SOLUTION INTRAMUSCULAR; INTRAVENOUS at 05:33

## 2019-01-01 RX ADMIN — FENTANYL CITRATE 25 MCG: 50 INJECTION INTRAMUSCULAR; INTRAVENOUS at 00:00

## 2019-01-01 RX ADMIN — ISODIUM CHLORIDE 3 ML: 0.03 SOLUTION RESPIRATORY (INHALATION) at 13:37

## 2019-01-01 RX ADMIN — SODIUM CHLORIDE 500 ML: 0.9 INJECTION, SOLUTION INTRAVENOUS at 18:21

## 2019-01-01 RX ADMIN — SODIUM CHLORIDE 500 ML: 0.9 INJECTION, SOLUTION INTRAVENOUS at 17:59

## 2019-01-01 RX ADMIN — OXYCODONE HYDROCHLORIDE 5 MG: 5 TABLET ORAL at 21:29

## 2019-01-01 RX ADMIN — SODIUM CHLORIDE, SODIUM GLUCONATE, SODIUM ACETATE, POTASSIUM CHLORIDE, MAGNESIUM CHLORIDE, SODIUM PHOSPHATE, DIBASIC, AND POTASSIUM PHOSPHATE 75 ML/HR: .53; .5; .37; .037; .03; .012; .00082 INJECTION, SOLUTION INTRAVENOUS at 21:42

## 2019-01-01 RX ADMIN — MAGNESIUM SULFATE HEPTAHYDRATE 2 G: 40 INJECTION, SOLUTION INTRAVENOUS at 07:55

## 2019-01-01 RX ADMIN — ASCORBIC ACID 1500 MG: 500 INJECTION, SOLUTION INTRAMUSCULAR; INTRAVENOUS; SUBCUTANEOUS at 01:10

## 2019-01-01 RX ADMIN — ALUMINUM HYDROXIDE, MAGNESIUM HYDROXIDE, AND SIMETHICONE 30 ML: 200; 200; 20 SUSPENSION ORAL at 23:09

## 2019-01-01 RX ADMIN — HEPARIN SODIUM 5000 UNITS: 5000 INJECTION INTRAVENOUS; SUBCUTANEOUS at 06:14

## 2019-01-01 RX ADMIN — SODIUM CHLORIDE: 0.9 INJECTION, SOLUTION INTRAVENOUS at 05:31

## 2019-01-01 RX ADMIN — AMPICILLIN 2000 MG: 2 INJECTION, POWDER, FOR SOLUTION INTRAVENOUS at 00:24

## 2019-01-01 RX ADMIN — SODIUM PHOSPHATE, MONOBASIC, MONOHYDRATE 6 MMOL: 276; 142 INJECTION, SOLUTION INTRAVENOUS at 14:04

## 2019-01-01 RX ADMIN — PIPERACILLIN SODIUM AND TAZOBACTAM SODIUM 3.38 G: 36; 4.5 INJECTION, POWDER, FOR SOLUTION INTRAVENOUS at 10:03

## 2019-01-01 RX ADMIN — SODIUM CHLORIDE, SODIUM GLUCONATE, SODIUM ACETATE, POTASSIUM CHLORIDE, MAGNESIUM CHLORIDE, SODIUM PHOSPHATE, DIBASIC, AND POTASSIUM PHOSPHATE 50 ML/HR: .53; .5; .37; .037; .03; .012; .00082 INJECTION, SOLUTION INTRAVENOUS at 20:33

## 2019-01-01 RX ADMIN — CHLORHEXIDINE GLUCONATE 0.12% ORAL RINSE 15 ML: 1.2 LIQUID ORAL at 09:13

## 2019-01-01 RX ADMIN — OXYCODONE HYDROCHLORIDE 10 MG: 10 TABLET ORAL at 23:50

## 2019-01-01 RX ADMIN — HEPARIN SODIUM 5000 UNITS: 5000 INJECTION INTRAVENOUS; SUBCUTANEOUS at 06:10

## 2019-01-01 RX ADMIN — DEXTROSE, SODIUM CHLORIDE, AND POTASSIUM CHLORIDE 125 ML/HR: 5; .9; .15 INJECTION INTRAVENOUS at 14:16

## 2019-01-01 RX ADMIN — SODIUM CHLORIDE 150 ML/HR: 0.45 INJECTION, SOLUTION INTRAVENOUS at 11:55

## 2019-01-01 RX ADMIN — ASCORBIC ACID: 500 INJECTION, SOLUTION INTRAMUSCULAR; INTRAVENOUS; SUBCUTANEOUS at 21:07

## 2019-01-01 RX ADMIN — HEPARIN SODIUM 5000 UNITS: 5000 INJECTION INTRAVENOUS; SUBCUTANEOUS at 14:47

## 2019-01-01 RX ADMIN — INSULIN LISPRO 1 UNITS: 100 INJECTION, SOLUTION INTRAVENOUS; SUBCUTANEOUS at 12:35

## 2019-01-01 RX ADMIN — CARBIDOPA AND LEVODOPA 1.5 TABLET: 25; 100 TABLET ORAL at 11:55

## 2019-01-01 RX ADMIN — CARBIDOPA AND LEVODOPA 1.5 TABLET: 25; 100 TABLET ORAL at 09:21

## 2019-01-01 RX ADMIN — CARBIDOPA AND LEVODOPA 1.5 TABLET: 25; 100 TABLET ORAL at 18:08

## 2019-01-01 RX ADMIN — HEPARIN SODIUM 5000 UNITS: 5000 INJECTION INTRAVENOUS; SUBCUTANEOUS at 15:52

## 2019-01-01 RX ADMIN — CHLORHEXIDINE GLUCONATE 0.12% ORAL RINSE 15 ML: 1.2 LIQUID ORAL at 13:39

## 2019-01-01 RX ADMIN — METRONIDAZOLE 500 MG: 500 INJECTION, SOLUTION INTRAVENOUS at 01:07

## 2019-01-01 RX ADMIN — INSULIN LISPRO 1 UNITS: 100 INJECTION, SOLUTION INTRAVENOUS; SUBCUTANEOUS at 12:02

## 2019-01-01 RX ADMIN — HEPARIN SODIUM 5000 UNITS: 5000 INJECTION INTRAVENOUS; SUBCUTANEOUS at 14:54

## 2019-01-01 RX ADMIN — FENTANYL CITRATE 100 MCG/HR: 50 INJECTION INTRAVENOUS at 01:45

## 2019-01-01 RX ADMIN — HEPARIN SODIUM 5000 UNITS: 5000 INJECTION INTRAVENOUS; SUBCUTANEOUS at 14:18

## 2019-01-01 RX ADMIN — CARBIDOPA AND LEVODOPA 1.5 TABLET: 25; 100 TABLET ORAL at 13:19

## 2019-01-01 RX ADMIN — SODIUM CHLORIDE 3.38 G: 900 INJECTION INTRAVENOUS at 10:04

## 2019-01-01 RX ADMIN — HEPARIN SODIUM 5000 UNITS: 5000 INJECTION INTRAVENOUS; SUBCUTANEOUS at 21:39

## 2019-01-01 RX ADMIN — PIPERACILLIN SODIUM AND TAZOBACTAM SODIUM 3.38 G: 36; 4.5 INJECTION, POWDER, FOR SOLUTION INTRAVENOUS at 14:01

## 2019-01-01 RX ADMIN — HEPARIN SODIUM 5000 UNITS: 5000 INJECTION INTRAVENOUS; SUBCUTANEOUS at 14:26

## 2019-01-01 RX ADMIN — CARVEDILOL 12.5 MG: 12.5 TABLET, FILM COATED ORAL at 08:03

## 2019-01-01 RX ADMIN — HEPARIN SODIUM 5000 UNITS: 5000 INJECTION INTRAVENOUS; SUBCUTANEOUS at 13:49

## 2019-01-01 RX ADMIN — INSULIN LISPRO 20 UNITS: 100 INJECTION, SOLUTION INTRAVENOUS; SUBCUTANEOUS at 00:30

## 2019-01-01 RX ADMIN — CARVEDILOL 12.5 MG: 12.5 TABLET, FILM COATED ORAL at 17:14

## 2019-01-01 RX ADMIN — AMPICILLIN 2000 MG: 2 INJECTION, POWDER, FOR SOLUTION INTRAVENOUS at 13:57

## 2019-01-01 RX ADMIN — HEPARIN SODIUM 5000 UNITS: 5000 INJECTION INTRAVENOUS; SUBCUTANEOUS at 13:45

## 2019-01-01 RX ADMIN — PANTOPRAZOLE SODIUM 40 MG: 40 TABLET, DELAYED RELEASE ORAL at 17:14

## 2019-01-01 RX ADMIN — NOREPINEPHRINE BITARTRATE 35 MCG/MIN: 1 INJECTION INTRAVENOUS at 08:05

## 2019-01-01 RX ADMIN — NOREPINEPHRINE BITARTRATE 19 MCG/MIN: 1 INJECTION INTRAVENOUS at 14:23

## 2019-01-01 RX ADMIN — PANTOPRAZOLE SODIUM 40 MG: 40 INJECTION, POWDER, FOR SOLUTION INTRAVENOUS at 23:12

## 2019-01-01 RX ADMIN — LIDOCAINE HYDROCHLORIDE 50 MG: 10 INJECTION, SOLUTION INFILTRATION; PERINEURAL at 05:33

## 2019-01-01 RX ADMIN — HYDRALAZINE HYDROCHLORIDE 5 MG: 20 INJECTION INTRAMUSCULAR; INTRAVENOUS at 20:38

## 2019-01-01 RX ADMIN — ATORVASTATIN CALCIUM 40 MG: 40 TABLET, FILM COATED ORAL at 17:13

## 2019-01-01 RX ADMIN — LEVALBUTEROL HYDROCHLORIDE 1.25 MG: 1.25 SOLUTION, CONCENTRATE RESPIRATORY (INHALATION) at 19:37

## 2019-01-01 RX ADMIN — NOREPINEPHRINE BITARTRATE 35 MCG/MIN: 1 INJECTION INTRAVENOUS at 10:04

## 2019-01-01 RX ADMIN — SODIUM CHLORIDE 9 UNITS/HR: 9 INJECTION, SOLUTION INTRAVENOUS at 07:49

## 2019-01-01 RX ADMIN — HEPARIN SODIUM 5000 UNITS: 5000 INJECTION INTRAVENOUS; SUBCUTANEOUS at 05:14

## 2019-01-01 RX ADMIN — MELATONIN 3 MG: 3 TAB ORAL at 21:29

## 2019-01-01 RX ADMIN — HEPARIN SODIUM 5000 UNITS: 5000 INJECTION INTRAVENOUS; SUBCUTANEOUS at 05:28

## 2019-01-01 RX ADMIN — FENTANYL CITRATE 25 MCG: 50 INJECTION INTRAMUSCULAR; INTRAVENOUS at 06:15

## 2019-01-01 RX ADMIN — Medication 50 MCG/HR: at 22:17

## 2019-01-01 RX ADMIN — FENTANYL CITRATE 100 MCG/HR: 50 INJECTION INTRAVENOUS at 07:22

## 2019-01-01 RX ADMIN — FENTANYL CITRATE 50 MCG: 50 INJECTION, SOLUTION INTRAMUSCULAR; INTRAVENOUS at 11:54

## 2019-01-01 RX ADMIN — HEPARIN SODIUM 5000 UNITS: 5000 INJECTION INTRAVENOUS; SUBCUTANEOUS at 05:10

## 2019-01-01 RX ADMIN — HEPARIN SODIUM 5000 UNITS: 5000 INJECTION INTRAVENOUS; SUBCUTANEOUS at 21:07

## 2019-01-01 RX ADMIN — POTASSIUM CHLORIDE 20 MEQ: 200 INJECTION, SOLUTION INTRAVENOUS at 11:07

## 2019-01-01 RX ADMIN — SODIUM CHLORIDE 4.5 G: 900 INJECTION INTRAVENOUS at 12:04

## 2019-01-01 RX ADMIN — HYDROCORTISONE SODIUM SUCCINATE 50 MG: 100 INJECTION, POWDER, FOR SOLUTION INTRAMUSCULAR; INTRAVENOUS at 06:25

## 2019-01-01 RX ADMIN — POTASSIUM CHLORIDE 40 MEQ: 400 INJECTION, SOLUTION INTRAVENOUS at 14:06

## 2019-01-01 RX ADMIN — FENTANYL CITRATE 100 MCG/HR: 50 INJECTION INTRAVENOUS at 13:09

## 2019-01-01 RX ADMIN — FINASTERIDE 5 MG: 5 TABLET, FILM COATED ORAL at 08:13

## 2019-01-01 RX ADMIN — LORAZEPAM 2 MG: 2 INJECTION INTRAMUSCULAR; INTRAVENOUS at 22:40

## 2019-01-01 RX ADMIN — HEPARIN SODIUM 5000 UNITS: 5000 INJECTION INTRAVENOUS; SUBCUTANEOUS at 14:14

## 2019-01-01 RX ADMIN — ROCURONIUM BROMIDE 50 MG: 10 INJECTION INTRAVENOUS at 01:10

## 2019-01-01 RX ADMIN — CARBIDOPA AND LEVODOPA 1.5 TABLET: 25; 100 TABLET ORAL at 13:01

## 2019-01-01 RX ADMIN — OXYCODONE HYDROCHLORIDE 5 MG: 5 TABLET ORAL at 22:38

## 2019-01-01 RX ADMIN — NOREPINEPHRINE BITARTRATE 35 MCG/MIN: 1 INJECTION INTRAVENOUS at 16:55

## 2019-01-01 RX ADMIN — HYDROMORPHONE HYDROCHLORIDE 1 MG: 1 INJECTION, SOLUTION INTRAMUSCULAR; INTRAVENOUS; SUBCUTANEOUS at 04:49

## 2019-01-01 RX ADMIN — SODIUM CHLORIDE 1000 ML: 0.9 INJECTION, SOLUTION INTRAVENOUS at 10:41

## 2019-01-01 RX ADMIN — MIDAZOLAM 6 MG: 1 INJECTION INTRAMUSCULAR; INTRAVENOUS at 01:06

## 2019-01-01 RX ADMIN — CEFEPIME HYDROCHLORIDE 500 MG: 1 INJECTION, POWDER, FOR SOLUTION INTRAMUSCULAR; INTRAVENOUS at 05:03

## 2019-01-01 RX ADMIN — FUROSEMIDE 40 MG: 10 INJECTION, SOLUTION INTRAMUSCULAR; INTRAVENOUS at 09:57

## 2019-01-01 RX ADMIN — HEPARIN SODIUM 5000 UNITS: 5000 INJECTION INTRAVENOUS; SUBCUTANEOUS at 06:23

## 2019-01-01 RX ADMIN — NOREPINEPHRINE BITARTRATE 35 MCG/MIN: 1 INJECTION INTRAVENOUS at 14:52

## 2019-01-01 RX ADMIN — CARBIDOPA AND LEVODOPA 1.5 TABLET: 25; 100 TABLET ORAL at 12:31

## 2019-01-01 RX ADMIN — Medication 20000 ML: at 12:46

## 2019-01-01 RX ADMIN — SODIUM CHLORIDE 150 ML/HR: 0.45 INJECTION, SOLUTION INTRAVENOUS at 05:49

## 2019-01-01 RX ADMIN — Medication 50 MEQ: at 06:50

## 2019-01-01 RX ADMIN — MELATONIN 3 MG: 3 TAB ORAL at 22:03

## 2019-01-01 RX ADMIN — CARBIDOPA AND LEVODOPA 1.5 TABLET: 25; 100 TABLET ORAL at 11:37

## 2019-01-01 RX ADMIN — AMPICILLIN 2000 MG: 2 INJECTION, POWDER, FOR SOLUTION INTRAVENOUS at 04:49

## 2019-01-01 RX ADMIN — MAGNESIUM SULFATE HEPTAHYDRATE 1 G: 1 INJECTION, SOLUTION INTRAVENOUS at 01:30

## 2019-01-01 RX ADMIN — HEPARIN SODIUM 5000 UNITS: 5000 INJECTION INTRAVENOUS; SUBCUTANEOUS at 21:54

## 2019-01-01 RX ADMIN — FINASTERIDE 5 MG: 5 TABLET, FILM COATED ORAL at 08:53

## 2019-01-01 RX ADMIN — METHYLENE BLUE 150 MG: 5 INJECTION INTRAVENOUS at 13:16

## 2019-01-01 RX ADMIN — SODIUM CHLORIDE 250 ML/HR: 0.9 INJECTION, SOLUTION INTRAVENOUS at 00:45

## 2019-01-01 RX ADMIN — Medication 100 MCG/HR: at 14:24

## 2019-01-01 RX ADMIN — PANTOPRAZOLE SODIUM 40 MG: 40 TABLET, DELAYED RELEASE ORAL at 05:10

## 2019-01-01 RX ADMIN — PHENYLEPHRINE HYDROCHLORIDE 200 MCG: 10 INJECTION INTRAVENOUS at 01:10

## 2019-01-01 RX ADMIN — EPINEPHRINE 10 MCG/MIN: 1 INJECTION, SOLUTION, CONCENTRATE INTRAVENOUS at 02:36

## 2019-01-01 RX ADMIN — FINASTERIDE 5 MG: 5 TABLET, FILM COATED ORAL at 11:22

## 2019-01-01 RX ADMIN — DOBUTAMINE IN DEXTROSE 2.5 MCG/KG/MIN: 200 INJECTION, SOLUTION INTRAVENOUS at 04:04

## 2019-01-01 RX ADMIN — NOREPINEPHRINE BITARTRATE 10 MCG/MIN: 1 INJECTION INTRAVENOUS at 00:11

## 2019-01-01 RX ADMIN — Medication 100 MCG/HR: at 20:30

## 2019-01-01 RX ADMIN — LORAZEPAM 1 MG: 2 INJECTION INTRAMUSCULAR; INTRAVENOUS at 19:17

## 2019-01-01 RX ADMIN — HYDROMORPHONE HYDROCHLORIDE 0.5 MG: 1 INJECTION, SOLUTION INTRAMUSCULAR; INTRAVENOUS; SUBCUTANEOUS at 06:29

## 2019-01-01 RX ADMIN — HEPARIN SODIUM 5000 UNITS: 5000 INJECTION INTRAVENOUS; SUBCUTANEOUS at 06:51

## 2019-01-01 RX ADMIN — HEPARIN SODIUM 5000 UNITS: 5000 INJECTION INTRAVENOUS; SUBCUTANEOUS at 16:37

## 2019-01-01 RX ADMIN — FUROSEMIDE 40 MG: 10 INJECTION, SOLUTION INTRAMUSCULAR; INTRAVENOUS at 21:16

## 2019-01-01 RX ADMIN — LEVALBUTEROL HYDROCHLORIDE 1.25 MG: 1.25 SOLUTION, CONCENTRATE RESPIRATORY (INHALATION) at 13:37

## 2019-01-01 RX ADMIN — SODIUM CHLORIDE 75 ML/HR: 0.45 INJECTION, SOLUTION INTRAVENOUS at 10:00

## 2019-01-01 RX ADMIN — FUROSEMIDE 40 MG: 10 INJECTION, SOLUTION INTRAMUSCULAR; INTRAVENOUS at 09:46

## 2019-01-01 RX ADMIN — CHLORHEXIDINE GLUCONATE 0.12% ORAL RINSE 15 ML: 1.2 LIQUID ORAL at 08:56

## 2019-01-01 RX ADMIN — POTASSIUM CHLORIDE 20 MEQ: 200 INJECTION, SOLUTION INTRAVENOUS at 07:54

## 2019-01-01 RX ADMIN — PANTOPRAZOLE SODIUM 40 MG: 40 INJECTION, POWDER, FOR SOLUTION INTRAVENOUS at 08:20

## 2019-01-01 RX ADMIN — PANTOPRAZOLE SODIUM 40 MG: 40 INJECTION, POWDER, FOR SOLUTION INTRAVENOUS at 09:57

## 2019-01-01 RX ADMIN — Medication 100 MCG/HR: at 04:01

## 2019-01-01 RX ADMIN — ISODIUM CHLORIDE 3 ML: 0.03 SOLUTION RESPIRATORY (INHALATION) at 07:26

## 2019-01-01 RX ADMIN — AMPICILLIN 2000 MG: 2 INJECTION, POWDER, FOR SOLUTION INTRAVENOUS at 12:46

## 2019-01-01 RX ADMIN — PROPOFOL 20 MCG/KG/MIN: 10 INJECTION, EMULSION INTRAVENOUS at 03:57

## 2019-01-01 RX ADMIN — CHLORHEXIDINE GLUCONATE 0.12% ORAL RINSE 15 ML: 1.2 LIQUID ORAL at 20:22

## 2019-01-01 RX ADMIN — PANTOPRAZOLE SODIUM 40 MG: 40 TABLET, DELAYED RELEASE ORAL at 15:53

## 2019-01-01 RX ADMIN — PIPERACILLIN SODIUM AND TAZOBACTAM SODIUM 3.38 G: 36; 4.5 INJECTION, POWDER, FOR SOLUTION INTRAVENOUS at 19:29

## 2019-01-01 RX ADMIN — HEPARIN SODIUM 5000 UNITS: 5000 INJECTION INTRAVENOUS; SUBCUTANEOUS at 23:12

## 2019-01-01 RX ADMIN — EPINEPHRINE 15 MCG/MIN: 1 INJECTION, SOLUTION, CONCENTRATE INTRAVENOUS at 16:20

## 2019-01-01 RX ADMIN — PRAVASTATIN SODIUM 80 MG: 80 TABLET ORAL at 18:08

## 2019-01-01 RX ADMIN — POTASSIUM CHLORIDE 40 MEQ: 400 INJECTION, SOLUTION INTRAVENOUS at 06:20

## 2019-01-01 RX ADMIN — METRONIDAZOLE 500 MG: 500 INJECTION, SOLUTION INTRAVENOUS at 07:49

## 2019-01-01 RX ADMIN — HEPARIN SODIUM 5000 UNITS: 5000 INJECTION INTRAVENOUS; SUBCUTANEOUS at 06:25

## 2019-01-01 RX ADMIN — HALOPERIDOL LACTATE 2 MG: 5 INJECTION INTRAMUSCULAR at 15:48

## 2019-01-01 RX ADMIN — HEPARIN SODIUM 5000 UNITS: 5000 INJECTION INTRAVENOUS; SUBCUTANEOUS at 21:18

## 2019-01-01 RX ADMIN — POTASSIUM CHLORIDE 20 MEQ: 200 INJECTION, SOLUTION INTRAVENOUS at 16:34

## 2019-01-01 RX ADMIN — CARVEDILOL 12.5 MG: 12.5 TABLET, FILM COATED ORAL at 15:51

## 2019-01-01 RX ADMIN — ASCORBIC ACID 1500 MG: 500 INJECTION, SOLUTION INTRAMUSCULAR; INTRAVENOUS; SUBCUTANEOUS at 12:57

## 2019-01-01 RX ADMIN — Medication 20000 ML: at 14:57

## 2019-01-01 RX ADMIN — HEPARIN SODIUM 5000 UNITS: 5000 INJECTION INTRAVENOUS; SUBCUTANEOUS at 05:17

## 2019-01-01 RX ADMIN — OXYCODONE HYDROCHLORIDE 2.5 MG: 5 TABLET ORAL at 19:59

## 2019-01-01 RX ADMIN — MELATONIN 3 MG: 3 TAB ORAL at 21:00

## 2019-01-01 RX ADMIN — CARBIDOPA AND LEVODOPA 1.5 TABLET: 25; 100 TABLET ORAL at 11:24

## 2019-01-01 RX ADMIN — HYDROCORTISONE SODIUM SUCCINATE 50 MG: 100 INJECTION, POWDER, FOR SOLUTION INTRAMUSCULAR; INTRAVENOUS at 12:09

## 2019-01-01 RX ADMIN — CARBIDOPA AND LEVODOPA 1.5 TABLET: 25; 100 TABLET ORAL at 17:11

## 2019-01-01 RX ADMIN — HEPARIN SODIUM 5000 UNITS: 5000 INJECTION INTRAVENOUS; SUBCUTANEOUS at 21:10

## 2019-01-01 RX ADMIN — HEPARIN SODIUM 5000 UNITS: 5000 INJECTION INTRAVENOUS; SUBCUTANEOUS at 05:26

## 2019-01-01 RX ADMIN — HYDROCORTISONE SODIUM SUCCINATE 50 MG: 100 INJECTION, POWDER, FOR SOLUTION INTRAMUSCULAR; INTRAVENOUS at 17:46

## 2019-01-01 RX ADMIN — THIAMINE HYDROCHLORIDE 200 MG: 100 INJECTION, SOLUTION INTRAMUSCULAR; INTRAVENOUS at 02:30

## 2019-01-01 RX ADMIN — Medication 1 SPRAY: at 12:01

## 2019-01-01 RX ADMIN — SODIUM CHLORIDE 2 UNITS/HR: 9 INJECTION, SOLUTION INTRAVENOUS at 00:02

## 2019-01-01 RX ADMIN — SODIUM CHLORIDE, SODIUM GLUCONATE, SODIUM ACETATE, POTASSIUM CHLORIDE, MAGNESIUM CHLORIDE, SODIUM PHOSPHATE, DIBASIC, AND POTASSIUM PHOSPHATE 1000 ML: .53; .5; .37; .037; .03; .012; .00082 INJECTION, SOLUTION INTRAVENOUS at 02:37

## 2019-01-01 RX ADMIN — HYDROMORPHONE HYDROCHLORIDE 1 MG: 1 INJECTION, SOLUTION INTRAMUSCULAR; INTRAVENOUS; SUBCUTANEOUS at 10:16

## 2019-01-01 RX ADMIN — CARBIDOPA AND LEVODOPA 1.5 TABLET: 25; 100 TABLET ORAL at 11:18

## 2019-01-01 RX ADMIN — PHENYLEPHRINE HYDROCHLORIDE 200 MCG: 10 INJECTION INTRAVENOUS at 05:51

## 2019-01-01 RX ADMIN — PIPERACILLIN SODIUM AND TAZOBACTAM SODIUM 3.38 G: 36; 4.5 INJECTION, POWDER, FOR SOLUTION INTRAVENOUS at 15:54

## 2019-01-01 RX ADMIN — CARVEDILOL 12.5 MG: 12.5 TABLET, FILM COATED ORAL at 17:12

## 2019-01-01 RX ADMIN — SODIUM CHLORIDE, SODIUM GLUCONATE, SODIUM ACETATE, POTASSIUM CHLORIDE, MAGNESIUM CHLORIDE, SODIUM PHOSPHATE, DIBASIC, AND POTASSIUM PHOSPHATE 75 ML/HR: .53; .5; .37; .037; .03; .012; .00082 INJECTION, SOLUTION INTRAVENOUS at 23:25

## 2019-01-01 RX ADMIN — SCOPALAMINE 1 PATCH: 1 PATCH, EXTENDED RELEASE TRANSDERMAL at 17:54

## 2019-01-01 RX ADMIN — PIPERACILLIN SODIUM AND TAZOBACTAM SODIUM 3.38 G: 36; 4.5 INJECTION, POWDER, FOR SOLUTION INTRAVENOUS at 05:12

## 2019-01-01 RX ADMIN — PHENYLEPHRINE HYDROCHLORIDE 200 MCG: 10 INJECTION INTRAVENOUS at 01:04

## 2019-01-01 RX ADMIN — POTASSIUM CHLORIDE 20 MEQ: 200 INJECTION, SOLUTION INTRAVENOUS at 12:32

## 2019-01-01 RX ADMIN — MELATONIN 3 MG: 3 TAB ORAL at 22:36

## 2019-01-01 RX ADMIN — HEPARIN SODIUM 5000 UNITS: 5000 INJECTION INTRAVENOUS; SUBCUTANEOUS at 14:05

## 2019-01-01 RX ADMIN — BUPIVACAINE 20 ML: 13.3 INJECTION, SUSPENSION, LIPOSOMAL INFILTRATION at 07:35

## 2019-01-01 RX ADMIN — PROPOFOL 20 MCG/KG/MIN: 10 INJECTION, EMULSION INTRAVENOUS at 02:15

## 2019-01-01 RX ADMIN — SODIUM CHLORIDE, SODIUM GLUCONATE, SODIUM ACETATE, POTASSIUM CHLORIDE, MAGNESIUM CHLORIDE, SODIUM PHOSPHATE, DIBASIC, AND POTASSIUM PHOSPHATE 125 ML/HR: .53; .5; .37; .037; .03; .012; .00082 INJECTION, SOLUTION INTRAVENOUS at 08:56

## 2019-01-01 RX ADMIN — PANTOPRAZOLE SODIUM 40 MG: 40 INJECTION, POWDER, FOR SOLUTION INTRAVENOUS at 08:31

## 2019-01-01 RX ADMIN — SODIUM CHLORIDE 150 ML/HR: 0.9 INJECTION, SOLUTION INTRAVENOUS at 03:10

## 2019-01-01 RX ADMIN — SODIUM CHLORIDE, SODIUM GLUCONATE, SODIUM ACETATE, POTASSIUM CHLORIDE, MAGNESIUM CHLORIDE, SODIUM PHOSPHATE, DIBASIC, AND POTASSIUM PHOSPHATE 60 ML/HR: .53; .5; .37; .037; .03; .012; .00082 INJECTION, SOLUTION INTRAVENOUS at 05:15

## 2019-01-01 RX ADMIN — MAGNESIUM SULFATE HEPTAHYDRATE 2 G: 40 INJECTION, SOLUTION INTRAVENOUS at 09:00

## 2019-01-01 RX ADMIN — DEXMEDETOMIDINE 0.3 MCG/KG/HR: 100 INJECTION, SOLUTION, CONCENTRATE INTRAVENOUS at 04:37

## 2019-01-01 RX ADMIN — ASCORBIC ACID 1500 MG: 500 INJECTION, SOLUTION INTRAMUSCULAR; INTRAVENOUS; SUBCUTANEOUS at 20:00

## 2019-01-01 RX ADMIN — CARVEDILOL 12.5 MG: 12.5 TABLET, FILM COATED ORAL at 16:32

## 2019-01-01 RX ADMIN — ATORVASTATIN CALCIUM 40 MG: 40 TABLET, FILM COATED ORAL at 16:32

## 2019-01-01 RX ADMIN — HYDROMORPHONE HYDROCHLORIDE 1 MG: 1 INJECTION, SOLUTION INTRAMUSCULAR; INTRAVENOUS; SUBCUTANEOUS at 14:34

## 2019-01-01 RX ADMIN — OXYCODONE HYDROCHLORIDE 5 MG: 5 TABLET ORAL at 13:40

## 2019-01-01 RX ADMIN — HYDRALAZINE HYDROCHLORIDE 5 MG: 20 INJECTION INTRAMUSCULAR; INTRAVENOUS at 03:38

## 2019-01-01 RX ADMIN — HEPARIN SODIUM 5000 UNITS: 5000 INJECTION INTRAVENOUS; SUBCUTANEOUS at 14:16

## 2019-01-01 RX ADMIN — FINASTERIDE 5 MG: 5 TABLET, FILM COATED ORAL at 08:36

## 2019-01-01 RX ADMIN — POTASSIUM PHOSPHATE, MONOBASIC AND POTASSIUM PHOSPHATE, DIBASIC 21 MMOL: 224; 236 INJECTION, SOLUTION INTRAVENOUS at 11:38

## 2019-01-01 RX ADMIN — FINASTERIDE 5 MG: 5 TABLET, FILM COATED ORAL at 08:31

## 2019-01-01 RX ADMIN — CARBIDOPA AND LEVODOPA 1.5 TABLET: 25; 100 TABLET ORAL at 16:32

## 2019-01-01 RX ADMIN — SODIUM CHLORIDE, SODIUM GLUCONATE, SODIUM ACETATE, POTASSIUM CHLORIDE, MAGNESIUM CHLORIDE, SODIUM PHOSPHATE, DIBASIC, AND POTASSIUM PHOSPHATE 125 ML/HR: .53; .5; .37; .037; .03; .012; .00082 INJECTION, SOLUTION INTRAVENOUS at 00:00

## 2019-01-01 RX ADMIN — ASCORBIC ACID 1500 MG: 500 INJECTION, SOLUTION INTRAMUSCULAR; INTRAVENOUS; SUBCUTANEOUS at 00:22

## 2019-01-01 RX ADMIN — GLYCOPYRROLATE 0.8 MG: 0.2 INJECTION, SOLUTION INTRAMUSCULAR; INTRAVENOUS at 07:15

## 2019-01-01 RX ADMIN — MAGNESIUM SULFATE HEPTAHYDRATE 1 G: 1 INJECTION, SOLUTION INTRAVENOUS at 16:07

## 2019-01-01 RX ADMIN — MAGNESIUM SULFATE HEPTAHYDRATE 2 G: 40 INJECTION, SOLUTION INTRAVENOUS at 09:28

## 2019-01-01 RX ADMIN — LORAZEPAM 0.5 MG: 2 INJECTION INTRAMUSCULAR at 11:56

## 2019-01-01 RX ADMIN — SODIUM CHLORIDE, SODIUM GLUCONATE, SODIUM ACETATE, POTASSIUM CHLORIDE, MAGNESIUM CHLORIDE, SODIUM PHOSPHATE, DIBASIC, AND POTASSIUM PHOSPHATE 75 ML/HR: .53; .5; .37; .037; .03; .012; .00082 INJECTION, SOLUTION INTRAVENOUS at 17:09

## 2019-01-01 RX ADMIN — HALOPERIDOL LACTATE 2 MG: 5 INJECTION INTRAMUSCULAR at 06:04

## 2019-01-01 RX ADMIN — ASCORBIC ACID: 500 INJECTION, SOLUTION INTRAMUSCULAR; INTRAVENOUS; SUBCUTANEOUS at 20:42

## 2019-01-01 RX ADMIN — OXYCODONE HYDROCHLORIDE 5 MG: 5 TABLET ORAL at 09:08

## 2019-01-01 RX ADMIN — PROPOFOL 10 MCG/KG/MIN: 10 INJECTION, EMULSION INTRAVENOUS at 21:08

## 2019-01-01 RX ADMIN — DEXMEDETOMIDINE 0.3 MCG/KG/HR: 100 INJECTION, SOLUTION, CONCENTRATE INTRAVENOUS at 15:34

## 2019-01-01 RX ADMIN — POTASSIUM CHLORIDE 20 MEQ: 200 INJECTION, SOLUTION INTRAVENOUS at 15:21

## 2019-01-01 RX ADMIN — Medication 20000 ML: at 06:40

## 2019-01-01 RX ADMIN — Medication 20000 ML: at 00:00

## 2019-01-01 RX ADMIN — HEPARIN SODIUM 5000 UNITS: 5000 INJECTION INTRAVENOUS; SUBCUTANEOUS at 21:29

## 2019-01-01 RX ADMIN — SODIUM CHLORIDE, SODIUM GLUCONATE, SODIUM ACETATE, POTASSIUM CHLORIDE, MAGNESIUM CHLORIDE, SODIUM PHOSPHATE, DIBASIC, AND POTASSIUM PHOSPHATE 1000 ML: .53; .5; .37; .037; .03; .012; .00082 INJECTION, SOLUTION INTRAVENOUS at 05:48

## 2019-01-01 RX ADMIN — NOREPINEPHRINE BITARTRATE 15 MCG/MIN: 1 INJECTION INTRAVENOUS at 10:14

## 2019-01-01 RX ADMIN — Medication 50 MEQ: at 06:49

## 2019-01-01 RX ADMIN — POTASSIUM CHLORIDE 20 MEQ: 200 INJECTION, SOLUTION INTRAVENOUS at 03:41

## 2019-01-01 RX ADMIN — CALCIUM GLUCONATE 2 G: 98 INJECTION, SOLUTION INTRAVENOUS at 03:31

## 2019-01-01 RX ADMIN — NOREPINEPHRINE BITARTRATE 35 MCG/MIN: 1 INJECTION INTRAVENOUS at 04:09

## 2019-01-01 RX ADMIN — CARVEDILOL 12.5 MG: 12.5 TABLET, FILM COATED ORAL at 08:54

## 2019-01-01 RX ADMIN — PIPERACILLIN SODIUM AND TAZOBACTAM SODIUM 3.38 G: 36; 4.5 INJECTION, POWDER, FOR SOLUTION INTRAVENOUS at 00:14

## 2019-01-01 RX ADMIN — Medication 1 SPRAY: at 10:04

## 2019-01-01 RX ADMIN — CEFAZOLIN 2000 MG: 1 INJECTION, POWDER, FOR SOLUTION INTRAVENOUS at 01:07

## 2019-01-01 RX ADMIN — PIPERACILLIN SODIUM AND TAZOBACTAM SODIUM 3.38 G: 36; 4.5 INJECTION, POWDER, FOR SOLUTION INTRAVENOUS at 22:14

## 2019-01-01 RX ADMIN — ASCORBIC ACID 1500 MG: 500 INJECTION, SOLUTION INTRAMUSCULAR; INTRAVENOUS; SUBCUTANEOUS at 18:45

## 2019-01-01 RX ADMIN — CARBIDOPA AND LEVODOPA 1.5 TABLET: 25; 100 TABLET ORAL at 08:29

## 2019-01-01 RX ADMIN — HYDROCORTISONE SODIUM SUCCINATE 50 MG: 100 INJECTION, POWDER, FOR SOLUTION INTRAMUSCULAR; INTRAVENOUS at 06:27

## 2019-01-01 RX ADMIN — AMPICILLIN 2000 MG: 2 INJECTION, POWDER, FOR SOLUTION INTRAVENOUS at 13:09

## 2019-01-01 RX ADMIN — PIPERACILLIN SODIUM AND TAZOBACTAM SODIUM 3.38 G: 36; 4.5 INJECTION, POWDER, FOR SOLUTION INTRAVENOUS at 23:08

## 2019-01-01 RX ADMIN — HEPARIN SODIUM 7500 UNITS: 5000 INJECTION INTRAVENOUS; SUBCUTANEOUS at 06:19

## 2019-01-01 RX ADMIN — VASOPRESSIN 0.04 UNITS/MIN: 20 INJECTION INTRAVENOUS at 23:59

## 2019-01-01 RX ADMIN — ATORVASTATIN CALCIUM 40 MG: 40 TABLET, FILM COATED ORAL at 17:59

## 2019-01-01 RX ADMIN — PANTOPRAZOLE SODIUM 40 MG: 40 INJECTION, POWDER, FOR SOLUTION INTRAVENOUS at 08:53

## 2019-01-01 RX ADMIN — HYDROMORPHONE HYDROCHLORIDE 1 MG: 1 INJECTION, SOLUTION INTRAMUSCULAR; INTRAVENOUS; SUBCUTANEOUS at 02:39

## 2019-01-01 RX ADMIN — FUROSEMIDE 40 MG: 10 INJECTION, SOLUTION INTRAMUSCULAR; INTRAVENOUS at 17:38

## 2019-01-01 RX ADMIN — Medication 100 MCG/HR: at 12:46

## 2019-01-01 RX ADMIN — Medication 50 MCG/HR: at 17:37

## 2019-01-01 RX ADMIN — PANTOPRAZOLE SODIUM 40 MG: 40 TABLET, DELAYED RELEASE ORAL at 15:51

## 2019-01-01 RX ADMIN — Medication 1 SPRAY: at 18:36

## 2019-01-01 RX ADMIN — SODIUM CHLORIDE 500 ML: 0.9 INJECTION, SOLUTION INTRAVENOUS at 23:59

## 2019-01-01 RX ADMIN — LORAZEPAM 2 MG: 2 INJECTION INTRAMUSCULAR; INTRAVENOUS at 21:57

## 2019-01-01 RX ADMIN — CALCIUM GLUCONATE 2 G: 98 INJECTION, SOLUTION INTRAVENOUS at 06:40

## 2019-01-01 RX ADMIN — HEPARIN SODIUM 7500 UNITS: 5000 INJECTION INTRAVENOUS; SUBCUTANEOUS at 15:15

## 2019-01-01 RX ADMIN — ATORVASTATIN CALCIUM 40 MG: 40 TABLET, FILM COATED ORAL at 15:51

## 2019-01-01 RX ADMIN — LORAZEPAM 1 MG: 2 INJECTION INTRAMUSCULAR; INTRAVENOUS at 20:20

## 2019-01-01 RX ADMIN — EPINEPHRINE 11 MCG/MIN: 1 INJECTION, SOLUTION, CONCENTRATE INTRAVENOUS at 20:42

## 2019-01-01 RX ADMIN — CALCIUM GLUCONATE 3 G: 98 INJECTION, SOLUTION INTRAVENOUS at 18:11

## 2019-01-01 RX ADMIN — FINASTERIDE 5 MG: 5 TABLET, FILM COATED ORAL at 08:47

## 2019-01-01 RX ADMIN — VASOPRESSIN 0.04 UNITS/MIN: 20 INJECTION INTRAVENOUS at 00:41

## 2019-01-01 RX ADMIN — VASOPRESSIN 0.04 UNITS/MIN: 20 INJECTION INTRAVENOUS at 05:51

## 2019-01-01 RX ADMIN — LORAZEPAM 1 MG: 2 INJECTION INTRAMUSCULAR; INTRAVENOUS at 19:53

## 2019-01-01 RX ADMIN — HYDROMORPHONE HYDROCHLORIDE 1 MG: 1 INJECTION, SOLUTION INTRAMUSCULAR; INTRAVENOUS; SUBCUTANEOUS at 02:57

## 2019-01-01 RX ADMIN — AMPICILLIN 2000 MG: 2 INJECTION, POWDER, FOR SOLUTION INTRAVENOUS at 04:43

## 2019-01-01 RX ADMIN — PANTOPRAZOLE SODIUM 40 MG: 40 INJECTION, POWDER, FOR SOLUTION INTRAVENOUS at 09:22

## 2019-01-01 RX ADMIN — ROCURONIUM BROMIDE 50 MG: 10 INJECTION INTRAVENOUS at 10:57

## 2019-01-01 RX ADMIN — FINASTERIDE 5 MG: 5 TABLET, FILM COATED ORAL at 08:09

## 2019-01-01 RX ADMIN — PANTOPRAZOLE SODIUM 40 MG: 40 TABLET, DELAYED RELEASE ORAL at 08:04

## 2019-01-01 RX ADMIN — MAGNESIUM SULFATE HEPTAHYDRATE 2 G: 40 INJECTION, SOLUTION INTRAVENOUS at 08:50

## 2019-01-01 RX ADMIN — PANTOPRAZOLE SODIUM 40 MG: 40 TABLET, DELAYED RELEASE ORAL at 06:19

## 2019-01-01 RX ADMIN — CARBIDOPA AND LEVODOPA 1.5 TABLET: 25; 100 TABLET ORAL at 08:19

## 2019-01-01 RX ADMIN — CARBIDOPA AND LEVODOPA 1.5 TABLET: 25; 100 TABLET ORAL at 15:51

## 2019-01-23 NOTE — PROGRESS NOTES
Assessment/Plan:  Ears irrigated to clear with warm water and ear curette bilaterally  Return to the office as scheduled  Diagnoses and all orders for this visit:    Impacted cerumen of both ears  -     Ear cerumen removal          Subjective:      Patient ID: Fidel French is a 77 y o  male  Patient complains of left ear feeling blocked and decreased hearing for the past 1-2 weeks  He denies any ear pain  He has treated this with Debrox drops with some relief  Ear Fullness    There is pain in the left ear  This is a new problem  The current episode started 1 to 4 weeks ago  The problem has been gradually improving  There has been no fever  The patient is experiencing no pain  Associated symptoms include hearing loss  Pertinent negatives include no coughing, ear discharge, headaches, neck pain, rhinorrhea or sore throat  He has tried ear drops for the symptoms  The treatment provided mild relief  The following portions of the patient's history were reviewed and updated as appropriate: allergies, current medications, past family history, past medical history, past social history, past surgical history and problem list     Review of Systems   HENT: Positive for hearing loss  Negative for ear discharge, rhinorrhea and sore throat  Respiratory: Negative for cough  Musculoskeletal: Negative for neck pain  Neurological: Negative for headaches  Objective: There were no vitals taken for this visit  Physical Exam   Constitutional: He is oriented to person, place, and time  He appears well-developed and well-nourished  No distress  HENT:   Head: Normocephalic  Nose: Nose normal    Mouth/Throat: Oropharynx is clear and moist    External auditory canals occluded with cerumen bilaterally  Eyes: Conjunctivae are normal  No scleral icterus  Neck: Neck supple  Cardiovascular: Normal rate and regular rhythm      Pulmonary/Chest: Effort normal and breath sounds normal  Abdominal: Soft  There is no tenderness  Musculoskeletal: He exhibits no edema  Lymphadenopathy:     He has no cervical adenopathy  Neurological: He is alert and oriented to person, place, and time  Skin: Skin is warm and dry  Psychiatric: He has a normal mood and affect

## 2019-01-23 NOTE — PROGRESS NOTES
Ear cerumen removal  Date/Time: 1/23/2019 4:12 PM  Performed by: Cholo Quintanilla by: Melida Lara     Patient location:  Clinic  Consent:     Consent obtained:  Verbal    Consent given by:  Patient    Risks discussed:  Dizziness, incomplete removal and pain    Alternatives discussed:  Alternative treatment  Procedure details:     Local anesthetic:  None    Location:  R ear and L ear    Procedure type: curette      Equipment used:  Irrigated with warm water  Post-procedure details:     Complication:  None    Hearing quality:  Improved    Patient tolerance of procedure:   Tolerated well, no immediate complications

## 2019-02-26 NOTE — PROGRESS NOTES
[address] [facility]  [facility]  [address] [facility]  [place], [address] [facility]  [phone]  www  [facility]  org      NAME: Danielle Oleary  AGE: 77 y o  SEX: male  : 1940   MRN: [id number]    DATE: 2019  TIME: 1:32 PM    Assessment and Plan:    Neurogenic bladder with urinary retention hostile noncompliant bladder on straight catheterization  He is stable from my perspective and has had no recent infections  Continue with catheterization as he is doing  History of elevated PSA, normalized  Follow-up 1 year with PSA  Chief Complaint   No chief complaint on file  History of Present Illness   Follow-up neurogenic bladder with urinary retention and hostile noncompliant bladder on straight catheterization secondary to APR in   Has history of elevated PSA status post  transperineal biopsy which was negative  PSA is 2 0 as of yesterday  Urinalysis today shows small leukocytes and small nitrates  Creatinine was 1 47 as of 2018, and this is basically stable for the past 3 years  He continues with finasteride  He has been diagnosed with Parkinson's disease in the meantime is in on Sinemet  I personally reviewed his renal ultrasound images and I see no hydronephrosis or other abnormalities  We will await the report    The following portions of the patient's history were reviewed and updated as appropriate: allergies, current medications, past family history, past medical history, past social history, past surgical history and problem list     Review of Systems   Review of Systems    Active Problem List     Patient Active Problem List   Diagnosis    Allergic rhinitis    Cancer, colon ([facility] [address] )    DM type 2 (diabetes mellitus, type 2) ([facility] [facility] )    Elevated prostate specific antigen (PSA)    Enlarged prostate without lower urinary tract symptoms (luts)    Benign essential hypertension    GERD without esophagitis    Hyperlipidemia    Displacement of lumbar intervertebral disc without myelopathy    Lumbar radiculopathy    Neurogenic bladder disorder    Right knee pain    Vitamin D deficiency    Primary Parkinsonism ([address] )    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    Spinal stenosis of lumbar region    Status post lumbar discectomy       Objective   /88 (BP Location: Left arm, Patient Position: Sitting, Cuff Size: Adult)   Pulse 93   Ht 5' 5" (1 651 m)   Wt 95 3 kg (210 lb)   BMI 34 95 kg/m²     Physical Exam   Constitutional: He is oriented to person, place, and time  He appears well-developed and well-nourished  HENT:   Head: Normocephalic and atraumatic  Eyes: EOM are normal    Neck: Normal range of motion  Pulmonary/Chest: Effort normal    Musculoskeletal: Normal range of motion  Neurological: He is alert and oriented to person, place, and time  Skin: Skin is warm and dry  Psychiatric: He has a normal mood and affect   His behavior is normal  Judgment and thought content normal            Current Medications     Current Outpatient Medications:     carbidopa-levodopa (SINEMET)  mg per tablet, Take 1 5 tablets by mouth 3 (three) times a day, Disp: , Rfl: 3    carvedilol (COREG CR) 40 MG 24 hr capsule, Take 1 capsule (40 mg total) by mouth daily, Disp: 90 capsule, Rfl: 3    Cranberry 200 MG CAPS, Take by mouth, Disp: , Rfl:     finasteride (PROSCAR) 5 mg tablet, Take 1 tablet (5 mg total) by mouth daily, Disp: 90 tablet, Rfl: 3    glucose blood (WALI CONTOUR TEST) test strip, 1 each by Other route daily, Disp: 100 each, Rfl: 3    hydrochlorothiazide (HYDRODIURIL) 25 mg tablet, Take 1 tablet (25 mg total) by mouth daily, Disp: 90 tablet, Rfl: 3    lisinopril (ZESTRIL) 20 mg tablet, Take 1 tablet (20 mg total) by mouth daily, Disp: 90 tablet, Rfl: 3    Multiple Vitamins-Minerals (CENTRUM SILVER) tablet, Take by mouth, Disp: , Rfl:     omeprazole (PriLOSEC) 20 mg delayed release capsule, Take 1 capsule (20 mg total) by mouth daily, Disp: 90 capsule, Rfl: 3    rosuvastatin (CRESTOR) 20 MG tablet, Take 1 tablet (20 mg total) by mouth daily, Disp: 90 tablet, Rfl: 3    sitaGLIPtin-metFORMIN (JANUMET)  MG per tablet, Take 1 tablet by mouth daily, Disp: 90 tablet, Rfl: 3        Dima Perez MD

## 2019-03-14 NOTE — PROGRESS NOTES
Assessment and Plan:    Problem List Items Addressed This Visit     None        Health Maintenance Due   Topic Date Due    Medicare Annual Wellness Visit (AWV)  1940    BMI: Followup Plan  11/01/1957    HEPATITIS B VACCINES (1 of 3 - Risk 3-dose series) 11/01/1958    DTaP,Tdap,and Td Vaccines (1 - Tdap) 11/01/1960    Fall Risk  11/01/2004    Pneumococcal PPSV23/PCV13 65+ Years / High and Highest Risk (2 of 2 - PPSV23) 04/14/2015    HEMOGLOBIN A1C  01/12/2019         HPI:  Shelly Do is a 77 y o  male here for his Subsequent Wellness Visit      Patient Active Problem List   Diagnosis    Allergic rhinitis    Cancer, colon ([facility] )    DM type 2 (diabetes mellitus, type 2) (HCC)    Elevated prostate specific antigen (PSA)    Enlarged prostate without lower urinary tract symptoms (luts)    Benign essential hypertension    GERD without esophagitis    Hyperlipidemia    Displacement of lumbar intervertebral disc without myelopathy    Lumbar radiculopathy    Neurogenic bladder disorder    Right knee pain    Vitamin D deficiency    Primary Parkinsonism ([address] )    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    Spinal stenosis of lumbar region    Status post lumbar discectomy     Past Medical History:   Diagnosis Date    Benign prostatic hyperplasia with urinary obstruction and other lower urinary tract symptoms     Colon cancer ([address] ) 1998    Elevated PSA     last assessed 3/11/2014    Hypercholesterolemia     Hypertension     Malignant neoplasm of skin     Microhematuria     Neurogenic bladder     last assessed 6/13/2014    Type 2 diabetes mellitus ([address] )     Urinary incontinence      Past Surgical History:   Procedure Laterality Date    APPENDECTOMY      11 y/o    COLOSTOMY      PROSTATE BIOPSY      ROTATOR CUFF REPAIR      resolved 1999    SKIN BIOPSY      TONSILLECTOMY      7 y/o    VASECTOMY       Family History   Problem Relation Age of Onset    Kidney failure Mother         renal failure    Heart attack Father         MI    Diabetes Father         DM    Hyperlipidemia Father         hypercholesterolemia    Other Sister         mesothelioma    Coronary artery disease Brother         CABG    Cancer Brother         urinary bladder    Prostate cancer Brother      Social History     Tobacco Use   Smoking Status Former Smoker    Last attempt to quit: Ceci Roth 38 Years since quittin 2   Smokeless Tobacco Never Used   Tobacco Comment    per [employer] 'former smoker- negative tobacco x 25 yrs, prev 1 ppd x 25 rs'     Social History     Substance and Sexual Activity   Alcohol Use Yes    Comment: SOCIAL      Social History     Substance and Sexual Activity   Drug Use No       Current Outpatient Medications   Medication Sig Dispense Refill    carbidopa-levodopa (SINEMET)  mg per tablet Take 1 5 tablets by mouth 3 (three) times a day  3    carvedilol (COREG CR) 40 MG 24 hr capsule Take 1 capsule (40 mg total) by mouth daily 90 capsule 3    Cranberry 200 MG CAPS Take by mouth      finasteride (PROSCAR) 5 mg tablet Take 1 tablet (5 mg total) by mouth daily 90 tablet 3    glucose blood (WALI CONTOUR TEST) test strip 1 each by Other route daily 100 each 3    hydrochlorothiazide (HYDRODIURIL) 25 mg tablet Take 1 tablet (25 mg total) by mouth daily 90 tablet 3    lisinopril (ZESTRIL) 20 mg tablet Take 1 tablet (20 mg total) by mouth daily 90 tablet 3    Multiple Vitamins-Minerals (CENTRUM SILVER) tablet Take by mouth      omeprazole (PriLOSEC) 20 mg delayed release capsule Take 1 capsule (20 mg total) by mouth daily 90 capsule 3    rosuvastatin (CRESTOR) 20 MG tablet Take 1 tablet (20 mg total) by mouth daily 90 tablet 3    sitaGLIPtin-metFORMIN (JANUMET)  MG per tablet Take 1 tablet by mouth daily 90 tablet 3     No current facility-administered medications for this visit        Allergies   Allergen Reactions    Atorvastatin Myalgia     Immunization History   Administered Date(s) Administered    INFLUENZA 10/10/2007, 2016, 2017    Influenza TIV (IM) 2010, 2016, 2017    Influenza, high dose seasonal 0 5 mL 10/12/2018    Pneumococcal Conjugate 13-Valent 2015       Patient Care Team:  Trudi Reveles DO as PCP - General    Medicare Screening Tests and Risk Assessments:  Codie Hays is here for his Subsequent Wellness visit  Health Risk Assessment:  Patient rates overall health as fair  Patient feels that their physical health rating is Slightly worse  Eyesight was rated as Slightly worse  Hearing was rated as Same  Patient feels that their emotional and mental health rating is Same  Pain experienced by patient in the last 7 days has been Some  Patient's pain rating has been 2/10  Patient states that he has experienced no weight loss or gain in last 6 months  Emotional/Mental Health:  Patient has been feeling nervous/anxious  PHQ-9 Depression Screening:    Frequency of the following problems over the past two weeks:      1  Little interest or pleasure in doing things: 1 - several days      2  Feeling down, depressed, or hopeless: 1 - several days      3  Trouble falling or staying asleep, or sleeping too much: 1 - several days      4  Feeling tired or having little energy: 1 - several days      5  Poor appetite or overeatin - several days      6  Feeling bad about yourself - or that you are a failure or have let yourself or your family down: 1 - several days      7  Trouble concentrating on things, such as reading the newspaper or watching television: 1 - several days      8  Moving or speaking so slowly that other people could have noticed  Or the opposite - being so fidgety or restless that you have been moving around a lot more than usual: 0 - not at all      9   Thoughts that you would be better off dead, or of hurting yourself in some way: 1 - several days  PHQ-2 Score: 2  PHQ-9 Score: 8    Broken Bones/Falls: Fall Risk Assessment:    In the past year, patient has experienced: No history of falling in past year          Bladder/Bowel:  Patient has not leaked urine accidently in the last six months  Patient reports no loss of bowel control  Immunizations:  Patient has had a flu vaccination within the last year  Patient has received a pneumonia shot  Patient has received a shingles shot  (Additional Comments: Not sure about the shingles or tetanus)    Home Safety:  Patient does not have trouble with stairs inside or outside of their home  Patient currently reports that there are safety hazards present in home , working smoke alarms, no working carbon monoxide detectors  Preventative Screenings:   prostate cancer screen performed, colon cancer screen completed, cholesterol screen completed, glaucoma eye exam completed, (Additional Comments: Pt not sure about the prostate test)    Nutrition:  Current diet: Regular and Diabetic with servings of the following:    Medications:  Patient is currently taking over-the-counter supplements  List of OTC medications includes: cranberry, multivitamin  Patient is able to manage medications  Lifestyle Choices:  Patient reports no tobacco use  Patient has smoked or used tobacco in the past   Patient has stopped his tobacco use  Tobacco use quit date: 35 yrs ago  Patient reports alcohol use  Alcohol use per week: varies, sometimes none  Patient drives a vehicle  Patient does not wear seat belt  Current level of exercise of physical activity described by patient as: Goes to fitness program 3 x wk          Activities of Daily Living:  Can get out of bed by his or her self, able to dress self, able to make own meals, able to do own shopping, able to bathe self, can do own laundry/housekeeping, can manage own money, pay bills and track expenses    Previous Hospitalizations:  No hospitalization or ED visit in past 12 months        Advanced Directives:  Patient has decided on a power of   Patient has spoken to designated power of   Patient has completed advanced directive  Preventative Screening/Counseling:      Cardiovascular:      General: Risks and Benefits Discussed and Screening Current      Counseling: Healthy Diet, Healthy Weight, Improve Cholesterol, Improve Blood Pressure and Improve Exercise Tolerance          Diabetes:      General: Risks and Benefits Discussed and Screening Current      Counseling: Healthy Diet, Healthy Weight and Improve Physical Activity      Due for labs: Blood Glucose          Colorectal Cancer:      General: Risks and Benefits Discussed and Screening Current      Counseling: high fiber diet          Prostate Cancer:      General: Risks and Benefits Discussed and Screening Current          Osteoporosis:      General: Risks and Benefits Discussed      Counseling: Calcium and Vitamin D Intake and Regular Weightbearing Exercise          AAA:  Male patient with age over [de-identified] years  Patient has history of tobacco use  General: Risks and Benefits Discussed          Glaucoma:      General: Risks and Benefits Discussed and Screening Current          HIV:      General: Risks and Benefits Discussed          Hepatitis C:      General: Risks and Benefits Discussed        Advanced Directives:   Patient has living will for healthcare, has durable POA for healthcare, patient has an advanced directive       Immunizations:      Influenza: Risks & Benefits Discussed, Influenza UTD This Year and Influenza Recommended Annually      Pneumococcal: Risks & Benefits Discussed and Lifetime Vaccine Completed      Shingrix: Risks & Benefits Discussed      Hepatitis B (Low risk patients): Series Not Indicated      Hepatitis B (Medium to high risk patients): Risks & Benefits Discussed      Zostavax: Zostavax Vaccine UTD      TD: Risks & Benefits Discussed      TDAP: Risks & Benefits Discussed

## 2019-03-14 NOTE — PROGRESS NOTES
Assessment/Plan:  Fasting labs drawn for CMP, lipid profile and hemoglobin A1c  Patient to continue present treatment  Patient instructed to follow a low-fat, low-salt and a low sugar/carbohydrate diet and continue regular exercise program   Continue home glucose monitoring  Follow up with specialists as scheduled and return to the office in 4-6 months for appointment and fasting labs  Diagnoses and all orders for this visit:    Type 2 diabetes mellitus without complication, without long-term current use of insulin (HCC)  -     Hemoglobin A1C With EAG  -     Comprehensive metabolic panel    Benign essential hypertension  -     Comprehensive metabolic panel    Hyperlipidemia, unspecified hyperlipidemia type  -     Comprehensive metabolic panel  -     Lipid panel    GERD without esophagitis    Primary Parkinsonism ([facility] [address] )    Vitamin D deficiency    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    Medicare annual wellness visit, subsequent    Essential hypertension  Comments:  Blood pressure uncontrolled  Patient to increase lisinopril to 20 mg daily and resume HCTZ 25 mg daily  Low-salt diet  Monitor blood pressure at home  Orders:  -     hydrochlorothiazide (HYDRODIURIL) 25 mg tablet; Take 1 tablet (25 mg total) by mouth daily  -     carvedilol (COREG CR) 40 MG 24 hr capsule; Take 1 capsule (40 mg total) by mouth daily    Essential hypertension  -     hydrochlorothiazide (HYDRODIURIL) 25 mg tablet; Take 1 tablet (25 mg total) by mouth daily  -     carvedilol (COREG CR) 40 MG 24 hr capsule; Take 1 capsule (40 mg total) by mouth daily          Subjective:      Patient ID: Destiny Jones is a 77 y o  male  Patient is here for routine appointment for chronic conditions and fasting labs  Also annual Medicare wellness exam   Patient has been feeling fairly well overall has been exercising 3 times a week at [facility] for about 1 hour    Home glucose monitoring reveals fasting blood sugars 120-170  Patient is up-to-date on diabetic eye exam and foot exam   He follows with colorectal surgeon and urologist as well as Neurology  Patient ambulates with the use of a cane  Diabetes   He presents for his follow-up diabetic visit  He has type 2 diabetes mellitus  His disease course has been stable  Pertinent negatives for hypoglycemia include no headaches  Pertinent negatives for diabetes include no blurred vision, no chest pain, no fatigue, no foot paresthesias, no foot ulcerations, no polydipsia, no polyuria, no visual change, no weakness and no weight loss  There are no hypoglycemic complications  Symptoms are stable  Pertinent negatives for diabetic complications include no CVA or heart disease  Risk factors for coronary artery disease include dyslipidemia, diabetes mellitus, family history, male sex, tobacco exposure and obesity  Current diabetic treatment includes oral agent (dual therapy)  He is compliant with treatment all of the time  His weight is stable  He is following a generally healthy diet  He participates in exercise three times a week  There is no change in his home blood glucose trend  His breakfast blood glucose is taken between 8-9 am  His breakfast blood glucose range is generally 130-140 mg/dl  An ACE inhibitor/angiotensin II receptor blocker is being taken  He sees a podiatrist Eye exam is current  Hypertension   This is a chronic problem  The problem is unchanged  Associated symptoms include anxiety  Pertinent negatives include no blurred vision, chest pain, headaches, orthopnea, palpitations, peripheral edema, PND or shortness of breath  Past treatments include ACE inhibitors, diuretics and beta blockers  The current treatment provides moderate improvement  There are no compliance problems  There is no history of CAD/MI or CVA  Hyperlipidemia   This is a chronic problem  The problem is controlled   Recent lipid tests were reviewed and are normal  Exacerbating diseases include diabetes and obesity  He has no history of hypothyroidism  Pertinent negatives include no chest pain, focal sensory loss, focal weakness, leg pain, myalgias or shortness of breath  Current antihyperlipidemic treatment includes statins  The current treatment provides significant improvement of lipids  There are no compliance problems  The following portions of the patient's history were reviewed and updated as appropriate: allergies, current medications, past family history, past medical history, past social history, past surgical history and problem list     Review of Systems   Constitutional: Negative for fatigue and weight loss  Eyes: Negative for blurred vision  Respiratory: Negative for shortness of breath  Cardiovascular: Negative for chest pain, palpitations, orthopnea and PND  Endocrine: Negative for polydipsia and polyuria  Musculoskeletal: Negative for myalgias  Neurological: Negative for focal weakness, weakness and headaches  Objective:      /84   Pulse 76   Temp 97 8 °F (36 6 °C) (Tympanic)   Resp 16   Ht 5' 5 5" (1 664 m)   Wt 95 7 kg (211 lb)   BMI 34 58 kg/m²          Physical Exam   Constitutional: He is oriented to person, place, and time  He appears well-developed and well-nourished  No distress  HENT:   Head: Normocephalic  Right Ear: External ear normal    Left Ear: External ear normal    Nose: Nose normal    Mouth/Throat: Oropharynx is clear and moist    Eyes: Conjunctivae are normal  No scleral icterus  Neck: Neck supple  No thyromegaly present  Cardiovascular: Normal rate and regular rhythm  Pulmonary/Chest: Effort normal and breath sounds normal    Abdominal: Soft  There is no tenderness  Musculoskeletal: He exhibits no edema  Lymphadenopathy:     He has no cervical adenopathy  Neurological: He is alert and oriented to person, place, and time  Skin: Skin is warm and dry     Psychiatric: He has a normal mood and affect

## 2019-07-10 NOTE — TELEPHONE ENCOUNTER
Patient of Dr Mellissa David is calling to say the [facility] medical supply maybe calling to verify that he is on self catherization due to neurogenic bladder  He is giving permission to verify this fact

## 2019-07-15 PROBLEM — N17.9 ACUTE KIDNEY INJURY (HCC): Status: ACTIVE | Noted: 2019-01-01

## 2019-07-15 PROBLEM — K43.5 PARASTOMAL HERNIA: Status: ACTIVE | Noted: 2019-01-01

## 2019-07-15 PROBLEM — C18.9 MALIGNANT NEOPLASM OF COLON (HCC): Status: ACTIVE | Noted: 2019-01-01

## 2019-07-15 NOTE — CONSULTS
Progress Note- Ostomy  Vitaliy Manley 77 y o  male  [id number]  [facility] [address] [address]        Assessment:  POD # 0 - Patient seen this morning for ostomy care and teaching needs  He is in bed with no complaint, states feeling somewhat looping but is able to answer questions  Stoma is to his LUQ, non budded, red and measuring 2 1/8- 21/4 in, irregularly wound with intact peristomal skin  Stoma does not appears revised since incision is not present  Patient agreeable with receiving sample pouch tomorrow, he has his own supplies, cut to fit pouching system however his cut to fit barriers are prepared for stoma measuring 1 3/4 in round  Patient is very well versed in ostomy care, has been managing pouch without any leakage issues despite loose stooling according to patient  Plan: We will see patient again tomorrow to show sample 1 piece pouching system meant for ostomates with parastomal hernia  Subjective:  "I had a colostomy for 20 years"    Objective:  Patient demonstrating good understanding of ostomy care, reports using two piece pouching system with no leakage despite having a non budded stoma  Vitals:    07/15/19 1236   BP: 100/58   Pulse: 90   Resp: 20   Temp: 97 6 °F (36 4 °C)   SpO2: 92%       Wound 07/15/19 Incision Abdomen N/A (Active)   Wound Description Clean; Intact 7/15/2019 10:23 AM   Mariah-wound Assessment Clean; Intact;Dry 7/15/2019 10:23 AM   Closure Staples 7/15/2019 10:23 AM   Drainage Amount Small 7/15/2019 10:23 AM   Drainage Description Bloody 7/15/2019 10:23 AM   Dressing Gauze 7/15/2019 10:23 AM   Dressing Changed Changed by provider 7/15/2019 10:23 AM   Dressing Status Clean;Dry; Intact 7/15/2019  8:45 AM   Number of days: 0     NG/OG/Enteral Tube Nasogastric 16 Fr Left nares (Active)   Placement Reverification Auscultation 7/15/2019 10:01 AM   Site Assessment Clean;Dry; Intact 7/15/2019 10:01 AM   Status Suction-low continuous 7/15/2019  8:45 AM   Drainage Appearance Dana Oats 7/15/2019 8:45 AM   Intake (mL) 50 mL 7/15/2019 10:01 AM   Output (mL) 250 mL 7/15/2019  8:45 AM   Number of days: 0       Colostomy LLQ (Active)   Stomal Appliance 1 piece 7/15/2019 10:41 AM   Stoma Assessment Flushed well;Red 7/15/2019 10:41 AM   Stoma size (in) 2 25 in 7/15/2019 10:41 AM   Stoma Shape Oval;Round 7/15/2019 10:41 AM   Peristomal Assessment DOMINICK 7/15/2019 10:41 AM   Treatment Site care 7/15/2019 10:41 AM   Output (mL) 0 mL 7/15/2019 10:41 AM   Number of days: 0       We will continue following patient as needed for care and support, patient to verbalize nursing staff if needing assisstacne  Please call ext [phone] or [phone] with questions or concerns          Ashley Low, RN, BSN, [facility]

## 2019-07-15 NOTE — EMTALA/ACUTE CARE TRANSFER
[facility] [address] [facility] [address]  Dept: [phone]      EMTALA TRANSFER CONSENT    NAME Gemma Lawson                                         1940                              MRN [id number]    I have been informed of my rights regarding examination, treatment, and transfer   by Dr Javier Richardson DO    Benefits: Specialized equipment and/or services available at the receiving facility (Include comment)________________________    Risks: Potential for delay in receiving treatment, Potential deterioration of medical condition, Increased discomfort during transfer, Possible worsening of condition or death during transfer      Consent for Transfer:  I acknowledge that my medical condition has been evaluated and explained to me by the emergency department physician or other qualified medical person and/or my attending physician, who has recommended that I be transferred to the service of  Accepting Physician: Dr Angie Neumann at [address] [place] [address] Rd Name, [facility] : [facility]  The above potential benefits of such transfer, the potential risks associated with such transfer, and the probable risks of not being transferred have been explained to me, and I fully understand them  The doctor has explained that, in my case, the benefits of transfer outweigh the risks  I agree to be transferred  I authorize the performance of emergency medical procedures and treatments upon me in both transit and upon arrival at the receiving facility  Additionally, I authorize the release of any and all medical records to the receiving facility and request they be transported with me, if possible  I understand that the safest mode of transportation during a medical emergency is an ambulance and that the Hospital advocates the use of this mode of transport   Risks of traveling to the receiving facility by car, including absence of medical control, life sustaining equipment, such as oxygen, and medical personnel has been explained to me and I fully understand them  (STER[place] CORRECT BOX BELOW)  [  ]  I consent to the stated transfer and to be transported by ambulance/helicopter  [  ]  I consent to the stated transfer, but refuse transportation by ambulance and accept full responsibility for my transportation by car  I understand the risks of non-ambulance transfers and I exonerate the Hospital and its staff from any deterioration in my condition that results from this refusal     X___________________________________________    DATE  07/15/19  TIME________  Signature of patient or legally responsible individual signing on patient behalf           RELATIONSHIP TO PATIENT_________________________          Provider Certification    NAME Yann Chan                                         1940                              MRN [id number]    A medical screening exam was performed on the above named patient  Based on the examination:    Condition Necessitating Transfer The primary encounter diagnosis was Incarcerated ventral hernia  A diagnosis of SIRS (systemic inflammatory response syndrome) (HCC) was also pertinent to this visit      Patient Condition: The patient has been stabilized such that within reasonable medical probability, no material deterioration of the patient condition or the condition of the unborn child(adriel) is likely to result from the transfer    Reason for Transfer: Level of Care needed not available at this facility    Transfer Requirements: Facility \[facility] [place] [facility]\""   · Space available and qualified personnel available for treatment as acknowledged by    · Agreed to accept transfer and to provide appropriate medical treatment as acknowledged by       Dr Smith Bloch  · Appropriate medical records of the examination and treatment of the patient are provided at the time of transfer   [address] _______  · Transfer will be performed by qualified personnel from    and appropriate transfer equipment as required, including the use of necessary and appropriate life support measures  Provider Certification: I have examined the patient and explained the following risks and benefits of being transferred/refusing transfer to the patient/family:  General risk, such as traffic hazards, adverse weather conditions, rough terrain or turbulence, possible failure of equipment (including vehicle or aircraft), or consequences of actions of persons outside the control of the transport personnel      Based on these reasonable risks and benefits to the patient and/or the unborn child(adriel), and based upon the information available at the time of the patients examination, I certify that the medical benefits reasonably to be expected from the provision of appropriate medical treatments at another medical facility outweigh the increasing risks, if any, to the individuals medical condition, and in the case of labor to the unborn child, from effecting the transfer      X____________________________________________ DATE 07/15/19        TIME_______      ORIGINAL - SEND TO MEDICAL RECORDS   COPY - SEND WITH PATIENT DURING TRANSFER

## 2019-07-15 NOTE — CONSULTS
2           Consultation - Nephrology   Anushka Martinez 77 y o  male MRN: [id number]  Unit/Bed#: [facility] [address] Encounter: [id number]      Assessment/Plan     Assessment / Plan:  1  Renal  The patient has developed acute renal failure with baseline creatinine that appears to be around 1 4  The patient states that he really has never been told that he had significant problems with his kidneys but he does straight cath himself for many years because of a neurogenic bladder  The patient developed incarcerated hernia was taken to the operating room today postoperatively has had low urine output and increasing creatinine  More than likely there is definitely a component of effective volume depletion due to 3rd spacing of fluid after abdominal surgery he also has NG suction and was not eating  There is no electrolyte or acid-base abnormality that would require urgent dialysis so at this point will treat with fluid support  Given the patient is oliguric can check urine creatinine and urine sodium to assess fractional excretion percentage  Increase normal saline to 150 cc/hour  Monitor renal function and urine output  Supportive care postoperatively  Urine sodium and urine creatinine    2  Status post exploratory laparotomy with bowel resection hernia repair and resection of small bowel  Postop care per surgery  Patient underwent surgery today  History of Present Illness   Physician Requesting Consult: Winnie Bradley MD  Reason for Consult / Principal Problem:  Acute renal failure  Hx and PE limited by:   HPI: Anushka Martinez is a 77y o  year old male who presented to the [facility] emergency room complaining of abdominal pain nausea and vomiting  He apparently has a colostomy and has not seen it put out stool for 5 days prior to this admission  He was transferred here and underwent surgery with some bowel resection repair of hernia    Postoperatively has low urine output with increasing creatinine we were asked to see him for this  History obtained from chart review and the patient  Inpatient consult to Nephrology  Consult performed by: Santiago Hyde MD  Consult ordered by: Annamarie Underwood MD          Review of Systems   Constitutional: Negative for chills and fever  I am tired because I did not sleep on    HENT:        NG tube in  Eyes: Negative  Respiratory: Negative  Negative for cough, chest tightness, shortness of breath and wheezing  Cardiovascular: Negative  Negative for chest pain and leg swelling  No orthopnea  Gastrointestinal:        Patient is postoperative and does not really report significant pain  No stool out ostomy  Genitourinary: Restrepo catheter in  Neurological: Negative  Psychiatric/Behavioral: Negative for agitation and behavioral problems         Historical Information   Patient Active Problem List   Diagnosis    Allergic rhinitis    Cancer, colon ([address] )    DM type 2 (diabetes mellitus, type 2) (HCC)    Elevated prostate specific antigen (PSA)    Enlarged prostate without lower urinary tract symptoms (luts)    Benign essential hypertension    GERD without esophagitis    Hyperlipidemia    Displacement of lumbar intervertebral disc without myelopathy    Lumbar radiculopathy    Neurogenic bladder disorder    Right knee pain    Vitamin D deficiency    Primary Parkinsonism ([address] )    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    Spinal stenosis of lumbar region    Status post lumbar discectomy    Malignant neoplasm of colon ([address] )    Parastomal hernia    Acute kidney injury ([address] )     Past Medical History:   Diagnosis Date    Benign prostatic hyperplasia with urinary obstruction and other lower urinary tract symptoms     Colon cancer ([address] ) 1998    Elevated PSA     last assessed 3/11/2014    Hypercholesterolemia     Hypertension     Malignant neoplasm of skin     Microhematuria     Neurogenic bladder     last assessed 2014    Type 2 diabetes mellitus ([facility] [address] )     Urinary incontinence      Past Surgical History:   Procedure Laterality Date    APPENDECTOMY      9 y/o    COLON SURGERY      COLOSTOMY      PROSTATE BIOPSY      ROTATOR CUFF REPAIR      resolved     SKIN BIOPSY      TONSILLECTOMY      7 y/o    VASECTOMY       Social History   Social History     Substance and Sexual Activity   Alcohol Use Yes    Frequency: Monthly or less    Binge frequency: Less than monthly    Comment: SOCIAL     Social History     Substance and Sexual Activity   Drug Use No     Social History     Tobacco Use   Smoking Status Former Smoker    Last attempt to quit:     Years since quittin 5   Smokeless Tobacco Never Used   Tobacco Comment    per [employer] 'former smoker- negative tobacco x 25 yrs, prev 1 ppd x 25 rs'     Family History   Problem Relation Age of Onset    Kidney failure Mother         renal failure    Heart attack Father         MI    Diabetes Father         DM    Hyperlipidemia Father         hypercholesterolemia    Other Sister         mesothelioma    Coronary artery disease Brother         CABG    Cancer Brother         urinary bladder    Prostate cancer Brother        Meds/Allergies   current meds:   Current Facility-Administered Medications   Medication Dose Route Frequency    carbidopa-levodopa (SINEMET)  mg per tablet 1 5 tablet  1 5 tablet Oral TID With Meals    heparin (porcine) subcutaneous injection 5,000 Units  5,000 Units Subcutaneous Q8H [address]    HYDROmorphone (DILAUDID) 1 mg/mL 50 mL PCA   Intravenous Continuous    insulin lispro (HumaLOG) 100 units/mL subcutaneous injection 1-6 Units  1-6 Units Subcutaneous Q6H [address]    naloxone (NARCAN) 0 04 mg/mL syringe 0 04 mg  0 04 mg Intravenous Q3 min PRN    ondansetron (ZOFRAN) injection 4 mg  4 mg Intravenous Q4H PRN    pantoprazole (PROTONIX) injection 40 mg  40 mg Intravenous Q12H [address]    sodium chloride 0 9 % infusion  150 mL/hr Intravenous Continuous     Allergies   Allergen Reactions    Atorvastatin Myalgia    Lyrica [Pregabalin]        Objective     Intake/Output Summary (Last 24 hours) at 7/15/2019 1616  Last data filed at 7/15/2019 1400  Gross per 24 hour   Intake 4250 ml   Output 460 ml   Net 3790 ml     Body mass index is 35 12 kg/m²  Invasive Devices:   Urethral Catheter Latex 16 Fr  (Active)   Site Assessment Clean;Skin intact 7/15/2019  3:35 PM   Collection Container Standard drainage bag 7/15/2019  3:35 PM   Securement Method Securing device (Describe) 7/15/2019  3:35 PM   Output (mL) 60 mL 7/15/2019  8:45 AM       PHYSICAL EXAM:  /59   Pulse 104   Temp 98 8 °F (37 1 °C)   Resp 20   Ht 5' 6" (1 676 m)   Wt 98 7 kg (217 lb 9 5 oz)   SpO2 93%   BMI 35 12 kg/m²     Physical Exam   Constitutional: He is oriented to person, place, and time  No distress  Eyes: No scleral icterus  Neck: Neck supple  No JVD present  Cardiovascular: Normal rate and regular rhythm  Exam reveals no friction rub  No significant edema  Pulmonary/Chest: Effort normal and breath sounds normal  No respiratory distress  He has no wheezes  He has no rales  Abdominal: Soft  He exhibits no distension  There is no tenderness  Ostomy lower abdomen  Neurological: He is alert and oriented to person, place, and time  Psychiatric: He has a normal mood and affect           Current Weight: Weight - Scale: 98 7 kg (217 lb 9 5 oz)  First Weight: Weight - Scale: 98 7 kg (217 lb 9 5 oz)    Lab Results:    Results from last 7 days   Lab Units 07/15/19  1044   WBC Thousand/uL 8 74   HEMOGLOBIN g/dL 12 8   HEMATOCRIT % 39 3   PLATELETS Thousands/uL 184     Results from last 7 days   Lab Units 07/15/19  1044 07/15/19  0636   POTASSIUM mmol/L 3 8  --    CHLORIDE mmol/L 107  --    CO2 mmol/L 22  --    CO2, I-STAT mmol/L  --  24   BUN mg/dL 56*  --    CREATININE mg/dL 3 16*  --    GLUCOSE, ISTAT mg/dl  --  177*   CALCIUM mg/dL 8 3  -- Results from last 7 days   Lab Units 07/15/19  1044 07/15/19  0636 07/14/19 2017   POTASSIUM mmol/L 3 8  --  3 8   CHLORIDE mmol/L 107  --  96*   CO2 mmol/L 22  --  25   CO2, I-STAT mmol/L  --  24  --    BUN mg/dL 56*  --  41*   CREATININE mg/dL 3 16*  --  2 61*   CALCIUM mg/dL 8 3  --  11 3*   ALK PHOS U/L  --   --  108   ALT U/L  --   --  12   AST U/L  --   --  21   GLUCOSE, ISTAT mg/dl  --  177*  --

## 2019-07-15 NOTE — ASSESSMENT & PLAN NOTE
S/p exploratory laparotomy, lysis of adhesions, small bowel resection, primary repair of parastomal hernia, colonoscopy 7/15    Remove NGT, place on clears  IVF resuscitation  Dilaudid PCA for analgesia  OOB, ambulate  Wound care for ostomy  PT/OT

## 2019-07-15 NOTE — ED PROCEDURE NOTE
PROCEDURE  CriticalCare Time  Performed by: Abrahan Lowe DO  Authorized by: Abrahan Lowe DO     Critical care provider statement:     Critical care time (minutes):  30    Critical care time was exclusive of:  Separately billable procedures and treating other patients and teaching time    Critical care was necessary to treat or prevent imminent or life-threatening deterioration of the following conditions:  Sepsis    Critical care was time spent personally by me on the following activities:  Blood draw for specimens, obtaining history from patient or surrogate, development of treatment plan with patient or surrogate, discussions with consultants, evaluation of patient's response to treatment, examination of patient, interpretation of cardiac output measurements, ordering and performing treatments and interventions, ordering and review of laboratory studies, ordering and review of radiographic studies, re-evaluation of patient's condition and review of old charts    I assumed direction of critical care for this patient from another provider in my specialty: yanet Lowe DO  07/15/19 0014

## 2019-07-15 NOTE — ANESTHESIA POSTPROCEDURE EVALUATION
Post-Op Assessment Note    CV Status:  Stable  Pain Score: 2    Pain management: adequate     Mental Status:  Alert and awake   Hydration Status:  Stable   PONV Controlled:  Controlled   Airway Patency:  Patent   Post Op Vitals Reviewed: Yes      Staff: CRNA, Anesthesiologist   Comments: Patient resting in PACU, answering questions appropriately  No c/o pain or nausea  Airway patent, VSS             BP   138/89 (105)   Temp   96 9   Pulse   83   Resp   18   SpO2   100% on FM

## 2019-07-15 NOTE — ANESTHESIA PREPROCEDURE EVALUATION
Review of Systems/Medical History    Chart reviewed  No history of anesthetic complications     Cardiovascular  Hyperlipidemia, Hypertension ,    Pulmonary  Smoker ex-smoker  Cumulative Pack Years: 25,        GI/Hepatic    GERD ,   Comment: Hx rectal cancer, s/p colostomy  Mechanical fall on side of colostomy 3 weeks ago  Ventral hernia around colostomy site  No output from colostomy since fall  N/V billious vomiting x 2 days, has NGT in place     Prostatic disorder, benign prostatic hyperplasia  Comment: Neurogenic bladder, straight caths     Endo/Other  Diabetes type 2 Oral agent,   Obesity (BMI 35)    GYN       Hematology   Musculoskeletal    Arthritis     Neurology   Psychology                Anesthesia Plan  ASA Score- 3 Emergent    Anesthesia Type- general with ASA Monitors  Additional Monitors:   Airway Plan: ETT  Comment: GA with ETT, IV, NGT in place, antiemetics, T/S ? Postop mechanical ventilation discussed  ? Bilateral TAP blocks for postop pain management discussed   Plan Factors-    Induction- intravenous  Postoperative Plan-   Planned trial extubation    Informed Consent- Anesthetic plan and risks discussed with patient  I personally reviewed this patient with the CRNA  Discussed and agreed on the Anesthesia Plan with the CRNA  Jordan Cardenas

## 2019-07-15 NOTE — ED PROVIDER NOTES
History  Chief Complaint   Patient presents with    Abdominal Problem     Pt fell 3 weeks ago, falling on right side c/o pain on ribs (was not seen), since the fall area around stoma is enlarged, firm, aching, pt states no BM for 3 days, c/o of N/V and poor appetite      21-year-old male with a remote history of rectal cancer with colostomy, diabetes presents to the emergency department with a 3 day history of no output from the colostomy, enlarging ventral hernia around the colostomy, decreased appetite  Patient states he has always had a large hernia associated with the colostomy for the last 20 years, but it is definitely becoming enlarged over the last 3 days  Tonight he had 2 episodes of a large amount of bilious vomiting  No fevers or chills  He did fall 3 weeks ago onto his right side but was never evaluated  He has had some right upper quadrant pain since then but he feels it is getting better  History provided by:  Patient   used: No    Abdominal Cramping   Pain location: Ventral around colostomy    Pain quality: aching    Pain radiates to:  Does not radiate  Pain severity:  Unable to specify  Onset quality:  Gradual  Duration:  3 days  Timing:  Constant  Progression:  Worsening  Chronicity:  New  Context: previous surgery    Context: not eating    Relieved by:  None tried  Worsened by:  Nothing  Ineffective treatments:  None tried  Associated symptoms: anorexia, nausea and vomiting    Associated symptoms: no belching, no chest pain, no chills, no constipation, no cough, no diarrhea, no dysuria, no fatigue, no fever, no flatus, no hematemesis, no hematochezia, no hematuria, no melena and no shortness of breath    Vomiting:     Quality:  Bilious material    Number of occurrences:  2  Risk factors: being elderly    Risk factors: no alcohol abuse, has not had multiple surgeries, no NSAID use and not obese        Prior to Admission Medications   Prescriptions Last Dose Informant Patient Reported? Taking?    Cranberry 200 MG CAPS  Self Yes Yes   Sig: Take by mouth   Multiple Vitamins-Minerals (CENTRUM SILVER) tablet  Self Yes Yes   Sig: Take by mouth   carbidopa-levodopa (SINEMET)  mg per tablet  Self Yes Yes   Sig: Take 1 5 tablets by mouth 3 (three) times a day   carvedilol (COREG CR) 40 MG 24 hr capsule   No Yes   Sig: Take 1 capsule (40 mg total) by mouth daily   finasteride (PROSCAR) 5 mg tablet  Self No Yes   Sig: Take 1 tablet (5 mg total) by mouth daily   glucose blood (WALI CONTOUR TEST) test strip  Self No Yes   Si each by Other route daily   hydrochlorothiazide (HYDRODIURIL) 25 mg tablet   No Yes   Sig: Take 1 tablet (25 mg total) by mouth daily   lisinopril (ZESTRIL) 20 mg tablet   No Yes   Sig: TAKE 1 TABLET BY MOUTH EVERY DAY   omeprazole (PriLOSEC) 20 mg delayed release capsule  Self No Yes   Sig: Take 1 capsule (20 mg total) by mouth daily   rosuvastatin (CRESTOR) 20 MG tablet  Self No Yes   Sig: Take 1 tablet (20 mg total) by mouth daily   sitaGLIPtin-metFORMIN (JANUMET)  MG per tablet  Self No Yes   Sig: Take 1 tablet by mouth daily      Facility-Administered Medications: None       Past Medical History:   Diagnosis Date    Benign prostatic hyperplasia with urinary obstruction and other lower urinary tract symptoms     Colon cancer (HCC)     Elevated PSA     last assessed 3/11/2014    Hypercholesterolemia     Hypertension     Malignant neoplasm of skin     Microhematuria     Neurogenic bladder     last assessed 2014    Type 2 diabetes mellitus ([facility] [address] )     Urinary incontinence        Past Surgical History:   Procedure Laterality Date    APPENDECTOMY      3 y/o    COLOSTOMY      PROSTATE BIOPSY      ROTATOR CUFF REPAIR      resolved     SKIN BIOPSY      TONSILLECTOMY      5 y/o    VASECTOMY         Family History   Problem Relation Age of Onset    Kidney failure Mother         renal failure    Heart attack Father         JOHANNY Dillon Diabetes Father         DM    Hyperlipidemia Father         hypercholesterolemia    Other Sister         mesothelioma    Coronary artery disease Brother         CABG    Cancer Brother         urinary bladder    Prostate cancer Brother      I have reviewed and agree with the history as documented  Social History     Tobacco Use    Smoking status: Former Smoker     Last attempt to quit: 1980     Years since quittin 5    Smokeless tobacco: Never Used    Tobacco comment: per Dilan 'former smoker- negative tobacco x 25 yrs, prev 1 ppd x 25 rs'   Substance Use Topics    Alcohol use: Yes     Comment: SOCIAL    Drug use: No        Review of Systems   Constitutional: Negative  Negative for chills, fatigue and fever  HENT: Negative  Eyes: Negative  Respiratory: Negative  Negative for cough and shortness of breath  Cardiovascular: Negative  Negative for chest pain  Gastrointestinal: Positive for abdominal distention, abdominal pain, anorexia, nausea and vomiting  Negative for blood in stool, constipation, diarrhea, flatus, hematemesis, hematochezia and melena  Genitourinary: Negative  Negative for dysuria and hematuria  Musculoskeletal: Negative for neck pain  Skin: Negative  Allergic/Immunologic: Negative  Neurological: Negative  Negative for weakness, numbness and headaches  Hematological: Negative  Psychiatric/Behavioral: Negative  All other systems reviewed and are negative  Physical Exam  Physical Exam   Constitutional: He is oriented to person, place, and time  He appears well-developed and well-nourished  Non-toxic appearance  He does not have a sickly appearance  He does not appear ill  No distress  HENT:   Head: Normocephalic and atraumatic  Right Ear: External ear normal    Left Ear: External ear normal    Mouth/Throat: Oropharynx is clear and moist    Eyes: Pupils are equal, round, and reactive to light   Conjunctivae are normal  No scleral icterus  Cardiovascular: Regular rhythm and normal heart sounds  Tachycardia present  Pulmonary/Chest: Effort normal and breath sounds normal    Abdominal: Soft  He exhibits no distension and no mass  Bowel sounds are absent  There is tenderness in the right upper quadrant, right lower quadrant, epigastric area and left lower quadrant  There is guarding  There is no rigidity and no rebound  A hernia (Large ventral hernia just under colostomy, non reducible, firm) is present  Neurological: He is alert and oriented to person, place, and time  He has normal strength and normal reflexes  He exhibits normal muscle tone  Skin: Skin is warm and dry  No rash noted  He is not diaphoretic  No erythema  No pallor  Psychiatric: He has a normal mood and affect  Nursing note and vitals reviewed        Vital Signs  ED Triage Vitals   Temperature Pulse Respirations Blood Pressure SpO2   07/14/19 1956 07/14/19 1923 07/14/19 1923 07/14/19 1923 07/14/19 1923   97 5 °F (36 4 °C) 103 17 143/90 95 %      Temp Source Heart Rate Source Patient Position - Orthostatic VS BP Location FiO2 (%)   07/14/19 1956 07/14/19 1923 07/14/19 1923 07/14/19 1923 --   Oral Monitor Lying Left arm       Pain Score       07/14/19 1923       4           Vitals:    07/14/19 1923   BP: 143/90   Pulse: 103   Patient Position - Orthostatic VS: Lying         Visual Acuity      ED Medications  Medications   sodium chloride 0 9 % bolus 1,000 mL (has no administration in time range)   ondansetron (ZOFRAN) injection 4 mg (has no administration in time range)       Diagnostic Studies  Results Reviewed     Procedure Component Value Units Date/Time    CBC and differential [[id number]]     Lab Status:  No result Specimen:  Blood     Comprehensive metabolic panel [[id number]]     Lab Status:  No result Specimen:  Blood     Lipase [[id number]]     Lab Status:  No result Specimen:  Blood     Protime-INR [[id number]]     Lab Status:  No result Specimen:  Blood     APTT [[id number]]     Lab Status:  No result Specimen:  Blood                  CT abdomen pelvis with contrast    (Results Pending)              Procedures  Procedures       ED Course  ED Course as of Jul 15 0014   Sun Jul 14, 2019   0785 Spoke with Dr Jeanne Summers who feels the patient would be better served by colorectal surgery  General surgery does not generally deal with parastomal hernias  2338 Patient and patient's family updated regarding results and treatment plan                                  MDM  Number of Diagnoses or Management Options  Diagnosis management comments: 21-year-old male presents with a 3 day history of no output from his colostomy along with enlarging hernia just under his colostomy  Tonight he had 2 episodes of a large amount of bilious vomiting  No fevers  On exam he does not appear acutely ill  He has a very large ventral hernia under his colostomy which is firm and not reducible  He states he has had this hernia for the last 20 years but it is definitely much larger than it had been  He also has diffuse abdominal tenderness with voluntary guarding and absent bowel sounds  Will check basic labs will also do CT abdomen pelvis mainly to rule out bowel obstruction versus incarcerated hernia or both  Amount and/or Complexity of Data Reviewed  Clinical lab tests: ordered and reviewed  Tests in the radiology section of CPT®: ordered and reviewed        Disposition  Final diagnoses:   None     ED Disposition     None      Follow-up Information    None         Patient's Medications   Discharge Prescriptions    No medications on file     No discharge procedures on file      ED Provider  Electronically Signed by           Hardeep Leung DO  07/15/19 4133

## 2019-07-15 NOTE — PROGRESS NOTES
Received call from nurse concerned that wound started bleeding again and dressings were saturated w blood  Nurse reported that area of the wound had dehisced  Dressings were already taken down when I arrived  Small amount of bloody seepage between staples was present when I examined the patient  Staples all intact, wound well approximated  No areas of dehiscence or evisceration were present  4x4 dressings were reapplied  Will continue to check on patient      Ascencion Tejada, PGY1  Surgery  3:36PM  7/15/2019

## 2019-07-15 NOTE — PROGRESS NOTES
Informed by nursing the patient's BP is soft, (90/60's)  Patient not tachycardic  On exam, patient alert, no chest pains, no SOB  HR 86/min, regular    Abdominal midline dressing saturated with bloody drainage  Dressing changed, staples are intact  No active site of bleeding  Blood was expressed from distal midline wound  Area cleansed, redressed       CBC, BMP ordered    Will continue to check on patient

## 2019-07-15 NOTE — ANESTHESIA PROCEDURE NOTES
Peripheral Block    Patient location during procedure: OR  Start time: 7/15/2019 7:33 AM  Reason for block: procedure for pain, at surgeon's request and post-op pain management  Staffing  Anesthesiologist: Renzo Freitas MD  Performed: anesthesiologist   Preanesthetic Checklist  Completed: patient identified, site marked, surgical consent, pre-op evaluation, timeout performed, IV checked, risks and benefits discussed and monitors and equipment checked  Peripheral Block  Patient position: supine  Prep: ChloraPrep and bilateral flank and abdomen areas  Patient monitoring: heart rate, cardiac monitor, continuous pulse ox and frequent blood pressure checks  Block type: TAP  Laterality: bilateral  Injection technique: single-shot  Procedures: ultrasound guided, Ultrasound guidance required for the procedure to increase accuracy and safety of medication placement and decrease risk of complications  Ultrasound permanent image savedbupivacaine (MARCAINE) 0 5 % perineural infiltration, 30 mL (with 20 cc Exparel used and 10 cc NS)  Needle  Needle type: Stimuplex   Needle gauge: 26 G  Needle length: 4    Needle localization: ultrasound guidance  Needle insertion depth: 2 5 cm  Assessment  Injection assessment: incremental injection, local visualized surrounding nerve on ultrasound and negative aspiration for heme  Paresthesia pain: none  Heart rate change: no  Slow fractionated injection: yes  Post-procedure:  site cleaned  patient tolerated the procedure well with no immediate complications  Additional Notes  Ultrasound guidance  Ultrasound pic in paper chart  Each TAP side received 10 cc Exparel/15 cc 0 5% bupivacaine/5 cc NS  hydrodissection with NS first to confirm TAP plane before injection of mixture

## 2019-07-15 NOTE — PLAN OF CARE
Problem: Nutrition/Hydration-ADULT  Goal: Nutrient/Hydration intake appropriate for improving, restoring or maintaining nutritional needs  Description  Monitor and assess patient's nutrition/hydration status for malnutrition (ex- brittle hair, bruises, dry skin, pale skin and conjunctiva, muscle wasting, smooth red tongue, and disorientation)  Collaborate with interdisciplinary team and initiate plan and interventions as ordered  Monitor patient's weight and dietary intake as ordered or per policy  Utilize nutrition screening tool and intervene per policy  Determine patient's food preferences and provide high-protein, high-caloric foods as appropriate  INTERVENTIONS:  - Monitor oral intake, urinary output, labs, and treatment plans  - Assess nutrition and hydration status and recommend course of action  - Evaluate amount of meals eaten  - Assist patient with eating if necessary   - Allow adequate time for meals  - Recommend/ encourage appropriate diets, oral nutritional supplements, and vitamin/mineral supplements  - Order, calculate, and assess calorie counts as needed  - Recommend, monitor, and adjust tube feedings and TPN/PPN based on assessed needs  - Assess need for intravenous fluids  - Provide specific nutrition/hydration education as appropriate  - Include patient/family/caregiver in decisions related to nutrition  Outcome: Progressing     Problem: Potential for Falls  Goal: Patient will remain free of falls  Description  INTERVENTIONS:  - Assess patient frequently for physical needs  -  Identify cognitive and physical deficits and behaviors that affect risk of falls    -  [place] fall precautions as indicated by assessment   - Educate patient/family on patient safety including physical limitations  - Instruct patient to call for assistance with activity based on assessment  - Modify environment to reduce risk of injury  - Consider OT/PT consult to assist with strengthening/mobility  Outcome: Progressing

## 2019-07-15 NOTE — ASSESSMENT & PLAN NOTE
27-year-old male s/p ex lap w SB resection, repair of parastomal hernia  POD#1  VSS  Afebrile  JELLY  Follow creatinine and uop       -NPO  -NGT  -IVF, 150cc/h per nephro, appreciate recs  -IS/pulmonary toilet  -DVT prophylaxis  -wound checks  -strict I/O's

## 2019-07-15 NOTE — H&P
H&P Exam - Colorectal Surgery   Lloyd David 77 y o  male MRN: [id number]  Unit/Bed#: [facility] 813-01 Encounter: [id number]    Assessment/Plan     Assessment:  25-year-old male with obstruction due to parastomal hernia  He has not had colostomy function for 5 days and has had nausea and vomiting for 1 day  He went into the hospital at [facility] yesterday and CT scan revealed large parastomal hernia with obstruction  Plan:  -take to the operating room for exploratory laparotomy, possible bowel resection, hernia repair  -supportive care  -will likely need PT/OT evaluation  -IS/pulmonary toilet  -DVT prophylaxis    History of Present Illness   HPI:  Lloyd David is a 77 y o  male who presents with nausea and vomiting  He has not had colostomy function for 5 days  He has never had this problem before  He is not really having any pain, and he denies fevers and chills  He has a history of a very low anterior resection with end colostomy for a rectal cancer in 1998  His only other abdominal surgery was an appendectomy  He notices appetite has been reducing over the past 5 days, and he has not been able to keep anything down starting yesterday  He presented with an elevated creatinine and lactic acid  He has past medical history significant for diabetes, colon cancer, and neurogenic bladder for which the patient straight caths himself at home  Review of Systems   Constitutional: Positive for appetite change  Negative for activity change, chills, fever and unexpected weight change  HENT: Negative  Negative for hearing loss and trouble swallowing  Eyes: Negative  Negative for photophobia and redness  Respiratory: Negative  Negative for chest tightness and shortness of breath  Cardiovascular: Negative  Negative for chest pain and palpitations  Gastrointestinal: Positive for abdominal distention, constipation, nausea and vomiting  Negative for abdominal pain and diarrhea     Endocrine: Negative  Negative for cold intolerance and heat intolerance  Genitourinary: Negative  Negative for flank pain and genital sores  Musculoskeletal: Negative  Negative for arthralgias and back pain  Skin: Negative  Negative for color change and pallor  Allergic/Immunologic: Negative  Neurological: Negative  Negative for facial asymmetry, speech difficulty and light-headedness  Hematological: Negative  Negative for adenopathy  Psychiatric/Behavioral: Negative  Negative for agitation and behavioral problems         Historical Information   Past Medical History:   Diagnosis Date    Benign prostatic hyperplasia with urinary obstruction and other lower urinary tract symptoms     Colon cancer ([place] [address] ) 1998    Elevated PSA     last assessed 3/11/2014    Hypercholesterolemia     Hypertension     Malignant neoplasm of skin     Microhematuria     Neurogenic bladder     last assessed 2014    Type 2 diabetes mellitus ([place] [address] )     Urinary incontinence      Past Surgical History:   Procedure Laterality Date    APPENDECTOMY      11 y/o    COLON SURGERY      COLOSTOMY      PROSTATE BIOPSY      ROTATOR CUFF REPAIR      resolved     SKIN BIOPSY      TONSILLECTOMY      7 y/o    VASECTOMY       Social History   Social History     Substance and Sexual Activity   Alcohol Use Yes    Frequency: Monthly or less    Binge frequency: Less than monthly    Comment: SOCIAL     Social History     Substance and Sexual Activity   Drug Use No     Social History     Tobacco Use   Smoking Status Former Smoker    Last attempt to quit: Haylee Samuels Years since quittin 5   Smokeless Tobacco Never Used   Tobacco Comment    per [employer] 'former smoker- negative tobacco x 25 yrs, prev 1 ppd x 25 rs'     Family History:   Family History   Problem Relation Age of Onset    Kidney failure Mother         renal failure    Heart attack Father         MI    Diabetes Father         DM    Hyperlipidemia Father hypercholesterolemia    Other Sister         mesothelioma    Coronary artery disease Brother         CABG    Cancer Brother         urinary bladder    Prostate cancer Brother        Meds/Allergies   PTA meds:   Prior to Admission Medications   Prescriptions Last Dose Informant Patient Reported? Taking?    Cranberry 200 MG CAPS  Self Yes No   Sig: Take by mouth   Multiple Vitamins-Minerals (CENTRUM SILVER) tablet  Self Yes No   Sig: Take by mouth   carbidopa-levodopa (SINEMET)  mg per tablet  Self Yes No   Sig: Take 1 5 tablets by mouth 3 (three) times a day   carvedilol (COREG CR) 40 MG 24 hr capsule   No No   Sig: Take 1 capsule (40 mg total) by mouth daily   finasteride (PROSCAR) 5 mg tablet  Self No No   Sig: Take 1 tablet (5 mg total) by mouth daily   glucose blood (WALI CONTOUR TEST) test strip  Self No No   Si each by Other route daily   hydrochlorothiazide (HYDRODIURIL) 25 mg tablet   No No   Sig: Take 1 tablet (25 mg total) by mouth daily   lisinopril (ZESTRIL) 20 mg tablet   No No   Sig: TAKE 1 TABLET BY MOUTH EVERY DAY   omeprazole (PriLOSEC) 20 mg delayed release capsule  Self No No   Sig: Take 1 capsule (20 mg total) by mouth daily   rosuvastatin (CRESTOR) 20 MG tablet  Self No No   Sig: Take 1 tablet (20 mg total) by mouth daily   sitaGLIPtin-metFORMIN (JANUMET)  MG per tablet  Self No No   Sig: Take 1 tablet by mouth daily      Facility-Administered Medications: None     Allergies   Allergen Reactions    Atorvastatin Myalgia    Lyrica [Pregabalin]        Objective   First Vitals:   Blood Pressure: 140/65 (07/15/19 0231)  Pulse: 105 (07/15/19 0229)  Temperature: (!) 97 °F (36 1 °C) (07/15/19 0229)  SpO2: 96 % (07/15/19 0229)    Current Vitals:   Blood Pressure: 140/65 (07/15/19 0231)  Pulse: 102 (07/15/19 0231)  Temperature: (!) 97 °F (36 1 °C) (07/15/19 0231)  SpO2: 96 % (07/15/19 0231)    No intake or output data in the 24 hours ending 07/15/19 0325    Invasive Devices Peripheral Intravenous Line            Peripheral IV 07/14/19 Left Antecubital less than 1 day          Drain            NG/OG/Enteral Tube Nasogastric 16 Fr Left nares less than 1 day                Physical Exam   Constitutional: He appears well-developed and well-nourished  No distress  HENT:   Head: Normocephalic and atraumatic  Right Ear: External ear normal    Left Ear: External ear normal    Eyes: Pupils are equal, round, and reactive to light  EOM are normal  Right eye exhibits no discharge  Left eye exhibits no discharge  No scleral icterus  Neck: No tracheal deviation present  Cardiovascular: Normal rate  Pulmonary/Chest: Effort normal  No respiratory distress  Abdominal: Soft  He exhibits distension  He exhibits no fluid wave and no pulsatile midline mass  There is generalized tenderness and tenderness in the right lower quadrant  There is rigidity  There is no guarding  A hernia is present  Neurological: He is alert  Coordination normal    Skin: No rash noted  He is not diaphoretic  Vitals reviewed  Lab Results:   CBC:   Lab Results   Component Value Date    WBC 22 48 (H) 07/14/2019    HGB 15 4 07/14/2019    HCT 47 0 07/14/2019    MCV 88 07/14/2019     07/14/2019    MCH 28 9 07/14/2019    MCHC 32 8 07/14/2019    RDW 13 2 07/14/2019    MPV 11 6 07/14/2019    NRBC 0 07/14/2019   , CMP:   Lab Results   Component Value Date    SODIUM 136 07/14/2019    K 3 8 07/14/2019    CL 96 (L) 07/14/2019    CO2 25 07/14/2019    BUN 41 (H) 07/14/2019    CREATININE 2 61 (H) 07/14/2019    CALCIUM 11 3 (H) 07/14/2019    AST 21 07/14/2019    ALT 12 07/14/2019    ALKPHOS 108 07/14/2019    EGFR 22 07/14/2019   , Coagulation:   Lab Results   Component Value Date    INR 1 06 07/14/2019     Imaging: I have personally reviewed pertinent reports  and I have personally reviewed pertinent films in PACS  EKG, Pathology, and Other Studies: I have personally reviewed pertinent reports        Code Status: Level 1 - Full Code  Advance Directive and Living Will:      Power of :    POLST:

## 2019-07-15 NOTE — OP NOTE
OPERATIVE REPORT  PATIENT NAME: Mellissa Castle    :  1940  MRN: [id number]  Pt Location: [facility] OR ROOM 08    SURGERY DATE: 7/15/2019    Surgeon(s) and Role:     * Sherrie Price MD - Primary     * Saima Farias - Assisting    Preop Diagnosis:  Malignant neoplasm of colon, unspecified part of colon ([address] ) [C18 9]    Post-Op Diagnosis Codes:     * Malignant neoplasm of colon, unspecified part of colon ([address] ) [C18 9]    Procedure(s) (LRB):  LAPAROTOMY EXPLORATORY; BOWEL RESECTION; HERNIA REPAIR (N/A)  RESECTION SMALL BOWEL (N/A)    Specimen(s):  ID Type Source Tests Collected by Time Destination   1 : small bowel Tissue Small Bowel, NOS TISSUE EXAM Sherrie Price MD 7/15/2019 2165        Estimated Blood Loss:   Minimal    Drains:  NG/OG/Enteral Tube Nasogastric 16 Fr Left nares (Active)   Placement Reverification Auscultation 7/15/2019 12:11 AM   Site Assessment Clean;Dry; Intact 7/15/2019 12:11 AM   Status Suction-low intermittent 7/15/2019 12:11 AM   Drainage Appearance Rhesa Lips 7/15/2019 12:11 AM   Number of days: 0       Urethral Catheter Latex 16 Fr  (Active)   Number of days: 0       Anesthesia Type:   General    Operative Indications:  Malignant neoplasm of colon, unspecified part of colon ([address] ) [C18 9]      Operative Findings:  Incarcerated small bowel and peristomal hernia    Complications:   None    Procedure and Technique:  After preoperative identification in the preoperative holding area the patient was taken the operating room placed in the supine position  After satisfactory induction of general endotracheal anesthesia appropriate placement of anesthesia monitors, patient was placed in the supine position  Patient was secured to the operating room table  Patient was prepped and draped in usual sterile fashion  Antibiotics were given prior to incision  Time-out was undertaken procedure begun  A examination was performed    On the lateral side there is a small quarter shape purplish area in the stoma  This is away from the mucocutaneous junction  Otherwise the stoma was completely normal   Midline incision was made  This was taken down to level the fascia  Fascia was opened cephalad and extended inferiorly  Were able to lyse adhesions in order to fully open up the anterior abdominal wall  We then were able to encircle the left lower quadrant colostomy site  On the cephalad side, there was a loop of small bowel that was incarcerated into the hernia sac  This was dissected free with a combination of blunt and sharp dissection until the small bowel could be removed from the hernia sac  Care was taken not to injure the colon in doing so  The small bowel was evaluated outside of the hernia sac  On the distal side of the small bowel there is an area that was concerning for full-thickness necrosis  No obvious perforation had taken place  At the exit site of the hernia there was another area that was hemorrhagic but appeared to be largely viable  Given the likely full-thickness necrosis, small bowel resection was entertained  Well-vascularized small bowel was transected proximal to the necrotic site using a GRETCHEN 75 stapler  GRETCHEN 75 was used to transect the proximal site as well  As such all necrotic-appearing bowel was resected  Mesentery was divided between clamps and 2 0 Vicryl ties  Anastomosis was then performed by making enterotomies and then placing a single firing of the GRETCHEN 75 stapler  The end was then closed with Allis clamps and transected using GRETCHNE 75  As such we reanastomosed well-vascularized, well mobilize small bowel and a side-to-side functional end-to-end manner  Mesenteric defect was closed using 3 0 Vicryl  This was then placed back into the abdominal cavity  We then inspected the hernia site  Given the ischemia week copiously irrigated the hernia sac  This extended well laterally and the super fascial position    There was some murky fluid but no signs of intestinal contents  Unfortunately the colon was relatively circuitous within this in the inferior wall of the hernia sac was all colon  In order to prevent early recurrence, it was decided to perform suture hernia repair  I elected not to perform a mesh repair given the small bowel necrosis and high risk of infection  Interrupted 1  Single strand PDS were used to close the cephalad side of the hernia defect this was closed tight enough that only 1 finger could be placed between the stoma and the fascial repair  Abdomen is inspected for hemostasis was noted be excellent now prepared for closure  1 Interrupted single strand PDS was used to close fascia  Fascia was quite attenuated multiple places  Skin was closed using staples  A brief colonoscopy was entertained tissue ensure viability of the colon given the hemorrhagic finding at the stoma os  To approximately 10 cm all mucosa was viable  There was just a 1 small area less than 2 cm in size was hemorrhagic in nature  This was well above the fascia given the mucocutaneous junction was intact appeared viable and was left as is  Dressings were placed    Patient was transferred to recovery room stable condition             I was present for the entire procedure    Patient Disposition:  PACU     SIGNATURE: Sherrie Price MD  DATE: July 15, 2019  TIME: 7:22 AM

## 2019-07-15 NOTE — SOCIAL WORK
Met with pt to discuss the role of CM and to discuss any help pt may need prior to d/c  Pt lives with his wife Henry Aldrich in a 2 story home with 4 [address] and 13 steps to the 2nd floor bedrooms and bathroom, there is a bathroom on the 1st floor  Pt preformed ADL's independently pta  Pt uses a cane  Pt denies hx of VNA/HHC or STR  Pt notes he goes to a local fitness center 3x/week to do exercises  Pt drives  Pt PCP is Dr Starla Fair  Pt preferred pharmacy is [facility] in [place]  Contact: Sofi Browne (wife) [phone] or [phone]  Pt unable to provide details of POA or Living Will  Pt wife will transport home at time of d/c if medically appropriate  CM reviewed d/c planning process including the following: identifying help at home, patient preference for d/c planning needs, Discharge Lounge, [facility] Meds to Bed program, availability of treatment team to discuss questions or concerns patient and/or family may have regarding understanding medications and recognizing signs and symptoms once discharged  CM also encouraged patient to follow up with all recommended appointments after discharge  Patient advised of importance for patient and family to participate in managing patients medical well being  Patient/caregiver received discharge checklist   Content reviewed  Patient/caregiver encouraged to participate in discharge plan of care prior to discharge home

## 2019-07-16 NOTE — ADDENDUM NOTE
Addendum  created 07/16/19 0749 by Megan Liu MD    Order list changed
Addendum  created 07/16/19 1544 by Tawnya Gao MD    Sign clinical note
Has The Growth Been Previously Biopsied?: has been previously biopsied
Body Location Override (Optional): Left upper medial back
Family Member: Grandfather

## 2019-07-16 NOTE — PROGRESS NOTES
Progress Note - Divina AkbarLoren 1940, 77 y o  male MRN: [id number]    Unit/Bed#: [facility] 813-01 Encounter: [id number]    Primary Care Provider: yRan Salinas DO   Date and time admitted to hospital: 7/15/2019  2:17 AM        * Parastomal hernia  Assessment & Plan  S/p exploratory laparotomy, lysis of adhesions, small bowel resection, primary repair of parastomal hernia, colonoscopy 7/15    Remove NGT, place on clears  IVF resuscitation  Dilaudid PCA for analgesia  OOB, ambulate  Wound care for ostomy  PT/OT                Subjective/Objective     Subjective: No acute events  Pain controlled  No nausea    Objective:   Blood pressure 135/74, pulse (!) 108, temperature 98 5 °F (36 9 °C), resp  rate 15, height 5' 6" (1 676 m), weight 98 7 kg (217 lb 9 5 oz), SpO2 94 %  ,Body mass index is 35 12 kg/m²  Intake/Output Summary (Last 24 hours) at 7/16/2019 1741  Last data filed at 7/16/2019 1403  Gross per 24 hour   Intake 1815 65 ml   Output 1450 ml   Net 365 65 ml       Invasive Devices     Peripheral Intravenous Line            Peripheral IV 07/14/19 Left Antecubital 1 day    Peripheral IV 07/15/19 Left Wrist 1 day          Drain            Colostomy LLQ 1 day    NG/OG/Enteral Tube Nasogastric 16 Fr Left nares 1 day    Urethral Catheter Latex 16 Fr  1 day                Physical Exam:  GEN: NAD  HEENT: MMM  CV: RRR  Lung: Normal effort  Ab: Soft, NT/ND  Incisions CDI  Ostomy with nothing in bag, 70 cc recorded out  Extrem: No CCE  Neuro: A+Ox3      Lab, Imaging and other studies:I have personally reviewed pertinent lab results      VTE Pharmacologic Prophylaxis: Heparin  VTE Mechanical Prophylaxis: sequential compression device

## 2019-07-16 NOTE — UTILIZATION REVIEW
Initial Clinical Review    Admission: Date/Time/Statement: 7/15/19 @ 0217  TRANSFER FROM [facility] TO [facility] FOR HIGHER LEVEL OF CARE - BOWEL OBSTRUCTION NEEDING EMERGENT SURGERY     Orders Placed This Encounter   Procedures    Inpatient Admission     Standing Status:   Standing     Number of Occurrences:   1     Order Specific Question:   Admitting Physician     Answer:   Damián Watson [[id number]]     Order Specific Question:   Level of Care     Answer:   Med Surg [16]     Order Specific Question:   Estimated length of stay     Answer:   Inpatient Only Surgery     Assessment/Plan:   MR GAGE IS A 78 YO MALE WHO TRANSFERRED TO [address] [place] [address] FOR SURGERY FOR AN INCARCERATED, POSSIBLE STRANGULATED PARASTOMAL HERNIA  PT DID NOT HAVE ANY COLOSTOMY FUNCTION X 5 DAYS WITH INCREASING N/V & ABDOMINAL DISTENTION  CT SHOWED LARGER PARASTOMAL HERNIA WITH OBSTRUCTION  HE WENT TO THE OR 7/15/19  POST-OP HE IS IN A MED SURG BED WITH ROUTINE POST OP ORDERS  HE IS ADMITTED TO INPATIENT STATUS  PT IS DOING AS EXPECTED FOR POD #1    HE HAS A BIT OF AN JELLY WHICH IS BEING ADDRESSED WITH HYDRATION - IV FLUIDS  ML/HR    ++++++++++++++++++++++++++  7/15 OPERATIVE REPORT   Preop Diagnosis:  Malignant neoplasm of colon, unspecified part of colon ([address] ) [C18 9]     Post-Op Diagnosis Codes:     * Malignant neoplasm of colon, unspecified part of colon ([address] ) [C18 9]     Procedure(s) (LRB):  LAPAROTOMY EXPLORATORY; BOWEL RESECTION; HERNIA REPAIR (N/A)  RESECTION SMALL BOWEL (N/A)    Anesthesia Type: General    Operative Findings:  Incarcerated small bowel and peristomal hernia  +++++++++++++++++++++++++    ED Triage Vitals   Temperature Pulse Respirations Blood Pressure SpO2   07/15/19 0229 07/15/19 0229 07/15/19 0752 07/15/19 0231 07/15/19 0229   (!) 97 °F (36 1 °C) 105 (!) 24 140/65 96 %      Temp Source Heart Rate Source Patient Position - Orthostatic VS BP Location FiO2 (%)   07/15/19 0229 -- -- -- -- Oral          Pain Score       07/15/19 0237       No Pain        Wt Readings from Last 1 Encounters:   07/15/19 98 7 kg (217 lb 9 5 oz)     Additional Vital Signs:   07/16/19 10:52:06  98 6 °F (37 °C)  111Abnormal   15  135/77  96  93 %     07/16/19 06:52:35  98 6 °F (37 °C)  101  20  131/79  96  95 %     07/16/19 03:10:55  98 °F (36 7 °C)  105  24Abnormal   132/78  96  94 %     07/15/19 23:27:41  98 1 °F (36 7 °C)  109Abnormal   17  115/65  82  96 %     07/15/19 22:56:49  98 2 °F (36 8 °C)  113Abnormal   20  110/85  93  95 %     07/15/19 18:58:31  97 3 °F (36 3 °C)Abnormal   108Abnormal   18  103/60  74  94 %     07/15/19 17:58:11    105    102/59  73  94 %     07/15/19 15:05:04  98 8 °F (37 1 °C)  104  20  102/59  73  95 %     07/15/19 1236  97 6 °F (36 4 °C)  90  20  100/58    92 %     07/15/19 11:36:41  97 9 °F (36 6 °C)  95  20  99/56  70  91 %     07/15/19 10:23:21  99 3 °F (37 4 °C)  94  22  99/48Abnormal   65  91 %     07/15/19 09:22:38  99 °F (37 2 °C)  93  22  138/65  89  90 %     07/15/19 0845  99 °F (37 2 °C)  92  24Abnormal   137/98    95 %  Nasal cannula   07/15/19 0830    92  22  147/57    98 %  Simple mask   07/15/19 0815    92  24Abnormal   169/91    99 %  Simple mask   07/15/19 0800    84  22  149/85    98 %     07/15/19 0752  96 9 °F (36 1 °C)Abnormal   81  24Abnormal   138/86    100 %  Simple mask   07/15/19 02:31:20  97 °F (36 1 °C)Abnormal   102    140/65  90  96 %       Pertinent Labs/Diagnostic Test Results:     7/14 - CT ABD, PELVIS -  Findings consistent with large left paraventral hernia containing loops of small bowel resulting in a closed loop obstruction  Several markedly dilated loops of small bowel within the left abdomen with findings suggestive of pneumatosis coli  Air within the bilateral kidneys and bladder    Given patient history of diabetes, 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    Findings consistent with large left paraventral hernia containing loops of small bowel resulting in a closed loop obstruction  Several markedly dilated loops of small bowel within the left abdomen with findings suggestive of pneumatosis coli  Air within the bilateral kidneys and bladder  Given patient history of diabetes, 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        Results from last 7 days   Lab Units 07/16/19  0512 07/15/19  1044 07/15/19  0636 07/14/19 2017   WBC Thousand/uL 8 64 8 74  --  22 48*   HEMOGLOBIN g/dL 10 8* 12 8  --  15 4   I STAT HEMOGLOBIN g/dl  --   --  12 6  --    HEMATOCRIT % 33 4* 39 3  --  47 0   HEMATOCRIT, ISTAT %  --   --  37  --    PLATELETS Thousands/uL 118* 184  --  263   NEUTROS ABS Thousands/µL  --  7 40  --  18 90*     Results from last 7 days   Lab Units 07/16/19  0453 07/15/19  1044 07/15/19  0636 07/14/19 2017   SODIUM mmol/L 146* 142  --  136   POTASSIUM mmol/L 3 6 3 8  --  3 8   CHLORIDE mmol/L 113* 107  --  96*   CO2 mmol/L 22 22  --  25   CO2, I-STAT mmol/L  --   --  24  --    ANION GAP mmol/L 11 13  --  15*   BUN mg/dL 62* 56*  --  41*   CREATININE mg/dL 3 66* 3 16*  --  2 61*   EGFR ml/min/1 73sq m 15 18  --  22   CALCIUM mg/dL 7 3* 8 3  --  11 3*   CALCIUM, IONIZED, ISTAT mmol/L  --   --  1 15  --    MAGNESIUM mg/dL 1 4*  --   --   --    PHOSPHORUS mg/dL 4 7*  --   --   --      Results from last 7 days   Lab Units 07/14/19 2017   AST U/L 21   ALT U/L 12   ALK PHOS U/L 108   TOTAL PROTEIN g/dL 9 0*   ALBUMIN g/dL 4 0   TOTAL BILIRUBIN mg/dL 0 91     Results from last 7 days   Lab Units 07/16/19  1129 07/16/19  0603 07/16/19  0014 07/15/19  1729 07/15/19  1159 07/15/19  1024 07/15/19  0812   POC GLUCOSE mg/dl 148* 139 157* 183* 179* 202* 194*     Results from last 7 days   Lab Units 07/16/19  0453 07/15/19  1044 07/14/19 2017   GLUCOSE RANDOM mg/dL 134 186* 210*     Results from last 7 days   Lab Units 07/15/19  0636   PH, SANDRA I-STAT  7 350   PCO2, SANDRA ISTAT mm HG 41 9*   PO2, SANDRA ISTAT mm HG 84 0*   HCO3, SANDRA ISTAT mmol/L 23 1*   I STAT BASE EXC mmol/L -2   I STAT O2 SAT % 96     Results from last 7 days   Lab Units 07/14/19 2017   PROTIME seconds 13 9   INR  1 06   PTT seconds 28     Results from last 7 days   Lab Units 07/15/19  0129 07/14/19  2307 07/14/19  2110   LACTIC ACID mmol/L 1 7 2 2* 2 5*     Results from last 7 days   Lab Units 07/14/19 2017   LIPASE u/L 209     Results from last 7 days   Lab Units 07/15/19  1723   SODIUM UR  28   CREATININE UR mg/dL 193 0     Results from last 7 days   Lab Units 07/14/19  2201   BLOOD CULTURE  No Growth at 24 hrs  No Growth at 24 hrs  Past Medical History:   Diagnosis Date    Benign prostatic hyperplasia with urinary obstruction and other lower urinary tract symptoms     Colon cancer (HCC) 1998    Elevated PSA     last assessed 3/11/2014    Hypercholesterolemia     Hypertension     Malignant neoplasm of skin     Microhematuria     Neurogenic bladder     last assessed 6/13/2014    [facility]     Urinary incontinence      Admitting Diagnosis: Hernia, epigastric [K43 9]     Age/Sex: 77 y o  male     Admission Orders:    Current Facility-Administered Medications:  carbidopa-levodopa 1 5 tablet Oral TID With Meals     heparin (porcine) 5,000 Units Subcutaneous Q8H [address]     HYDROmorphone  Intravenous Continuous  PCA IN USE    insulin lispro 1-6 Units Subcutaneous Q6H [address]     naloxone 0 04 mg Intravenous Q3 min PRN     ondansetron 4 mg Intravenous Q4H PRN   X1 7/15   pantoprazole 40 mg Intravenous Q12H [address]     potassium chloride 20 mEq Intravenous Q2H     sodium chloride 150 mL/hr Intravenous Continuous Last Rate: 150 mL/hr (07/16/19 1155)      MED SURG  PCA DILAUDID   SCDs  OVERTON   AMBULATE Q SHIF T  POC GLUCOSE Q 6 HR   NGT TUBE   DIET NPO   IP CONSULT TO WOUND CARE  IP CONSULT TO NEPHROLOGY  IP CONSULT TO ACUTE PAIN SERVICE    Network Utilization Review Department  Phone: [phone];  Fax [phone]  [email or web address]  ATTENTION: Please call with any questions or concerns to [phone]  and carefully listen to the prompts so that you are directed to the right person  Send all requests for admission clinical reviews, approved or denied determinations and any other requests to fax [phone]   All voicemails are confidential

## 2019-07-16 NOTE — NURSING NOTE
Notified nephrology of patient's oliguria  Per Dr Steve Morales, no more fluids needed, and recommended I talk to primary team about rechecking labs   WIll notify white team

## 2019-07-16 NOTE — CONSULTS
Consultation - Acute Pain Service   Griselda Salomon 77 y o  male MRN: [id number]  Unit/Bed#: [facility] 813-01 Encounter: [id number]               Assessment/Plan     Assessment:   77 yr old M POD 1 s/p exlap, bowel resection, hernia repair  Pt received bilateral Exparel TAP blocks for postop pain control  He reports minimal pain at surgical site at this time (3/10)  He instead complains of sore throat from NGT placement and intubation  He mentions his pain has been dulled around surgical site, and he was able to get OOB to chair comfortably  He does have access to dilaudid PCA, which he will use upon moving around  He has used around 2 mg IV dilaudid total yesterday and 0 5 mg IV dilaudid total today so far  He is an opioid naiive patient, tolerates tylenol products and ibuprofen products in the past      Plan:  -Exparel TAP blocks working appropriately at this time for postop pain control  Pt notified that his pain may escalate in 1-2 days time once Exparel begins to wear off  Pt still with NGT and will need to continue to rely on dilaudid PCA  Once NGT removed, pt may use PO/IV pain regimen as ordered by surgical team      APS sign off  Thank you for the Consult  Please call  / [phone] ( [facility] [phone]) with any further questions    History of Present Illness    Admit Date:  7/15/2019  Hospital Day:  1 day  Primary Service:  Colorectal  Attending Provider:  Cydney Guadarrama MD  Reason for Consult / Principal Problem: acute postop pain, opioid naiive patient, received bilateral TAP blocks for postop pain control  Hx and PE limited by: none  HPI: Griselda Salomon is a 77y o  year old male who presents with (see above assessment section)    Inpatient consult to Acute Pain Service  Consult performed by: Zhen Mcgrath MD  Consult ordered by:  Zhen Mcgrath MD          Review of Systems    Historical Information   Past Medical History:   Diagnosis Date    Benign prostatic hyperplasia with urinary obstruction and other lower urinary tract symptoms     [facility] )     Elevated PSA     last assessed 3/11/2014    Hypercholesterolemia     Hypertension     Malignant neoplasm of skin     Microhematuria     Neurogenic bladder     last assessed 2014    Type 2 diabetes mellitus ([facility] )     Urinary incontinence      Past Surgical History:   Procedure Laterality Date    APPENDECTOMY      3 y/o    COLON SURGERY      COLOSTOMY      LAPAROTOMY N/A 7/15/2019    Procedure: LAPAROTOMY EXPLORATORY; BOWEL RESECTION; HERNIA REPAIR;  Surgeon: Brian White MD;  [facility]: [facility];  Service: Colorectal    PROSTATE BIOPSY      ROTATOR CUFF REPAIR      resolved     SKIN BIOPSY      SMALL INTESTINE SURGERY N/A 7/15/2019    Procedure: RESECTION SMALL BOWEL;  Surgeon: Brian White MD;  [facility];  Service: Colorectal    TONSILLECTOMY      5 y/o    VASECTOMY       Social History   Social History     Substance and Sexual Activity   Alcohol Use Yes    Frequency: Monthly or less    Binge frequency: Less than monthly    Comment: SOCIAL     Social History     Substance and Sexual Activity   Drug Use No     Social History     Tobacco Use   Smoking Status Former Smoker    Last attempt to quit: Wendy Hernandez Years since quittin 5   Smokeless Tobacco Never Used   Tobacco Comment    per [employer] 'former smoker- negative tobacco x 25 yrs, prev 1 ppd x 25 rs'     Family History: non-contributory    Meds/Allergies   all current active meds have been reviewed    Allergies   Allergen Reactions    Atorvastatin Myalgia    Lyrica [Pregabalin]        Objective   Temp:  [97 3 °F (36 3 °C)-98 6 °F (37 °C)] 98 5 °F (36 9 °C)  HR:  [101-113] 108  Resp:  [15-24] 15  BP: (102-135)/(59-85) 135/74    Intake/Output Summary (Last 24 hours) at 2019 1533  Last data filed at 2019 1403  Gross per 24 hour   Intake 1815 65 ml   Output 1450 ml   Net 365 65 ml       Physical Exam    Lab Results:   I have personally reviewed pertinent labs  , CBC:   Lab Results   Component Value Date    WBC 8 64 07/16/2019    HGB 10 8 (L) 07/16/2019    HCT 33 4 (L) 07/16/2019    MCV 91 07/16/2019     (L) 07/16/2019    MCH 29 4 07/16/2019    MCHC 32 3 07/16/2019    RDW 13 7 07/16/2019    MPV 11 1 07/16/2019   , CMP:   Lab Results   Component Value Date    SODIUM 146 (H) 07/16/2019    K 3 6 07/16/2019     (H) 07/16/2019    CO2 22 07/16/2019    BUN 62 (H) 07/16/2019    CREATININE 3 66 (H) 07/16/2019    CALCIUM 7 3 (L) 07/16/2019    EGFR 15 07/16/2019     Imaging Studies: I have personally reviewed pertinent reports  EKG, Pathology, and Other Studies: I have personally reviewed pertinent reports  Counseling / Coordination of Care  Total floor / unit time spent today Level 1 = 20 minutes  Greater than 50% of total time was spent with the patient and / or family counseling and / or coordination of care  A description of the counseling / coordination of care: discussed with patient current pain regimen, including use of Exparel TAP blocks and dilaudid PCA, and goals for pain management once NGT removed  Wife present in the room

## 2019-07-16 NOTE — PROGRESS NOTES
07/16/19 1000   Spiritual Beliefs/Perceptions   Support Systems Spouse/significant other   Psychosocial   Patient Behaviors/Mood Calm   Plan of Care   Comments Pt  tired, explored relational needs and support system, listened empathetically   Assessment Completed by: Unit visit

## 2019-07-16 NOTE — PLAN OF CARE
Problem: PHYSICAL THERAPY ADULT  Goal: Performs mobility at highest level of function for planned discharge setting  See evaluation for individualized goals  Description  Treatment/Interventions: Functional transfer training, LE strengthening/ROM, Elevations, Therapeutic exercise, Endurance training, Patient/family training, Equipment eval/education, Bed mobility, Gait training, Spoke to nursing, OT  Equipment Recommended: Walker(RW)       See flowsheet documentation for full assessment, interventions and recommendations  Note:   Prognosis: Good  Problem List: Decreased strength, Decreased endurance, Impaired balance, Decreased mobility, Decreased coordination, Impaired judgement, Decreased safety awareness, Obesity, Decreased skin integrity  Assessment: Pt is 77 y o  male seen for PT evaluation s/p admit to [facility] [place]15/19  Two pt identifiers were used to confirm  Pt presented s/p small bowel resection which was performed on 7/15/19  Pt was admitted with a primary dx of: parastomal hernia  PT now consulted for assessment of mobility and d/c needs  Pts current co morbidities effecting treatment include: malignant neoplasm of colon, JELLY, DM, HTN, GERD, hyperlipidemia and personal factors including 4-5 [place] home environment  Pt currently lives in a two story home with his wife  Pts current clinical presentation is Unstable/ Unpredictable (high complexity) due to Ongoing medical management for primary dx, Increased reliance on more restrictive AD compared to baseline, decreased activity tolerance compared to baseline, fall risk, increased assistance needed from caregiver at current time, continuous pulse oximetry monitoring, abnormal lab values, multiple lines, decline in overall functional mobility status  Prior to admission, pt was mod I with ambulation with the use of a SPC as per pt  Upon evaluation, pt currently is requiring max A x 2 for transfers   Pt presents at PT eval functioning below baseline and currently w/ overall mobility deficits secondary to: decreased strength, decreased endurance, impaired balance, impaired coordination, decreased ROM  Pt currently at a fall risk secondary to impairments listed above  Based on PT evaluation, pt will continue to benefit from skilled acute PT interventions to address stated impairments; to maximize functional mobility; for ongoing pt/ family training; and DME needs  At conclusion of PT session pt was left seated in bedside chair, agreeable to participate in additional therapy session  Provided pt education regarding PT plan to improve functional mobility status  PT is currently recommending post acute inpatient rehab  Pt agreeable to plan and goals as stated on evaluation  PT will continue to follow during hospital stay  Barriers to Discharge: Inaccessible home environment, Decreased caregiver support     Recommendation: Post acute IP rehab     PT - OK to Discharge: Yes    See flowsheet documentation for full assessment

## 2019-07-16 NOTE — OCCUPATIONAL THERAPY NOTE
[address] Evaluation     Patient Name: Patricia Pablo  [id number] Date: 7/16/2019  Problem List  Patient Active Problem List   Diagnosis    Allergic rhinitis    Cancer, colon ([facility] [address] )    DM type 2 (diabetes mellitus, type 2) (HCC)    Elevated prostate specific antigen (PSA)    Enlarged prostate without lower urinary tract symptoms (luts)    Benign essential hypertension    GERD without esophagitis    Hyperlipidemia    Displacement of lumbar intervertebral disc without myelopathy    Lumbar radiculopathy    Neurogenic bladder disorder    Right knee pain    Vitamin D deficiency    Primary Parkinsonism ([address] )    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    Spinal stenosis of lumbar region    Status post lumbar discectomy    Malignant neoplasm of colon ([facility] [address] )    Parastomal hernia    Acute kidney injury ([facility] [address] )     Past Medical History  Past Medical History:   Diagnosis Date    Benign prostatic hyperplasia with urinary obstruction and other lower urinary tract symptoms     Colon cancer ([facility] [address] ) 1998    Elevated PSA     last assessed 3/11/2014    Hypercholesterolemia     Hypertension     Malignant neoplasm of skin     Microhematuria     Neurogenic bladder     last assessed 6/13/2014    Type 2 diabetes mellitus ([facility] [address] )     Urinary incontinence      Past Surgical History  Past Surgical History:   Procedure Laterality Date    APPENDECTOMY      5 y/o    COLON SURGERY      COLOSTOMY      PROSTATE BIOPSY      ROTATOR CUFF REPAIR      resolved 1999    SKIN BIOPSY      TONSILLECTOMY      7 y/o    VASECTOMY             07/16/19 1111   Note Type   Note type Eval only   Restrictions/Precautions   Weight Bearing Precautions Per Order No   Other Precautions Chair Alarm;Multiple lines; Fall Risk  (NG tube)   Pain Assessment   Pain Assessment 0-10   Pain Score 3   Pain Type Surgical pain   Pain Location Abdomen   Patient's Stated Pain Goal No pain   Hospital Pain Intervention(s) Repositioned; Ambulation/increased activity   Response to Interventions tolerated   Home Living   Type of [address] Two level;Bed/bath upstairs  (4-5 ENEDELIA)   Bathroom Shower/Tub Walk-in shower   Bathroom Toilet Standard   [address]   Prior Function   Level of [place] Independent with ADLs and functional mobility   Lives With [place] Help From Family   ADL Assistance Independent   IADLs Independent  (shares with spouse)   Falls in the last 6 months 0   Vocational Retired   Comments Per pt/spouse report, spouse is unable to physically assist the pt d/t recent ankle & hip injuries  Lifestyle   Autonomy At baseline pt was completing all ADLs IND, shares IADLs with spouse, (+) driving, ambulating MOD IND with cane  Of note, pt with colostomy & self straight caths at baseline   Reciprocal Relationships supportive spouse   Service to Others retired   [address] enjoys exercising   Psychosocial   Psychosocial (WDL) WDL   Subjective   Subjective "I want to get up and walk everyday"   ADL   Eating Assistance Unable to assess  (currently NG tube/NPO)   Grooming Assistance 5  Supervision/Setup   UB Bathing Assistance 4  Minimal Assistance   LB Bathing Assistance 3  Moderate Assistance   [address] 2  Maximal Assistance   LB Dressing Deficit Don/doff R sock; Don/doff L sock  (pt unable to reach feet seated in recliner)   Toileting Assistance  3  Moderate Assistance   Bed Mobility   Additional Comments Pt seated OOB upon arrival   Transfers   Sit to Stand 2  Maximal assistance   Additional items Assist x 2; Increased time required;Verbal cues   Stand to Sit 3  Moderate assistance   Additional items Assist x 2; Increased time required;Verbal cues   Additional Comments RW - VCs/TCs for safe hand placement   Balance   Static Sitting Fair +   Dynamic Sitting Fair   Static Standing Poor +   Dynamic Standing Poor   Activity Tolerance   Activity Tolerance Patient limited by fatigue;Patient limited by pain   Medical Staff Made Aware PT, CM   Nurse Made Aware Spoke to RN - pt appropriate to be seen   RUE Assessment   RUE Assessment WFL   LUE Assessment   LUE Assessment WFL   Hand Function   Gross Motor Coordination Functional   Fine Motor Coordination Functional   Cognition   Overall Cognitive Status WFL   Arousal/Participation Alert; Cooperative   Attention Within functional limits   Orientation Level Oriented X4   Memory Decreased recall of recent events   Following Commands Follows one step commands with increased time or repetition   Comments pt requiring repetition of activity directions   Assessment   Limitation Decreased ADL status; Decreased endurance;Decreased self-care trans;Decreased high-level ADLs   Prognosis Good   Assessment Pt is a 77 y o  male who was [facility] to [facility] on 7/15/2019 with Parastomal hernia - pt with baseline colostomy (history of rectal cancer) and presented after not having colostomy function for 5 days, and nausea/vomitting  Pt s/p LAPAROTOMY EXPLORATORY; BOWEL RESECTION; HERNIA REPAIR (N/A); RESECTION SMALL BOWEL (N/A) 7/15/2019  Pt's problem list also includes PMH of malignant neoplasm of colon, JELLY, DM type II, HTN, GERD, HLD, displacement of lumbar intervertebral disc, lumbar radiculopathy, s/p lumbar discectomy, neurogenic bladder disorder (straight caths at baseline), Parkinsonism  At baseline pt was completing all ADLs IND, shares IADLs with spouse, (+) driving, ambulating MOD IND with cane  Pt lives with his spouse in a 2  with 2nd floor set up and 4-5 ENEDELIA  Per pt/spouse report, spouse is unable to physically assist the pt d/t recent ankle & hip injuries  Currently pt requires MIN A for UB ADLs, MAX A for LB ADLs, and MOD-MAX Ax2 for functional transfers with RW   Pt currently presents with impairments in the following categories -steps to enter environment, limited home support, difficulty performing ADLS and difficulty performing IADLS  activity tolerance, endurance and standing balance/tolerance  These impairments, as well as pt's fatigue, pain, decreased caregiver support, risk for falls and home environment  limit pt's ability to safely engage in all baseline areas of occupation, includinggrooming, bathing, dressing, toileting and functional mobility/transfers  From OT standpoint, recommend SHORT TERM REHAB upon D/C  OT will continue to follow to address the below stated goals  Goals   Patient Goals to walk more   LTG Time Frame 10-14   Long Term Goal #1 see goals below   Plan   Treatment Interventions ADL retraining;Functional transfer training; Endurance training;Patient/family training;Equipment evaluation/education; Compensatory technique education; Energy conservation; Activityengagement   Goal Expiration Date 07/30/19   OT Frequency 3-5x/wk   Recommendation   OT Discharge Recommendation Short Term Rehab   Equipment Recommended Bedside commode   OT - OK to Discharge Yes  (when medically appropriate)   Barthel Index   Feeding 0  (currently NPO)   Bathing 0   Grooming Score 5   Dressing Score 5   Bladder Score 0  (ramos catheter, self straight caths at baseline)   Bowels Score 0  (colostomy)   Toilet Use Score 5   Transfers (Bed/Chair) Score 5   Mobility (Level Surface) Score 0   Stairs Score 0   Barthel Index Score 20   Modified Canyon Scale   Modified Canyon Scale 4         Goals:    MOD IND toileting & clothing management with use of AE as needed  MOD IND UB/LB ADLs with use of AE as needed  MOD IND functional transfers/mobility to all surfaces with Fair+ to Good balance/safety to participate in dynamic ADLs/IADLs  Participate in IADL simulation with Good endurance/safety to increase IND and facilitate return to PLOF  Increase activity tolerance to Good for 40-45 minutes of participation in functional tasks      Demo good carryover with safe use of RW during functional tasks  Improve standing balance to Fair+ to Good for 8-10 minutes of participation in functional tasks          Alyce Vaughan, OT

## 2019-07-16 NOTE — PLAN OF CARE
Problem: OCCUPATIONAL THERAPY ADULT  Goal: Performs self-care activities at highest level of function for planned discharge setting  See evaluation for individualized goals  Description  Treatment Interventions: ADL retraining, Functional transfer training, Endurance training, Patient/family training, Equipment evaluation/education, Compensatory technique education, Energy conservation, Activityengagement  Equipment Recommended: Bedside commode       See flowsheet documentation for full assessment, interventions and recommendations  Note:   Limitation: Decreased ADL status, Decreased endurance, Decreased self-care trans, Decreased high-level ADLs  Prognosis: Good  Assessment: Pt is a 77 y o  male who was admitted to [facility] on 7/15/2019 with Parastomal hernia - pt with baseline colostomy (history of rectal cancer) and presented after not having colostomy function for 5 days, and nausea/vomitting  Pt s/p LAPAROTOMY EXPLORATORY; BOWEL RESECTION; HERNIA REPAIR (N/A); RESECTION SMALL BOWEL (N/A) 7/15/2019  Pt's problem list also includes PMH of malignant neoplasm of colon, JELLY, DM type II, HTN, GERD, HLD, displacement of lumbar intervertebral disc, lumbar radiculopathy, s/p lumbar discectomy, neurogenic bladder disorder (straight caths at baseline), Parkinsonism  At baseline pt was completing all ADLs IND, shares IADLs with spouse, (+) driving, ambulating MOD IND with cane  Pt lives with his spouse in a 2  with 2nd floor set up and 4-5 ENEDELIA  Per pt/spouse report, spouse is unable to physically assist the pt d/t recent ankle & hip injuries  Currently pt requires MIN A for UB ADLs, MAX A for LB ADLs, and MOD-MAX Ax2 for functional transfers with RW  Pt currently presents with impairments in the following categories -steps to enter environment, limited home support, difficulty performing ADLS and difficulty performing IADLS  activity tolerance, endurance and standing balance/tolerance   These impairments, as well as pt's fatigue, pain, decreased caregiver support, risk for falls and home environment  limit pt's ability to safely engage in all baseline areas of occupation, includinggrooming, bathing, dressing, toileting and functional mobility/transfers  From OT standpoint, recommend SHORT TERM REHAB upon D/C  OT will continue to follow to address the below stated goals        OT Discharge Recommendation: Short Term Rehab  OT - OK to Discharge: Yes(when medically appropriate)

## 2019-07-16 NOTE — SOCIAL WORK
Cm reviewed patient during care coordination rounds with [facility]  Patient not stable for discharge today  Patient is NPO with NGT  Patient has ostomy, which is not new and is able to manage on his own  PT/OT recommending short-term rehab at this time  Cm met with patient to discuss PT/OT recommendations  Referrals placed to [facility], [address] [place] [address] per patient and patient's wife's request   They are going to discuss preference of [facility] vs [address] [place] [address] as first choice and inform Cm of decision  SNF list given to review if additional SNF choices are needed  Awaiting determinations  Acute Rehab is their first choice vs SNF at this time  Patient on IVF and to be OOB  Cm following  A post acute care recommendation was made by your care team for Acute Rehab  Discussed [place] of Choice with both patient and caregiver  List of facilities given to both patient and caregiver via in person  both patient and caregiver aware the list is custom filtered for them by preference  and that [facility] post acute providers are designated

## 2019-07-16 NOTE — PROGRESS NOTES
Progress Note - Grazyna Must 1940, 77 y o  male MRN: [id number]    Unit/Bed#: [facility] 813-01 Encounter: [id number]    Primary Care Provider: Yeison Matias DO   Date and time admitted to hospital: 7/15/2019  2:17 AM        Malignant neoplasm of colon [facility])  Assessment & Plan  78-year-old male s/p ex lap w SB resection, repair of parastomal hernia  POD#1  VSS  Afebrile  JELLY  Follow creatinine and uop  -NPO  -NGT  -IVF, 150cc/h per nephro, appreciate recs  -IS/pulmonary toilet  -DVT prophylaxis  -wound checks  -strict I/O's      NGT w 600 output last 24 h  No ostomy output  Subjective/Objective     Subjective: Denid nausea or vomiting  Incision site bleeding resolved  Denied fever, chills, chest pain, shortness of breath this morning  Objective:     Blood pressure 132/78, pulse 105, temperature 98 °F (36 7 °C), resp  rate (!) 24, height 5' 6" (1 676 m), weight 98 7 kg (217 lb 9 5 oz), SpO2 94 %  ,Body mass index is 35 12 kg/m²  Intake/Output Summary (Last 24 hours) at 7/16/2019 0538  Last data filed at 7/16/2019 0256  Gross per 24 hour   Intake 6015 15 ml   Output 960 ml   Net 5055 15 ml       Invasive Devices     Peripheral Intravenous Line            Peripheral IV 07/14/19 Left Antecubital 1 day    Peripheral IV 07/15/19 Left Wrist less than 1 day          Drain            Colostomy LLQ 1 day    NG/OG/Enteral Tube Nasogastric 16 Fr Left nares 1 day    Urethral Catheter Latex 16 Fr  1 day                Physical Exam  General: NAD  HEENT: NC/AT  MMM  Cv: RRR  Lungs: normal effort  Ab: mild distension, Nontender  NGT w 600 output last 24 h  No ostomy output     Ex: no CCE  Neuro: AAOx3

## 2019-07-16 NOTE — PROGRESS NOTES
NEPHROLOGY PROGRESS NOTE   Fran Kenny 77 y o  male MRN: [id number]  Unit/Bed#: [facility] 674-16 Encounter: [id number]  Reason for Consult: JELLY    ASSESSMENT and PLAN:     41-year-old male with a history of underlying renal dysfunction who initially had presented for abdominal pain nausea and vomiting  Patient was transferred to  [facility] post lap with small-bowel resection and repair of peristomal hernia  Nephrology consulted for acute kidney injury  1 ) Acute Kidney Injury  - Present on admission (POA) ; admission creatinine: 2 61 mg/dL  - Urinalysis:  Will check a urinalysis  - Imaging: CT  Scan without contrast showed 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   There is air within the left renal collecting system and left ureter  There appears to be no evidence of hydronephrosis  There was a concern that he has possibleemphysematous pyelonephritis/cystitis  - Serologies: n/a  - Etiology:  Patient's acute kidney injury had already started prior to surgery, as his creatinine was already 2 6,  That has continued to trend up postop,  In the setting of fluid shifts, prerenal azotemia with a possible component of acute tubular necrosis  Though his fractional excretion of sodium is 0 32%  Which would be more consistent with a prerenal picture  - Status:   His creatinine continues to trend up now at 3 66 but his urine output has started to  in is improved  His abdomen is currently soft    - Plan:   ·  maintain Restrepo catheter at this time  ·  continue intravenous fluids but change to half-normal saline  ·  monitor input and output  ·  avoid contrast studies and NSAIDs  ·  if his urine output drops, would recommend transducing a bladder pressure  ·  Daily basic metabolic panel    2 )  Hypernatremia  - currently NPO with NG tube to suction  - receiving isotonic normal saline  - will change to 1/2 NS    3 )  Low bicarbonate level  - will monitor at this time    4 ) Hypomagnesemia  - check repeat labs tomorrow  - received 2 g of magnesium sulfate    5 )  Chronic kidney disease III  - baseline creatinine 1 4-1 5 mg/dL        SUBJECTIVE / INTERVAL HISTORY:     no overnight events  His urine output is starting to  this morning after some repositioning of the Restrepo catheter    OBJECTIVE:  Current Weight: Weight - Scale: 98 7 kg (217 lb 9 5 oz)  Vitals:    07/15/19 2327 07/16/19 0310 07/16/19 0652 07/16/19 1052   BP: 115/65 132/78 131/79 135/77   Pulse: (!) 109 105 101 (!) 111   Resp: 17 (!) 24 20 15   Temp: 98 1 °F (36 7 °C) 98 °F (36 7 °C) 98 6 °F (37 °C) 98 6 °F (37 °C)   TempSrc:       SpO2: 96% 94% 95% 93%   Weight:       Height:           Intake/Output Summary (Last 24 hours) at 7/16/2019 1129  Last data filed at 7/16/2019 0256  Gross per 24 hour   Intake 3765 15 ml   Output 500 ml   Net 3265 15 ml       Review of Systems:    12 point ROS has been reviewed  Physical Exam   Constitutional: He is oriented to person, place, and time  He appears well-developed and well-nourished  No distress  HENT:   Head: Normocephalic and atraumatic  Eyes: Pupils are equal, round, and reactive to light  No scleral icterus  Neck: Normal range of motion  Neck supple  Cardiovascular: Normal rate, regular rhythm and normal heart sounds  Exam reveals no gallop and no friction rub  No murmur heard  Pulmonary/Chest: Effort normal and breath sounds normal  No respiratory distress  He has no wheezes  He has no rales  He exhibits no tenderness  Abdominal: Soft  Bowel sounds are normal  He exhibits no distension  There is no tenderness  There is no rebound  Musculoskeletal: Normal range of motion  He exhibits no edema  Neurological: He is alert and oriented to person, place, and time  Skin: No rash noted  He is not diaphoretic  Psychiatric: He has a normal mood and affect  Nursing note and vitals reviewed        Medications:    Current Facility-Administered Medications:    carbidopa-levodopa (SINEMET)  mg per tablet 1 5 tablet, 1 5 tablet, Oral, TID With Meals, Boni Ham, 1 5 tablet at 07/16/19 0819    heparin (porcine) subcutaneous injection 5,000 Units, 5,000 Units, Subcutaneous, Q8H [address], 5,000 Units at 07/16/19 0606 **AND** [CANCELED] Platelet count, , , Once, Boni Ham    HYDROmorphone (DILAUDID) 1 mg/mL 50 mL PCA, , Intravenous, Continuous, Beba Mariah Ayala    insulin lispro (HumaLOG) 100 units/mL subcutaneous injection 1-6 Units, 1-6 Units, Subcutaneous, Q6H [address], 1 Units at 07/16/19 0026 **AND** Fingerstick Glucose (POCT), , , Q6H, Beba Mariah Ayala    naloxone (NARCAN) 0 04 mg/mL syringe 0 04 mg, 0 04 mg, Intravenous, Q3 min PRN, Beba Mariah Ayala    ondansetron [facility]) injection 4 mg, 4 mg, Intravenous, Q4H PRN, Boni Loveless, 4 mg at 07/15/19 0410    pantoprazole (PROTONIX) injection 40 mg, 40 mg, Intravenous, Q12H [address], Boni Loveless, 40 mg at 07/16/19 0819    potassium chloride 20 mEq IVPB (premix), 20 mEq, Intravenous, Q2H, Rachael Velazco PA-C    sodium chloride infusion 0 45 %, 150 mL/hr, Intravenous, Continuous, Autumn Lane MD    Laboratory Results:  Results from last 7 days   Lab Units 07/16/19  0512 07/16/19  0453 07/15/19  1044 07/15/19  0636 07/14/19 2017   WBC Thousand/uL 8 64  --  8 74  --  22 48*   HEMOGLOBIN g/dL 10 8*  --  12 8  --  15 4   I STAT HEMOGLOBIN g/dl  --   --   --  12 6  --    HEMATOCRIT % 33 4*  --  39 3  --  47 0   HEMATOCRIT, ISTAT %  --   --   --  37  --    PLATELETS Thousands/uL 118*  --  184  --  263   POTASSIUM mmol/L  --  3 6 3 8  --  3 8   CHLORIDE mmol/L  --  113* 107  --  96*   CO2 mmol/L  --  22 22  --  25   CO2, I-STAT mmol/L  --   --   --  24  --    BUN mg/dL  --  62* 56*  --  41*   CREATININE mg/dL  --  3 66* 3 16*  --  2 61*   CALCIUM mg/dL  --  7 3* 8 3  --  11 3*   MAGNESIUM mg/dL  --  1 4*  --   --   --    PHOSPHORUS mg/dL  --  4 7*  --   --   --    GLUCOSE, ISTAT mg/dl  --   --   --  177*  --

## 2019-07-16 NOTE — NURSING NOTE
Patient still with minimal urine output after bolus was given  Notified white surgery residentJanice  No new orders given, per resident, continue to monitor output but no more fluids will be given for the night

## 2019-07-16 NOTE — PHYSICAL THERAPY NOTE
Physical Therapy Evaluation/Treatment    Patient Name: Shaista Gill    XURKH'Q Date: 7/16/2019     Time In: 10:55  Time Out: 11:10     Problem List  Patient Active Problem List   Diagnosis    Allergic rhinitis    Cancer, colon ([facility] )    DM type 2 (diabetes mellitus, type 2) (HCC)    Elevated prostate specific antigen (PSA)    Enlarged prostate without lower urinary tract symptoms (luts)    Benign essential hypertension    GERD without esophagitis    Hyperlipidemia    Displacement of lumbar intervertebral disc without myelopathy    Lumbar radiculopathy    Neurogenic bladder disorder    Right knee pain    Vitamin D deficiency    Primary Parkinsonism ([address] )    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    Spinal stenosis of lumbar region    Status post lumbar discectomy    Malignant neoplasm of colon ([facility] )    Parastomal hernia    Acute kidney injury ([facility] )        Past Medical History  Past Medical History:   Diagnosis Date    Benign prostatic hyperplasia with urinary obstruction and other lower urinary tract symptoms     Colon cancer ([facility] ) 1998    Elevated PSA     last assessed 3/11/2014    Hypercholesterolemia     Hypertension     Malignant neoplasm of skin     Microhematuria     Neurogenic bladder     last assessed 6/13/2014    Type 2 diabetes mellitus ([facility] )     Urinary incontinence         Past Surgical History  Past Surgical History:   Procedure Laterality Date    APPENDECTOMY      3 y/o    COLON SURGERY      COLOSTOMY      PROSTATE BIOPSY      ROTATOR CUFF REPAIR      resolved 1999    SKIN BIOPSY      TONSILLECTOMY      5 y/o    VASECTOMY             07/16/19 1120   Note Type   Note type Eval/Treat   Pain Assessment   Pain Assessment No/denies pain  ("weakness")   Home Living   Type of Home House   Home Layout Two level;Stairs to enter with rails;Bed/bath upstairs  (4-5 ENEDELIA)   Home Equipment Noe Pizano   Prior Function   Level of [place] Independent with ADLs and functional mobility   Lives With Spouse   Receives Help From Family   ADL Assistance Independent   IADLs Independent   Falls in the last 6 months 0   Vocational Retired   Comments Pt was independently exercising at [facility] in [place], pt wife present reporting she has ankle and hip issues and is unable to provide much physical support at [employer]   Restrictions/Precautions   Weight Bearing Precautions Per Order No   Other Precautions Multiple lines; Fall Risk  (wall suction for NG tube)   General   Family/Caregiver Present Yes  (pt wife)   Cognition   Overall Cognitive Status WFL   Arousal/Participation Alert   Orientation Level Oriented X4   Following Commands Follows one step commands with increased time or repetition   RUE Assessment   RUE Assessment WFL   LUE Assessment   LUE Assessment WFL   RLE Assessment   RLE Assessment X   Strength RLE   RLE Overall Strength 3+/5   LLE Assessment   LLE Assessment X   Strength LLE   LLE Overall Strength 3+/5   Coordination   Movements are Fluid and Coordinated 0   Coordination and Movement Description slow movements, guarded posture   Bed Mobility   Additional Comments Not assessed, pt received sitting in bedside chair, agreeable to therapy session, pt left sitting in bedside chair at termination of session, all needs within reach   Transfers   Sit to Stand 2  Maximal assistance   Additional items Assist x 2; Increased time required;Verbal cues   Stand to Sit 3  Moderate assistance   Additional items Assist x 2; Increased time required;Verbal cues   Ambulation/Elevation   Gait pattern Not tested  (pt on wall suction to NG tube)   Balance   Static Sitting Fair +   Dynamic Sitting Fair   Static Standing Poor +  (RW)   Dynamic Standing Poor  (RW)   Endurance Deficit   Endurance Deficit Yes   Endurance Deficit Description weakness, fatigue   Activity Tolerance   Activity Tolerance Patient limited by fatigue Medical Staff Made Aware OT Franky   Nurse Made Aware RN cleared patient for PT evaluation   Assessment   Prognosis Good   Problem List Decreased strength;Decreased endurance; Impaired balance;Decreased mobility; Decreased coordination; Impaired judgement;Decreased safety awareness; Obesity; Decreased skin integrity   Assessment Pt is 77 y o  male seen for PT evaluation s/p admit to [facility] on 7/15[facility]19  Two pt identifiers were used to confirm  Pt presented s/p small bowel resection which was performed on 7/15/19  Pt was admitted with a primary dx of: parastomal hernia  PT now consulted for assessment of mobility and d/c needs  Pts current co morbidities effecting treatment include: malignant neoplasm of colon, JELLY, DM, HTN, GERD, hyperlipidemia and personal factors including 4-5 ENEDELIA home environment  Pt currently lives in a two story home with his wife  Pts current clinical presentation is Unstable/ Unpredictable (high complexity) due to Ongoing medical management for primary dx, Increased reliance on more restrictive AD compared to baseline, decreased activity tolerance compared to baseline, fall risk, increased assistance needed from caregiver at current time, continuous pulse oximetry monitoring, abnormal lab values, multiple lines, decline in overall functional mobility status  Prior to admission, pt was mod I with ambulation with the use of a SPC as per pt  Upon evaluation, pt currently is requiring max A x 2 for transfers  Pt presents at PT eval functioning below baseline and currently w/ overall mobility deficits secondary to: decreased strength, decreased endurance, impaired balance, impaired coordination, decreased ROM  Pt currently at a fall risk secondary to impairments listed above  Based on PT evaluation, pt will continue to benefit from skilled acute PT interventions to address stated impairments; to maximize functional mobility; for ongoing pt/ family training; and DME needs   At conclusion of PT session pt was left seated in bedside chair, agreeable to participate in additional therapy session  Provided pt education regarding PT plan to improve functional mobility status  PT is currently recommending post acute inpatient rehab  Pt agreeable to plan and goals as stated on evaluation  PT will continue to follow during hospital stay  Barriers to Discharge Inaccessible home environment;Decreased caregiver support   Goals   Patient Goals go home   STG Expiration Date 07/26/19   Short Term Goal #1 1  Pt will increased strength by 1 grade in order to increase functional independence with transfers  2  Pt will increase balance grade by 1 in order to improve safety  3  Pt will improve transfer ability to mod I to increase functional independence and decrease caregiver burden  4  Pt will perform bed mobility independently to decrease caregiver burden  5  Pt will be able to amb 150 ft with RW and mod I to increase functional independence in home and community environments  6  Pt will be able to ascend and descend 13 stairs mod I to increase functional independence within the home environment  7  Pt will be independent with HEP  Treatment Day 1   Plan   Treatment/Interventions Functional transfer training;LE strengthening/ROM; Elevations; Therapeutic exercise; Endurance training;Patient/family training;Equipment eval/education; Bed mobility;Gait training;Spoke to nursing;OT   PT Frequency Other (Comment)  (3-5x/wk)   Recommendation   Recommendation Post acute IP rehab   Equipment Recommended Walker  (RW)   PT - OK to Discharge Yes   Additional Comments to rehab when medically stable   Modified Maricao Scale   Modified Maricao Scale 4   Barthel Index   Feeding 0  (secondary to modified diet)   Bathing 0   Grooming Score 5   Dressing Score 5   Bladder Score 0   Bowels Score 0  (colostomy)   Toilet Use Score 5   Transfers (Bed/Chair) Score 5   Mobility (Level Surface) Score 0   Stairs Score 0   Barthel Index Score 20 Additional Therapy Session:    Time In: 11:10  Time Out: 11:20    Pt agreeable to participate in additional therapy session focusing on therapeutic exercises  Pt performed sit to stand transfer with max A x 2 and verbal cues for hand placement  Pt performed standing marches x 10 on each LE and standing heel raises x 10 on each LE  Pt displays significantly improved balance during standing activities  Pt requires min A for balance and safety in a standing position  Provided pt education regarding goal of therapy session, limitations of ambulation secondary to NG tube on suction, progression while in the hospital, rehab recommendation  Skilled physical therapy is indicated to address listed functional deficits focusing on strength, endurance and functional mobility      Noelle Suarez PT, DPT

## 2019-07-16 NOTE — WOUND OSTOMY CARE
Progress Note- Ostomy  Yrn Barney 77 y o  male  [id number]  [facility]        Assessment:  Patient seen again for colostomy care needs  He is awake, alert and oriented, out of bed in chair stating "I think I'm leaking" referring to his appliance  Pouch assessed and noted intact seal with no leakage, no flatus or stool noted only small amount of sanguinous drainage  Patient was visited for new pouch recommendation, he was shown sample of [employer] pouch meant for ostomate with parastomal hernia, he was educated on how to apply and rationale behind shape of barrier however patient decline using sample for now  He is agreeable for wound care nurse to call [employer] and requesting additional sample to be sent home on his behalf, patient encouraged asking for ET nurse if he decide to switch and trail new pouch  Otherwise patient is currently using pre-cut Con[employer]ate[employer] two piece pouching system  His stoma is red, moist healthy and irregularly round, measuring approximately 21/8  - 21/4inch  Plan: We will follow patient as needed, patient is well versed in ostomy care and able to change pouch independently  Vitals:    07/16/19 1052   BP: 135/77   Pulse: (!) 111   Resp: 15   Temp: 98 6 °F (37 °C)   SpO2: 93%     Wound 07/15/19 Incision Abdomen N/A (Active)   Wound Description Clean;Dry; Intact 7/16/2019  7:00 AM   Mariah-wound Assessment Clean;Dry; Intact 7/16/2019  7:00 AM   Closure Staples 7/16/2019  7:00 AM   Drainage Amount None 7/16/2019  7:00 AM   Drainage Description Bloody 7/15/2019  3:33 PM   Dressing Gauze 7/16/2019  7:00 AM   Dressing Changed Changed by provider 7/15/2019  3:33 PM   Dressing Status Clean;Dry; Intact 7/15/2019  3:33 PM   Number of days: 1     Colostomy LLQ (Active)   Stomal Appliance 1 piece 7/16/2019  8:52 AM   Stoma Assessment Harahan 7/16/2019  8:52 AM   Stoma size (in) 2 25 in 7/15/2019 10:41 AM   Stoma Shape Oval;Round 7/15/2019  3:35 PM   Peristomal Assessment DOMINICK 7/16/2019  8:52 AM   Treatment Site care 7/15/2019  3:35 PM   Output (mL) 0 mL 7/16/2019  2:56 AM   Number of days: 1         Wound care to follow as needed, please call ext [phone] or [phone] with questions or concerns        Michelle Amos RN, BSN, Patricio [facility] Elana

## 2019-07-16 NOTE — NURSING NOTE
Notified white surgery of low urine output  500ml bolus ordered and administered, will continue to monitor

## 2019-07-17 NOTE — PROGRESS NOTES
NEPHROLOGY PROGRESS NOTE   Gemma Lawson 77 y o  male MRN: [id number]  Unit/Bed#: [facility] 179-39 Encounter: [id number]  Reason for Consult: JELLY    ASSESSMENT and PLAN:     48-year-old male with a history of underlying renal dysfunction who initially had presented for abdominal pain nausea and vomiting  Patient was transferred to  [facility] post lap with small-bowel resection and repair of peristomal hernia  Nephrology consulted for acute kidney injury      1 ) Acute Kidney Injury  - Present on admission (POA) ; admission creatinine: 2 61 mg/dL  - Urinalysis:  Will check a urinalysis  - Imaging: CT  Scan without contrast showed 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   There is air within the left renal collecting system and left ureter  There appears to be no evidence of hydronephrosis  There was a concern that he has possibleemphysematous pyelonephritis/cystitis  - Serologies: n/a  - Etiology:  Patient's acute kidney injury had already started prior to surgery, as his creatinine was already 2 6,  That has continued to trend up postop,  In the setting of fluid shifts, prerenal azotemia with a possible component of acute tubular necrosis  Though his fractional excretion of sodium is 0 32%  Which would be more consistent with a prerenal picture  - Status:   excellent urine output    His renal function continues to improve and his creatinine has trended down to 2 8  - Plan:   · Agree with Restrepo catheter removal, check a bladder scan 6 hours after to make sure that he is voiding properly  · Will continue intravenous fluids but I will change it to isolate at 60 mL/hour  · Monitor input and output  ·  avoid contrast studies and NSAIDs  ·  Daily basic metabolic panel     2 )  Hypernatremia  - resolved     3 )  Low bicarbonate level  - a changed isolate     4 )  Hypomagnesemia  - magnesium level improved to 1 9     5 )  Chronic kidney disease III  - baseline creatinine 1 4-1 5 mg/dL      SUBJECTIVE / INTERVAL HISTORY:    No issues, rachel removed this am    OBJECTIVE:  Current Weight: Weight - Scale: 98 7 kg (217 lb 9 5 oz)  Vitals:    07/16/19 2300 07/17/19 0254 07/17/19 0329 07/17/19 0718   BP: 132/74 158/93  155/92   Pulse: 103 103  97   Resp:  22  18   Temp:  (!) 97 4 °F (36 3 °C)  99 2 °F (37 3 °C)   TempSrc:       SpO2: 92% 92% 93% 96%   Weight:       Height:           Intake/Output Summary (Last 24 hours) at 7/17/2019 1057  Last data filed at 7/17/2019 0930  Gross per 24 hour   Intake 2820 9 ml   Output 3820 ml   Net -999 1 ml       Review of Systems:    12 point ROS has been reviewed  Physical Exam   Constitutional: He is oriented to person, place, and time  He appears well-developed and well-nourished  No distress  HENT:   Head: Normocephalic and atraumatic  Eyes: Pupils are equal, round, and reactive to light  No scleral icterus  Neck: Normal range of motion  Neck supple  Cardiovascular: Normal rate, regular rhythm and normal heart sounds  Exam reveals no gallop and no friction rub  No murmur heard  Pulmonary/Chest: Effort normal and breath sounds normal  No respiratory distress  He has no wheezes  He has no rales  He exhibits no tenderness  Abdominal: Soft  Bowel sounds are normal  He exhibits no distension  There is no tenderness  There is no rebound  Musculoskeletal: Normal range of motion  He exhibits no edema  Neurological: He is alert and oriented to person, place, and time  Skin: No rash noted  He is not diaphoretic  Psychiatric: He has a normal mood and affect  Nursing note and vitals reviewed        Medications:    Current Facility-Administered Medications:     acetaminophen (TYLENOL) tablet 650 mg, 650 mg, Oral, Q6H PRN, Timothy Tirado MD    carbidopa-levodopa (SINEMET)  mg per tablet 1 5 tablet, 1 5 tablet, Oral, TID With Meals, Debora Ng, 1 5 tablet at 07/17/19 0816    heparin (porcine) subcutaneous injection 5,000 Units, 5,000 Units, Subcutaneous, Q8H [facility], 5,000 Units at 07/17/19 0526 **AND** [CANCELED] Platelet count, , , Once, Rinda Fass    HYDROmorphone (DILAUDID) injection 0 5 mg, 0 5 mg, Intravenous, Q3H PRN, Jefferson Ganser, MD    insulin lispro (HumaLOG) 100 units/mL subcutaneous injection 1-6 Units, 1-6 Units, Subcutaneous, TID With Meals **AND** Fingerstick Glucose (POCT), , , TID ANNA, Rachael Velazco PA-C    multi-electrolyte (ISOLYTE-S PH 7 4 equivalent) IV solution, 60 mL/hr, Intravenous, Continuous, Sue Kimble MD    naloxone (NARCAN) 0 04 mg/mL syringe 0 04 mg, 0 04 mg, Intravenous, Q3 min PRN, Ruslan Ayala    ondansetron [facility]) injection 4 mg, 4 mg, Intravenous, Q4H PRN, Rinda Fass, 4 mg at 07/15/19 0410    oxyCODONE (ROXICODONE) immediate release tablet 10 mg, 10 mg, Oral, Q4H PRN, Jefferson Ganser, MD    oxyCODONE (ROXICODONE) IR tablet 5 mg, 5 mg, Oral, Q4H PRN, Jefferson Ganser, MD    pantoprazole (PROTONIX) injection 40 mg, 40 mg, Intravenous, Q12H [facility], Socorro Ayala, 40 mg at 07/17/19 0817    phenol (CHLORASEPTIC) 1 4 % mucosal liquid 1 spray, 1 spray, Mouth/Throat, Q2H PRN, Serena Fuentes MD, 1 spray at 07/17/19 1004    Laboratory Results:  Results from last 7 days   Lab Units 07/17/19  0537 07/16/19  0512 07/16/19  0453 07/15/19  1044 07/15/19  0636 07/14/19 2017   WBC Thousand/uL 8 28 8 64  --  8 74  --  22 48*   HEMOGLOBIN g/dL 10 0* 10 8*  --  12 8  --  15 4   I STAT HEMOGLOBIN g/dl  --   --   --   --  12 6  --    HEMATOCRIT % 31 9* 33 4*  --  39 3  --  47 0   HEMATOCRIT, ISTAT %  --   --   --   --  37  --    PLATELETS Thousands/uL 128* 118*  --  184  --  263   POTASSIUM mmol/L 3 4*  --  3 6 3 8  --  3 8   CHLORIDE mmol/L 112*  --  113* 107  --  96*   CO2 mmol/L 20*  --  22 22  --  25   CO2, I-STAT mmol/L  --   --   --   --  24  --    BUN mg/dL 59*  --  62* 56*  --  41*   CREATININE mg/dL 2 83*  --  3 66* 3 16*  --  2 61*   CALCIUM mg/dL 8 1*  --  7 3* 8 3  --  11 3*   MAGNESIUM mg/dL 1 9  --  1 4*  --   --   --    PHOSPHORUS mg/dL 3 3  --  4 7*  --   --   --    GLUCOSE, ISTAT mg/dl  --   --   --   --  177*  --

## 2019-07-17 NOTE — PLAN OF CARE
Problem: PHYSICAL THERAPY ADULT  Goal: Performs mobility at highest level of function for planned discharge setting  See evaluation for individualized goals  Description  Treatment/Interventions: Functional transfer training, LE strengthening/ROM, Elevations, Therapeutic exercise, Endurance training, Patient/family training, Equipment eval/education, Bed mobility, Gait training, Spoke to nursing, OT  Equipment Recommended: Walker(RW)       See flowsheet documentation for full assessment, interventions and recommendations  Outcome: Progressing  Note:   Prognosis: Good  Problem List: Decreased strength, Decreased endurance, Impaired balance, Decreased mobility, Decreased safety awareness, Obesity, Decreased skin integrity  Assessment: Pt able to perform R rolling and BM modAx1 with verbal and physical instruction for proper body mechanics and safety throughout  Pt likes to reach towards therapist during BM with improper body mechanics and dec safety awareness  Pt able to perform sit to stand transfers modAx2 to and from raised surfaces  Pt able to ambulate 7 sidesteps towards [facility] with use of RW needing modAx1  Pt able to perform static stand with support of RW in front scoring poor balance with pt ability to self cath independently  Pt would cont to benefit from skilled inpt PT services to maximize functional independence  Barriers to Discharge: Decreased caregiver support, Inaccessible home environment     Recommendation: Post acute IP rehab     PT - OK to Discharge: Yes    See flowsheet documentation for full assessment

## 2019-07-17 NOTE — CONSULTS
Patient seen already by wound care after surgery and yesterday  for ostomy care and support  Patient informed ET nurse he has his colostomy for 20 years and does not need any education or assistance with managing his ostomy  Patient is currently using a two piece 13/4 in cut to fit pouching system by [facility], patients stoma was measured and noted stoma size is slightly large, possibly due to swelling post operatively  Recommendations made to increase opening of barrier to accommodate new size of stoma and patient expressed understanding of recommendation  Patient also provided with sample of one piece pouching system for ostomates with parastomal hernia, offered to place new pouching system however patient declined, patient says he is good with using his current pouch does not want to change, he will cut a larger opening until inflammation resolves  Patient was encouraged asking for ET nurse if he changes his mind or has any questions, patient agreeable with sample pouch ordered for home delivery and made clear he has no need for ET nurse at his moment  Spoke with primary RN yesterday and today, made aware patient is to have ET nurse page if he wants to trial new pouch and have us apply the sample, however does not need our assistance  We will follow as needed by patient      Michelle Amos, RN, BSN, [facility]

## 2019-07-17 NOTE — SOCIAL WORK
CM followed up with general surgery Shade Simons) regarding anticipated discharge date as requested by rehab facilities  Per Jaswinder Banerjee, anticipated discharge will be over the weekend  CM updated Baljinder, AMANDO, and CHARLI

## 2019-07-17 NOTE — PHYSICAL THERAPY NOTE
Physical Therapy Tx Session:       07/17/19 5330   Pain Assessment   Pain Assessment No/denies pain   Pain Score No Pain   Restrictions/Precautions   Other Precautions Impulsive;Multiple lines;Telemetry; Fall Risk   General   Chart Reviewed Yes   Family/Caregiver Present Yes  (wife)   Cognition   Overall Cognitive Status Impaired   Arousal/Participation Cooperative;Responsive   Attention Attends with cues to redirect   Orientation Level Oriented X4   Following Commands Follows one step commands with increased time or repetition  (2* impulsivity)   Subjective   Subjective pt supine in bed resting comfortably;pt willing and agreeable to work with PT and to participate in therapy intervention;"I can try";pt's wife present during therapy session   Bed Mobility   Supine to Sit 3  Moderate assistance   Additional items Assist x 1;HOB elevated; Bedrails; Increased time required;Verbal cues;LE management   Additional Comments inc reach towards therapist with B hands to "pull forward";verbal and physical instruction given to pt with use of bedrail,sidelying and log rolling with proper body mechanics and safety for BM during tx session   Transfers   Sit to Stand 3  Moderate assistance   Additional items Assist x 2;Bedrails; Increased time required;Verbal cues   Stand to Sit 3  Moderate assistance   Additional items Assist x 2;Armrests; Increased time required;Verbal cues  (for safety,education and control descent)   Additional Comments pt able to perform static stand greater than 5 mins for performance of self cath to attempt to urinate;use of Rw in front for stability and support   Ambulation/Elevation   Gait pattern Poor UE support; Improper Weight shift;Narrow WAN; Forward Flexion; Shuffling; Inconsistent lakisha; Foward flexed; Short stride; Ataxia; Step to;Excessively slow   Gait Assistance 3  Moderate assist   Additional items Assist x 1;Verbal cues; Tactile cues   Assistive Device Rolling walker   Distance 7 sidesteps towards [facility] with use of RW on tile surface;limited mobility and gait 2* per NSG bladder scan needed in supine   Balance   Static Sitting Fair   Dynamic Sitting   (zero)   Static Standing Poor   Dynamic Standing Poor   Ambulatory Poor   Endurance Deficit   Endurance Deficit Yes   Endurance Deficit Description weakness,fatigue   Activity Tolerance   Activity Tolerance Patient limited by fatigue  (fair)   Nurse Made Aware yes Higinio Cantrell)   Assessment   Prognosis Good   Problem List Decreased strength;Decreased endurance; Impaired balance;Decreased mobility; Decreased safety awareness; Obesity; Decreased skin integrity   Assessment Pt able to perform R rolling and BM modAx1 with verbal and physical instruction for proper body mechanics and safety throughout  Pt likes to reach towards therapist during BM with improper body mechanics and dec safety awareness  Pt able to perform sit to stand transfers modAx2 to and from raised surfaces  Pt able to ambulate 7 sidesteps towards [facility] with use of RW needing modAx1  Pt able to perform static stand with support of RW in front scoring poor balance with pt ability to self cath independently  Pt would cont to benefit from skilled inpt PT services to maximize functional independence  Barriers to Discharge Decreased caregiver support; Inaccessible home environment   Goals   Patient Goals to rest   STG Expiration Date 07/26/19   Treatment Day 2   Plan   Treatment/Interventions Functional transfer training;LE strengthening/ROM; Elevations; Therapeutic exercise; Endurance training;Patient/family training;Equipment eval/education; Bed mobility;Gait training;Spoke to nursing;Spoke to case management; Family   Progress Slow progress, decreased activity tolerance   PT Frequency Other (Comment)  (3-5x/week)   Recommendation   Recommendation Post acute IP rehab   Equipment Recommended Walker  (current use of RW for mobility)

## 2019-07-18 NOTE — ASSESSMENT & PLAN NOTE
S/p exploratory laparotomy, lysis of adhesions, small bowel resection, primary repair of parastomal hernia, colonoscopy 7/15    POD#3  Afebrile, VSS  Pt straight caths daily at home for >10y       Plan  Clears  isolyte 60cc/h  OOB, ambulate  Urinary retention protocol  Wound care for ostomy  PT/OT

## 2019-07-18 NOTE — OCCUPATIONAL THERAPY NOTE
Occupational Therapy Treatment Note:     07/18/19 1330   Restrictions/Precautions   Weight Bearing Precautions Per Order No   Other Precautions Fall Risk   Pain Assessment   Pain Score No Pain   ADL   Where Assessed Chair   Grooming Assistance 5  Supervision/Setup   Grooming Deficit Wash/dry hands; Wash/dry face; Teeth care   Grooming Comments increasaed time, seated   UB Bathing Assistance 4  Minimal Assistance   UB Bathing Deficit Verbal cueing   UB Bathing Comments seated   LB Bathing Assistance 2  Maximal Assistance   LB Bathing Deficit Right lower leg including foot; Left lower leg including foot   LB Bathing Comments difficulity with distal LB   UB Dressing Assistance 4  Minimal Assistance   UB Dressing Deficit Thread RUE; Thread LUE   UB Dressing Comments don gown   LB Dressing Assistance 2  Maximal Assistance   LB Dressing Deficit Don/doff R sock; Don/doff L sock   LB Dressing Comments reports difficulity with same at baseline   Toileting Assistance  3  Moderate Assistance   Toileting Deficit Perineal hygiene   Toileting Comments asisist with hygiene   Bed Mobility   Additional Comments Pt seated OOB upon presentatino and at end of session   Transfers   Sit to Stand 3  Moderate assistance   Additional items Assist x 1; Increased time required;Verbal cues   Stand to Sit 4  Minimal assistance   Additional items Assist x 1; Increased time required;Verbal cues   Stand pivot 4  Minimal assistance   Additional items Assist x 1; Increased time required;Verbal cues   Additional Comments RW level   Functional Mobility   Functional Mobility 4  Minimal assistance   Additional Comments excessivly slow   Additional items Rolling walker   Cognition   Overall Cognitive Status WFL   Arousal/Participation Cooperative;Responsive   Attention Attends with cues to redirect   Orientation Level Oriented X4   Memory Decreased recall of recent events   Following Commands Follows one step commands with increased time or repetition Comments slow to process at times, increassed TC, VC  Activity Tolerance   Activity Tolerance Patient limited by fatigue   Medical Staff Made Aware NSG aware   Assessment   Assessment Pt was seen this date for OT tx session focusing on self care tasks, sit to stnad progression, tranfers, funiotnlam obility, fall prevention education, energy conservatio ntechniques, and overall activity tolerance  Pt presents seated OOB upon presentation, compeltes previously mentioned activities at documented assist levels, please see flowsheet  Pt is excessivly slow with tasks and continues to require significat physical assist for both, tranfrs, mobility tasks and self care tasks  REquires increased cues at time and observed to be slow to process ocassionally  Pt resting in chair at end of session with all needs in reach would benefit from conitnued OT tx to improve overall funciotnal abilties  Continue to follow with current POC     Plan   Treatment Interventions ADL retraining   Goal Expiration Date 07/30/19   Treatment Day 1   OT Frequency 3-5x/wk   Recommendation   OT Discharge Recommendation Short Term [address], [address]

## 2019-07-18 NOTE — PROGRESS NOTES
Progress Note - Nicholas Sees 1940, 77 y o  male MRN: [id number]    Unit/Bed#: [facility] 813-01 Encounter: [id number]    Primary Care Provider: Keenan Sheldon DO   Date and time admitted to hospital: 7/15/2019  2:17 AM      * Parastomal hernia  Assessment & Plan  S/p exploratory laparotomy, lysis of adhesions, small bowel resection, primary repair of parastomal hernia, colonoscopy 7/15    POD#3  Afebrile, VSS  Pt straight caths daily at home for >10y  Plan  Clears  isolyte 60cc/h  OOB, ambulate  Urinary retention protocol  Wound care for ostomy  PT/OT        Subjective/Objective     Subjective: JOSELIN  Denied fever, chills, chest pain, shortness of breath, nausea, vomiting, or abdominal pain this morning  Reported daily self straight cathing bladder for 11 years  Objective:     Blood pressure (!) 171/96, pulse 100, temperature 100 °F (37 8 °C), resp  rate 20, height 5' 6" (1 676 m), weight 98 7 kg (217 lb 9 5 oz), SpO2 93 %  ,Body mass index is 35 12 kg/m²  Intake/Output Summary (Last 24 hours) at 7/18/2019 0151  Last data filed at 7/18/2019 0000  Gross per 24 hour   Intake 2667 ml   Output 2500 ml   Net 167 ml       Invasive Devices     Peripheral Intravenous Line            Peripheral IV 07/14/19 Left Antecubital 3 days    Peripheral IV 07/15/19 Left Wrist 2 days          Drain            Colostomy LLQ 3 days                Physical Exam  General: NAD  HEENT: NC/AT  MMM  Cv: RRR  Lungs: normal effort  Ab: Soft, nontender  Mild distension    No ostomy output  Ex: no CCE  Neuro: AAOx3      Lab, Imaging and other studies:  I have personally reviewed pertinent lab results    , CBC:   Lab Results   Component Value Date    WBC 8 28 07/17/2019    HGB 10 0 (L) 07/17/2019    HCT 31 9 (L) 07/17/2019    MCV 92 07/17/2019     (L) 07/17/2019    MCH 28 7 07/17/2019    MCHC 31 3 (L) 07/17/2019    RDW 13 6 07/17/2019    MPV 11 1 07/17/2019    NRBC 0 07/17/2019   , CMP:   Lab Results   Component Value Date    SODIUM 139 07/17/2019    K 3 4 (L) 07/17/2019     (H) 07/17/2019    CO2 20 (L) 07/17/2019    BUN 59 (H) 07/17/2019    CREATININE 2 83 (H) 07/17/2019    CALCIUM 8 1 (L) 07/17/2019    EGFR 20 07/17/2019     VTE Pharmacologic Prophylaxis: Heparin  VTE Mechanical Prophylaxis: sequential compression device      Caroline Nicolas, PGY1  Surgery  7/18/19

## 2019-07-18 NOTE — PROGRESS NOTES
NEPHROLOGY PROGRESS NOTE   Eugenia Haq 77 y o  male MRN: [id number]  Unit/Bed#: [facility] 812-30 Encounter: [id number]  Reason for Consult: JELLY    ASSESSMENT and PLAN:    78-year-old male with a history of underlying renal dysfunction who initially had presented for abdominal pain nausea and vomiting   Patient was transferred to  [facility] post lap with small-bowel resection and repair of peristomal hernia   Nephrology consulted for acute kidney injury      1 ) Acute Kidney Injury  - Present on admission (POA) ; admission creatinine: 2 61 mg/dL  - Urinalysis:  Will check a urinalysis    - Imaging: CT  Scan without contrast showed 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  Marolyn Expose is air within the left renal collecting system and left ureter   There appears to be no evidence of hydronephrosis  Marolyn Expose was a concern that he has possibleemphysematous pyelonephritis/cystitis  - Serologies: n/a  - Etiology:  Patient's acute kidney injury had already started prior to surgery, as his creatinine was already 2 6,  That has continued to trend up postop,  In the setting of fluid shifts, prerenal azotemia with a possible component of acute tubular necrosis   Though his fractional excretion of sodium is 0 32%  Which would be more consistent with a prerenal picture  - Status:  Nonoliguric, creatinine has trended back up to 3 1 today  He has helped elevated bladder scans greater than 300 that did require straight catheterization    Possible urinary retention  - Plan:   · Check a bladder scan every 6 hours and straight cath if greater than 250   · Consider a Urology consultation  · Continue isolate at 50 mL/hour  · Monitor input and output  ·  avoid contrast studies and NSAIDs  ·  Daily basic metabolic panel     2 )  Hypernatremia  - resolved     3 )  Low bicarbonate level  - a changed isolate  -improving     4 )  Hypomagnesemia  - magnesium level improved to 1 9     5 )  Chronic kidney disease III  - baseline creatinine 1 4-1 5     6 ) Hypokalemia  -currently getting potassium replacement  -magnesium level is normal  -isolate      SUBJECTIVE / INTERVAL HISTORY:    Straight cath overnight  OBJECTIVE:  Current Weight: Weight - Scale: 98 7 kg (217 lb 9 5 oz)  Vitals:    07/17/19 1511 07/17/19 2335 07/18/19 0722 07/18/19 0725   BP: 134/76 (!) 171/96 160/98    BP Location:   Right arm    Pulse: 100   99   Resp: 18 20 18   Temp: 100 °F (37 8 °C)   98 3 °F (36 8 °C)   TempSrc:       SpO2: 93%   95%   Weight:       Height:           Intake/Output Summary (Last 24 hours) at 7/18/2019 1050  Last data filed at 7/18/2019 0800  Gross per 24 hour   Intake 2624 5 ml   Output 650 ml   Net 1974 5 ml       Review of Systems:    12 point ROS has been reviewed  Physical Exam   Constitutional: He is oriented to person, place, and time  He appears well-developed and well-nourished  No distress  HENT:   Head: Normocephalic and atraumatic  Eyes: Pupils are equal, round, and reactive to light  No scleral icterus  Neck: Normal range of motion  Neck supple  Cardiovascular: Normal rate, regular rhythm and normal heart sounds  Exam reveals no gallop and no friction rub  No murmur heard  Pulmonary/Chest: Effort normal and breath sounds normal  No respiratory distress  He has no wheezes  He has no rales  He exhibits no tenderness  Abdominal: Soft  Bowel sounds are normal  He exhibits no distension  There is no tenderness  There is no rebound  Musculoskeletal: Normal range of motion  He exhibits no edema  Neurological: He is alert and oriented to person, place, and time  Skin: No rash noted  He is not diaphoretic  Psychiatric: He has a normal mood and affect  Nursing note and vitals reviewed        Medications:    Current Facility-Administered Medications:     acetaminophen (TYLENOL) tablet 650 mg, 650 mg, Oral, Q6H PRN, Fe Jeff MD    atorvastatin (LIPITOR) tablet 40 mg, 40 mg, Oral, Daily With Saintclair Spearman, MD, 40 mg at 07/17/19 1759    carbidopa-levodopa (SINEMET)  mg per tablet 1 5 tablet, 1 5 tablet, Oral, TID With Meals, Alonso AkbarOrleans, 1 5 tablet at 07/18/19 0853    carvedilol (COREG) tablet 40 625 mg, 40 625 mg, Oral, Daily, Yessy Martinez MD    finasteride (PROSCAR) tablet 5 mg, 5 mg, Oral, Daily, Yessy Martinez MD, 5 mg at 07/18/19 0853    heparin (porcine) subcutaneous injection 5,000 Units, 5,000 Units, Subcutaneous, Q8H [address], 5,000 Units at 07/18/19 0512 **AND** [CANCELED] Platelet count, , , Once, Alonso AkbarOrleans    HYDROmorphone (DILAUDID) injection 0 5 mg, 0 5 mg, Intravenous, Q3H PRN, Hawk Canales MD    insulin lispro (HumaLOG) 100 units/mL subcutaneous injection 1-6 Units, 1-6 Units, Subcutaneous, TID With Meals, 1 Units at 07/18/19 0853 **AND** Fingerstick Glucose (POCT), , , TID AC, Rachael Velazco PA-C    magnesium sulfate 2 g/50 mL IVPB (premix) 2 g, 2 g, Intravenous, Once, Hawk Canales MD, 2 g at 07/18/19 0930    multi-electrolyte (ISOLYTE-S PH 7 4 equivalent) IV solution, 50 mL/hr, Intravenous, Continuous, Emily Euceda MD    naloxone (NARCAN) 0 04 mg/mL syringe 0 04 mg, 0 04 mg, Intravenous, Q3 min PRN, Jennifer Ayala    ondansetron [facility]) injection 4 mg, 4 mg, Intravenous, Q4H PRN, Alonso AkbarLoren, 4 mg at 07/15/19 0410    oxyCODONE (ROXICODONE) immediate release tablet 10 mg, 10 mg, Oral, Q4H PRN, Hawk Canales MD    oxyCODONE (ROXICODONE) IR tablet 5 mg, 5 mg, Oral, Q4H PRN, Hawk Canales MD, 5 mg at 07/17/19 2238    pantoprazole (PROTONIX) injection 40 mg, 40 mg, Intravenous, Q12H [address], Socorro Ayala, 40 mg at 07/18/19 0853    phenol (CHLORASEPTIC) 1 4 % mucosal liquid 1 spray, 1 spray, Mouth/Throat, Q2H PRN, Pineda Medina MD, 1 spray at 07/17/19 1201    potassium chloride 20 mEq IVPB (premix), 20 mEq, Intravenous, Once, Hawk Canales MD, Last Rate: 50 mL/hr at 07/18/19 0930, 20 mEq at 07/18/19 0930    Laboratory Results:  Results from last 7 days   Lab Units 07/18/19  0507 07/17/19  0537 07/16/19  0512 07/16/19  0453 07/15/19  1044 07/15/19  0636 07/14/19 2017   WBC Thousand/uL 9 32 8 28 8 64  --  8 74  --  22 48*   HEMOGLOBIN g/dL 10 2* 10 0* 10 8*  --  12 8  --  15 4   I STAT HEMOGLOBIN g/dl  --   --   --   --   --  12 6  --    HEMATOCRIT % 31 5* 31 9* 33 4*  --  39 3  --  47 0   HEMATOCRIT, ISTAT %  --   --   --   --   --  37  --    PLATELETS Thousands/uL 142* 128* 118*  --  184  --  263   POTASSIUM mmol/L 3 3* 3 4*  --  3 6 3 8  --  3 8   CHLORIDE mmol/L 110* 112*  --  113* 107  --  96*   CO2 mmol/L 22 20*  --  22 22  --  25   CO2, I-STAT mmol/L  --   --   --   --   --  24  --    BUN mg/dL 55* 59*  --  62* 56*  --  41*   CREATININE mg/dL 3 18* 2 83*  --  3 66* 3 16*  --  2 61*   CALCIUM mg/dL 8 5 8 1*  --  7 3* 8 3  --  11 3*   MAGNESIUM mg/dL 1 8 1 9  --  1 4*  --   --   --    PHOSPHORUS mg/dL 3 1 3 3  --  4 7*  --   --   --    GLUCOSE, ISTAT mg/dl  --   --   --   --   --  177*  --

## 2019-07-18 NOTE — PROGRESS NOTES
Bladder scanned for 310 ml, straight cathed for 650 ml  Patient stated that he has been straight cathing himself 5-6 times a day for about 11 years

## 2019-07-18 NOTE — PLAN OF CARE
Problem: OCCUPATIONAL THERAPY ADULT  Goal: Performs self-care activities at highest level of function for planned discharge setting  See evaluation for individualized goals  Description  Treatment Interventions: ADL retraining, Functional transfer training, Endurance training, Patient/family training, Equipment evaluation/education, Compensatory technique education, Energy conservation, Activityengagement  Equipment Recommended: Bedside commode       See flowsheet documentation for full assessment, interventions and recommendations  Outcome: Progressing  Note:   Limitation: Decreased ADL status, Decreased endurance, Decreased self-care trans, Decreased high-level ADLs  Prognosis: Good  Assessment: Pt was seen this date for OT tx session focusing on self care tasks, sit to stnad progression, tranfers, funiotnlam obility, fall prevention education, energy conservatio ntechniques, and overall activity tolerance  Pt presents seated OOB upon presentation, compeltes previously mentioned activities at documented assist levels, please see flowsheet  Pt is excessivly slow with tasks and continues to require significat physical assist for both, tranfrs, mobility tasks and self care tasks  REquires increased cues at time and observed to be slow to process ocassionally  Pt resting in chair at end of session with all needs in reach would benefit from conitnued OT tx to improve overall funciotnal abilties  Continue to follow with current POC       OT Discharge Recommendation: Short Term Rehab  OT - OK to Discharge: Yes(when medically appropriate)

## 2019-07-18 NOTE — PLAN OF CARE
Problem: Potential for Falls  Goal: Patient will remain free of falls  Description  INTERVENTIONS:  - Assess patient frequently for physical needs  -  Identify cognitive and physical deficits and behaviors that affect risk of falls  -  [place] fall precautions as indicated by assessment   - Educate patient/family on patient safety including physical limitations  - Instruct patient to call for assistance with activity based on assessment  - Modify environment to reduce risk of injury  - Consider OT/PT consult to assist with strengthening/mobility  Outcome: Progressing     Problem: Nutrition/Hydration-ADULT  Goal: Nutrient/Hydration intake appropriate for improving, restoring or maintaining nutritional needs  Description  Monitor and assess patient's nutrition/hydration status for malnutrition (ex- brittle hair, bruises, dry skin, pale skin and conjunctiva, muscle wasting, smooth red tongue, and disorientation)  Collaborate with interdisciplinary team and initiate plan and interventions as ordered  Monitor patient's weight and dietary intake as ordered or per policy  Utilize nutrition screening tool and intervene per policy  Determine patient's food preferences and provide high-protein, high-caloric foods as appropriate       INTERVENTIONS:  - Monitor oral intake, urinary output, labs, and treatment plans  - Assess nutrition and hydration status and recommend course of action  - Evaluate amount of meals eaten  - Assist patient with eating if necessary   - Allow adequate time for meals  - Recommend/ encourage appropriate diets, oral nutritional supplements, and vitamin/mineral supplements  - Order, calculate, and assess calorie counts as needed  - Recommend, monitor, and adjust tube feedings and TPN/PPN based on assessed needs  - Assess need for intravenous fluids  - Provide specific nutrition/hydration education as appropriate  - Include patient/family/caregiver in decisions related to nutrition  Outcome: Progressing     Problem: PAIN - ADULT  Goal: Verbalizes/displays adequate comfort level or baseline comfort level  Description  Interventions:  - Encourage patient to monitor pain and request assistance  - Assess pain using appropriate pain scale  - Administer analgesics based on type and severity of pain and evaluate response  - Implement non-pharmacological measures as appropriate and evaluate response  - Consider cultural and social influences on pain and pain management  - Notify physician/advanced practitioner if interventions unsuccessful or patient reports new pain  Outcome: Progressing     Problem: INFECTION - ADULT  Goal: Absence or prevention of progression during hospitalization  Description  INTERVENTIONS:  - Assess and monitor for signs and symptoms of infection  - Monitor lab/diagnostic results  - Monitor all insertion sites, i e  indwelling lines, tubes, and drains  - Monitor endotracheal (as able) and nasal secretions for changes in amount and color  - [place] appropriate cooling/warming therapies per order  - Administer medications as ordered  - Instruct and encourage patient and family to use good hand hygiene technique  - Identify and instruct in appropriate isolation precautions for identified infection/condition  Outcome: Progressing  Goal: Absence of fever/infection during neutropenic period  Description  INTERVENTIONS:  - Monitor WBC  - Implement neutropenic guidelines  Outcome: Progressing     Problem: SAFETY ADULT  Goal: Patient will remain free of falls  Description  INTERVENTIONS:  - Assess patient frequently for physical needs  -  Identify cognitive and physical deficits and behaviors that affect risk of falls    -  [place] fall precautions as indicated by assessment   - Educate patient/family on patient safety including physical limitations  - Instruct patient to call for assistance with activity based on assessment  - Modify environment to reduce risk of injury  - Consider OT/PT consult to assist with strengthening/mobility  Outcome: Progressing  Goal: Maintain or return to baseline ADL function  Description  INTERVENTIONS:  -  Assess patient's ability to carry out ADLs; assess patient's baseline for ADL function and identify physical deficits which impact ability to perform ADLs (bathing, care of mouth/teeth, toileting, grooming, dressing, etc )  - Assess/evaluate cause of self-care deficits   - Assess range of motion  - Assess patient's mobility; develop plan if impaired  - Assess patient's need for assistive devices and provide as appropriate  - Encourage maximum independence but intervene and supervise when necessary  ¯ Involve family in performance of ADLs  ¯ Assess for home care needs following discharge   ¯ Request OT consult to assist with ADL evaluation and planning for discharge  ¯ Provide patient education as appropriate  Outcome: Progressing  Goal: Maintain or return mobility status to optimal level  Description  INTERVENTIONS:  - Assess patient's baseline mobility status (ambulation, transfers, stairs, etc )    - Identify cognitive and physical deficits and behaviors that affect mobility  - Identify mobility aids required to assist with transfers and/or ambulation (gait belt, sit-to-stand, lift, walker, cane, etc )  - [place] fall precautions as indicated by assessment  - Record patient progress and toleration of activity level on Mobility SBAR; progress patient to next Phase/Stage  - Instruct patient to call for assistance with activity based on assessment  - Request Rehabilitation consult to assist with strengthening/weightbearing, etc   Outcome: Progressing     Problem: DISCHARGE PLANNING  Goal: Discharge to home or other facility with appropriate resources  Description  INTERVENTIONS:  - Identify barriers to discharge w/patient and caregiver  - Arrange for needed discharge resources and transportation as appropriate  - Identify discharge learning needs (meds, wound care, etc )  - Arrange for interpretive services to assist at discharge as needed  - Refer to Case Management Department for coordinating discharge planning if the patient needs post-hospital services based on physician/advanced practitioner order or complex needs related to functional status, cognitive ability, or social support system  Outcome: Progressing     Problem: Knowledge Deficit  Goal: Patient/family/caregiver demonstrates understanding of disease process, treatment plan, medications, and discharge instructions  Description  Complete learning assessment and assess knowledge base    Interventions:  - Provide teaching at level of understanding  - Provide teaching via preferred learning methods  Outcome: Progressing     Problem: Prexisting or High Potential for Compromised Skin Integrity  Goal: Skin integrity is maintained or improved  Description  INTERVENTIONS:  - Identify patients at risk for skin breakdown  - Assess and monitor skin integrity  - Assess and monitor nutrition and hydration status  - Monitor labs (i e  albumin)  - Assess for incontinence   - Turn and reposition patient  - Assist with mobility/ambulation  - Relieve pressure over bony prominences  - Avoid friction and shearing  - Provide appropriate hygiene as needed including keeping skin clean and dry  - Evaluate need for skin moisturizer/barrier cream  - Collaborate with interdisciplinary team (i e  Nutrition, Rehabilitation, etc )   - Patient/family teaching  Outcome: Progressing

## 2019-07-18 NOTE — PROGRESS NOTES
The pantoprazole has / have been converted to Oral per [facility] IV-to-PO Auto-Conversion Protocol for Adults as approved by the Pharmacy and Therapeutics Committee  The patient met all eligible criteria:  3 Age = 24years old   2) Received at least one dose of the IV form   3) Receiving at least one other scheduled oral/enteral medication   4) Tolerating an oral/enteral diet   and did not have any exclusions:   1) Critical care patient   2) Active GI bleed (IF assessing H2RAs or PPIs)   3) Continuous tube feeding (IF assessing cipro, doxycycline, levofloxacin, minocycline, rifampin, or voriconazole)   4) Receiving PO vancomycin (IF assessing metronidazole)   5) Persistent nausea and/or vomiting   6) Ileus or gastrointestinal obstruction   7) [place]/nasogastric tube set for continuous suction   8) Specific order not to automatically convert to PO (in the order's comments or if discussed in the most recent Infectious Disease or primary team's progress notes)       Ruba Dahl, PharmD   Pharmacy Resident

## 2019-07-18 NOTE — PLAN OF CARE
Problem: Potential for Falls  Goal: Patient will remain free of falls  Description  INTERVENTIONS:  - Assess patient frequently for physical needs  -  Identify cognitive and physical deficits and behaviors that affect risk of falls  -  [place] fall precautions as indicated by assessment   - Educate patient/family on patient safety including physical limitations  - Instruct patient to call for assistance with activity based on assessment  - Modify environment to reduce risk of injury  - Consider OT/PT consult to assist with strengthening/mobility  Outcome: Progressing     Problem: Nutrition/Hydration-ADULT  Goal: Nutrient/Hydration intake appropriate for improving, restoring or maintaining nutritional needs  Description  Monitor and assess patient's nutrition/hydration status for malnutrition (ex- brittle hair, bruises, dry skin, pale skin and conjunctiva, muscle wasting, smooth red tongue, and disorientation)  Collaborate with interdisciplinary team and initiate plan and interventions as ordered  Monitor patient's weight and dietary intake as ordered or per policy  Utilize nutrition screening tool and intervene per policy  Determine patient's food preferences and provide high-protein, high-caloric foods as appropriate       INTERVENTIONS:  - Monitor oral intake, urinary output, labs, and treatment plans  - Assess nutrition and hydration status and recommend course of action  - Evaluate amount of meals eaten  - Assist patient with eating if necessary   - Allow adequate time for meals  - Recommend/ encourage appropriate diets, oral nutritional supplements, and vitamin/mineral supplements  - Order, calculate, and assess calorie counts as needed  - Recommend, monitor, and adjust tube feedings and TPN/PPN based on assessed needs  - Assess need for intravenous fluids  - Provide specific nutrition/hydration education as appropriate  - Include patient/family/caregiver in decisions related to nutrition  Outcome: Progressing     Problem: PAIN - ADULT  Goal: Verbalizes/displays adequate comfort level or baseline comfort level  Description  Interventions:  - Encourage patient to monitor pain and request assistance  - Assess pain using appropriate pain scale  - Administer analgesics based on type and severity of pain and evaluate response  - Implement non-pharmacological measures as appropriate and evaluate response  - Consider cultural and social influences on pain and pain management  - Notify physician/advanced practitioner if interventions unsuccessful or patient reports new pain  Outcome: Progressing     Problem: INFECTION - ADULT  Goal: Absence or prevention of progression during hospitalization  Description  INTERVENTIONS:  - Assess and monitor for signs and symptoms of infection  - Monitor lab/diagnostic results  - Monitor all insertion sites, i e  indwelling lines, tubes, and drains  - Monitor endotracheal (as able) and nasal secretions for changes in amount and color  - [place] appropriate cooling/warming therapies per order  - Administer medications as ordered  - Instruct and encourage patient and family to use good hand hygiene technique  - Identify and instruct in appropriate isolation precautions for identified infection/condition  Outcome: Progressing  Goal: Absence of fever/infection during neutropenic period  Description  INTERVENTIONS:  - Monitor WBC  - Implement neutropenic guidelines  Outcome: Progressing     Problem: SAFETY ADULT  Goal: Patient will remain free of falls  Description  INTERVENTIONS:  - Assess patient frequently for physical needs  -  Identify cognitive and physical deficits and behaviors that affect risk of falls    -  [place] fall precautions as indicated by assessment   - Educate patient/family on patient safety including physical limitations  - Instruct patient to call for assistance with activity based on assessment  - Modify environment to reduce risk of injury  - Consider OT/PT consult to assist with strengthening/mobility  Outcome: Progressing  Goal: Maintain or return to baseline ADL function  Description  INTERVENTIONS:  -  Assess patient's ability to carry out ADLs; assess patient's baseline for ADL function and identify physical deficits which impact ability to perform ADLs (bathing, care of mouth/teeth, toileting, grooming, dressing, etc )  - Assess/evaluate cause of self-care deficits   - Assess range of motion  - Assess patient's mobility; develop plan if impaired  - Assess patient's need for assistive devices and provide as appropriate  - Encourage maximum independence but intervene and supervise when necessary  ¯ Involve family in performance of ADLs  ¯ Assess for home care needs following discharge   ¯ Request OT consult to assist with ADL evaluation and planning for discharge  ¯ Provide patient education as appropriate  Outcome: Progressing  Goal: Maintain or return mobility status to optimal level  Description  INTERVENTIONS:  - Assess patient's baseline mobility status (ambulation, transfers, stairs, etc )    - Identify cognitive and physical deficits and behaviors that affect mobility  - Identify mobility aids required to assist with transfers and/or ambulation (gait belt, sit-to-stand, lift, walker, cane, etc )  - [place] fall precautions as indicated by assessment  - Record patient progress and toleration of activity level on Mobility SBAR; progress patient to next Phase/Stage  - Instruct patient to call for assistance with activity based on assessment  - Request Rehabilitation consult to assist with strengthening/weightbearing, etc   Outcome: Progressing     Problem: DISCHARGE PLANNING  Goal: Discharge to home or other facility with appropriate resources  Description  INTERVENTIONS:  - Identify barriers to discharge w/patient and caregiver  - Arrange for needed discharge resources and transportation as appropriate  - Identify discharge learning needs (meds, wound care, etc )  - Arrange for interpretive services to assist at discharge as needed  - Refer to Case Management Department for coordinating discharge planning if the patient needs post-hospital services based on physician/advanced practitioner order or complex needs related to functional status, cognitive ability, or social support system  Outcome: Progressing     Problem: Knowledge Deficit  Goal: Patient/family/caregiver demonstrates understanding of disease process, treatment plan, medications, and discharge instructions  Description  Complete learning assessment and assess knowledge base    Interventions:  - Provide teaching at level of understanding  - Provide teaching via preferred learning methods  Outcome: Progressing     Problem: Prexisting or High Potential for Compromised Skin Integrity  Goal: Skin integrity is maintained or improved  Description  INTERVENTIONS:  - Identify patients at risk for skin breakdown  - Assess and monitor skin integrity  - Assess and monitor nutrition and hydration status  - Monitor labs (i e  albumin)  - Assess for incontinence   - Turn and reposition patient  - Assist with mobility/ambulation  - Relieve pressure over bony prominences  - Avoid friction and shearing  - Provide appropriate hygiene as needed including keeping skin clean and dry  - Evaluate need for skin moisturizer/barrier cream  - Collaborate with interdisciplinary team (i e  Nutrition, Rehabilitation, etc )   - Patient/family teaching  Outcome: Progressing

## 2019-07-18 NOTE — SOCIAL WORK
CM met with pt to discuss preferred acute rehab , referrals were put in for [facility] acute rehab and [facility]  Pt was on phone with his wife and pt requested cm speak with wife   Cm spoke with Deng Archer and her first choice for rehab is [facility] acute rehab   Cm called [facility] and notified Liliana   Cm notified PT and OT that updated notes need for inc auth

## 2019-07-18 NOTE — SOCIAL WORK
Cm received all from Zakiya Dalton from [facility] and Zakiya Dalton received call from pt wife stating she want [facility] for acute rehab   Cm called pt wife Deanne Ye [phone] and [facility] changed her mind and would like pt to go to [facility]   Cm called [facility] and aware

## 2019-07-19 NOTE — PROGRESS NOTES
NEPHROLOGY PROGRESS NOTE   Mat Jeff 77 y o  male MRN: [id number]  Unit/Bed#: [facility] 811-50 Encounter: [id number]  Reason for Consult: JELLY    ASSESSMENT and PLAN:    79-year-old male with a history of underlying renal dysfunction who initially had presented for abdominal pain nausea and vomiting   Patient was transferred to  [facility] [place] with small-bowel resection and repair of peristomal hernia   Nephrology consulted for acute kidney injury      1 ) Acute Kidney Injury  - Present on admission (POA) ; admission creatinine: 2 61 mg/dL  - Urinalysis:  Will check a urinalysis    - Imaging: CT  Scan without contrast showed 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  Wagner Alvarez is air within the left renal collecting system and left ureter   There appears to be no evidence of hydronephrosis  Wagner Alvarez was a concern that he has possibleemphysematous pyelonephritis/cystitis  - Serologies: n/a  - Etiology:  Patient's acute kidney injury had already started prior to surgery, as his creatinine was already 2 6,  That has continued to trend up postop,  In the setting of fluid shifts, prerenal azotemia with a possible component of acute tubular necrosis   Though his fractional excretion of sodium is 0 32%  Which would be more consistent with a prerenal picture  Though his renal function is worsening and there could be a component of urinary retention   - Status:  Nonoliguric, creatinine has trended back up to 3 4, felt nauseous this morning some decreased intake but no vomiting or diarrhea  No episodes of hypotension  He is having elevated postvoid residuals and requiring straight catheterizations  I do not suspect that this is purely prerenal as his renal function is worsening while on intravenous fluids his renal function has worsened after removal of the Restrepo catheter    - Plan:   · Check a bladder scan every 6 hours and straight cath if greater than 250   · Consult Urology  · Increase IV fluids to 75 mL/hour  · Monitor input and output  ·  avoid contrast studies and NSAIDs  ·  Daily basic metabolic panel  · Check a renal ultrasound  · Check a urinalysis     2 )  Hypernatremia  - resolved     3 )  Low bicarbonate level  - a changed isolate  -improving     4 )  Hypomagnesemia  - magnesium level improved to 1 9     5 )  Chronic kidney disease III  - baseline creatinine 1 4-1 5      6 ) Hypokalemia  -resolved      SUBJECTIVE / INTERVAL HISTORY:    Feeling nauseous this morning  Has some acid reflux overnight can sleep blood    OBJECTIVE:  Current Weight: Weight - Scale: 98 7 kg (217 lb 9 5 oz)  Vitals:    07/18/19 2327 07/18/19 2351 07/19/19 0147 07/19/19 0715   BP: 147/88  141/84 163/97   BP Location:       Pulse: 82  88 87   Resp: 18   17   Temp: 98 1 °F (36 7 °C)   98 1 °F (36 7 °C)   TempSrc:       SpO2: (!) 87% 95% 95% 93%   Weight:       Height:           Intake/Output Summary (Last 24 hours) at 7/19/2019 0956  Last data filed at 7/19/2019 0858  Gross per 24 hour   Intake 1244 17 ml   Output 1525 ml   Net -280 83 ml       Review of Systems:    12 point ROS has been reviewed  Physical Exam   Constitutional: He is oriented to person, place, and time  He appears well-developed and well-nourished  No distress  HENT:   Head: Normocephalic and atraumatic  Eyes: Pupils are equal, round, and reactive to light  No scleral icterus  Neck: Normal range of motion  Neck supple  Cardiovascular: Normal rate, regular rhythm and normal heart sounds  Exam reveals no gallop and no friction rub  No murmur heard  Pulmonary/Chest: Effort normal and breath sounds normal  No respiratory distress  He has no wheezes  He has no rales  He exhibits no tenderness  Abdominal: Soft  Bowel sounds are normal  He exhibits no distension  There is no tenderness  There is no rebound  Musculoskeletal: Normal range of motion  He exhibits no edema     Neurological: He is alert and oriented to person, place, and time  Skin: No rash noted  He is not diaphoretic  Psychiatric: He has a normal mood and affect  Nursing note and vitals reviewed        Medications:    Current Facility-Administered Medications:     acetaminophen (TYLENOL) tablet 650 mg, 650 mg, Oral, Q6H PRN, Raji Polanco MD    aluminum-magnesium hydroxide-simethicone (MYLANTA) 200-200-20 mg/5 mL oral suspension 30 mL, 30 mL, Oral, Q4H PRN, Raji Polanco MD, 30 mL at 07/18/19 2309    atorvastatin (LIPITOR) tablet 40 mg, 40 mg, Oral, Daily With Cinthia Aguilera MD, 40 mg at 07/18/19 1637    carbidopa-levodopa (SINEMET)  mg per tablet 1 5 tablet, 1 5 tablet, Oral, TID With Meals, Mirna Fairly, 1 5 tablet at 07/19/19 0801    carvedilol (COREG) tablet 12 5 mg, 12 5 mg, Oral, BID With Meals, Raji Polanco MD, 12 5 mg at 07/19/19 0801    finasteride (PROSCAR) tablet 5 mg, 5 mg, Oral, Daily, Beverly Ahumada MD, 5 mg at 07/19/19 0801    heparin (porcine) subcutaneous injection 5,000 Units, 5,000 Units, Subcutaneous, Q8H [facility], 5,000 Units at 07/19/19 0510 **AND** [CANCELED] Platelet count, , , Once, Marysuha [facility]    HYDROmorphone (DILAUDID) injection 0 5 mg, 0 5 mg, Intravenous, Q3H PRN, Raji Polanco MD    insulin lispro (HumaLOG) 100 units/mL subcutaneous injection 1-6 Units, 1-6 Units, Subcutaneous, TID With Meals, 2 Units at 07/19/19 0801 **AND** Fingerstick Glucose (POCT), , , TID AC, Rachael Velazco PA-C    melatonin tablet 3 mg, 3 mg, Oral, HS PRN, Raji Polanco MD, 3 mg at 07/18/19 2100    multi-electrolyte (ISOLYTE-S PH 7 4 equivalent) IV solution, 75 mL/hr, Intravenous, Continuous, Nabila Woods MD, Last Rate: 50 mL/hr at 07/18/19 1116, 50 mL/hr at 07/18/19 1116    naloxone (NARCAN) 0 04 mg/mL syringe 0 04 mg, 0 04 mg, Intravenous, Q3 min PRN, Yoon Patches Ayala    ondansetron [facility]) injection 4 mg, 4 mg, Intravenous, Q4H PRN, Maryclare Fairly, 4 mg at 07/15/19 0410    oxyCODONE (Hamida Velazco) immediate release tablet 10 mg, 10 mg, Oral, Q4H PRN, Connie Soler MD    oxyCODONE (ROXICODONE) IR tablet 5 mg, 5 mg, Oral, Q4H PRN, Connie Soler MD, 5 mg at 07/17/19 2238    pantoprazole (PROTONIX) EC tablet 40 mg, 40 mg, Oral, BID AC, Jessie Amaya MD, 40 mg at 07/19/19 0510    phenol (CHLORASEPTIC) 1 4 % mucosal liquid 1 spray, 1 spray, Mouth/Throat, Q2H PRN, Santos Perkins MD, 1 spray at 07/17/19 1201    potassium chloride (K-DUR,KLOR-CON) CR tablet 40 mEq, 40 mEq, Oral, Once, Connie Soler MD    Laboratory Results:  Results from last 7 days   Lab Units 07/19/19  0431 07/18/19  0507 07/17/19  0537 07/16/19  0512 07/16/19  0453 07/15/19  1044 07/15/19  0636 07/14/19 2017   WBC Thousand/uL 9 23 9 32 8 28 8 64  --  8 74  --  22 48*   HEMOGLOBIN g/dL 10 8* 10 2* 10 0* 10 8*  --  12 8  --  15 4   I STAT HEMOGLOBIN g/dl  --   --   --   --   --   --  12 6  --    HEMATOCRIT % 33 9* 31 5* 31 9* 33 4*  --  39 3  --  47 0   HEMATOCRIT, ISTAT %  --   --   --   --   --   --  37  --    PLATELETS Thousands/uL 175 142* 128* 118*  --  184  --  263   POTASSIUM mmol/L 3 6 3 3* 3 4*  --  3 6 3 8  --  3 8   CHLORIDE mmol/L 110* 110* 112*  --  113* 107  --  96*   CO2 mmol/L 20* 22 20*  --  22 22  --  25   CO2, I-STAT mmol/L  --   --   --   --   --   --  24  --    BUN mg/dL 56* 55* 59*  --  62* 56*  --  41*   CREATININE mg/dL 3 43* 3 18* 2 83*  --  3 66* 3 16*  --  2 61*   CALCIUM mg/dL 8 8 8 5 8 1*  --  7 3* 8 3  --  11 3*   MAGNESIUM mg/dL 2 2 1 8 1 9  --  1 4*  --   --   --    PHOSPHORUS mg/dL  --  3 1 3 3  --  4 7*  --   --   --    GLUCOSE, ISTAT mg/dl  --   --   --   --   --   --  177*  --

## 2019-07-19 NOTE — SOCIAL WORK
CM called pt wife Cristiano Bonillaleonard [phone] and left voice message   Cm notified Cristiano [facility] does not have a bed this weekend   If pt medically clear cm will need new snf choices   Cm awaiting return call

## 2019-07-19 NOTE — ASSESSMENT & PLAN NOTE
S/p exploratory laparotomy, lysis of adhesions, small bowel resection, primary repair of parastomal hernia, colonoscopy 7/15    VSS  Afebrile  C/o heartburn this AM  Ostomy only w/ scant serosang drainage, no stool       Plan  Regular diet  Continue IVF per renal, will f/u with nephrology to see when can remove  OOB, ambulate  Urinary retention protocol  Wound care for ostomy  PT/OT  Dispo planning to [facility]

## 2019-07-19 NOTE — PLAN OF CARE
Problem: Potential for Falls  Goal: Patient will remain free of falls  Description  INTERVENTIONS:  - Assess patient frequently for physical needs  -  Identify cognitive and physical deficits and behaviors that affect risk of falls  -  [place] fall precautions as indicated by assessment   - Educate patient/family on patient safety including physical limitations  - Instruct patient to call for assistance with activity based on assessment  - Modify environment to reduce risk of injury  - Consider OT/PT consult to assist with strengthening/mobility  Outcome: Progressing     Problem: Nutrition/Hydration-ADULT  Goal: Nutrient/Hydration intake appropriate for improving, restoring or maintaining nutritional needs  Description  Monitor and assess patient's nutrition/hydration status for malnutrition (ex- brittle hair, bruises, dry skin, pale skin and conjunctiva, muscle wasting, smooth red tongue, and disorientation)  Collaborate with interdisciplinary team and initiate plan and interventions as ordered  Monitor patient's weight and dietary intake as ordered or per policy  Utilize nutrition screening tool and intervene per policy  Determine patient's food preferences and provide high-protein, high-caloric foods as appropriate       INTERVENTIONS:  - Monitor oral intake, urinary output, labs, and treatment plans  - Assess nutrition and hydration status and recommend course of action  - Evaluate amount of meals eaten  - Assist patient with eating if necessary   - Allow adequate time for meals  - Recommend/ encourage appropriate diets, oral nutritional supplements, and vitamin/mineral supplements  - Order, calculate, and assess calorie counts as needed  - Recommend, monitor, and adjust tube feedings and TPN/PPN based on assessed needs  - Assess need for intravenous fluids  - Provide specific nutrition/hydration education as appropriate  - Include patient/family/caregiver in decisions related to nutrition  Outcome: Progressing     Problem: PAIN - ADULT  Goal: Verbalizes/displays adequate comfort level or baseline comfort level  Description  Interventions:  - Encourage patient to monitor pain and request assistance  - Assess pain using appropriate pain scale  - Administer analgesics based on type and severity of pain and evaluate response  - Implement non-pharmacological measures as appropriate and evaluate response  - Consider cultural and social influences on pain and pain management  - Notify physician/advanced practitioner if interventions unsuccessful or patient reports new pain  Outcome: Progressing     Problem: INFECTION - ADULT  Goal: Absence or prevention of progression during hospitalization  Description  INTERVENTIONS:  - Assess and monitor for signs and symptoms of infection  - Monitor lab/diagnostic results  - Monitor all insertion sites, i e  indwelling lines, tubes, and drains  - Monitor endotracheal (as able) and nasal secretions for changes in amount and color  - [place] appropriate cooling/warming therapies per order  - Administer medications as ordered  - Instruct and encourage patient and family to use good hand hygiene technique  - Identify and instruct in appropriate isolation precautions for identified infection/condition  Outcome: Progressing  Goal: Absence of fever/infection during neutropenic period  Description  INTERVENTIONS:  - Monitor WBC  - Implement neutropenic guidelines  Outcome: Progressing     Problem: SAFETY ADULT  Goal: Patient will remain free of falls  Description  INTERVENTIONS:  - Assess patient frequently for physical needs  -  Identify cognitive and physical deficits and behaviors that affect risk of falls    -  [place] fall precautions as indicated by assessment   - Educate patient/family on patient safety including physical limitations  - Instruct patient to call for assistance with activity based on assessment  - Modify environment to reduce risk of injury  - Consider OT/PT consult to assist with strengthening/mobility  Outcome: Progressing  Goal: Maintain or return to baseline ADL function  Description  INTERVENTIONS:  -  Assess patient's ability to carry out ADLs; assess patient's baseline for ADL function and identify physical deficits which impact ability to perform ADLs (bathing, care of mouth/teeth, toileting, grooming, dressing, etc )  - Assess/evaluate cause of self-care deficits   - Assess range of motion  - Assess patient's mobility; develop plan if impaired  - Assess patient's need for assistive devices and provide as appropriate  - Encourage maximum independence but intervene and supervise when necessary  ¯ Involve family in performance of ADLs  ¯ Assess for home care needs following discharge   ¯ Request OT consult to assist with ADL evaluation and planning for discharge  ¯ Provide patient education as appropriate  Outcome: Progressing  Goal: Maintain or return mobility status to optimal level  Description  INTERVENTIONS:  - Assess patient's baseline mobility status (ambulation, transfers, stairs, etc )    - Identify cognitive and physical deficits and behaviors that affect mobility  - Identify mobility aids required to assist with transfers and/or ambulation (gait belt, sit-to-stand, lift, walker, cane, etc )  - [place] fall precautions as indicated by assessment  - Record patient progress and toleration of activity level on Mobility SBAR; progress patient to next Phase/Stage  - Instruct patient to call for assistance with activity based on assessment  - Request Rehabilitation consult to assist with strengthening/weightbearing, etc   Outcome: Progressing     Problem: DISCHARGE PLANNING  Goal: Discharge to home or other facility with appropriate resources  Description  INTERVENTIONS:  - Identify barriers to discharge w/patient and caregiver  - Arrange for needed discharge resources and transportation as appropriate  - Identify discharge learning needs (meds, wound care, etc )  - Arrange for interpretive services to assist at discharge as needed  - Refer to Case Management Department for coordinating discharge planning if the patient needs post-hospital services based on physician/advanced practitioner order or complex needs related to functional status, cognitive ability, or social support system  Outcome: Progressing     Problem: Knowledge Deficit  Goal: Patient/family/caregiver demonstrates understanding of disease process, treatment plan, medications, and discharge instructions  Description  Complete learning assessment and assess knowledge base    Interventions:  - Provide teaching at level of understanding  - Provide teaching via preferred learning methods  Outcome: Progressing     Problem: Prexisting or High Potential for Compromised Skin Integrity  Goal: Skin integrity is maintained or improved  Description  INTERVENTIONS:  - Identify patients at risk for skin breakdown  - Assess and monitor skin integrity  - Assess and monitor nutrition and hydration status  - Monitor labs (i e  albumin)  - Assess for incontinence   - Turn and reposition patient  - Assist with mobility/ambulation  - Relieve pressure over bony prominences  - Avoid friction and shearing  - Provide appropriate hygiene as needed including keeping skin clean and dry  - Evaluate need for skin moisturizer/barrier cream  - Collaborate with interdisciplinary team (i e  Nutrition, Rehabilitation, etc )   - Patient/family teaching  Outcome: Progressing

## 2019-07-19 NOTE — OCCUPATIONAL THERAPY NOTE
Occupational Therapy Cancel Note    Chart reviewed, attempt see pt however pt currently off floor at ultrasound and not available forOT tx at this time  Continue to follow as able       Shirley Josue

## 2019-07-19 NOTE — RESTORATIVE TECHNICIAN NOTE
Restorative Specialist Mobility Note       Activity: Ambulate in bruno, Bathroom privileges     Assistive Device: Front wheel walker        Repositioned: Supine              Anti-Embolism Device On:  Bilateral, Sequential compression devices, below knee

## 2019-07-19 NOTE — PROGRESS NOTES
Progress Note - Patricia Pablo 1940, 77 y o  male MRN: [id number]    Unit/Bed#: [facility] 813-01 Encounter: [id number]    Primary Care Provider: Aline Milner DO   Date and time admitted to hospital: 7/15/2019  2:17 AM        * Parastomal hernia  Assessment & Plan  S/p exploratory laparotomy, lysis of adhesions, small bowel resection, primary repair of parastomal hernia, colonoscopy 7/15    VSS  Afebrile  C/o heartburn this AM  Ostomy only w/ scant serosang drainage, no stool  Plan  Regular diet  Continue IVF per renal, will f/u with nephrology to see when can remove  OOB, ambulate  Urinary retention protocol  Wound care for ostomy  PT/OT  Dispo planning to [facility]                Subjective/Objective   Subjective: C/o heartburn and now w hickups  Denied any nausea or vomiting  Tolerating regular diet  No ostomy output  Urinating w/o issues  Denied fever, chills,shortness of breath, nausea, vomiting, or abdominal pain this morning  Objective:     Blood pressure 141/84, pulse 88, temperature 98 1 °F (36 7 °C), resp  rate 18, height 5' 6" (1 676 m), weight 98 7 kg (217 lb 9 5 oz), SpO2 95 %  ,Body mass index is 35 12 kg/m²  Intake/Output Summary (Last 24 hours) at 7/19/2019 0535  Last data filed at 7/19/2019 0401  Gross per 24 hour   Intake 1467 17 ml   Output 1525 ml   Net -57 83 ml       Invasive Devices     Peripheral Intravenous Line            Peripheral IV 07/18/19 Left Forearm less than 1 day          Drain            Colostomy LLQ 4 days                Physical Exam  General: NAD  HEENT: NC/AT  MMM  Cv: RRR  Lungs: normal effort  Ab: mild distension noted and mild erythema around incision site  Nontender througout and BS+  Scant serosang output into ostomy     Ex: no CCE  Neuro: AAOx3    CBC [[id number]] (Abnormal) Collected: 07/18/19 0507   Lab Status: Final result Specimen: Blood from Arm, Right Updated: 07/18/19 0630    WBC 9 32 Thousand/uL     RBC 3 53Low  Million/uL     Hemoglobin 10 2Low g/dL     Hematocrit 31 5Low  %     MCV 89 fL     MCH 28 9 pg     MCHC 32 4 g/dL     RDW 13 6 %     Platelets 985QPE  Thousands/uL     MPV 11 4 fL    Basic metabolic panel [[id number]] (Abnormal) Collected: 07/18/19 0507   Lab Status: Final result Specimen: Blood from Arm, Right Updated: 07/18/19 2621    Sodium 138 mmol/L     Potassium 3 3Low  mmol/L     Chloride 110High  mmol/L     CO2 22 mmol/L     ANION GAP 6 mmol/L     BUN 55High  mg/dL     Creatinine 3  18High  mg/dL     Glucose 148High  mg/dL     Calcium 8 5 mg/dL     eGFR 18 ml/min/1 73sq m        VTE Pharmacologic Prophylaxis: Heparin  VTE Mechanical Prophylaxis: sequential compression device    ZOE Peralta  7/19/2019  5:37AM

## 2019-07-19 NOTE — PHYSICAL THERAPY NOTE
Physical Therapy Cancellation Note    PT ATTEMPTED TO SEE PATIENT ON TWO OCCASIONS TODAY FOR TREATMENT SESSION  DURING FIRST ATTEMPT PATIENT DECLINED PARTICIPATION SECONDARY TO HAVING RECENTLY AMBULATED WITH RESTORATIVE THERAPIST  DURING 2ND ATTEMPT PATIENT OFF FLOOR  PT WILL CONTINUE TO FOLLOW ON CASELOAD AND PERFORM FUTURE TREATMENT SESSIONS AS MEDICALLY APPROPRIATE      Sosa Srivastava, AIYANA

## 2019-07-20 NOTE — PROGRESS NOTES
Progress Note - General Surgery   Veena De La Torre 77 y o  male MRN: [id number]  Unit/Bed#: [facility] 813-01 Encounter: [id number]    Assessment:  Renal function stabilized  Stoma functioning  Plan:  Support and consider discharge when ok with nephrology/renal function stabilized  Subjective/Objective   Chief Complaint: Abdominal pain  Subjective: Improving  GI function returning  Objective:     Blood pressure (!) 180/98, pulse 90, temperature 99 °F (37 2 °C), resp  rate 18, height 5' 6" (1 676 m), weight 98 7 kg (217 lb 9 5 oz), SpO2 94 %  ,Body mass index is 35 12 kg/m²  Intake/Output Summary (Last 24 hours) at 7/20/2019 0701  Last data filed at 7/20/2019 0612  Gross per 24 hour   Intake 2074 17 ml   Output 2500 ml   Net -425 83 ml       Invasive Devices     Peripheral Intravenous Line            Peripheral IV 07/18/19 Left Forearm 1 day          Drain            Colostomy LLQ 5 days                Physical Exam:   NAD  Abdomen mildly distended  Stoma functioning  Wounds ok  Lab, Imaging and other studies:I have personally reviewed pertinent lab results      VTE Pharmacologic Prophylaxis: Heparin  VTE Mechanical Prophylaxis: sequential compression device

## 2019-07-20 NOTE — CONSULTS
UROLOGY CONSULTATION NOTE     Patient Identifiers: Ligia Goodwin (MRN [id number])  Service Requesting Consultation:  Surgery  Service Providing Consultation:  Urology, Rick Sandra MD    Date of Service: 7/20/2019    Reason for Consultation:  Postoperative urinary retention    History of Present Illness:     Ligia Goodwin is a 77 y o  with a history of low anterior resection for rectal cancer 1998  He has had an and colostomy since that time  He recently developed a parastomal hernia  He underwent exploratory laparotomy with lysis of adhesions, small bowel resection with repair parastomal hernia on July 15, 2019 by Dr Reed Calderon  Urology is now consulted for postoperative urinary retention  There are no urine cultures available for my review  The patient states that he has been performing clean intermittent catheterization for at least 10 years  He is well known to Dr Kenzie Aguero from the outpatient setting  Urologic consultation obtained for his urinary retention which is chronic and stable  In addition the patient is found to have acute kidney injury with a creatinine of greater than 3  Creatinine today 2 96  Recent retroperitoneal ultrasound shows mild to moderate bilateral hydronephrosis      Past Medical, Past Surgical History:     Past Medical History:   Diagnosis Date    Benign prostatic hyperplasia with urinary obstruction and other lower urinary tract symptoms     Colon cancer ([facility] [address] ) 1998    Elevated PSA     last assessed 3/11/2014    Hypercholesterolemia     Hypertension     Malignant neoplasm of skin     Microhematuria     Neurogenic bladder     last assessed 6/13/2014    Type 2 diabetes mellitus ([address] )     Urinary incontinence      Past Surgical History:   Procedure Laterality Date    APPENDECTOMY      3 y/o    COLON SURGERY      COLOSTOMY      LAPAROTOMY N/A 7/15/2019    Procedure: LAPAROTOMY EXPLORATORY; BOWEL RESECTION; HERNIA REPAIR;  Surgeon: Gila John MD;  [facility]: [facility];  Service: Colorectal    PROSTATE BIOPSY      ROTATOR CUFF REPAIR      resolved 1999    SKIN BIOPSY      SMALL INTESTINE SURGERY N/A 7/15/2019    Procedure: RESECTION SMALL BOWEL;  Surgeon: Tariq Shane MD;  [facility]: [facility];  Service: Colorectal    TONSILLECTOMY      5 y/o    VASECTOMY        Medications, Allergies:   Scheduled Meds:  Current Facility-Administered Medications:  acetaminophen 650 mg Oral Q6H PRN Shaneka Gonsalez MD    aluminum-magnesium hydroxide-simethicone 30 mL Oral Q4H PRN Shaneka Gonsalez MD    amLODIPine 5 mg Oral Daily Kristie Dang MD    atorvastatin 40 mg Oral Daily With Neno Knox MD    carbidopa-levodopa 1 5 tablet Oral TID With Meals Carline Ayala    carvedilol 12 5 mg Oral BID With James Neff MD    finasteride 5 mg Oral Daily David Mason MD    heparin (porcine) 5,000 Units Subcutaneous Q8H [facility] Carline Ayala    HYDROmorphone 0 5 mg Intravenous Q3H PRN Shaneka Gonsalez MD    insulin lispro 1-6 Units Subcutaneous TID With Meals Rachael Velazco PA-C    melatonin 3 mg Oral HS PRN Shaneka Gonsalez MD    multi-electrolyte 75 mL/hr Intravenous Continuous Mary Jane eVrdin MD Last Rate: 75 mL/hr (07/20/19 1115)   naloxone 0 04 mg Intravenous Q3 min PRN Carline Ayala    ondansetron 4 mg Intravenous Q4H PRN Freida Hamman    oxyCODONE 10 mg Oral Q4H PRN Shaneka Gonsalez MD    oxyCODONE 5 mg Oral Q4H PRN Shaneka Gonsalez MD    pantoprazole 40 mg Oral BID AC Tariq Shane MD    phenol 1 spray Mouth/Throat Q2H PRN Niko Edwards MD      Continuous Infusions:  multi-electrolyte 75 mL/hr Last Rate: 75 mL/hr (07/20/19 1115)     PRN Meds:   acetaminophen    aluminum-magnesium hydroxide-simethicone    HYDROmorphone    melatonin    naloxone    ondansetron    oxyCODONE    oxyCODONE    phenol    Allergies:   Allergies   Allergen Reactions    Atorvastatin Myalgia    Lyrica [Pregabalin]    :    Social and Family History:   Social History:   Social History     Tobacco Use    Smoking status: Former Smoker     Last attempt to quit: 1980     Years since quittin 5    Smokeless tobacco: Never Used    Tobacco comment: per [employer] 'former smoker- negative tobacco x 25 yrs, prev 1 ppd x 25 rs'   Substance Use Topics    Alcohol use: Yes     Frequency: Monthly or less     Binge frequency: Less than monthly     Comment: SOCIAL    Drug use: No        Social History     Tobacco Use   Smoking Status Former Smoker    Last attempt to quit: Suresh Alvarengan Years since quittin 5   Smokeless Tobacco Never Used   Tobacco Comment    per [employer] 'former smoker- negative tobacco x 25 yrs, prev 1 ppd x 25 rs'       Family History:  Family History   Problem Relation Age of Onset    Kidney failure Mother         renal failure    Heart attack Father         MI    Diabetes Father         DM    Hyperlipidemia Father         hypercholesterolemia    Other Sister         mesothelioma    Coronary artery disease Brother         CABG    Cancer Brother         urinary bladder    Prostate cancer Brother    :     Review of Systems:   He continues to catheterize without incident  He denies any nausea or vomiting  He denies any chest pain or shortness of breath  He has mild abdominal pain consistent with recent surgery  All other systems queried were negative  Physical Exam:     Vitals:    19 0657   BP: (!) 180/98   Pulse:    Resp:    Temp:    SpO2:        PE:  General:  Awake alert oriented no acute distress, elderly appearing  HEENT:  Normocephalic, atraumatic, sclerae nonicteric  Cardiac:  Regular  Abdomen:  Soft obese nontender nondistended  Incision clean dry and intact with staples    And colostomy in the left lower quadrant with gas and stool in the bag appreciated  Skin:  Warm  Extremities:  Without edema  Neurologic:  Grossly intact and nonfocal  Gait:  Slow  Affect:  Appropriate    Labs:     Lab Results   Component Value Date    HGB 9 7 (L) 07/20/2019    HCT 30 8 (L) 07/20/2019    WBC 8 66 07/20/2019     07/20/2019   ]    Lab Results   Component Value Date     03/08/2017    K 3 5 07/20/2019     (H) 07/20/2019    CO2 21 07/20/2019    BUN 51 (H) 07/20/2019    CREATININE 2 96 (H) 07/20/2019    CALCIUM 8 4 07/20/2019    GLUCOSE 177 (H) 07/15/2019   ]    Imaging:       ASSESSMENT:     77 y o  old male with  rectal cancer status post low anterior resection status post recent exploratory laparotomy with small-bowel resection, lysis of adhesions, and repair of peristomal hernia, urinary retention (chronic), acute kidney injury    PLAN:     Based on the patient's current creatinine and recent ultrasound which reveals mild bilateral hydronephrosis, I recommend insertion of a Restrepo catheter until the creatinine nadirs  Monitor creatinine and follow urine output  Continue to follow recommendations of Nephrology  Thank you for allowing me to participate in this patients care  Please do not hesitate to call with any additional questions    Yvan Christy MD

## 2019-07-20 NOTE — PLAN OF CARE
Problem: Potential for Falls  Goal: Patient will remain free of falls  Description  INTERVENTIONS:  - Assess patient frequently for physical needs  -  Identify cognitive and physical deficits and behaviors that affect risk of falls  -  [place] fall precautions as indicated by assessment   - Educate patient/family on patient safety including physical limitations  - Instruct patient to call for assistance with activity based on assessment  - Modify environment to reduce risk of injury  - Consider OT/PT consult to assist with strengthening/mobility  Outcome: Progressing     Problem: Nutrition/Hydration-ADULT  Goal: Nutrient/Hydration intake appropriate for improving, restoring or maintaining nutritional needs  Description  Monitor and assess patient's nutrition/hydration status for malnutrition (ex- brittle hair, bruises, dry skin, pale skin and conjunctiva, muscle wasting, smooth red tongue, and disorientation)  Collaborate with interdisciplinary team and initiate plan and interventions as ordered  Monitor patient's weight and dietary intake as ordered or per policy  Utilize nutrition screening tool and intervene per policy  Determine patient's food preferences and provide high-protein, high-caloric foods as appropriate       INTERVENTIONS:  - Monitor oral intake, urinary output, labs, and treatment plans  - Assess nutrition and hydration status and recommend course of action  - Evaluate amount of meals eaten  - Assist patient with eating if necessary   - Allow adequate time for meals  - Recommend/ encourage appropriate diets, oral nutritional supplements, and vitamin/mineral supplements  - Order, calculate, and assess calorie counts as needed  - Recommend, monitor, and adjust tube feedings and TPN/PPN based on assessed needs  - Assess need for intravenous fluids  - Provide specific nutrition/hydration education as appropriate  - Include patient/family/caregiver in decisions related to nutrition  Outcome: Progressing     Problem: PAIN - ADULT  Goal: Verbalizes/displays adequate comfort level or baseline comfort level  Description  Interventions:  - Encourage patient to monitor pain and request assistance  - Assess pain using appropriate pain scale  - Administer analgesics based on type and severity of pain and evaluate response  - Implement non-pharmacological measures as appropriate and evaluate response  - Consider cultural and social influences on pain and pain management  - Notify physician/advanced practitioner if interventions unsuccessful or patient reports new pain  Outcome: Progressing     Problem: INFECTION - ADULT  Goal: Absence or prevention of progression during hospitalization  Description  INTERVENTIONS:  - Assess and monitor for signs and symptoms of infection  - Monitor lab/diagnostic results  - Monitor all insertion sites, i e  indwelling lines, tubes, and drains  - Monitor endotracheal (as able) and nasal secretions for changes in amount and color  - [place] appropriate cooling/warming therapies per order  - Administer medications as ordered  - Instruct and encourage patient and family to use good hand hygiene technique  - Identify and instruct in appropriate isolation precautions for identified infection/condition  Outcome: Progressing  Goal: Absence of fever/infection during neutropenic period  Description  INTERVENTIONS:  - Monitor WBC  - Implement neutropenic guidelines  Outcome: Progressing     Problem: SAFETY ADULT  Goal: Patient will remain free of falls  Description  INTERVENTIONS:  - Assess patient frequently for physical needs  -  Identify cognitive and physical deficits and behaviors that affect risk of falls    -  [place] fall precautions as indicated by assessment   - Educate patient/family on patient safety including physical limitations  - Instruct patient to call for assistance with activity based on assessment  - Modify environment to reduce risk of injury  - Consider OT/PT consult to assist with strengthening/mobility  Outcome: Progressing  Goal: Maintain or return to baseline ADL function  Description  INTERVENTIONS:  -  Assess patient's ability to carry out ADLs; assess patient's baseline for ADL function and identify physical deficits which impact ability to perform ADLs (bathing, care of mouth/teeth, toileting, grooming, dressing, etc )  - Assess/evaluate cause of self-care deficits   - Assess range of motion  - Assess patient's mobility; develop plan if impaired  - Assess patient's need for assistive devices and provide as appropriate  - Encourage maximum independence but intervene and supervise when necessary  ¯ Involve family in performance of ADLs  ¯ Assess for home care needs following discharge   ¯ Request OT consult to assist with ADL evaluation and planning for discharge  ¯ Provide patient education as appropriate  Outcome: Progressing  Goal: Maintain or return mobility status to optimal level  Description  INTERVENTIONS:  - Assess patient's baseline mobility status (ambulation, transfers, stairs, etc )    - Identify cognitive and physical deficits and behaviors that affect mobility  - Identify mobility aids required to assist with transfers and/or ambulation (gait belt, sit-to-stand, lift, walker, cane, etc )  - [place] fall precautions as indicated by assessment  - Record patient progress and toleration of activity level on Mobility SBAR; progress patient to next Phase/Stage  - Instruct patient to call for assistance with activity based on assessment  - Request Rehabilitation consult to assist with strengthening/weightbearing, etc   Outcome: Progressing     Problem: DISCHARGE PLANNING  Goal: Discharge to home or other facility with appropriate resources  Description  INTERVENTIONS:  - Identify barriers to discharge w/patient and caregiver  - Arrange for needed discharge resources and transportation as appropriate  - Identify discharge learning needs (meds, wound care, etc )  - Arrange for interpretive services to assist at discharge as needed  - Refer to Case Management Department for coordinating discharge planning if the patient needs post-hospital services based on physician/advanced practitioner order or complex needs related to functional status, cognitive ability, or social support system  Outcome: Progressing     Problem: Knowledge Deficit  Goal: Patient/family/caregiver demonstrates understanding of disease process, treatment plan, medications, and discharge instructions  Description  Complete learning assessment and assess knowledge base    Interventions:  - Provide teaching at level of understanding  - Provide teaching via preferred learning methods  Outcome: Progressing     Problem: Prexisting or High Potential for Compromised Skin Integrity  Goal: Skin integrity is maintained or improved  Description  INTERVENTIONS:  - Identify patients at risk for skin breakdown  - Assess and monitor skin integrity  - Assess and monitor nutrition and hydration status  - Monitor labs (i e  albumin)  - Assess for incontinence   - Turn and reposition patient  - Assist with mobility/ambulation  - Relieve pressure over bony prominences  - Avoid friction and shearing  - Provide appropriate hygiene as needed including keeping skin clean and dry  - Evaluate need for skin moisturizer/barrier cream  - Collaborate with interdisciplinary team (i e  Nutrition, Rehabilitation, etc )   - Patient/family teaching  Outcome: Progressing

## 2019-07-20 NOTE — PROGRESS NOTES
NEPHROLOGY PROGRESS NOTE    Joanne Leonard 77 y o  male MRN: [id number]  Unit/Bed#: [facility] 813-01 Encounter: [id number]  Reason for Consult:  Acute on chronic kidney disease    ASSESSMENT/PLAN:  1  Renal  Patient's acute on chronic kidney disease baseline creatinine is around 1 4  He has some volume depletion issues with urine retention and creatinine at this point is declining with IV fluids  My partners consult Urology who has not seen the patient yet will await their input  For now IV fluid rate was decreased  Electrolytes and acid-base status have normalized  Monitor volume status  Continue IV fluids  Await Urology input    2  Hypertension  Looking blood pressure is on carvedilol alone  Will add amlodipine 5 mg a day as patient has significant hypertension  Amlodipine 5 mg p o  Q day monitor blood pressure    3  Status post exploratory laparotomy lysis of adhesions small-bowel resection  Postop care per surgery  SUBJECTIVE:  Review of Systems   Constitution: Positive for malaise/fatigue  Negative for chills and fever  HENT: Negative  Eyes: Negative  Cardiovascular: Negative for chest pain and orthopnea  Respiratory: Negative for cough, shortness of breath and wheezing  Gastrointestinal: Positive for bloating and abdominal pain  Negative for diarrhea, nausea and vomiting  Genitourinary: Negative for dysuria and hematuria  OBJECTIVE:  Current Weight: Weight - Scale: 98 7 kg (217 lb 9 5 oz)  Vitals:Temp (24hrs), Av 2 °F (36 8 °C), Min:97 9 °F (36 6 °C), Max:99 °F (37 2 °C)  Current: Temperature: 99 °F (37 2 °C)   Blood pressure (!) 180/98, pulse 90, temperature 99 °F (37 2 °C), resp  rate 18, height 5' 6" (1 676 m), weight 98 7 kg (217 lb 9 5 oz), SpO2 94 %  , Body mass index is 35 12 kg/m²        Intake/Output Summary (Last 24 hours) at 2019 0832  Last data filed at 2019 0612  Gross per 24 hour   Intake 2074 17 ml   Output 2500 ml   Net -425 83 ml       Physical Exam: BP (!) 180/98 (BP Location: Right arm)   Pulse 90   Temp 99 °F (37 2 °C)   Resp 18   Ht 5' 6" (1 676 m)   Wt 98 7 kg (217 lb 9 5 oz)   SpO2 94%   BMI 35 12 kg/m²   Physical Exam   Constitutional: He is oriented to person, place, and time  No distress  Neck: Neck supple  No JVD present  Cardiovascular: Normal rate and regular rhythm  Exam reveals no friction rub  Pulmonary/Chest: Effort normal and breath sounds normal  No respiratory distress  He has no wheezes  He has no rales  Abdominal: Soft  He exhibits distension  There is tenderness  Neurological: He is alert and oriented to person, place, and time         Medications:    Current Facility-Administered Medications:     acetaminophen (TYLENOL) tablet 650 mg, 650 mg, Oral, Q6H PRN, Herbie Duke MD    aluminum-magnesium hydroxide-simethicone (MYLANTA) 200-200-20 mg/5 mL oral suspension 30 mL, 30 mL, Oral, Q4H PRN, Herbie Duke MD, 30 mL at 07/18/19 2309    atorvastatin (LIPITOR) tablet 40 mg, 40 mg, Oral, Daily With Carolyne Owens MD, 40 mg at 07/19/19 1551    carbidopa-levodopa (SINEMET)  mg per tablet 1 5 tablet, 1 5 tablet, Oral, TID With Meals, Saima Farias, 1 5 tablet at 07/20/19 0813    carvedilol (COREG) tablet 12 5 mg, 12 5 mg, Oral, BID With Meals, Herbie Duke MD, 12 5 mg at 07/20/19 0813    finasteride (PROSCAR) tablet 5 mg, 5 mg, Oral, Daily, Edin Terry MD, 5 mg at 07/20/19 0813    heparin (porcine) subcutaneous injection 5,000 Units, 5,000 Units, Subcutaneous, Q8H [address], 5,000 Units at 07/20/19 0614 **AND** [CANCELED] Platelet count, , , Once, Saima Crystal    HYDROmorphone (DILAUDID) injection 0 5 mg, 0 5 mg, Intravenous, Q3H PRN, Herbie Duke MD    insulin lispro (HumaLOG) 100 units/mL subcutaneous injection 1-6 Units, 1-6 Units, Subcutaneous, TID With Meals, 1 Units at 07/20/19 0813 **AND** Fingerstick Glucose (POCT), , , TID ANNA, Rachael Velazco PA-C    melatonin tablet 3 mg, 3 mg, Oral, HS PRN, Bo Gold MD, 3 mg at 07/18/19 2100    multi-electrolyte (ISOLYTE-S PH 7 4 equivalent) IV solution, 75 mL/hr, Intravenous, Continuous, Abbi Roth MD, Last Rate: 75 mL/hr at 07/19/19 2142, 75 mL/hr at 07/19/19 2142    naloxone (NARCAN) 0 04 mg/mL syringe 0 04 mg, 0 04 mg, Intravenous, Q3 min PRN, Ld Ayala    ondansetron [facility]) injection 4 mg, 4 mg, Intravenous, Q4H PRN, Malorie Fuel, 4 mg at 07/15/19 0410    oxyCODONE (ROXICODONE) immediate release tablet 10 mg, 10 mg, Oral, Q4H PRN, Bo Gold MD    oxyCODONE (ROXICODONE) IR tablet 5 mg, 5 mg, Oral, Q4H PRN, Bo Gold MD, 5 mg at 07/17/19 2238    pantoprazole (PROTONIX) EC tablet 40 mg, 40 mg, Oral, BID AC, Priscila Mendez MD, 40 mg at 07/20/19 0614    phenol (CHLORASEPTIC) 1 4 % mucosal liquid 1 spray, 1 spray, Mouth/Throat, Q2H PRN, Williams Lee MD, 1 spray at 07/17/19 1201    Laboratory Results:  Lab Results   Component Value Date    WBC 8 66 07/20/2019    HGB 9 7 (L) 07/20/2019    HCT 30 8 (L) 07/20/2019    MCV 90 07/20/2019     07/20/2019     Lab Results   Component Value Date    SODIUM 142 07/20/2019    K 3 5 07/20/2019     (H) 07/20/2019    CO2 21 07/20/2019    BUN 51 (H) 07/20/2019    CREATININE 2 96 (H) 07/20/2019    GLUC 158 (H) 07/20/2019    CALCIUM 8 4 07/20/2019     Lab Results   Component Value Date    CALCIUM 8 4 07/20/2019    PHOS 3 1 07/18/2019     No results found for: LABPROT

## 2019-07-21 NOTE — PLAN OF CARE
Problem: Potential for Falls  Goal: Patient will remain free of falls  Description  INTERVENTIONS:  - Assess patient frequently for physical needs  -  Identify cognitive and physical deficits and behaviors that affect risk of falls  -  [place] fall precautions as indicated by assessment   - Educate patient/family on patient safety including physical limitations  - Instruct patient to call for assistance with activity based on assessment  - Modify environment to reduce risk of injury  - Consider OT/PT consult to assist with strengthening/mobility  Outcome: Progressing     Problem: Nutrition/Hydration-ADULT  Goal: Nutrient/Hydration intake appropriate for improving, restoring or maintaining nutritional needs  Description  Monitor and assess patient's nutrition/hydration status for malnutrition (ex- brittle hair, bruises, dry skin, pale skin and conjunctiva, muscle wasting, smooth red tongue, and disorientation)  Collaborate with interdisciplinary team and initiate plan and interventions as ordered  Monitor patient's weight and dietary intake as ordered or per policy  Utilize nutrition screening tool and intervene per policy  Determine patient's food preferences and provide high-protein, high-caloric foods as appropriate       INTERVENTIONS:  - Monitor oral intake, urinary output, labs, and treatment plans  - Assess nutrition and hydration status and recommend course of action  - Evaluate amount of meals eaten  - Assist patient with eating if necessary   - Allow adequate time for meals  - Recommend/ encourage appropriate diets, oral nutritional supplements, and vitamin/mineral supplements  - Order, calculate, and assess calorie counts as needed  - Recommend, monitor, and adjust tube feedings and TPN/PPN based on assessed needs  - Assess need for intravenous fluids  - Provide specific nutrition/hydration education as appropriate  - Include patient/family/caregiver in decisions related to nutrition  Outcome: Progressing     Problem: PAIN - ADULT  Goal: Verbalizes/displays adequate comfort level or baseline comfort level  Description  Interventions:  - Encourage patient to monitor pain and request assistance  - Assess pain using appropriate pain scale  - Administer analgesics based on type and severity of pain and evaluate response  - Implement non-pharmacological measures as appropriate and evaluate response  - Consider cultural and social influences on pain and pain management  - Notify physician/advanced practitioner if interventions unsuccessful or patient reports new pain  Outcome: Progressing     Problem: INFECTION - ADULT  Goal: Absence or prevention of progression during hospitalization  Description  INTERVENTIONS:  - Assess and monitor for signs and symptoms of infection  - Monitor lab/diagnostic results  - Monitor all insertion sites, i e  indwelling lines, tubes, and drains  - Monitor endotracheal (as able) and nasal secretions for changes in amount and color  - [place] appropriate cooling/warming therapies per order  - Administer medications as ordered  - Instruct and encourage patient and family to use good hand hygiene technique  - Identify and instruct in appropriate isolation precautions for identified infection/condition  Outcome: Progressing  Goal: Absence of fever/infection during neutropenic period  Description  INTERVENTIONS:  - Monitor WBC  - Implement neutropenic guidelines  Outcome: Progressing     Problem: SAFETY ADULT  Goal: Patient will remain free of falls  Description  INTERVENTIONS:  - Assess patient frequently for physical needs  -  Identify cognitive and physical deficits and behaviors that affect risk of falls    -  [place] fall precautions as indicated by assessment   - Educate patient/family on patient safety including physical limitations  - Instruct patient to call for assistance with activity based on assessment  - Modify environment to reduce risk of injury  - Consider OT/PT consult to assist with strengthening/mobility  Outcome: Progressing  Goal: Maintain or return to baseline ADL function  Description  INTERVENTIONS:  -  Assess patient's ability to carry out ADLs; assess patient's baseline for ADL function and identify physical deficits which impact ability to perform ADLs (bathing, care of mouth/teeth, toileting, grooming, dressing, etc )  - Assess/evaluate cause of self-care deficits   - Assess range of motion  - Assess patient's mobility; develop plan if impaired  - Assess patient's need for assistive devices and provide as appropriate  - Encourage maximum independence but intervene and supervise when necessary  ¯ Involve family in performance of ADLs  ¯ Assess for home care needs following discharge   ¯ Request OT consult to assist with ADL evaluation and planning for discharge  ¯ Provide patient education as appropriate  Outcome: Progressing  Goal: Maintain or return mobility status to optimal level  Description  INTERVENTIONS:  - Assess patient's baseline mobility status (ambulation, transfers, stairs, etc )    - Identify cognitive and physical deficits and behaviors that affect mobility  - Identify mobility aids required to assist with transfers and/or ambulation (gait belt, sit-to-stand, lift, walker, cane, etc )  - [place] fall precautions as indicated by assessment  - Record patient progress and toleration of activity level on Mobility SBAR; progress patient to next Phase/Stage  - Instruct patient to call for assistance with activity based on assessment  - Request Rehabilitation consult to assist with strengthening/weightbearing, etc   Outcome: Progressing     Problem: DISCHARGE PLANNING  Goal: Discharge to home or other facility with appropriate resources  Description  INTERVENTIONS:  - Identify barriers to discharge w/patient and caregiver  - Arrange for needed discharge resources and transportation as appropriate  - Identify discharge learning needs (meds, wound care, etc )  - Arrange for interpretive services to assist at discharge as needed  - Refer to Case Management Department for coordinating discharge planning if the patient needs post-hospital services based on physician/advanced practitioner order or complex needs related to functional status, cognitive ability, or social support system  Outcome: Progressing     Problem: Knowledge Deficit  Goal: Patient/family/caregiver demonstrates understanding of disease process, treatment plan, medications, and discharge instructions  Description  Complete learning assessment and assess knowledge base    Interventions:  - Provide teaching at level of understanding  - Provide teaching via preferred learning methods  Outcome: Progressing     Problem: Prexisting or High Potential for Compromised Skin Integrity  Goal: Skin integrity is maintained or improved  Description  INTERVENTIONS:  - Identify patients at risk for skin breakdown  - Assess and monitor skin integrity  - Assess and monitor nutrition and hydration status  - Monitor labs (i e  albumin)  - Assess for incontinence   - Turn and reposition patient  - Assist with mobility/ambulation  - Relieve pressure over bony prominences  - Avoid friction and shearing  - Provide appropriate hygiene as needed including keeping skin clean and dry  - Evaluate need for skin moisturizer/barrier cream  - Collaborate with interdisciplinary team (i e  Nutrition, Rehabilitation, etc )   - Patient/family teaching  Outcome: Progressing

## 2019-07-21 NOTE — ASSESSMENT & PLAN NOTE
S/p exploratory laparotomy, lysis of adhesions, small bowel resection, primary repair of parastomal hernia, colonoscopy 7/15    VSS  Afebrile   Ostomy healthy/pink appearing with small amount of stool in bag, 50cc ostomy output over last 24    Plan  Regular diet  Restrepo placed yesterday by urology, defer to urology for management   Continue IVF per renal, will f/u with nephrology to see when can remove  OOB, ambulate  Wound care for ostomy  PT/OT  Dispo planning to [facility]

## 2019-07-21 NOTE — SOCIAL WORK
Family was given an update regarding status of SNF placement  Family requests placement at [facility] 54 is bed is available when pt is medically cleared

## 2019-07-21 NOTE — PLAN OF CARE
Problem: Potential for Falls  Goal: Patient will remain free of falls  Description  INTERVENTIONS:  - Assess patient frequently for physical needs  -  Identify cognitive and physical deficits and behaviors that affect risk of falls  -  [place] fall precautions as indicated by assessment   - Educate patient/family on patient safety including physical limitations  - Instruct patient to call for assistance with activity based on assessment  - Modify environment to reduce risk of injury  - Consider OT/PT consult to assist with strengthening/mobility  Outcome: Progressing     Problem: Nutrition/Hydration-ADULT  Goal: Nutrient/Hydration intake appropriate for improving, restoring or maintaining nutritional needs  Description  Monitor and assess patient's nutrition/hydration status for malnutrition (ex- brittle hair, bruises, dry skin, pale skin and conjunctiva, muscle wasting, smooth red tongue, and disorientation)  Collaborate with interdisciplinary team and initiate plan and interventions as ordered  Monitor patient's weight and dietary intake as ordered or per policy  Utilize nutrition screening tool and intervene per policy  Determine patient's food preferences and provide high-protein, high-caloric foods as appropriate       INTERVENTIONS:  - Monitor oral intake, urinary output, labs, and treatment plans  - Assess nutrition and hydration status and recommend course of action  - Evaluate amount of meals eaten  - Assist patient with eating if necessary   - Allow adequate time for meals  - Recommend/ encourage appropriate diets, oral nutritional supplements, and vitamin/mineral supplements  - Order, calculate, and assess calorie counts as needed  - Recommend, monitor, and adjust tube feedings and TPN/PPN based on assessed needs  - Assess need for intravenous fluids  - Provide specific nutrition/hydration education as appropriate  - Include patient/family/caregiver in decisions related to nutrition  Outcome: Progressing     Problem: PAIN - ADULT  Goal: Verbalizes/displays adequate comfort level or baseline comfort level  Description  Interventions:  - Encourage patient to monitor pain and request assistance  - Assess pain using appropriate pain scale  - Administer analgesics based on type and severity of pain and evaluate response  - Implement non-pharmacological measures as appropriate and evaluate response  - Consider cultural and social influences on pain and pain management  - Notify physician/advanced practitioner if interventions unsuccessful or patient reports new pain  Outcome: Progressing     Problem: INFECTION - ADULT  Goal: Absence or prevention of progression during hospitalization  Description  INTERVENTIONS:  - Assess and monitor for signs and symptoms of infection  - Monitor lab/diagnostic results  - Monitor all insertion sites, i e  indwelling lines, tubes, and drains  - Monitor endotracheal (as able) and nasal secretions for changes in amount and color  - [place] appropriate cooling/warming therapies per order  - Administer medications as ordered  - Instruct and encourage patient and family to use good hand hygiene technique  - Identify and instruct in appropriate isolation precautions for identified infection/condition  Outcome: Progressing  Goal: Absence of fever/infection during neutropenic period  Description  INTERVENTIONS:  - Monitor WBC  - Implement neutropenic guidelines  Outcome: Progressing     Problem: SAFETY ADULT  Goal: Patient will remain free of falls  Description  INTERVENTIONS:  - Assess patient frequently for physical needs  -  Identify cognitive and physical deficits and behaviors that affect risk of falls    -  [place] fall precautions as indicated by assessment   - Educate patient/family on patient safety including physical limitations  - Instruct patient to call for assistance with activity based on assessment  - Modify environment to reduce risk of injury  - Consider OT/PT consult to assist with strengthening/mobility  Outcome: Progressing  Goal: Maintain or return to baseline ADL function  Description  INTERVENTIONS:  -  Assess patient's ability to carry out ADLs; assess patient's baseline for ADL function and identify physical deficits which impact ability to perform ADLs (bathing, care of mouth/teeth, toileting, grooming, dressing, etc )  - Assess/evaluate cause of self-care deficits   - Assess range of motion  - Assess patient's mobility; develop plan if impaired  - Assess patient's need for assistive devices and provide as appropriate  - Encourage maximum independence but intervene and supervise when necessary  ¯ Involve family in performance of ADLs  ¯ Assess for home care needs following discharge   ¯ Request OT consult to assist with ADL evaluation and planning for discharge  ¯ Provide patient education as appropriate  Outcome: Progressing  Goal: Maintain or return mobility status to optimal level  Description  INTERVENTIONS:  - Assess patient's baseline mobility status (ambulation, transfers, stairs, etc )    - Identify cognitive and physical deficits and behaviors that affect mobility  - Identify mobility aids required to assist with transfers and/or ambulation (gait belt, sit-to-stand, lift, walker, cane, etc )  - [place] fall precautions as indicated by assessment  - Record patient progress and toleration of activity level on Mobility SBAR; progress patient to next Phase/Stage  - Instruct patient to call for assistance with activity based on assessment  - Request Rehabilitation consult to assist with strengthening/weightbearing, etc   Outcome: Progressing     Problem: DISCHARGE PLANNING  Goal: Discharge to home or other facility with appropriate resources  Description  INTERVENTIONS:  - Identify barriers to discharge w/patient and caregiver  - Arrange for needed discharge resources and transportation as appropriate  - Identify discharge learning needs (meds, wound care, etc )  - Arrange for interpretive services to assist at discharge as needed  - Refer to Case Management Department for coordinating discharge planning if the patient needs post-hospital services based on physician/advanced practitioner order or complex needs related to functional status, cognitive ability, or social support system  Outcome: Progressing     Problem: Knowledge Deficit  Goal: Patient/family/caregiver demonstrates understanding of disease process, treatment plan, medications, and discharge instructions  Description  Complete learning assessment and assess knowledge base    Interventions:  - Provide teaching at level of understanding  - Provide teaching via preferred learning methods  Outcome: Progressing     Problem: Prexisting or High Potential for Compromised Skin Integrity  Goal: Skin integrity is maintained or improved  Description  INTERVENTIONS:  - Identify patients at risk for skin breakdown  - Assess and monitor skin integrity  - Assess and monitor nutrition and hydration status  - Monitor labs (i e  albumin)  - Assess for incontinence   - Turn and reposition patient  - Assist with mobility/ambulation  - Relieve pressure over bony prominences  - Avoid friction and shearing  - Provide appropriate hygiene as needed including keeping skin clean and dry  - Evaluate need for skin moisturizer/barrier cream  - Collaborate with interdisciplinary team (i e  Nutrition, Rehabilitation, etc )   - Patient/family teaching  Outcome: Progressing

## 2019-07-21 NOTE — PROGRESS NOTES
NEPHROLOGY PROGRESS NOTE    Mellissa Castle 77 y o  male MRN: [id number]  Unit/Bed#: [facility] 813-01 Encounter: [id number]  Reason for Consult:  Acute on chronic kidney disease    Patient is sitting in bed feeling okay although little anxious  Other than that he says he is eating a little bit but no other significant changes  ASSESSMENT/PLAN:  1  Renal  Patient's acute on chronic kidney disease with baseline creatinine of 1 4  He developed acute renal failure after his surgery and at this point creatinine has slowly been improving  Urology decided to place Restrepo catheter and creatinine is down to 2 2 today so I do suspect this was likely playing some sort of role in his renal dysfunction  Monitor with Restrepo catheter in urology is following  Discontinue IV fluids  Monitor electrolytes and renal function  Mild hypokalemia will give potassium repletion today and follow on oral intake    2  Hypertension  Blood pressure still with some spikes but just added amlodipine to his regimen yesterday  Will monitor to see if this helps improve control blood pressure without changes today  3  Status post exploratory laparotomy with lysis of adhesions and small-bowel resection  Postop care per surgery  SUBJECTIVE:  Review of Systems   Constitution: Negative for chills and fever  HENT: Negative  Cardiovascular: Negative  Negative for chest pain, leg swelling and orthopnea  Respiratory: Negative  Negative for cough, shortness of breath and wheezing  Gastrointestinal: Negative for bloating, abdominal pain, diarrhea, nausea and vomiting  Genitourinary: Restrepo catheter  Neurological: Negative  Psychiatric/Behavioral: Negative for altered mental status         OBJECTIVE:  Current Weight: Weight - Scale: 98 7 kg (217 lb 9 5 oz)  Vitals:Temp (24hrs), Av 1 °F (36 7 °C), Min:97 7 °F (36 5 °C), Max:98 6 °F (37 °C)  Current: Temperature: 98 °F (36 7 °C)   Blood pressure (!) 180/90, pulse 90, temperature 98 °F (36 7 °C), resp  rate 20, height 5' 6" (1 676 m), weight 98 7 kg (217 lb 9 5 oz), SpO2 95 %  , Body mass index is 35 12 kg/m²  Intake/Output Summary (Last 24 hours) at 7/21/2019 1055  Last data filed at 7/21/2019 0730  Gross per 24 hour   Intake 2466 25 ml   Output 3225 ml   Net -758 75 ml       Physical Exam: BP (!) 180/90 (BP Location: Right arm) Comment: RN notified   Pulse 90   Temp 98 °F (36 7 °C)   Resp 20   Ht 5' 6" (1 676 m)   Wt 98 7 kg (217 lb 9 5 oz)   SpO2 95%   BMI 35 12 kg/m²   Physical Exam   Constitutional: He is oriented to person, place, and time  No distress  Eyes: No scleral icterus  Neck: Neck supple  No JVD present  Cardiovascular: Normal rate and regular rhythm  Exam reveals no friction rub  Pulmonary/Chest: Effort normal and breath sounds normal  No respiratory distress  He has no wheezes  He has no rales  Abdominal: Soft  Bowel sounds are normal  He exhibits no distension  There is no tenderness  Protruding abdomen   Neurological: He is alert and oriented to person, place, and time  Psychiatric: He has a normal mood and affect         Medications:    Current Facility-Administered Medications:     acetaminophen (TYLENOL) tablet 650 mg, 650 mg, Oral, Q6H PRN, Jessica Basurto MD    aluminum-magnesium hydroxide-simethicone (MYLANTA) 200-200-20 mg/5 mL oral suspension 30 mL, 30 mL, Oral, Q4H PRN, Jessica Basurto MD, 30 mL at 07/18/19 2309    amLODIPine (NORVASC) tablet 5 mg, 5 mg, Oral, Daily, Cristy Turner MD, 5 mg at 07/21/19 0836    atorvastatin (LIPITOR) tablet 40 mg, 40 mg, Oral, Daily With Christina Alonzo MD, 40 mg at 07/20/19 1713    carbidopa-levodopa (SINEMET)  mg per tablet 1 5 tablet, 1 5 tablet, Oral, TID With Meals, Ruperto Pen, 1 5 tablet at 07/21/19 0836    carvedilol (COREG) tablet 12 5 mg, 12 5 mg, Oral, BID With Meals, Jessica Basurto MD, 12 5 mg at 07/21/19 0836    finasteride (PROSCAR) tablet 5 mg, 5 mg, Oral, Daily, [place] Isrrael Roy MD, 5 mg at 07/21/19 0836    heparin (porcine) subcutaneous injection 5,000 Units, 5,000 Units, Subcutaneous, Q8H [facility] long term, 5,000 Units at 07/21/19 0625 **AND** [CANCELED] Platelet count, , , Once, Diana Perez    HYDROmorphone (DILAUDID) injection 0 5 mg, 0 5 mg, Intravenous, Q3H PRN, Fe Jeff MD    insulin lispro (HumaLOG) 100 units/mL subcutaneous injection 1-6 Units, 1-6 Units, Subcutaneous, TID With Meals, 1 Units at 07/21/19 0835 **AND** Fingerstick Glucose (POCT), , , TID AC, Rachael Velazco PA-C    melatonin tablet 3 mg, 3 mg, Oral, HS PRN, Fe Jeff MD, 3 mg at 07/18/19 2100    multi-electrolyte (ISOLYTE-S PH 7 4 equivalent) IV solution, 75 mL/hr, Intravenous, Continuous, Waleska Conner MD, Last Rate: 75 mL/hr at 07/21/19 0135, 75 mL/hr at 07/21/19 0135    naloxone (NARCAN) 0 04 mg/mL syringe 0 04 mg, 0 04 mg, Intravenous, Q3 min PRN, Victorina Lipoma Lucy    ondansetron [facility]) injection 4 mg, 4 mg, Intravenous, Q4H PRN, Diana Perez, 4 mg at 07/15/19 0410    oxyCODONE (ROXICODONE) immediate release tablet 10 mg, 10 mg, Oral, Q4H PRN, Fe Jeff MD    oxyCODONE (ROXICODONE) IR tablet 5 mg, 5 mg, Oral, Q4H PRN, Fe Jeff MD, 5 mg at 07/17/19 2238    pantoprazole (PROTONIX) EC tablet 40 mg, 40 mg, Oral, BID AC, Cydney Guadarrama MD, 40 mg at 07/21/19 0625    phenol (CHLORASEPTIC) 1 4 % mucosal liquid 1 spray, 1 spray, Mouth/Throat, Q2H PRN, Evonne Montoya MD, 1 spray at 07/17/19 1201    Laboratory Results:  Lab Results   Component Value Date    WBC 9 23 07/21/2019    HGB 10 0 (L) 07/21/2019    HCT 31 6 (L) 07/21/2019    MCV 89 07/21/2019     07/21/2019     Lab Results   Component Value Date    SODIUM 138 07/21/2019    K 3 3 (L) 07/21/2019     07/21/2019    CO2 22 07/21/2019    BUN 41 (H) 07/21/2019    CREATININE 2 24 (H) 07/21/2019    GLUC 173 (H) 07/21/2019    CALCIUM 8 3 07/21/2019     Lab Results   Component Value Date    CALCIUM 8 3 07/21/2019    PHOS 3 1 07/18/2019     No results found for: LABPROT

## 2019-07-21 NOTE — PROGRESS NOTES
Pt complaining of bloating, Upon assessment pt very distended and taught  Bowel sounds normo active, ostomy has adequate out put  Pt has no complaints of nausea at this time  Walked patient 200 ft, pt has some belching with minimal relief  White sx ricardo and Scarlet Sanchez with white sx made aware  Pt also has no appetite at this time  Will continue to monitor

## 2019-07-21 NOTE — PROGRESS NOTES
Gisela Chow is status post exploratory laparotomy with small-bowel resection and repair of ventral hernia and takedown of parastomal hernia  Creatinine was elevated at with hydronephrosis on ultrasound  I therefore made the decision to place a Restrepo catheter  Creatinine is marginally improved today  Recommend maintaining Restrepo catheter to gravity until creatinine returns to baseline  Follow recommendations of Nephrology

## 2019-07-21 NOTE — PROGRESS NOTES
Progress Note - Colorectal Surgery   Mat Aguilar 77 y o  male MRN: [id number]  Unit/Bed#: [facility] 813-01 Encounter: [id number]    Assessment/Plan:    * Parastomal hernia  Assessment & Plan  S/p exploratory laparotomy, lysis of adhesions, small bowel resection, primary repair of parastomal hernia, colonoscopy 7/15    VSS  Afebrile  Ostomy healthy/pink appearing with small amount of stool in bag, 50cc ostomy output over last 24    Plan  Regular diet  Restrepo placed yesterday by urology, defer to urology for management   Continue IVF per renal, will f/u with nephrology to see when can remove  OOB, ambulate  Wound care for ostomy  PT/OT  Dispo planning to STR      Subjective/Objective     Subjective: No acute events overnight  Pain is controlled  States he is eating jello and apple sauce without nausea/vomiting, but does not have much of an appetite  No fevers, chills or any other complaints at this time  Objective:     Vitals: Temp:  [97 7 °F (36 5 °C)-98 6 °F (37 °C)] 98 °F (36 7 °C)  HR:  [85-92] 90  Resp:  [16-20] 20  BP: (160-171)/() 171/105  Body mass index is 35 12 kg/m²  I/O       07/19 0701 - 07/20 0700 07/20 0701 - 07/21 0700 07/21 0701 - 07/22 0700    P  O  540 480     I V  (mL/kg) 1534 2 (15 5) 1746 3 (17 7)     Total Intake(mL/kg) 2074 2 (21) 2226 3 (22 6)     Urine (mL/kg/hr) 2300 (1) 3650 (1 5)     Stool 200 50     Total Output 2500 3700     Net -425 8 -1473 8                  Physical Exam:   NAD, alert and oriented x3  Normocephalic, atraumatic  MMM, EOMI, PERRLA  Norm resp effort on RA  RRR  Abd soft, NT/ND  Midline abdominal incision with mild erythema immediately around the incision, improving  Ostomy is pink/healthy with small amount of stool in ostomy bag  No calf tenderness or peripheral edema  Motor/sensation intact in distal extremities  CN grossly intact  -rash/lesions    Lab, Imaging and other studies:   CBC with diff:   Lab Results   Component Value Date    WBC 9 23 07/21/2019 HGB 10 0 (L) 07/21/2019    HCT 31 6 (L) 07/21/2019    MCV 89 07/21/2019     07/21/2019    MCH 28 2 07/21/2019    MCHC 31 6 07/21/2019    RDW 13 4 07/21/2019    MPV 10 9 07/21/2019    NRBC 0 07/21/2019   , BMP/CMP:   Lab Results   Component Value Date    SODIUM 138 07/21/2019    K 3 3 (L) 07/21/2019     07/21/2019    CO2 22 07/21/2019    BUN 41 (H) 07/21/2019    CREATININE 2 24 (H) 07/21/2019    CALCIUM 8 3 07/21/2019    EGFR 27 07/21/2019     VTE Pharmacologic Prophylaxis: Heparin  VTE Mechanical Prophylaxis: sequential compression device

## 2019-07-22 NOTE — UTILIZATION REVIEW
Continued Stay Review    Date: 7/20                    Current Patient Class: Inpatient Current Level of Care:     HPI:78 y o  male initially admitted on 7/15 presents with nausea and vomiting  He has not had colostomy function for 5 days  He has never had this problem before  Assessment/Plan:   7/20 Progress notes:  7/15 S/P LAPAROTOMY EXPLORATORY; BOWEL RESECTION; HERNIA REPAIR (N/A)  RESECTION SMALL BOWEL (N/A)  Acute on chronic CKD with baseline creat is about 1 4, continue IV fluids, monitor volume status, await Urology consult  7/20 Urology cons, Postop urinary retention, Pt has be self catherization for approx  10 yrs  recent US shows mild bilateral hydronephrosis, recommend ramos cath until creat has improved  Monitor creat and urine outpt       Pertinent Labs/Diagnostic Results:   Results from last 7 days   Lab Units 07/20/19  0518   WBC Thousand/uL 8 66   HEMOGLOBIN g/dL 9 7*   HEMATOCRIT % 30 8*   PLATELETS Thousands/uL 163   NEUTROS ABS Thousands/µL 6 63         Lab Units 07/20/19  0518   SODIUM mmol/L 142   POTASSIUM mmol/L 3 5   CHLORIDE mmol/L 112*   CO2 mmol/L 21   ANION GAP mmol/L 9   BUN mg/dL 51*   CREATININE mg/dL 2 96*   EGFR ml/min/1 73sq m 19   CALCIUM mg/dL 8 4   MAGNESIUM mg/dL  --    PHOSPHORUS mg/dL  --          Results from last 7 days   Lab Units 07/20/19  2051 07/20/19  1614   POC GLUCOSE mg/dl 229* 217*     Lab Units 07/20/19  0518   GLUCOSE RANDOM mg/dL 158*     Vital Signs:   07/1940              None (Room air)     07/20/19 15:18:49  98 6 °F (37 °C)  92  16  164/95  118  94 %       07/20/19 0657        180/98  Abnormal         Lying   07/20/19 06:55:29  99 °F (37 2 °C)  90  18  172/102  Abnormal              Medications:   Scheduled Meds:   Current Facility-Administered Medications:  amLODIPine 5 mg Oral Daily    atorvastatin 40 mg Oral Daily With Dinner    carbidopa-levodopa 1 5 tablet Oral TID With Meals    carvedilol 12 5 mg Oral BID With Meals finasteride 5 mg Oral Daily    heparin (porcine) 5,000 Units Subcutaneous Q8H [address]    HYDROmorphone 0 5 mg Intravenous Q2H PRN    insulin lispro 1-6 Units Subcutaneous TID With Meals    melatonin 3 mg Oral HS    multi-electrolyte 125 mL/hr Intravenous Continuous Last Rate: 125 mL/hr (07/22/19 0804)   naloxone 0 04 mg Intravenous Q3 min PRN    ondansetron 4 mg Intravenous Q4H PRN    pantoprazole 40 mg Oral BID AC    phenol 1 spray Mouth/Throat Q2H PRN      Discharge Plan: TBD    Network Utilization Review Department  Phone: [phone]; Fax [phone]  [email or web address]  ATTENTION: Please call with any questions or concerns to [phone]  and carefully listen to the prompts so that you are directed to the right person  Send all requests for admission clinical reviews, approved or denied determinations and any other requests to fax [phone]   All voicemails are confidential

## 2019-07-22 NOTE — PLAN OF CARE
Problem: Nutrition/Hydration-ADULT  Goal: Nutrient/Hydration intake appropriate for improving, restoring or maintaining nutritional needs  Description  Monitor and assess patient's nutrition/hydration status for malnutrition (ex- brittle hair, bruises, dry skin, pale skin and conjunctiva, muscle wasting, smooth red tongue, and disorientation)  Collaborate with interdisciplinary team and initiate plan and interventions as ordered  Monitor patient's weight and dietary intake as ordered or per policy  Utilize nutrition screening tool and intervene per policy  Determine patient's food preferences and provide high-protein, high-caloric foods as appropriate  INTERVENTIONS:  - Monitor oral intake, urinary output, labs, and treatment plans  - Assess nutrition and hydration status and recommend course of action  - Evaluate amount of meals eaten  - Assist patient with eating if necessary   - Allow adequate time for meals  - Recommend/ encourage appropriate diets, oral nutritional supplements, and vitamin/mineral supplements  - Order, calculate, and assess calorie counts as needed  - Recommend, monitor, and adjust tube feedings and TPN/PPN based on assessed needs  - Assess need for intravenous fluids  - Provide specific nutrition/hydration education as appropriate  - Include patient/family/caregiver in decisions related to nutrition  Outcome: Not Progressing   NPO, will monitor diet advancement/tolerance

## 2019-07-22 NOTE — PROGRESS NOTES
UROLOGY PROGRESS NOTE   Patient Identifiers: Markel Carson (MRN [id number])  Date of Service: 7/22/2019    Subjective:     Out of bed in a chair  Comfortable with no complaints urine is clear  Patient has  no complaints        Objective:     VITALS:    Vitals:    07/22/19 0718   BP: 140/80   Pulse: (!) 113   Resp: 16   Temp: 98 °F (36 7 °C)   SpO2: 91%           LABS:  Lab Results   Component Value Date    HGB 11 0 (L) 07/22/2019    HCT 34 6 (L) 07/22/2019    WBC 14 50 (H) 07/22/2019     07/22/2019   ]    Lab Results   Component Value Date     03/08/2017    K 3 4 (L) 07/22/2019     07/22/2019    CO2 22 07/22/2019    BUN 46 (H) 07/22/2019    CREATININE 2 16 (H) 07/22/2019    CALCIUM 8 5 07/22/2019    GLUCOSE 177 (H) 07/15/2019   ]        INPATIENT MEDS:    Current Facility-Administered Medications:     amLODIPine (NORVASC) tablet 5 mg, 5 mg, Oral, Daily, Noe Fried MD, 5 mg at 07/22/19 0809    atorvastatin (LIPITOR) tablet 40 mg, 40 mg, Oral, Daily With Alina Thompson MD, 40 mg at 07/21/19 1719    carbidopa-levodopa (SINEMET)  mg per tablet 1 5 tablet, 1 5 tablet, Oral, TID With Meals, Gordy Duke, 1 5 tablet at 07/22/19 1219    carvedilol (COREG) tablet 12 5 mg, 12 5 mg, Oral, BID With Meals, Iván Dorado MD, 12 5 mg at 07/22/19 0803    finasteride (PROSCAR) tablet 5 mg, 5 mg, Oral, Daily, Grover Giron MD, 5 mg at 07/22/19 0809    heparin (porcine) subcutaneous injection 5,000 Units, 5,000 Units, Subcutaneous, Q8H [facility], 5,000 Units at 07/22/19 0505 **AND** [CANCELED] Platelet count, , , Once, Gordy Duke    HYDROmorphone (DILAUDID) injection 0 5 mg, 0 5 mg, Intravenous, Q2H PRN, Rachael Velazco PA-C    insulin lispro (HumaLOG) 100 units/mL subcutaneous injection 1-6 Units, 1-6 Units, Subcutaneous, TID With Meals, 1 Units at 07/22/19 1215 **AND** Fingerstick Glucose (POCT), , , TID AC, Rachael Velazco PA-C    melatonin tablet 3 mg, 3 mg, Oral, HS, Polo Yadav MD, 3 mg at 07/21/19 2122    multi-electrolyte (ISOLYTE-S PH 7 4 equivalent) IV solution, 125 mL/hr, Intravenous, Continuous, Polo Yadav MD, Last Rate: 125 mL/hr at 07/22/19 0804, 125 mL/hr at 07/22/19 0804    naloxone (NARCAN) 0 04 mg/mL syringe 0 04 mg, 0 04 mg, Intravenous, Q3 min PRN, Carrillo Dominguez    ondansetron [facility]) injection 4 mg, 4 mg, Intravenous, Q4H PRN, Carrillo Dominguez, 4 mg at 07/15/19 0410    pantoprazole (PROTONIX) EC tablet 40 mg, 40 mg, Oral, BID AC, Jamey Hair MD, 40 mg at 07/22/19 0804    phenol (CHLORASEPTIC) 1 4 % mucosal liquid 1 spray, 1 spray, Mouth/Throat, Q2H PRN, Carley Bello MD, 1 spray at 07/17/19 1201      Physical Exam:   /80   Pulse (!) 113   Temp 98 °F (36 7 °C)   Resp 16   Ht 5' 6" (1 676 m)   Wt 98 7 kg (217 lb 9 5 oz)   SpO2 91%   BMI 35 12 kg/m²   GEN: no acute distress    RESP: breathing comfortably with no accessory muscle use    ABD: soft, non-tender, non-distended   INCISION:    EXT: no significant peripheral edema     OVERTON: in place draining clear yellow urine  no clots and       RADIOLOGY:     None     Assessment:    1  Neurogenic bladder   2   Acute kidney injury     Plan:   - he may resume clean intermittent catheterization once he is able  - recommend follow-up ultrasound and  office visit as an outpatient  -  -

## 2019-07-22 NOTE — PROGRESS NOTES
Progress Note - Nephrology   Reita Aschoff 77 y o  male MRN: [id number]  Unit/Bed#: [facility] 813-01 Encounter: [id number]      Assessment:  59-year-old male with a history of CKD (baseline creatinine 1 4), diabetes mellitus type 2, hypertension, hypercholesterolemia, BPH, colon cancer (1998, treated with surgery), Parkinson's disease, neurogenic bladder admitted for parastomal hernia repair  Nephrology consulted for acute on chronic kidney disease  Plan:  1  Acute on chronic kidney disease  -status post parastomal hernia repair  -baseline creatinine 1 4  -creatinine noted to be improving slowly today is 2  16  Likely explained by post renal obstruction, due to improvement after Restrepo placement, renal ultrasound showed mild hydronephrosis  -Restrepo in place, placed by Urology, ocean draining 250 cc of clear yellow urine  -continue Restrepo until creatinine normalizes  Urology following  -patient currently has an NG tube in placed last night due to nausea and vomiting, poor oral intake  -current isolate S at 125 cc/hour, continue to hydrate  -mild hypokalemia at 3 4, potassium repletion ordered  -monitor electrolytes and renal function    2  Parastomal hernia  -vital signs stable, patient has an ostomy which is functioning well   -continue to monitor wound and ostomy   -management as per primary team    3  Hypertension  -140s-180s/80s-90s  -continue amlodipine 5 daily, carvedilol 12 5 b i d   -management as per primary team    4  BPH  -Restrepo catheter in place, making adequate amount of urine (2 4 L yesterday)  -continue to monitor  -management as per primary team        Subjective: Today patient seen examined sitting in chair in no acute distress  He denies any acute complaints  NG tube is in place, which was placed last night due to nausea and vomiting  He denies pain at his surgical site  Ostomy is draining well  No discomfort at the Restrepo, draining clear yellow urine  He denies hematuria or dysuria    He denies headache, chest pain, shortness of breath  ROS  CONSTITUTIONAL: Denies any fever, chills,  CARDIOVASCULAR: No chest pain or palpitations  RESPIRATORY: Denies any cough, hemoptysis, shortness of breath or dyspnea  GASTROINTESTINAL: Denies abdominal pain, nausea, vomitng   GENITOURINARY: No problems with urination  Denies any hematuria or dysuria  NEUROLOGIC: No dizziness or vertigo, denies headaches  MUSCULOSKELETAL: Denies any muscle or joint pain  SKIN:  Positive for abdominal incision, ostomy  Denies skin rashes or itching          Current Facility-Administered Medications:     amLODIPine (NORVASC) tablet 5 mg, 5 mg, Oral, Daily, Mikki Sullivan MD, 5 mg at 07/22/19 0809    atorvastatin (LIPITOR) tablet 40 mg, 40 mg, Oral, Daily With Delonte Mi MD, 40 mg at 07/21/19 1719    carbidopa-levodopa (SINEMET)  mg per tablet 1 5 tablet, 1 5 tablet, Oral, TID With Meals, Vlad Colby, 1 5 tablet at 07/22/19 0802    carvedilol (COREG) tablet 12 5 mg, 12 5 mg, Oral, BID With Meals, Sudeep Davis MD, 12 5 mg at 07/22/19 0803    finasteride (PROSCAR) tablet 5 mg, 5 mg, Oral, Daily, Connor Bobo MD, 5 mg at 07/22/19 0809    heparin (porcine) subcutaneous injection 5,000 Units, 5,000 Units, Subcutaneous, Q8H [address], 5,000 Units at 07/22/19 0505 **AND** [CANCELED] Platelet count, , , Once, Vlad Colby    HYDROmorphone (DILAUDID) injection 0 5 mg, 0 5 mg, Intravenous, Q2H PRN, Rachael Velazco PA-C    insulin lispro (HumaLOG) 100 units/mL subcutaneous injection 1-6 Units, 1-6 Units, Subcutaneous, TID With Meals, 2 Units at 07/22/19 0803 **AND** Fingerstick Glucose (POCT), , , TID AC, Rachael Velazco PA-C    magnesium sulfate 2 g/50 mL IVPB (premix) 2 g, 2 g, Intravenous, Once, Rcahael Velazco PA-C, 2 g at 07/22/19 0928    melatonin tablet 3 mg, 3 mg, Oral, HS, Dang Lo MD, 3 mg at 07/21/19 2122    multi-electrolyte (ISOLYTE-S PH 7 4 equivalent) IV solution, 125 mL/hr, Intravenous, Continuous, Phoenix Fontenot MD, Last Rate: 125 mL/hr at 07/22/19 0804, 125 mL/hr at 07/22/19 0804    naloxone (NARCAN) 0 04 mg/mL syringe 0 04 mg, 0 04 mg, Intravenous, Q3 min PRN, Edmar Puri    ondansetron [facility]) injection 4 mg, 4 mg, Intravenous, Q4H PRN, Edmar Puri, 4 mg at 07/15/19 0410    pantoprazole (PROTONIX) EC tablet 40 mg, 40 mg, Oral, BID AC, Winnie Bradley MD, 40 mg at 07/22/19 0804    phenol (CHLORASEPTIC) 1 4 % mucosal liquid 1 spray, 1 spray, Mouth/Throat, Q2H PRN, Eugene Nesbitt MD, 1 spray at 07/17/19 1201  Objective:     Vitals: Blood pressure 140/80, pulse (!) 113, temperature 98 °F (36 7 °C), resp  rate 16, height 5' 6" (1 676 m), weight 98 7 kg (217 lb 9 5 oz), SpO2 91 %  ,Body mass index is 35 12 kg/m²  Weight (last 2 days)     None            Intake/Output Summary (Last 24 hours) at 7/22/2019 0910  Last data filed at 7/22/2019 0420  Gross per 24 hour   Intake 275 05 ml   Output 2400 ml   Net -2124 95 ml       Urethral Catheter Non-latex 18 Fr  (Active)   Site Assessment Clean;Skin intact 7/22/2019  1:07 AM   Collection Container Standard drainage bag 7/22/2019  1:07 AM   Securement Method Securing device (Describe) 7/22/2019  1:07 AM   Output (mL) 150 mL 7/22/2019  3:21 AM       Physical Exam:   GENERAL: NAD, NG tube in place,   HEENT:  NC/AT, EOMI, MMM, no scleral icterus  CARDIAC:  RRR, +S1/S2, no S3/S4 heard, no m/g/r  PULMONARY:  CTA B/L, no wheezing/rales/rhonci, non-labored breathing  ABDOMEN:  Ostomy in place,  Soft, NT/ND, +BS, no rebound/guarding/rigidity  :  Restrepo catheter in place draining clear yellow urine (250 cc)  Extremities:  2+ Pulses in DP/radial  No edema, cyanosis, or clubbing  NEUROLOGIC:  Alert/oriented x3  No motor or sensory deficits  SKIN:  Abdominal incision looks clean without drainage or active bleeding, ostomy draining small amount of stool      Lab, Imaging and other studies:   CBC:   Lab Results Component Value Date    WBC 14 50 (H) 07/22/2019    HGB 11 0 (L) 07/22/2019    HCT 34 6 (L) 07/22/2019    MCV 89 07/22/2019     07/22/2019    MCH 28 4 07/22/2019    MCHC 31 8 07/22/2019    RDW 13 6 07/22/2019    MPV 11 1 07/22/2019    NRBC 0 07/22/2019     CMP:   Lab Results   Component Value Date    SODIUM 136 07/22/2019    K 3 4 (L) 07/22/2019     07/22/2019    CO2 22 07/22/2019    BUN 46 (H) 07/22/2019    CREATININE 2 16 (H) 07/22/2019    CALCIUM 8 5 07/22/2019    EGFR 28 07/22/2019     Phosphorus: No results found for: PHOS  Magnesium:   Lab Results   Component Value Date    MG 1 3 (L) 07/22/2019     Ionized Calcium: No results found for: CAION    XR abdomen 1 vw portable   Final Result by Johny Frederick MD (07/22 0845)      1  The sidehole of the enteric tube projects at the gastroesophageal junction  It should be advanced if gastric positioning is desired  2   Incompletely imaged gaseous distention of small bowel and colon, likely postoperative ileus  3   Bilateral lower lobe consolidations, likely atelectasis  Workstation performed: [id number]         US kidney and bladder   Final Result by Elizabeth Akins DO (07/19 1615)   Bilateral cortical scarring and cortical lobulation  Bilateral mild-moderate hydronephrosis, new from prior exam    Normal arterial resistive indices -this does not suggest a significant obstruction  Incompletely distended urinary bladder which is otherwise unremarkable  Neither ureteral jet detected  *Because the patient has a history of a neurogenic bladder bladder size may be distended for this patient  Although hydronephrosis can be seen in the setting of ascending UTI, Consider computed tomography assess for an obstructing lesion  Alternatively consider nuclear medicine renal flow and function study with Lasix intervention         I personally discussed this study with Dr Robert Campuzano on 7/19/2019 at 4:13 PM                Workstation performed: [id number]

## 2019-07-22 NOTE — PHYSICAL THERAPY NOTE
Physical Therapy Tx Session:       07/22/19 6840   Pain Assessment   Pain Assessment 0-10   Pain Score 5   Pain Type Acute pain   Pain Location Abdomen   Pain Orientation Bilateral;Lower   Hospital Pain Intervention(s) Repositioned; Ambulation/increased activity; Elevated; Emotional support; Rest   Restrictions/Precautions   Other Precautions Pain; Fall Risk;Multiple lines;Telemetry   General   Chart Reviewed Yes   Family/Caregiver Present Yes  (spouse)   Cognition   Overall Cognitive Status WFL   Arousal/Participation Cooperative;Responsive; Alert   Attention Attends with cues to redirect   Orientation Level Oriented X4   Following Commands Follows one step commands with increased time or repetition  (2* inc pain and slow mobility)   Subjective   Subjective pt sitting in recliner chair upon arrival;pt willing and agreeable to work with PT and to participate in therapy intervention;"I just want to feel better and for them to figure out what is wrong with me"   Bed Mobility   Supine to Sit Unable to assess  (pt sitting in chair pre and post mobility)   Transfers   Sit to Stand 3  Moderate assistance   Additional items Assist x 1; Armrests; Increased time required;Verbal cues   Stand to Sit 3  Moderate assistance   Additional items Assist x 1; Armrests; Increased time required;Verbal cues  (for safety,education and control descent)   Ambulation/Elevation   Gait pattern Poor UE support; Improper Weight shift; Antalgic; Forward Flexion; Wide WAN;Shuffling; Inconsistent lakisha; Foward flexed; Short stride; Ataxia; Excessively slow   Gait Assistance 4  Minimal assist   Additional items Assist x 1;Verbal cues; Tactile cues   Assistive Device Rolling walker   Distance 60 feet x2 with use of RW on tile and carpet surfaces;one seated rest break inbetween ambulation distances lasting less than 15 seconds   Balance   Static Sitting Good  (in chair pre and post mobility BLE elevated)   Dynamic Sitting Poor   Static Standing Poor +   Dynamic Standing Poor +   Ambulatory Poor +   Endurance Deficit   Endurance Deficit Yes   Endurance Deficit Description weakness,fatigue and pain   Activity Tolerance   Activity Tolerance Patient limited by fatigue;Patient limited by pain  (fair)   Medical Staff Made Aware FIORELLA Bender)   Nurse Made Aware yes   Exercises   Hip Flexion Sitting;15 reps;AROM; Bilateral  (pt reports R groin pain during hip flexion (chronic per pt))   Hip Abduction Sitting;15 reps;AROM; Bilateral  (pt reports R groin pain during hip flexion (chronic per pt))   Hip Adduction Sitting;15 reps;AROM; Bilateral  (pt reports R groin pain during hip flexion (chronic per pt))   Knee AROM Long Arc Quad Sitting;15 reps;AROM; Bilateral   Ankle Pumps Sitting;20 reps;AROM; Bilateral   Assessment   Prognosis Good   Problem List Decreased strength;Decreased endurance; Impaired balance;Decreased mobility; Decreased safety awareness; Obesity; Decreased skin integrity;Pain   Assessment Pt able to perform sit<->stand transfers needing modAx1 to and from low surface  Pt declined placement of pillow underneath following mobility  Pt able to ambulate 60 feet x2 with use of RW on various surfaces needing minAx1, one static sit rest break lasting elss than 15 seconds  Pt fatigues quickly and reports inc B lower ABD pain and discomfort at rest and during mobility  Pt able to perform BLE ther ex HEP sitting in chair postmobility AROM  Pt would cont to benefit from skilled inpt PT services to maximize functional independence  Barriers to Discharge Inaccessible home environment   Goals   Patient Goals to rest in chair   STG Expiration Date 07/26/19   Treatment Day 3   Plan   Treatment/Interventions Functional transfer training;LE strengthening/ROM; Elevations; Therapeutic exercise; Endurance training;Patient/family training;Equipment eval/education; Bed mobility;Gait training;Spoke to nursing;Family   Progress Slow progress, decreased activity tolerance   PT Frequency (3-5x/week;restorative therapy aide for mobility)   Recommendation   Recommendation Short-term skilled PT   Equipment Recommended Walker  (current use of RW for mobility)   Skilled PT recommended while in hospital and upon DC to progress pt toward treatment goals

## 2019-07-22 NOTE — PLAN OF CARE
Problem: Potential for Falls  Goal: Patient will remain free of falls  Description  INTERVENTIONS:  - Assess patient frequently for physical needs  -  Identify cognitive and physical deficits and behaviors that affect risk of falls  -  [place] fall precautions as indicated by assessment   - Educate patient/family on patient safety including physical limitations  - Instruct patient to call for assistance with activity based on assessment  - Modify environment to reduce risk of injury  - Consider OT/PT consult to assist with strengthening/mobility  Outcome: Progressing     Problem: Nutrition/Hydration-ADULT  Goal: Nutrient/Hydration intake appropriate for improving, restoring or maintaining nutritional needs  Description  Monitor and assess patient's nutrition/hydration status for malnutrition (ex- brittle hair, bruises, dry skin, pale skin and conjunctiva, muscle wasting, smooth red tongue, and disorientation)  Collaborate with interdisciplinary team and initiate plan and interventions as ordered  Monitor patient's weight and dietary intake as ordered or per policy  Utilize nutrition screening tool and intervene per policy  Determine patient's food preferences and provide high-protein, high-caloric foods as appropriate       INTERVENTIONS:  - Monitor oral intake, urinary output, labs, and treatment plans  - Assess nutrition and hydration status and recommend course of action  - Evaluate amount of meals eaten  - Assist patient with eating if necessary   - Allow adequate time for meals  - Recommend/ encourage appropriate diets, oral nutritional supplements, and vitamin/mineral supplements  - Order, calculate, and assess calorie counts as needed  - Recommend, monitor, and adjust tube feedings and TPN/PPN based on assessed needs  - Assess need for intravenous fluids  - Provide specific nutrition/hydration education as appropriate  - Include patient/family/caregiver in decisions related to nutrition  Outcome: Progressing     Problem: PAIN - ADULT  Goal: Verbalizes/displays adequate comfort level or baseline comfort level  Description  Interventions:  - Encourage patient to monitor pain and request assistance  - Assess pain using appropriate pain scale  - Administer analgesics based on type and severity of pain and evaluate response  - Implement non-pharmacological measures as appropriate and evaluate response  - Consider cultural and social influences on pain and pain management  - Notify physician/advanced practitioner if interventions unsuccessful or patient reports new pain  Outcome: Progressing     Problem: INFECTION - ADULT  Goal: Absence or prevention of progression during hospitalization  Description  INTERVENTIONS:  - Assess and monitor for signs and symptoms of infection  - Monitor lab/diagnostic results  - Monitor all insertion sites, i e  indwelling lines, tubes, and drains  - Monitor endotracheal (as able) and nasal secretions for changes in amount and color  - [place] appropriate cooling/warming therapies per order  - Administer medications as ordered  - Instruct and encourage patient and family to use good hand hygiene technique  - Identify and instruct in appropriate isolation precautions for identified infection/condition  Outcome: Progressing  Goal: Absence of fever/infection during neutropenic period  Description  INTERVENTIONS:  - Monitor WBC  - Implement neutropenic guidelines  Outcome: Progressing     Problem: SAFETY ADULT  Goal: Patient will remain free of falls  Description  INTERVENTIONS:  - Assess patient frequently for physical needs  -  Identify cognitive and physical deficits and behaviors that affect risk of falls    -  [place] fall precautions as indicated by assessment   - Educate patient/family on patient safety including physical limitations  - Instruct patient to call for assistance with activity based on assessment  - Modify environment to reduce risk of injury  - Consider OT/PT consult to assist with strengthening/mobility  Outcome: Progressing  Goal: Maintain or return to baseline ADL function  Description  INTERVENTIONS:  -  Assess patient's ability to carry out ADLs; assess patient's baseline for ADL function and identify physical deficits which impact ability to perform ADLs (bathing, care of mouth/teeth, toileting, grooming, dressing, etc )  - Assess/evaluate cause of self-care deficits   - Assess range of motion  - Assess patient's mobility; develop plan if impaired  - Assess patient's need for assistive devices and provide as appropriate  - Encourage maximum independence but intervene and supervise when necessary  ¯ Involve family in performance of ADLs  ¯ Assess for home care needs following discharge   ¯ Request OT consult to assist with ADL evaluation and planning for discharge  ¯ Provide patient education as appropriate  Outcome: Progressing  Goal: Maintain or return mobility status to optimal level  Description  INTERVENTIONS:  - Assess patient's baseline mobility status (ambulation, transfers, stairs, etc )    - Identify cognitive and physical deficits and behaviors that affect mobility  - Identify mobility aids required to assist with transfers and/or ambulation (gait belt, sit-to-stand, lift, walker, cane, etc )  - [place] fall precautions as indicated by assessment  - Record patient progress and toleration of activity level on Mobility SBAR; progress patient to next Phase/Stage  - Instruct patient to call for assistance with activity based on assessment  - Request Rehabilitation consult to assist with strengthening/weightbearing, etc   Outcome: Progressing     Problem: DISCHARGE PLANNING  Goal: Discharge to home or other facility with appropriate resources  Description  INTERVENTIONS:  - Identify barriers to discharge w/patient and caregiver  - Arrange for needed discharge resources and transportation as appropriate  - Identify discharge learning needs (meds, wound care, etc )  - Arrange for interpretive services to assist at discharge as needed  - Refer to Case Management Department for coordinating discharge planning if the patient needs post-hospital services based on physician/advanced practitioner order or complex needs related to functional status, cognitive ability, or social support system  Outcome: Progressing     Problem: Knowledge Deficit  Goal: Patient/family/caregiver demonstrates understanding of disease process, treatment plan, medications, and discharge instructions  Description  Complete learning assessment and assess knowledge base    Interventions:  - Provide teaching at level of understanding  - Provide teaching via preferred learning methods  Outcome: Progressing     Problem: Prexisting or High Potential for Compromised Skin Integrity  Goal: Skin integrity is maintained or improved  Description  INTERVENTIONS:  - Identify patients at risk for skin breakdown  - Assess and monitor skin integrity  - Assess and monitor nutrition and hydration status  - Monitor labs (i e  albumin)  - Assess for incontinence   - Turn and reposition patient  - Assist with mobility/ambulation  - Relieve pressure over bony prominences  - Avoid friction and shearing  - Provide appropriate hygiene as needed including keeping skin clean and dry  - Evaluate need for skin moisturizer/barrier cream  - Collaborate with interdisciplinary team (i e  Nutrition, Rehabilitation, etc )   - Patient/family teaching  Outcome: Progressing

## 2019-07-22 NOTE — PLAN OF CARE
Problem: Potential for Falls  Goal: Patient will remain free of falls  Description  INTERVENTIONS:  - Assess patient frequently for physical needs  -  Identify cognitive and physical deficits and behaviors that affect risk of falls  -  [place] fall precautions as indicated by assessment   - Educate patient/family on patient safety including physical limitations  - Instruct patient to call for assistance with activity based on assessment  - Modify environment to reduce risk of injury  - Consider OT/PT consult to assist with strengthening/mobility  Outcome: Progressing     Problem: Nutrition/Hydration-ADULT  Goal: Nutrient/Hydration intake appropriate for improving, restoring or maintaining nutritional needs  Description  Monitor and assess patient's nutrition/hydration status for malnutrition (ex- brittle hair, bruises, dry skin, pale skin and conjunctiva, muscle wasting, smooth red tongue, and disorientation)  Collaborate with interdisciplinary team and initiate plan and interventions as ordered  Monitor patient's weight and dietary intake as ordered or per policy  Utilize nutrition screening tool and intervene per policy  Determine patient's food preferences and provide high-protein, high-caloric foods as appropriate       INTERVENTIONS:  - Monitor oral intake, urinary output, labs, and treatment plans  - Assess nutrition and hydration status and recommend course of action  - Evaluate amount of meals eaten  - Assist patient with eating if necessary   - Allow adequate time for meals  - Recommend/ encourage appropriate diets, oral nutritional supplements, and vitamin/mineral supplements  - Order, calculate, and assess calorie counts as needed  - Recommend, monitor, and adjust tube feedings and TPN/PPN based on assessed needs  - Assess need for intravenous fluids  - Provide specific nutrition/hydration education as appropriate  - Include patient/family/caregiver in decisions related to nutrition  Outcome: Progressing     Problem: PAIN - ADULT  Goal: Verbalizes/displays adequate comfort level or baseline comfort level  Description  Interventions:  - Encourage patient to monitor pain and request assistance  - Assess pain using appropriate pain scale  - Administer analgesics based on type and severity of pain and evaluate response  - Implement non-pharmacological measures as appropriate and evaluate response  - Consider cultural and social influences on pain and pain management  - Notify physician/advanced practitioner if interventions unsuccessful or patient reports new pain  Outcome: Progressing     Problem: INFECTION - ADULT  Goal: Absence or prevention of progression during hospitalization  Description  INTERVENTIONS:  - Assess and monitor for signs and symptoms of infection  - Monitor lab/diagnostic results  - Monitor all insertion sites, i e  indwelling lines, tubes, and drains  - Monitor endotracheal (as able) and nasal secretions for changes in amount and color  - [place] appropriate cooling/warming therapies per order  - Administer medications as ordered  - Instruct and encourage patient and family to use good hand hygiene technique  - Identify and instruct in appropriate isolation precautions for identified infection/condition  Outcome: Progressing  Goal: Absence of fever/infection during neutropenic period  Description  INTERVENTIONS:  - Monitor WBC  - Implement neutropenic guidelines  Outcome: Progressing     Problem: SAFETY ADULT  Goal: Patient will remain free of falls  Description  INTERVENTIONS:  - Assess patient frequently for physical needs  -  Identify cognitive and physical deficits and behaviors that affect risk of falls    -  [place] fall precautions as indicated by assessment   - Educate patient/family on patient safety including physical limitations  - Instruct patient to call for assistance with activity based on assessment  - Modify environment to reduce risk of injury  - Consider OT/PT consult to assist with strengthening/mobility  Outcome: Progressing  Goal: Maintain or return to baseline ADL function  Description  INTERVENTIONS:  -  Assess patient's ability to carry out ADLs; assess patient's baseline for ADL function and identify physical deficits which impact ability to perform ADLs (bathing, care of mouth/teeth, toileting, grooming, dressing, etc )  - Assess/evaluate cause of self-care deficits   - Assess range of motion  - Assess patient's mobility; develop plan if impaired  - Assess patient's need for assistive devices and provide as appropriate  - Encourage maximum independence but intervene and supervise when necessary  ¯ Involve family in performance of ADLs  ¯ Assess for home care needs following discharge   ¯ Request OT consult to assist with ADL evaluation and planning for discharge  ¯ Provide patient education as appropriate  Outcome: Progressing  Goal: Maintain or return mobility status to optimal level  Description  INTERVENTIONS:  - Assess patient's baseline mobility status (ambulation, transfers, stairs, etc )    - Identify cognitive and physical deficits and behaviors that affect mobility  - Identify mobility aids required to assist with transfers and/or ambulation (gait belt, sit-to-stand, lift, walker, cane, etc )  - [place] fall precautions as indicated by assessment  - Record patient progress and toleration of activity level on Mobility SBAR; progress patient to next Phase/Stage  - Instruct patient to call for assistance with activity based on assessment  - Request Rehabilitation consult to assist with strengthening/weightbearing, etc   Outcome: Progressing     Problem: DISCHARGE PLANNING  Goal: Discharge to home or other facility with appropriate resources  Description  INTERVENTIONS:  - Identify barriers to discharge w/patient and caregiver  - Arrange for needed discharge resources and transportation as appropriate  - Identify discharge learning needs (meds, wound care, etc )  - Arrange for interpretive services to assist at discharge as needed  - Refer to Case Management Department for coordinating discharge planning if the patient needs post-hospital services based on physician/advanced practitioner order or complex needs related to functional status, cognitive ability, or social support system  Outcome: Progressing     Problem: Knowledge Deficit  Goal: Patient/family/caregiver demonstrates understanding of disease process, treatment plan, medications, and discharge instructions  Description  Complete learning assessment and assess knowledge base    Interventions:  - Provide teaching at level of understanding  - Provide teaching via preferred learning methods  Outcome: Progressing     Problem: Prexisting or High Potential for Compromised Skin Integrity  Goal: Skin integrity is maintained or improved  Description  INTERVENTIONS:  - Identify patients at risk for skin breakdown  - Assess and monitor skin integrity  - Assess and monitor nutrition and hydration status  - Monitor labs (i e  albumin)  - Assess for incontinence   - Turn and reposition patient  - Assist with mobility/ambulation  - Relieve pressure over bony prominences  - Avoid friction and shearing  - Provide appropriate hygiene as needed including keeping skin clean and dry  - Evaluate need for skin moisturizer/barrier cream  - Collaborate with interdisciplinary team (i e  Nutrition, Rehabilitation, etc )   - Patient/family teaching  Outcome: Progressing

## 2019-07-22 NOTE — PROGRESS NOTES
Progress Note - Tish Keith 1940, 77 y o  male MRN: [id number]    Unit/Bed#: [facility] 813-01 Encounter: [id number]    Primary Care Provider: Jearlean Rinne, DO   Date and time admitted to hospital: 7/15/2019  2:17 AM        * Parastomal hernia  Assessment & Plan  S/p exploratory laparotomy, lysis of adhesions, small bowel resection, primary repair of parastomal hernia, colonoscopy 7/15    VSS  Afebrile  Ostomy healthy/pink appearing with small amount of stool in bag (150cc/24h)  NGT placed overnight for abdominal distension, nausea and vomiting (700cc brown output)  Plan  NPO  [email or web address], resumed while NPO  Restrepo placed 7/20 per urology, defering to urology for mgt, appreciate recs  Renal following, appreciate recommendations  OOB, ambulate  Wound care for ostomy  PT/OT  Dispo planning to [facility]  F/u morning BMP      Subjective/Objective     Subjective: Pt c/o abd distension and heartburn  Nausea improved after placement of NGT  Ambulating w/o difficulty  Denied fever, chills, chest pain, shortness of breath, nausea, vomiting, or abdominal pain this morning  Objective:     Blood pressure 145/81, pulse (!) 109, temperature 97 9 °F (36 6 °C), resp  rate 18, height 5' 6" (1 676 m), weight 98 7 kg (217 lb 9 5 oz), SpO2 92 %  ,Body mass index is 35 12 kg/m²  Intake/Output Summary (Last 24 hours) at 7/22/2019 0531  Last data filed at 7/22/2019 0420  Gross per 24 hour   Intake 635 05 ml   Output 2400 ml   Net -1764 95 ml       Invasive Devices     Peripheral Intravenous Line            Peripheral IV 07/20/19 Right Hand 1 day          Drain            Colostomy LLQ 7 days    Urethral Catheter Non-latex 18 Fr  1 day    NG/OG/Enteral Tube Nasogastric Left nares less than 1 day                Physical Exam  General: NAD  HEENT: NC/AT  MMM  NGT in place w 700cc output overnight  Cv: RRR  Lungs: normal effort  Ab: distended, non tender  BS+  Ostomy viable w small amount of stool output 150cc/24h      Ex: no CCE  Neuro: AAOx3    Lab, Imaging and other studies:  I have personally reviewed pertinent lab results  CBC and differential [[id number]] (Abnormal) Collected: 07/21/19 0522   Lab Status: Final result Specimen: Blood from Hand, Left Updated: 07/21/19 0611    WBC 9 23 Thousand/uL     RBC 3 55Low  Million/uL     Hemoglobin 10  0Low  g/dL     Hematocrit 31 6Low  %     MCV 89 fL     MCH 28 2 pg     MCHC 31 6 g/dL     RDW 13 4 %     MPV 10 9 fL     Platelets 819 Thousands/uL     nRBC 0 /100 WBCs     Neutrophils Relative 76High  %     Immat GRANS % 1 %     Lymphocytes Relative 10Low  %     Monocytes Relative 12 %     Eosinophils Relative 1 %     Basophils Relative 0 %     Neutrophils Absolute 6 98 Thousands/µL     Immature Grans Absolute 0 12 Thousand/uL     Lymphocytes Absolute 0 88 Thousands/µL     Monocytes Absolute 1 12 Thousand/µL     Eosinophils Absolute 0 11 Thousand/µL     Basophils Absolute 0 02 Thousands/µL    Basic metabolic panel [[id number]] (Abnormal) Collected: 07/21/19 0522   Lab Status: Final result Specimen: Blood from Hand, Left Updated: 07/21/19 0600    Sodium 138 mmol/L     Potassium 3 3Low  mmol/L     Chloride 108 mmol/L     CO2 22 mmol/L     ANION GAP 8 mmol/L     BUN 41High  mg/dL     Creatinine 2  24High  mg/dL     Glucose 173High  mg/dL     Calcium 8 3 mg/dL     eGFR 27 ml/min/1 73sq m      VTE Pharmacologic Prophylaxis: Heparin  VTE Mechanical Prophylaxis: sequential compression device    Harpreet Molina, PGY1  Surgery  7/22/19

## 2019-07-22 NOTE — ASSESSMENT & PLAN NOTE
S/p exploratory laparotomy, lysis of adhesions, small bowel resection, primary repair of parastomal hernia, colonoscopy 7/15    VSS  Afebrile  Ostomy healthy/pink appearing with small amount of stool in bag (150cc/24h)  NGT placed overnight for abdominal distension, nausea and vomiting (700cc brown output)       Plan  NPO  [email or web address], resumed while NPO  Restrepo placed 7/20 per urology, defering to urology for mgt, appreciate recs  Renal following, appreciate recommendations  OOB, ambulate  Wound care for ostomy  PT/OT  Dispo planning to STR  F/u morning BMP

## 2019-07-22 NOTE — PLAN OF CARE
Problem: PHYSICAL THERAPY ADULT  Goal: Performs mobility at highest level of function for planned discharge setting  See evaluation for individualized goals  Description  Treatment/Interventions: Functional transfer training, LE strengthening/ROM, Elevations, Therapeutic exercise, Endurance training, Patient/family training, Equipment eval/education, Bed mobility, Gait training, Spoke to nursing, OT  Equipment Recommended: Walker(RW)       See flowsheet documentation for full assessment, interventions and recommendations  Outcome: Progressing  Note:   Prognosis: Good  Problem List: Decreased strength, Decreased endurance, Impaired balance, Decreased mobility, Decreased safety awareness, Obesity, Decreased skin integrity, Pain  Assessment: Pt able to perform sit<->stand transfers needing modAx1 to and from low surface  Pt declined placement of pillow underneath following mobility  Pt able to ambulate 60 feet x2 with use of RW on various surfaces needing minAx1, one static sit rest break lasting elss than 15 seconds  Pt fatigues quickly and reports inc B lower ABD pain and discomfort at rest and during mobility  Pt able to perform BLE ther ex HEP sitting in chair postmobility AROM  Pt would cont to benefit from skilled inpt PT services to maximize functional independence  Barriers to Discharge: Inaccessible home environment     Recommendation: Short-term skilled PT     PT - OK to Discharge: Yes    See flowsheet documentation for full assessment

## 2019-07-22 NOTE — PROGRESS NOTES
Patient is alert and oriented x4, pleasant cooperative  Only complains of feeling weak  NGT left nares, to constant low wall suction  Clamped when taking pills with small sips of water  Minimal drainage  Placement verified by auscultation  Order to advance NGT, currently 59 cm exposed  Placement verified, patient tolerated well  Abdomen remains firm, hypo active bsx4  Minimal output of ostomy  Ambulating the bruno with one assist and rolling walker  Call bell in reach  IVF continue

## 2019-07-22 NOTE — NUTRITION
07/22/19 1133   Recommendations/Interventions   Summary Patient NPO, NGT in place secondary to nausea, vomiting and abdominal distention  Nutrition Recommendations Other (specify); Lab - consider order (specify)  (If unable to safely advance diet to clear liquid with ensure clear BID within 48 hours suggest initiate standard TPN for day one and then increase to: 700 ml 10% aa, 600 ml 40% dex, 250 ml 20% lipids   Monitor electrolytes )

## 2019-07-22 NOTE — NURSING NOTE
NGT put out thick brown output  Pt distended and belching  Notified [facility] who came to assess NGT  Difficulty with NGT draining  Ordered KUB  Will continue to monitor

## 2019-07-23 NOTE — ASSESSMENT & PLAN NOTE
S/p exploratory laparotomy, lysis of adhesions, small bowel resection, primary repair of parastomal hernia, colonoscopy 7/15    7/23 CT abd pelvis wo con: Fluid filled dilated SB and LB loops throughout abdomen  Decompressed colon meeting to the stoma  Likely postop ileus  VSS  Afebrile  Abd still distended, although much improved in comparison to prior exams  Ostomy healthy/pink appearing with small amount of stool in bag approx 100cc last 24h  NGT in place with no output  Creat: 2 16 (7/22) --> 2 72 (7/23)  Plan  NPO/NGT     [email or web address], resumed while NPO  Restrepo placed 7/20 per urology, defering to urology for mgt, appreciate recs  Renal following, appreciate recommendations  OOB, ambulate  Wound care for ostomy  PT/OT  Dispo planning to STR

## 2019-07-23 NOTE — ASSESSMENT & PLAN NOTE
S/p exploratory laparotomy, lysis of adhesions, small bowel resection, primary repair of parastomal hernia, colonoscopy 7/15    VSS  Afebrile  Ostomy healthy/pink appearing with small amount of stool in bag (150cc/24h)  NGT placed overnight for abdominal distension, nausea and vomiting (700cc brown output)       Plan  NPO/NGT  [email or web address], resumed while NPO  CT a/p po contrast only if not improved today  Restrepo placed 7/20 per urology, defering to urology for mgt, appreciate recs  Renal following, appreciate recommendations  OOB, ambulate  Wound care for ostomy  PT/OT  Dispo planning to STR

## 2019-07-23 NOTE — PLAN OF CARE
Problem: Potential for Falls  Goal: Patient will remain free of falls  Description  INTERVENTIONS:  - Assess patient frequently for physical needs  -  Identify cognitive and physical deficits and behaviors that affect risk of falls  -  [place] fall precautions as indicated by assessment   - Educate patient/family on patient safety including physical limitations  - Instruct patient to call for assistance with activity based on assessment  - Modify environment to reduce risk of injury  - Consider OT/PT consult to assist with strengthening/mobility  Outcome: Progressing     Problem: Nutrition/Hydration-ADULT  Goal: Nutrient/Hydration intake appropriate for improving, restoring or maintaining nutritional needs  Description  Monitor and assess patient's nutrition/hydration status for malnutrition (ex- brittle hair, bruises, dry skin, pale skin and conjunctiva, muscle wasting, smooth red tongue, and disorientation)  Collaborate with interdisciplinary team and initiate plan and interventions as ordered  Monitor patient's weight and dietary intake as ordered or per policy  Utilize nutrition screening tool and intervene per policy  Determine patient's food preferences and provide high-protein, high-caloric foods as appropriate       INTERVENTIONS:  - Monitor oral intake, urinary output, labs, and treatment plans  - Assess nutrition and hydration status and recommend course of action  - Evaluate amount of meals eaten  - Assist patient with eating if necessary   - Allow adequate time for meals  - Recommend/ encourage appropriate diets, oral nutritional supplements, and vitamin/mineral supplements  - Order, calculate, and assess calorie counts as needed  - Recommend, monitor, and adjust tube feedings and TPN/PPN based on assessed needs  - Assess need for intravenous fluids  - Provide specific nutrition/hydration education as appropriate  - Include patient/family/caregiver in decisions related to nutrition  Outcome: Progressing     Problem: PAIN - ADULT  Goal: Verbalizes/displays adequate comfort level or baseline comfort level  Description  Interventions:  - Encourage patient to monitor pain and request assistance  - Assess pain using appropriate pain scale  - Administer analgesics based on type and severity of pain and evaluate response  - Implement non-pharmacological measures as appropriate and evaluate response  - Consider cultural and social influences on pain and pain management  - Notify physician/advanced practitioner if interventions unsuccessful or patient reports new pain  Outcome: Progressing     Problem: INFECTION - ADULT  Goal: Absence or prevention of progression during hospitalization  Description  INTERVENTIONS:  - Assess and monitor for signs and symptoms of infection  - Monitor lab/diagnostic results  - Monitor all insertion sites, i e  indwelling lines, tubes, and drains  - Monitor endotracheal (as able) and nasal secretions for changes in amount and color  - [place] appropriate cooling/warming therapies per order  - Administer medications as ordered  - Instruct and encourage patient and family to use good hand hygiene technique  - Identify and instruct in appropriate isolation precautions for identified infection/condition  Outcome: Progressing  Goal: Absence of fever/infection during neutropenic period  Description  INTERVENTIONS:  - Monitor WBC  - Implement neutropenic guidelines  Outcome: Progressing     Problem: SAFETY ADULT  Goal: Patient will remain free of falls  Description  INTERVENTIONS:  - Assess patient frequently for physical needs  -  Identify cognitive and physical deficits and behaviors that affect risk of falls    -  [place] fall precautions as indicated by assessment   - Educate patient/family on patient safety including physical limitations  - Instruct patient to call for assistance with activity based on assessment  - Modify environment to reduce risk of injury  - Consider OT/PT consult to assist with strengthening/mobility  Outcome: Progressing  Goal: Maintain or return to baseline ADL function  Description  INTERVENTIONS:  -  Assess patient's ability to carry out ADLs; assess patient's baseline for ADL function and identify physical deficits which impact ability to perform ADLs (bathing, care of mouth/teeth, toileting, grooming, dressing, etc )  - Assess/evaluate cause of self-care deficits   - Assess range of motion  - Assess patient's mobility; develop plan if impaired  - Assess patient's need for assistive devices and provide as appropriate  - Encourage maximum independence but intervene and supervise when necessary  ¯ Involve family in performance of ADLs  ¯ Assess for home care needs following discharge   ¯ Request OT consult to assist with ADL evaluation and planning for discharge  ¯ Provide patient education as appropriate  Outcome: Progressing  Goal: Maintain or return mobility status to optimal level  Description  INTERVENTIONS:  - Assess patient's baseline mobility status (ambulation, transfers, stairs, etc )    - Identify cognitive and physical deficits and behaviors that affect mobility  - Identify mobility aids required to assist with transfers and/or ambulation (gait belt, sit-to-stand, lift, walker, cane, etc )  - [place] fall precautions as indicated by assessment  - Record patient progress and toleration of activity level on Mobility SBAR; progress patient to next Phase/Stage  - Instruct patient to call for assistance with activity based on assessment  - Request Rehabilitation consult to assist with strengthening/weightbearing, etc   Outcome: Progressing     Problem: DISCHARGE PLANNING  Goal: Discharge to home or other facility with appropriate resources  Description  INTERVENTIONS:  - Identify barriers to discharge w/patient and caregiver  - Arrange for needed discharge resources and transportation as appropriate  - Identify discharge learning needs (meds, wound care, etc )  - Arrange for interpretive services to assist at discharge as needed  - Refer to Case Management Department for coordinating discharge planning if the patient needs post-hospital services based on physician/advanced practitioner order or complex needs related to functional status, cognitive ability, or social support system  Outcome: Progressing     Problem: Knowledge Deficit  Goal: Patient/family/caregiver demonstrates understanding of disease process, treatment plan, medications, and discharge instructions  Description  Complete learning assessment and assess knowledge base    Interventions:  - Provide teaching at level of understanding  - Provide teaching via preferred learning methods  Outcome: Progressing     Problem: Prexisting or High Potential for Compromised Skin Integrity  Goal: Skin integrity is maintained or improved  Description  INTERVENTIONS:  - Identify patients at risk for skin breakdown  - Assess and monitor skin integrity  - Assess and monitor nutrition and hydration status  - Monitor labs (i e  albumin)  - Assess for incontinence   - Turn and reposition patient  - Assist with mobility/ambulation  - Relieve pressure over bony prominences  - Avoid friction and shearing  - Provide appropriate hygiene as needed including keeping skin clean and dry  - Evaluate need for skin moisturizer/barrier cream  - Collaborate with interdisciplinary team (i e  Nutrition, Rehabilitation, etc )   - Patient/family teaching  Outcome: Progressing     Problem: GASTROINTESTINAL - ADULT  Goal: Minimal or absence of nausea and/or vomiting  Description  INTERVENTIONS:  - Administer IV fluids as ordered to ensure adequate hydration  - Maintain NPO status until nausea and vomiting are resolved  - Nasogastric tube as ordered  - Administer ordered antiemetic medications as needed  - Provide nonpharmacologic comfort measures as appropriate  - Advance diet as tolerated, if ordered  - Nutrition services referral to assist patient with adequate nutrition and appropriate food choices  Outcome: Progressing  Goal: Maintains or returns to baseline bowel function  Description  INTERVENTIONS:  - Assess bowel function  - Encourage oral fluids to ensure adequate hydration  - Administer IV fluids as ordered to ensure adequate hydration  - Administer ordered medications as needed  - Encourage mobilization and activity  - Nutrition services referral to assist patient with appropriate food choices  Outcome: Progressing  Goal: Maintains adequate nutritional intake  Description  INTERVENTIONS:  - Monitor percentage of each meal consumed  - Identify factors contributing to decreased intake, treat as appropriate  - Assist with meals as needed  - Monitor I&O, WT and lab values  - Obtain nutrition services referral as needed  Outcome: Progressing  Goal: Establish and maintain optimal ostomy function  Description  INTERVENTIONS:  - Assess bowel function  - Encourage oral fluids to ensure adequate hydration  - Administer IV fluids as ordered to ensure adequate hydration  - Administer ordered medications as needed  - Encourage mobilization and activity  - Nutrition services referral to assist patient with appropriate food choices  - Assess stoma site  Outcome: Progressing     Problem: METABOLIC, FLUID AND ELECTROLYTES - ADULT  Goal: Electrolytes maintained within normal limits  Description  INTERVENTIONS:  - Monitor labs and assess patient for signs and symptoms of electrolyte imbalances  - Administer electrolyte replacement as ordered  - Monitor response to electrolyte replacements, including repeat lab results as appropriate  - Instruct patient on fluid and nutrition as appropriate  Outcome: Progressing  Goal: Fluid balance maintained  Description  INTERVENTIONS:  - Monitor labs and assess for signs and symptoms of volume excess or deficit  - Monitor I/O and WT  - Instruct patient on fluid and nutrition as appropriate  Outcome: Progressing  Goal: Glucose maintained within target range  Description  INTERVENTIONS:  - Monitor Blood Glucose as ordered  - Assess for signs and symptoms of hyperglycemia and hypoglycemia  - Administer ordered medications to maintain glucose within target range  - Assess nutritional intake and initiate nutrition service referral as needed  Outcome: Progressing

## 2019-07-23 NOTE — PROGRESS NOTES
Progress Note - Nephrology   Flor Perera 77 y o  male MRN: [id number]  Unit/Bed#: [facility] 813-01 Encounter: [id number]      Assessment:  25-year-old male with a history of CKD (baseline creatinine 1 4), diabetes mellitus type 2, hypertension, hypercholesterolemia, BPH, colon cancer (1998, treated with surgery), Parkinson's disease, neurogenic bladder admitted for parastomal hernia repair  Nephrology consulted for acute on chronic kidney disease  Plan:  1  Acute on chronic kidney disease  -status post parastomal hernia repair  -CKD 3:  baseline creatinine 1 4  -creatinine noted to be increased today at 2 72  Likely explained by neurogenic bladder,  due to improvement after Restrepo placement, renal ultrasound showed mild hydronephrosis  -Restrepo in place, placed by Urology, draining 250 cc of clear yellow urine  -continue Restrepo until creatinine normalizes  Urology following  -patient continues to have NG tube,  current NPO  -current isolate S decreased to 75 cc/hour, continue to hydrate, may need to increase rate  -mild hypokalemia at 3 4 present, 40 meq of KCL IVPB ordered  -monitor electrolytes and renal function     2  Parastomal hernia  -vital signs stable, patient has an ostomy which is functioning well   -continue to monitor wound and ostomy   -management as per primary team     3  Hypertension  -130s-150s/70s-80s  -continue amlodipine 5 daily, carvedilol 12 5 b i d   -management as per primary team     4  BPH/neurogenic bladder  -Restrepo catheter in place, making adequate amount of urine (1 175 L yesterday)  -continue to monitor  -management as per primary team    Subjective:   Patient seen examined sitting in chair in no acute distress  No acute overnight events  He continues to remain NPO  He denies any current complaints including dysuria, hematuria, suprapubic pain, back pain, shortness of breath, leg swelling       ROS  CONSTITUTIONAL: Denies any fever, chills,  CARDIOVASCULAR: No chest pain or palpitations  RESPIRATORY: Denies any cough, hemoptysis, shortness of breath or dyspnea  GASTROINTESTINAL: Denies abdominal pain, nausea, vomitng   GENITOURINARY: No problems with urination  Denies any hematuria or dysuria  NEUROLOGIC: No dizziness or vertigo, denies headaches  MUSCULOSKELETAL: Denies any muscle or joint pain  SKIN:  Positive for abdominal incision, ostomy  Denies skin rashes or itching         Current Facility-Administered Medications:     amLODIPine (NORVASC) tablet 5 mg, 5 mg, Oral, Daily, Jeff Sweeney MD, 5 mg at 07/22/19 0809    atorvastatin (LIPITOR) tablet 40 mg, 40 mg, Oral, Daily With Aniya Watkins MD, 40 mg at 07/21/19 1719    carbidopa-levodopa (SINEMET)  mg per tablet 1 5 tablet, 1 5 tablet, Oral, TID With Meals, Jeanette Cota, 1 5 tablet at 07/22/19 1552    carvedilol (COREG) tablet 12 5 mg, 12 5 mg, Oral, BID With Meals, Kristin Nagy MD, 12 5 mg at 07/22/19 1553    finasteride (PROSCAR) tablet 5 mg, 5 mg, Oral, Daily, Sho Herring MD, 5 mg at 07/22/19 0809    heparin (porcine) subcutaneous injection 5,000 Units, 5,000 Units, Subcutaneous, Q8H [address], 5,000 Units at 07/23/19 0610 **AND** [CANCELED] Platelet count, , , Once, Jeanette Cota    HYDROmorphone (DILAUDID) injection 0 5 mg, 0 5 mg, Intravenous, Q2H PRN, Roxanne Green DO    insulin lispro (HumaLOG) 100 units/mL subcutaneous injection 1-6 Units, 1-6 Units, Subcutaneous, Q6H **AND** Fingerstick Glucose (POCT), , , Q6H, Rachael Velazco PA-C    melatonin tablet 3 mg, 3 mg, Oral, HS, Nain Jacobson MD, 3 mg at 07/22/19 2203    multi-electrolyte (ISOLYTE-S PH 7 4 equivalent) IV solution, 125 mL/hr, Intravenous, Continuous, Amara Avila MD, Last Rate: 125 mL/hr at 07/23/19 0810, 125 mL/hr at 07/23/19 0810    naloxone (NARCAN) 0 04 mg/mL syringe 0 04 mg, 0 04 mg, Intravenous, Q3 min PRN, Jeanette Cota    ondansetron [facility]) injection 4 mg, 4 mg, Intravenous, Q4H PRN, Fran Simmons Ayala, 4 mg at 07/15/19 0410    pantoprazole (PROTONIX) injection 40 mg, 40 mg, Intravenous, Q24H KATHY, Rachael Velazco PA-C    phenol (CHLORASEPTIC) 1 4 % mucosal liquid 1 spray, 1 spray, Mouth/Throat, Q2H PRN, Ruthie Meza MD, 1 spray at 07/17/19 1201    sodium chloride 0 9 % bolus 500 mL, 500 mL, Intravenous, Once, Rachael Velazco PA-C     Objective:     Vitals: Blood pressure 153/77, pulse 98, temperature 98 3 °F (36 8 °C), resp  rate 16, height 5' 6" (1 676 m), weight 98 7 kg (217 lb 9 5 oz), SpO2 93 %  ,Body mass index is 35 12 kg/m²  Weight (last 2 days)     None            Intake/Output Summary (Last 24 hours) at 7/23/2019 0833  Last data filed at 7/23/2019 0810  Gross per 24 hour   Intake 2977 92 ml   Output 1175 ml   Net 1802 92 ml       Urethral Catheter Non-latex 18 Fr  (Active)   Site Assessment Clean;Skin intact 7/23/2019  8:16 AM   Collection Container Standard drainage bag 7/23/2019  8:16 AM   Securement Method Securing device (Describe) 7/23/2019  8:16 AM   Output (mL) 225 mL 7/23/2019  6:14 AM       Physical Exam:   Physical Exam:   GENERAL: NAD, NG tube in place, drain small amount of fluid  HEENT:  NC/AT, EOMI, MMM, no scleral icterus  CARDIAC:  RRR, +S1/S2, no S3/S4 heard, no m/g/r  PULMONARY:  CTA B/L, no wheezing/rales/rhonci, non-labored breathing  ABDOMEN:  Ostomy in place,  Soft, NT/ND, +BS, no rebound/guarding/rigidity  :  Restrepo catheter in place draining clear yellow urine (150 cc)  Extremities:  2+ Pulses in DP/radial  No edema, cyanosis, or clubbing  NEUROLOGIC:  Alert/oriented x3  SKIN:  Abdominal incision looks clean without drainage or active bleeding, ostomy draining small amount of stool      Lab, Imaging and other studies:   CBC:   Lab Results   Component Value Date    WBC 13 51 (H) 07/23/2019    HGB 9 9 (L) 07/23/2019    HCT 31 4 (L) 07/23/2019    MCV 91 07/23/2019     07/23/2019    MCH 28 7 07/23/2019    MCHC 31 5 07/23/2019    RDW 13 7 07/23/2019    MPV 11 0 07/23/2019    NRBC 0 07/23/2019     CMP:   Lab Results   Component Value Date    SODIUM 140 07/23/2019    K 3 4 (L) 07/23/2019     07/23/2019    CO2 22 07/23/2019    BUN 58 (H) 07/23/2019    CREATININE 2 72 (H) 07/23/2019    CALCIUM 8 1 (L) 07/23/2019    EGFR 21 07/23/2019     Phosphorus:   Lab Results   Component Value Date    PHOS 4 2 (H) 07/23/2019     Magnesium:   Lab Results   Component Value Date    MG 2 2 07/23/2019     Urinalysis: No results found for: COLORU, CLARITYU, SPECGRAV, PHUR, LEUKOCYTESUR, NITRITE, PROTEINUA, GLUCOSEU, KETONESU, BILIRUBINUR, BLOODU  Ionized Calcium: No results found for: CAION    XR abdomen 1 vw portable   Final Result by Jos Frias MD (07/22 0881)      1  The sidehole of the enteric tube projects at the gastroesophageal junction  It should be advanced if gastric positioning is desired  2   Incompletely imaged gaseous distention of small bowel and colon, likely postoperative ileus  3   Bilateral lower lobe consolidations, likely atelectasis  Workstation performed: [id number]         US kidney and bladder   Final Result by Christelle Bolivar DO (07/19 1615)   Bilateral cortical scarring and cortical lobulation  Bilateral mild-moderate hydronephrosis, new from prior exam    Normal arterial resistive indices -this does not suggest a significant obstruction  Incompletely distended urinary bladder which is otherwise unremarkable  Neither ureteral jet detected  *Because the patient has a history of a neurogenic bladder bladder size may be distended for this patient  Although hydronephrosis can be seen in the setting of ascending UTI, Consider computed tomography assess for an obstructing lesion  Alternatively consider nuclear medicine renal flow and function study with Lasix intervention         I personally discussed this study with Dr Kelli Jones on 7/19/2019 at 4:13 PM                Workstation performed: [id number] CT abdomen pelvis w contrast    (Results Pending)

## 2019-07-23 NOTE — OCCUPATIONAL THERAPY NOTE
07/23/19 1018   Restrictions/Precautions   Weight Bearing Precautions Per Order No   Other Precautions Multiple lines;Telemetry; Fall Risk   Pain Assessment   Pain Assessment No/denies pain   Pain Score No Pain   ADL   Where Assessed Chair   Grooming Assistance 5  Supervision/Setup   Grooming Deficit Setup   Grooming Comments wash face   UB Bathing Assistance 5  Supervision/Setup   UB Bathing Deficit Setup   UB Bathing Comments seated   LB Bathing Assistance 3  Moderate Assistance   LB Bathing Deficit Setup; Buttocks;Right lower leg including foot; Left lower leg including foot   UB Dressing Assistance 4  Minimal Assistance   UB Dressing Deficit Setup   LB Dressing Assistance 1  Total Assistance   LB Dressing Deficit Setup;Don/doff R sock; Don/doff L sock   LB Dressing Comments don/doff socks   Functional Standing Tolerance   Time 5 min   Activity static standing    Comments RW   Transfers   Sit to Stand 4  Minimal assistance   Additional items Assist x 1   Stand to Sit 4  Minimal assistance   Additional items Assist x 1   Cognition   Overall Cognitive Status WFL   Arousal/Participation Responsive   Attention Attends with cues to redirect   Orientation Level Oriented X4   Memory Decreased recall of recent events   Following Commands Follows one step commands with increased time or repetition   Activity Tolerance   Activity Tolerance Patient tolerated treatment well   Assessment   Assessment Pt participates in OT session with focus on bathing, dressing, grooming, activity tolerance, and transfers to increase I for d/c  Pt setup grooming to wash face  Pt mod A bathing with SB and requires A to wash both feet, buttocks, and perineal area 2* decreased ROM  Pt min A UB dressing to don/doff gown  Pt dependent LB dressing to don/doff socks 2* decreased ROM  Pt min A sit to stand with RW support  Pt engaged in static standing for 5 min duration  Pt will continue to benefit from activity tolerance, adls, and transfers  Plan   Treatment Interventions ADL retraining;Functional transfer training; Endurance training; Activityengagement   Goal Expiration Date 07/30/19   Treatment Day 2   OT Frequency 3-5x/wk   Recommendation   OT Discharge Recommendation Short Term Rehab

## 2019-07-23 NOTE — PLAN OF CARE
Problem: OCCUPATIONAL THERAPY ADULT  Goal: Performs self-care activities at highest level of function for planned discharge setting  See evaluation for individualized goals  Description  Treatment Interventions: ADL retraining, Functional transfer training, Endurance training, Patient/family training, Equipment evaluation/education, Compensatory technique education, Energy conservation, Activityengagement  Equipment Recommended: Bedside commode       See flowsheet documentation for full assessment, interventions and recommendations  Outcome: Progressing  Note:   Limitation: Decreased ADL status, Decreased endurance, Decreased self-care trans, Decreased high-level ADLs  Prognosis: Good  Assessment: Pt participates in OT session with focus on bathing, dressing, grooming, activity tolerance, and transfers to increase I for d/c  Pt setup grooming to wash face  Pt mod A bathing with SB and requires A to wash both feet, buttocks, and perineal area 2* decreased ROM  Pt min A UB dressing to don/doff gown  Pt dependent LB dressing to don/doff socks 2* decreased ROM  Pt min A sit to stand with RW support  Pt engaged in static standing for 5 min duration  Pt will continue to benefit from activity tolerance, adls, and transfers       OT Discharge Recommendation: Short Term Rehab  OT - OK to Discharge: Yes(when medically appropriate)

## 2019-07-23 NOTE — PROGRESS NOTES
Progress Note - Colorectal Surgery   Mat Aguilar 77 y o  male MRN: [id number]  Unit/Bed#: [facility] 813-01 Encounter: [id number]    Assessment/Plan:    * Parastomal hernia  Assessment & Plan  S/p exploratory laparotomy, lysis of adhesions, small bowel resection, primary repair of parastomal hernia, colonoscopy 7/15    VSS  Afebrile  Ostomy healthy/pink appearing with small amount of stool in bag  NGT in  Place  Plan  NPO/NGT  [email or web address], resumed while NPO  CT a/p po contrast only if not improved today  Restrepo placed 7/20 per urology, defering to urology for mgt, appreciate recs  Renal following, appreciate recommendations  OOB, ambulate  Wound care for ostomy  PT/OT  Dispo planning to STR        Subjective/Objective     Subjective: No acute events overnight  States he is still intermittently nauseous and complains of some pain in his abdomen  No fevers, chills, or any other complaints at this time  Objective:     Vitals: Temp:  [97 9 °F (36 6 °C)-98 5 °F (36 9 °C)] 98 5 °F (36 9 °C)  HR:  [] 90  Resp:  [16-18] 18  BP: (130-140)/(71-80) 130/71  Body mass index is 35 12 kg/m²  I/O       07/21 0701 - 07/22 0700 07/22 0701 - 07/23 0700    P  O  480 0    I V  (mL/kg) 95 1 (1) 1732 9 (17 6)    NG/GT 60 120    Total Intake(mL/kg) 635 1 (6 4) 1852 9 (18 8)    Urine (mL/kg/hr) 1150 (0 5) 700 (0 3)    Emesis/NG output 1100 200    Stool 150 50    Total Output 2400 950    Net -1765 +902 9          Unmeasured Emesis Occurrence 1 x           Physical Exam:   NAD, alert and oriented x3  Normocephalic, atraumatic  MMM, EOMI, PERRLA  Norm resp effort on RA  RRR  Abd soft, nontender to palpation, distended, ostomy appears pink/healthy with small amount of stool in bag  Minimal erythema around incision, improving  Incision otherwise c/d/i   NGT in place  No calf tenderness or peripheral edema  Motor/sensation intact in distal extremities  CN grossly intact  -rash/lesions    Lab, Imaging and other studies:   CBC with diff:   Lab Results   Component Value Date    WBC 13 51 (H) 07/23/2019    HGB 9 9 (L) 07/23/2019    HCT 31 4 (L) 07/23/2019    MCV 91 07/23/2019     07/23/2019    MCH 28 7 07/23/2019    MCHC 31 5 07/23/2019    RDW 13 7 07/23/2019    MPV 11 0 07/23/2019    NRBC 0 07/23/2019   , BMP/CMP:   No results found for: SODIUM, K, CL, CO2, ANIONGAP, BUN, CREATININE, GLUCOSE, CALCIUM, AST, ALT, ALKPHOS, PROT, BILITOT, EGFR  VTE Pharmacologic Prophylaxis: Heparin  VTE Mechanical Prophylaxis: sequential compression device

## 2019-07-23 NOTE — PHYSICAL THERAPY NOTE
Physical Therapy Cancellation Note      Pt presently receiving contrast for  Cat scan  Pt declines any mobility now since he is too tired   CM and RN aware           Elsi Hutson, PTA

## 2019-07-24 NOTE — PROGRESS NOTES
Progress Note - Apple Bush 1940, 77 y o  male MRN: [id number]    Unit/Bed#: [facility] 813-01 Encounter: [id number]    Primary Care Provider: Jayden Carrillo DO   Date and time admitted to hospital: 7/15/2019  2:17 AM      * Parastomal hernia  Assessment & Plan  S/p exploratory laparotomy, lysis of adhesions, small bowel resection, primary repair of parastomal hernia, colonoscopy 7/15    7/23 CT abd pelvis wo con: Fluid filled dilated SB and LB loops throughout abdomen  Decompressed colon meeting to the stoma  Likely postop ileus  VSS  Afebrile  Abd still distended, although much improved in comparison to prior exams  Ostomy healthy/pink appearing with small amount of stool in bag approx 100cc last 24h  NGT in place with no output  Creat: 2 16 (7/22) --> 2 72 (7/23)  Plan  NPO/NGT  [email or web address], resumed while NPO  Restrepo placed 7/20 per urology, defering to urology for mgt, appreciate recs  Renal following, appreciate recommendations  OOB, ambulate  Wound care for ostomy  PT/OT  Dispo planning to STR        Subjective/Objective     Subjective: JOSELIN  Still c/o of distension  Denied fever, chills, chest pain, shortness of breath, nausea, vomiting, or abdominal pain this morning  Objective:     Blood pressure 121/68, pulse 96, temperature 97 9 °F (36 6 °C), resp  rate 18, height 5' 6" (1 676 m), weight 98 7 kg (217 lb 9 5 oz), SpO2 91 %  ,Body mass index is 35 12 kg/m²        Intake/Output Summary (Last 24 hours) at 7/24/2019 0520  Last data filed at 7/24/2019 0239  Gross per 24 hour   Intake 3481 25 ml   Output 1150 ml   Net 2331 25 ml       Invasive Devices     Peripheral Intravenous Line            Peripheral IV 07/20/19 Right Hand 3 days    Peripheral IV 07/23/19 Left;Proximal;Ventral (anterior) Forearm less than 1 day          Drain            Colostomy LLQ 9 days    Urethral Catheter Non-latex 18 Fr  3 days    NG/OG/Enteral Tube Nasogastric Left nares 2 days              Physical Exam  General: NAD    HEENT: NC/AT  MMM  Cv: RRR  Lungs: normal effort  Ab: distended although improved in comparison to prior exams  Ostomy pink w minimal stool output  Ex: no CCE  Neuro: AAOx3    Lab, Imaging and other studies:  I have personally reviewed pertinent lab results    , CBC:   No results found for: WBC, HGB, HCT, MCV, PLT, ADJUSTEDWBC, MCH, MCHC, RDW, MPV, NRBC, CMP:   No results found for: SODIUM, K, CL, CO2, ANIONGAP, BUN, CREATININE, GLUCOSE, CALCIUM, AST, ALT, ALKPHOS, PROT, BILITOT, EGFR  VTE Pharmacologic Prophylaxis: Heparin  VTE Mechanical Prophylaxis: sequential compression device    Reynaldo Vidales, PGY1  7/24/2019

## 2019-07-24 NOTE — ASSESSMENT & PLAN NOTE
S/p exploratory laparotomy, lysis of adhesions, small bowel resection, primary repair of parastomal hernia, colonoscopy 7/15    7/23 CT abd pelvis wo con: Fluid filled dilated SB and LB loops throughout abdomen  Decompressed colon meeting to the stoma  Likely postop ileus  7/24 NGT clamped for 4 hours with no output, subsequently removed    Plan  NPO with sips  [email or web address], resumed while NPO- Renal following, appreciate continued recommendations  Restrepo placed 7/20 per urology, defering to urology for mgt, appreciate recs   Urine culture pending from 7/24  OOB, ambulate  Wound care for ostomy  PT/OT  Dispo planning to STR

## 2019-07-24 NOTE — PROGRESS NOTES
Progress Note - Nephrology   Belen Nyhan 77 y o  male MRN: [id number]  Unit/Bed#: [facility] 813-01 Encounter: [id number]      Assessment:  69-year-old male with a history of CKD (baseline creatinine 1 4), diabetes mellitus type 2, hypertension, hypercholesterolemia, BPH, colon cancer (1998, treated with surgery), Parkinson's disease, neurogenic bladder admitted for parastomal hernia repair   Nephrology consulted for acute on chronic kidney disease      Plan:  1  Acute on chronic kidney disease  -status post parastomal hernia repair  -CKD 3:  baseline creatinine 1 4  -creatinine noted to be  decreased today at 2 45   Likely explained by neurogenic bladder,  due to improvement after Restrepo placement,  CT scan done yesterday shows no hydronephrosis  -Restrepo in place, placed by Urology, draining 50 cc of clear yellow urine    -continue Restrepo until creatinine normalizes   Urology following  -patient continues to have NG tube,  current NPO  -current isolate S  increased to 125 cc/hour, continue to hydrate  -hypokalemia:  Currently at 3 8,  20 x2 meq of KCL IVPB ordered, goal of 4 0  -urinalysis and urine culture ordered by primary team to rule out infection  -monitor electrolytes and renal function     2  Parastomal hernia  -vital signs stable, patient has an ostomy which is functioning well   -continue to monitor wound and ostomy   -management as per primary team     3  Hypertension  -120s-130s/70s-80s  -continue amlodipine 5 daily, carvedilol 12 5 b i d   -management as per primary team     4  BPH/neurogenic bladder  -Restrepo catheter in place, urine output seems adequate (925+ 500  L yesterday)  -continue to monitor  -management as per primary team       Subjective:   Patient seen examined sitting in chair in no acute distress  He denies any acute complaints today, including dysuria, hematuria, back pain, suprapubic pain, nausea, vomiting, shortness of breath       CONSTITUTIONAL: Denies any fever, chills,  CARDIOVASCULAR: No chest pain or palpitations  RESPIRATORY: Denies any cough, hemoptysis, shortness of breath or dyspnea  GASTROINTESTINAL:  Positive for abdominal distention Denies abdominal pain, nausea, vomitng   GENITOURINARY:  Restrepo in place  No problems with urination  Denies any hematuria or dysuria  NEUROLOGIC: No dizziness or vertigo, denies headaches  MUSCULOSKELETAL: Denies any muscle or joint pain     SKIN:  Positive for abdominal incision, ostomy  Denies skin rashes or itching        Current Facility-Administered Medications:     amLODIPine (NORVASC) tablet 5 mg, 5 mg, Oral, Daily, Aj Saha MD, 5 mg at 07/24/19 4356    atorvastatin (LIPITOR) tablet 40 mg, 40 mg, Oral, Daily With Surekha Hunt MD, 40 mg at 07/23/19 1721    carbidopa-levodopa (SINEMET)  mg per tablet 1 5 tablet, 1 5 tablet, Oral, TID With Meals, Ilekeya Parks, 1 5 tablet at 07/24/19 0829    carvedilol (COREG) tablet 12 5 mg, 12 5 mg, Oral, BID With Meals, Maddie Gil MD, 12 5 mg at 07/24/19 0831    finasteride (PROSCAR) tablet 5 mg, 5 mg, Oral, Daily, Jessica Espinosa MD, 5 mg at 07/24/19 0831    heparin (porcine) subcutaneous injection 5,000 Units, 5,000 Units, Subcutaneous, Q8H [address], 5,000 Units at 07/24/19 0528 **AND** [CANCELED] Platelet count, , , Once, Ilean Parks    HYDROmorphone (DILAUDID) injection 0 5 mg, 0 5 mg, Intravenous, Q2H PRN, Roxanne Green DO    insulin lispro (HumaLOG) 100 units/mL subcutaneous injection 1-6 Units, 1-6 Units, Subcutaneous, Q6H, 1 Units at 07/23/19 1325 **AND** Fingerstick Glucose (POCT), , , Q6H, Rachael Velazco PA-C    melatonin tablet 3 mg, 3 mg, Oral, HS, Any Carlton MD, 3 mg at 07/23/19 1506    multi-electrolyte (ISOLYTE-S PH 7 4 equivalent) IV solution, 125 mL/hr, Intravenous, Continuous, Aj Saha MD, Last Rate: 125 mL/hr at 07/24/19 0856, 125 mL/hr at 07/24/19 0856    naloxone (NARCAN) 0 04 mg/mL syringe 0 04 mg, 0 04 mg, Intravenous, Q3 min PRN, Victorina Lipoma Lucy    ondansetron [facility]) injection 4 mg, 4 mg, Intravenous, Q4H PRN, Diana Perez, 4 mg at 07/15/19 0410    oxyCODONE (ROXICODONE) immediate release tablet 10 mg, 10 mg, Oral, Q6H PRN, Miryam Ellington MD    oxyCODONE (ROXICODONE) IR tablet 5 mg, 5 mg, Oral, Q4H PRN, Miryam Ellington MD, 5 mg at 07/23/19 2019    pantoprazole (PROTONIX) injection 40 mg, 40 mg, Intravenous, Q24H [address], Rachael Velazco PA-C, 40 mg at 07/24/19 0831    phenol (CHLORASEPTIC) 1 4 % mucosal liquid 1 spray, 1 spray, Mouth/Throat, Q2H PRN, Evonne Montoya MD, 1 spray at 07/17/19 1201    potassium chloride 20 mEq IVPB (premix), 20 mEq, Intravenous, Q2H, Jp G DO Rick, Last Rate: 50 mL/hr at 07/24/19 0833, 20 mEq at 07/24/19 9523     Objective:     Vitals: Blood pressure 122/67, pulse 98, temperature 98 2 °F (36 8 °C), resp  rate 15, height 5' 6" (1 676 m), weight 98 7 kg (217 lb 9 5 oz), SpO2 92 %  ,Body mass index is 35 12 kg/m²  Weight (last 2 days)     None            Intake/Output Summary (Last 24 hours) at 7/24/2019 0856  Last data filed at 7/24/2019 0536  Gross per 24 hour   Intake 3029 17 ml   Output 925 ml   Net 2104 17 ml       Urethral Catheter Non-latex 18 Fr  (Active)   Site Assessment Clean;Skin intact 7/24/2019  8:01 AM   Collection Container Standard drainage bag 7/24/2019  8:01 AM   Securement Method Securing device (Describe) 7/24/2019  8:01 AM   Output (mL) 200 mL 7/24/2019  2:39 AM       Physical Exam:   Physical Exam:   GENERAL: NAD, NG tube in place, drain small amount of fluid  HEENT:  NC/AT, EOMI, MMM, no scleral icterus  CARDIAC:  RRR, +S1/S2, no S3/S4 heard, no m/g/r  PULMONARY:  CTA B/L, no wheezing/rales/rhonci, non-labored breathing  ABDOMEN:  Ostomy in place, abdominal distention,  Soft, NT/ND, +BS, no rebound/guarding/rigidity  :  Restrepo catheter in place draining clear yellow urine (50 cc)    Extremities:  2+ Pulses in DP/radial  No edema, cyanosis, or clubbing  NEUROLOGIC:  Alert/oriented x3  SKIN:  Abdominal incision looks clean without drainage or active bleeding, ostomy draining small amount of stool        Lab, Imaging and other studies:   CBC:   Lab Results   Component Value Date    WBC 14 57 (H) 07/24/2019    HGB 9 4 (L) 07/24/2019    HCT 30 0 (L) 07/24/2019    MCV 94 07/24/2019     07/24/2019    MCH 29 3 07/24/2019    MCHC 31 3 (L) 07/24/2019    RDW 13 6 07/24/2019    MPV 11 2 07/24/2019    NRBC 0 07/24/2019     CMP:   Lab Results   Component Value Date    SODIUM 140 07/24/2019    K 3 8 07/24/2019     07/24/2019    CO2 22 07/24/2019    BUN 62 (H) 07/24/2019    CREATININE 2 45 (H) 07/24/2019    CALCIUM 8 1 (L) 07/24/2019    EGFR 24 07/24/2019     Phosphorus: No results found for: PHOS  Magnesium:   Lab Results   Component Value Date    MG 2 1 07/24/2019     Urinalysis: No results found for: Rubbie Bjornstad, SPECGRAV, PHUR, LEUKOCYTESUR, NITRITE, PROTEINUA, GLUCOSEU, KETONESU, BILIRUBINUR, BLOODU  Ionized Calcium: No results found for: CAION        CT abdomen pelvis wo contrast   Final Result by Sara Lambert MD (07/23 1619)         1  Postsurgical changes of repair of parastomal hernia with small bowel resection and anastomosis  There are fluid filled dilated small and large bowel loops throughout the abdomen with only decompressed colon seen meeting to the stoma  Findings    likely represent postoperative ileus  This can be followed with serial abdominal radiographs if clinically indicated  2   Increased air within right renal collecting system and ureter as well as new foci of air that appear to be within the bladder wall  As previously reported, 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  3   Bibasilar atelectasis and small pleural effusions  The study was marked in [facility] for immediate notification           Workstation performed: [id number]         XR abdomen 1 vw portable   Final Result by Danyell Franco MD (07/22 6919)      1  The sidehole of the enteric tube projects at the gastroesophageal junction  It should be advanced if gastric positioning is desired  2   Incompletely imaged gaseous distention of small bowel and colon, likely postoperative ileus  3   Bilateral lower lobe consolidations, likely atelectasis  Workstation performed: [id number]         US kidney and bladder   Final Result by Wilfrido Powers DO (07/19 8097)   Bilateral cortical scarring and cortical lobulation  Bilateral mild-moderate hydronephrosis, new from prior exam    Normal arterial resistive indices -this does not suggest a significant obstruction  Incompletely distended urinary bladder which is otherwise unremarkable  Neither ureteral jet detected  *Because the patient has a history of a neurogenic bladder bladder size may be distended for this patient  Although hydronephrosis can be seen in the setting of ascending UTI, Consider computed tomography assess for an obstructing lesion  Alternatively consider nuclear medicine renal flow and function study with Lasix intervention         I personally discussed this study with Dr Jason Lange on 7/19/2019 at 4:13 PM                Workstation performed: [address] [place] [address] PGY1

## 2019-07-24 NOTE — PLAN OF CARE
Problem: Potential for Falls  Goal: Patient will remain free of falls  Description  INTERVENTIONS:  - Assess patient frequently for physical needs  -  Identify cognitive and physical deficits and behaviors that affect risk of falls  -  [place] fall precautions as indicated by assessment   - Educate patient/family on patient safety including physical limitations  - Instruct patient to call for assistance with activity based on assessment  - Modify environment to reduce risk of injury  - Consider OT/PT consult to assist with strengthening/mobility  Outcome: Progressing     Problem: Nutrition/Hydration-ADULT  Goal: Nutrient/Hydration intake appropriate for improving, restoring or maintaining nutritional needs  Description  Monitor and assess patient's nutrition/hydration status for malnutrition (ex- brittle hair, bruises, dry skin, pale skin and conjunctiva, muscle wasting, smooth red tongue, and disorientation)  Collaborate with interdisciplinary team and initiate plan and interventions as ordered  Monitor patient's weight and dietary intake as ordered or per policy  Utilize nutrition screening tool and intervene per policy  Determine patient's food preferences and provide high-protein, high-caloric foods as appropriate       INTERVENTIONS:  - Monitor oral intake, urinary output, labs, and treatment plans  - Assess nutrition and hydration status and recommend course of action  - Evaluate amount of meals eaten  - Assist patient with eating if necessary   - Allow adequate time for meals  - Recommend/ encourage appropriate diets, oral nutritional supplements, and vitamin/mineral supplements  - Order, calculate, and assess calorie counts as needed  - Recommend, monitor, and adjust tube feedings and TPN/PPN based on assessed needs  - Assess need for intravenous fluids  - Provide specific nutrition/hydration education as appropriate  - Include patient/family/caregiver in decisions related to nutrition  Outcome: Progressing     Problem: PAIN - ADULT  Goal: Verbalizes/displays adequate comfort level or baseline comfort level  Description  Interventions:  - Encourage patient to monitor pain and request assistance  - Assess pain using appropriate pain scale  - Administer analgesics based on type and severity of pain and evaluate response  - Implement non-pharmacological measures as appropriate and evaluate response  - Consider cultural and social influences on pain and pain management  - Notify physician/advanced practitioner if interventions unsuccessful or patient reports new pain  Outcome: Progressing     Problem: INFECTION - ADULT  Goal: Absence or prevention of progression during hospitalization  Description  INTERVENTIONS:  - Assess and monitor for signs and symptoms of infection  - Monitor lab/diagnostic results  - Monitor all insertion sites, i e  indwelling lines, tubes, and drains  - Monitor endotracheal (as able) and nasal secretions for changes in amount and color  - [place] appropriate cooling/warming therapies per order  - Administer medications as ordered  - Instruct and encourage patient and family to use good hand hygiene technique  - Identify and instruct in appropriate isolation precautions for identified infection/condition  Outcome: Progressing  Goal: Absence of fever/infection during neutropenic period  Description  INTERVENTIONS:  - Monitor WBC  - Implement neutropenic guidelines  Outcome: Progressing     Problem: SAFETY ADULT  Goal: Patient will remain free of falls  Description  INTERVENTIONS:  - Assess patient frequently for physical needs  -  Identify cognitive and physical deficits and behaviors that affect risk of falls    -  [place] fall precautions as indicated by assessment   - Educate patient/family on patient safety including physical limitations  - Instruct patient to call for assistance with activity based on assessment  - Modify environment to reduce risk of injury  - Consider OT/PT consult to assist with strengthening/mobility  Outcome: Progressing  Goal: Maintain or return to baseline ADL function  Description  INTERVENTIONS:  -  Assess patient's ability to carry out ADLs; assess patient's baseline for ADL function and identify physical deficits which impact ability to perform ADLs (bathing, care of mouth/teeth, toileting, grooming, dressing, etc )  - Assess/evaluate cause of self-care deficits   - Assess range of motion  - Assess patient's mobility; develop plan if impaired  - Assess patient's need for assistive devices and provide as appropriate  - Encourage maximum independence but intervene and supervise when necessary  ¯ Involve family in performance of ADLs  ¯ Assess for home care needs following discharge   ¯ Request OT consult to assist with ADL evaluation and planning for discharge  ¯ Provide patient education as appropriate  Outcome: Progressing  Goal: Maintain or return mobility status to optimal level  Description  INTERVENTIONS:  - Assess patient's baseline mobility status (ambulation, transfers, stairs, etc )    - Identify cognitive and physical deficits and behaviors that affect mobility  - Identify mobility aids required to assist with transfers and/or ambulation (gait belt, sit-to-stand, lift, walker, cane, etc )  - [place] fall precautions as indicated by assessment  - Record patient progress and toleration of activity level on Mobility SBAR; progress patient to next Phase/Stage  - Instruct patient to call for assistance with activity based on assessment  - Request Rehabilitation consult to assist with strengthening/weightbearing, etc   Outcome: Progressing     Problem: DISCHARGE PLANNING  Goal: Discharge to home or other facility with appropriate resources  Description  INTERVENTIONS:  - Identify barriers to discharge w/patient and caregiver  - Arrange for needed discharge resources and transportation as appropriate  - Identify discharge learning needs (meds, wound care, etc )  - Arrange for interpretive services to assist at discharge as needed  - Refer to Case Management Department for coordinating discharge planning if the patient needs post-hospital services based on physician/advanced practitioner order or complex needs related to functional status, cognitive ability, or social support system  Outcome: Progressing     Problem: Knowledge Deficit  Goal: Patient/family/caregiver demonstrates understanding of disease process, treatment plan, medications, and discharge instructions  Description  Complete learning assessment and assess knowledge base    Interventions:  - Provide teaching at level of understanding  - Provide teaching via preferred learning methods  Outcome: Progressing     Problem: Prexisting or High Potential for Compromised Skin Integrity  Goal: Skin integrity is maintained or improved  Description  INTERVENTIONS:  - Identify patients at risk for skin breakdown  - Assess and monitor skin integrity  - Assess and monitor nutrition and hydration status  - Monitor labs (i e  albumin)  - Assess for incontinence   - Turn and reposition patient  - Assist with mobility/ambulation  - Relieve pressure over bony prominences  - Avoid friction and shearing  - Provide appropriate hygiene as needed including keeping skin clean and dry  - Evaluate need for skin moisturizer/barrier cream  - Collaborate with interdisciplinary team (i e  Nutrition, Rehabilitation, etc )   - Patient/family teaching  Outcome: Progressing     Problem: GASTROINTESTINAL - ADULT  Goal: Minimal or absence of nausea and/or vomiting  Description  INTERVENTIONS:  - Administer IV fluids as ordered to ensure adequate hydration  - Maintain NPO status until nausea and vomiting are resolved  - Nasogastric tube as ordered  - Administer ordered antiemetic medications as needed  - Provide nonpharmacologic comfort measures as appropriate  - Advance diet as tolerated, if ordered  - Nutrition services referral to assist patient with adequate nutrition and appropriate food choices  Outcome: Progressing  Goal: Maintains or returns to baseline bowel function  Description  INTERVENTIONS:  - Assess bowel function  - Encourage oral fluids to ensure adequate hydration  - Administer IV fluids as ordered to ensure adequate hydration  - Administer ordered medications as needed  - Encourage mobilization and activity  - Nutrition services referral to assist patient with appropriate food choices  Outcome: Progressing  Goal: Maintains adequate nutritional intake  Description  INTERVENTIONS:  - Monitor percentage of each meal consumed  - Identify factors contributing to decreased intake, treat as appropriate  - Assist with meals as needed  - Monitor I&O, WT and lab values  - Obtain nutrition services referral as needed  Outcome: Progressing  Goal: Establish and maintain optimal ostomy function  Description  INTERVENTIONS:  - Assess bowel function  - Encourage oral fluids to ensure adequate hydration  - Administer IV fluids as ordered to ensure adequate hydration  - Administer ordered medications as needed  - Encourage mobilization and activity  - Nutrition services referral to assist patient with appropriate food choices  - Assess stoma site  Outcome: Progressing     Problem: METABOLIC, FLUID AND ELECTROLYTES - ADULT  Goal: Electrolytes maintained within normal limits  Description  INTERVENTIONS:  - Monitor labs and assess patient for signs and symptoms of electrolyte imbalances  - Administer electrolyte replacement as ordered  - Monitor response to electrolyte replacements, including repeat lab results as appropriate  - Instruct patient on fluid and nutrition as appropriate  Outcome: Progressing  Goal: Fluid balance maintained  Description  INTERVENTIONS:  - Monitor labs and assess for signs and symptoms of volume excess or deficit  - Monitor I/O and WT  - Instruct patient on fluid and nutrition as appropriate  Outcome: Progressing  Goal: Glucose maintained within target range  Description  INTERVENTIONS:  - Monitor Blood Glucose as ordered  - Assess for signs and symptoms of hyperglycemia and hypoglycemia  - Administer ordered medications to maintain glucose within target range  - Assess nutritional intake and initiate nutrition service referral as needed  Outcome: Progressing

## 2019-07-25 NOTE — OCCUPATIONAL THERAPY NOTE
Occupational Therapy Treatment Note:       07/25/19 1550   Restrictions/Precautions   Weight Bearing Precautions Per Order No   Other Precautions Cognitive; Chair Alarm; Bed Alarm; Fall Risk;O2;Multiple lines   Pain Assessment   Pain Score No Pain   ADL   Where Assessed Edge of bed   LB Dressing Assistance 2  Maximal Assistance   LB Dressing Deficit Don/doff R sock; Don/doff L sock   LB Dressing Comments Seated EOB, poor sitting balance unsafe to continue attempt while seated in this position, requires max A to don socks   Functional Standing Tolerance   Time ~1 min   Activity static standing    Comments RW    Bed Mobility   Supine to Sit 3  Moderate assistance   Additional items Assist x 2; Increased time required;Verbal cues   Sit to Supine Unable to assess   Additional Comments Seated OOB at end of session   Transfers   Sit to Stand 3  Moderate assistance   Additional items Assist x 2; Increased time required;Verbal cues   Stand to Sit 3  Moderate assistance   Additional items Assist x 2; Increased time required;Verbal cues   Stand pivot 4  Minimal assistance   Additional items Assist x 2; Increased time required;Verbal cues   Additional Comments RW level, increased VC , slow to process, R knee buckle   Cognition   Arousal/Participation Responsive   Attention Attends with cues to redirect   Orientation Level Oriented to person;Oriented to place; Disoriented to situation;Disoriented to time   Memory Decreased recall of recent events   Following Commands Follows one step commands with increased time or repetition   Comments slow to process, increased time and prepition requires, increased VC and TC , noted increased confusion this date, lethargic    Activity Tolerance   Activity Tolerance Patient limited by fatigue   Medical Staff Made Aware NSG aware   Assessment   Assessment Pt was seen this date for OT tx session focusing on bed mobility, LB dressing, sit to stand progressions, tranfers, fall prevention education and overall activity [place]  Pt presents supine, compeltes previously mentioned tasks at documented assist levels  Pt with noted decline this date, requiring Ax2 for funciotnal tranfer tasks, increased confusion  Increased VC and TC throguhout, slow to process  Pt resting in chair at end of session, alarm on, all needs i nreach  would benefit from contined OT tx to improve overall fucniotnal abilites  Continue to follow with denis [facility]     Plan   Treatment Interventions ADL retraining   Goal Expiration Date 07/30/19   Treatment Day 3   OT Frequency 3-5x/wk   Recommendation   OT Discharge Recommendation Short Term [id number] [facility], [place]

## 2019-07-25 NOTE — PLAN OF CARE
Problem: OCCUPATIONAL THERAPY ADULT  Goal: Performs self-care activities at highest level of function for planned discharge setting  See evaluation for individualized goals  Description  Treatment Interventions: ADL retraining, Functional transfer training, Endurance training, Patient/family training, Equipment evaluation/education, Compensatory technique education, Energy conservation, Activityengagement  Equipment Recommended: Bedside commode       See flowsheet documentation for full assessment, interventions and recommendations  Outcome: Progressing  Note:   Limitation: Decreased ADL status, Decreased endurance, Decreased self-care trans, Decreased high-level ADLs  Prognosis: Good  Assessment: Pt was seen this date for OT tx session focusing on bed mobility, LB dressing, sit to stand progressions, tranfers, fall prevention education and overall activity [place]  Pt presents supine, compeltes previously mentioned tasks at documented assist levels  Pt with noted decline this date, requiring Ax2 for funciotnal tranfer tasks, increased confusion  Increased VC and TC throguhout, slow to process  Pt resting in chair at end of session, alarm on, all needs i nreach  would benefit from contined OT tx to improve overall fucniotnal abilites  Continue to follow with denis [facility]       OT Discharge Recommendation: Short Term Rehab  OT - OK to Discharge: Yes(when medically appropriate)

## 2019-07-25 NOTE — PLAN OF CARE
Problem: Potential for Falls  Goal: Patient will remain free of falls  Description  INTERVENTIONS:  - Assess patient frequently for physical needs  -  Identify cognitive and physical deficits and behaviors that affect risk of falls  -  [place] fall precautions as indicated by assessment   - Educate patient/family on patient safety including physical limitations  - Instruct patient to call for assistance with activity based on assessment  - Modify environment to reduce risk of injury  - Consider OT/PT consult to assist with strengthening/mobility  7/25/2019 1948 by Omar Suero RN  Outcome: Progressing  7/25/2019 1948 by Omar Suero RN  Outcome: Progressing     Problem: Nutrition/Hydration-ADULT  Goal: Nutrient/Hydration intake appropriate for improving, restoring or maintaining nutritional needs  Description  Monitor and assess patient's nutrition/hydration status for malnutrition (ex- brittle hair, bruises, dry skin, pale skin and conjunctiva, muscle wasting, smooth red tongue, and disorientation)  Collaborate with interdisciplinary team and initiate plan and interventions as ordered  Monitor patient's weight and dietary intake as ordered or per policy  Utilize nutrition screening tool and intervene per policy  Determine patient's food preferences and provide high-protein, high-caloric foods as appropriate       INTERVENTIONS:  - Monitor oral intake, urinary output, labs, and treatment plans  - Assess nutrition and hydration status and recommend course of action  - Evaluate amount of meals eaten  - Assist patient with eating if necessary   - Allow adequate time for meals  - Recommend/ encourage appropriate diets, oral nutritional supplements, and vitamin/mineral supplements  - Order, calculate, and assess calorie counts as needed  - Recommend, monitor, and adjust tube feedings and TPN/PPN based on assessed needs  - Assess need for intravenous fluids  - Provide specific nutrition/hydration education as appropriate  - Include patient/family/caregiver in decisions related to nutrition  7/25/2019 1948 by Anthony Polo RN  Outcome: Progressing  7/25/2019 1948 by Anthony Polo RN  Outcome: Progressing     Problem: PAIN - ADULT  Goal: Verbalizes/displays adequate comfort level or baseline comfort level  Description  Interventions:  - Encourage patient to monitor pain and request assistance  - Assess pain using appropriate pain scale  - Administer analgesics based on type and severity of pain and evaluate response  - Implement non-pharmacological measures as appropriate and evaluate response  - Consider cultural and social influences on pain and pain management  - Notify physician/advanced practitioner if interventions unsuccessful or patient reports new pain  7/25/2019 1948 by Anthony Polo RN  Outcome: Progressing  7/25/2019 1948 by Anthony Polo RN  Outcome: Progressing     Problem: INFECTION - ADULT  Goal: Absence or prevention of progression during hospitalization  Description  INTERVENTIONS:  - Assess and monitor for signs and symptoms of infection  - Monitor lab/diagnostic results  - Monitor all insertion sites, i e  indwelling lines, tubes, and drains  - Monitor endotracheal (as able) and nasal secretions for changes in amount and color  - [place] appropriate cooling/warming therapies per order  - Administer medications as ordered  - Instruct and encourage patient and family to use good hand hygiene technique  - Identify and instruct in appropriate isolation precautions for identified infection/condition  7/25/2019 1948 by Anthony Polo RN  Outcome: Progressing  7/25/2019 1948 by Anthony Polo RN  Outcome: Progressing  Goal: Absence of fever/infection during neutropenic period  Description  INTERVENTIONS:  - Monitor WBC  - Implement neutropenic guidelines  7/25/2019 1948 by Anthony Polo RN  Outcome: Progressing  7/25/2019 1948 by Anthony Polo RN  Outcome: Progressing     Problem: SAFETY ADULT  Goal: Patient will remain free of falls  Description  INTERVENTIONS:  - Assess patient frequently for physical needs  -  Identify cognitive and physical deficits and behaviors that affect risk of falls    -  [place] fall precautions as indicated by assessment   - Educate patient/family on patient safety including physical limitations  - Instruct patient to call for assistance with activity based on assessment  - Modify environment to reduce risk of injury  - Consider OT/PT consult to assist with strengthening/mobility  7/25/2019 1948 by Dre Odonnell RN  Outcome: Progressing  7/25/2019 1948 by Dre Odonnell RN  Outcome: Progressing  Goal: Maintain or return to baseline ADL function  Description  INTERVENTIONS:  -  Assess patient's ability to carry out ADLs; assess patient's baseline for ADL function and identify physical deficits which impact ability to perform ADLs (bathing, care of mouth/teeth, toileting, grooming, dressing, etc )  - Assess/evaluate cause of self-care deficits   - Assess range of motion  - Assess patient's mobility; develop plan if impaired  - Assess patient's need for assistive devices and provide as appropriate  - Encourage maximum independence but intervene and supervise when necessary  ¯ Involve family in performance of ADLs  ¯ Assess for home care needs following discharge   ¯ Request OT consult to assist with ADL evaluation and planning for discharge  ¯ Provide patient education as appropriate  7/25/2019 1948 by Dre Odonnell RN  Outcome: Progressing  7/25/2019 1948 by Dre Odonnell RN  Outcome: Progressing  Goal: Maintain or return mobility status to optimal level  Description  INTERVENTIONS:  - Assess patient's baseline mobility status (ambulation, transfers, stairs, etc )    - Identify cognitive and physical deficits and behaviors that affect mobility  - Identify mobility aids required to assist with transfers and/or ambulation (gait belt, sit-to-stand, lift, walker, cane, etc )  - [place] fall precautions as indicated by assessment  - Record patient progress and toleration of activity level on Mobility SBAR; progress patient to next Phase/Stage  - Instruct patient to call for assistance with activity based on assessment  - Request Rehabilitation consult to assist with strengthening/weightbearing, etc   7/25/2019 1948 by Clifton Apley, RN  Outcome: Progressing  7/25/2019 1948 by Clifton Apley, RN  Outcome: Progressing     Problem: DISCHARGE PLANNING  Goal: Discharge to home or other facility with appropriate resources  Description  INTERVENTIONS:  - Identify barriers to discharge w/patient and caregiver  - Arrange for needed discharge resources and transportation as appropriate  - Identify discharge learning needs (meds, wound care, etc )  - Arrange for interpretive services to assist at discharge as needed  - Refer to Case Management Department for coordinating discharge planning if the patient needs post-hospital services based on physician/advanced practitioner order or complex needs related to functional status, cognitive ability, or social support system  7/25/2019 1948 by Clifton Apley, RN  Outcome: Progressing  7/25/2019 1948 by Clifton Apley, RN  Outcome: Progressing     Problem: Knowledge Deficit  Goal: Patient/family/caregiver demonstrates understanding of disease process, treatment plan, medications, and discharge instructions  Description  Complete learning assessment and assess knowledge base    Interventions:  - Provide teaching at level of understanding  - Provide teaching via preferred learning methods  7/25/2019 1948 by Clifton Apley, RN  Outcome: Progressing  7/25/2019 1948 by Clifton Apley, RN  Outcome: Progressing     Problem: Prexisting or High Potential for Compromised Skin Integrity  Goal: Skin integrity is maintained or improved  Description  INTERVENTIONS:  - Identify patients at risk for skin breakdown  - Assess and monitor skin integrity  - Assess and monitor nutrition and hydration status  - Monitor labs (i e  albumin)  - Assess for incontinence   - Turn and reposition patient  - Assist with mobility/ambulation  - Relieve pressure over bony prominences  - Avoid friction and shearing  - Provide appropriate hygiene as needed including keeping skin clean and dry  - Evaluate need for skin moisturizer/barrier cream  - Collaborate with interdisciplinary team (i e  Nutrition, Rehabilitation, etc )   - Patient/family teaching  7/25/2019 1948 by Anthony Polo RN  Outcome: Progressing  7/25/2019 1948 by Anthony Polo RN  Outcome: Progressing     Problem: GASTROINTESTINAL - ADULT  Goal: Minimal or absence of nausea and/or vomiting  Description  INTERVENTIONS:  - Administer IV fluids as ordered to ensure adequate hydration  - Maintain NPO status until nausea and vomiting are resolved  - Nasogastric tube as ordered  - Administer ordered antiemetic medications as needed  - Provide nonpharmacologic comfort measures as appropriate  - Advance diet as tolerated, if ordered  - Nutrition services referral to assist patient with adequate nutrition and appropriate food choices  7/25/2019 1948 by Anthony Polo RN  Outcome: Progressing  7/25/2019 1948 by Anthony Polo RN  Outcome: Progressing  Goal: Maintains or returns to baseline bowel function  Description  INTERVENTIONS:  - Assess bowel function  - Encourage oral fluids to ensure adequate hydration  - Administer IV fluids as ordered to ensure adequate hydration  - Administer ordered medications as needed  - Encourage mobilization and activity  - Nutrition services referral to assist patient with appropriate food choices  7/25/2019 1948 by Anthony Polo RN  Outcome: Progressing  7/25/2019 1948 by Anthony Polo RN  Outcome: Progressing  Goal: Maintains adequate nutritional intake  Description  INTERVENTIONS:  - Monitor percentage of each meal consumed  - Identify factors contributing to decreased intake, treat as appropriate  - Assist with meals as needed  - Monitor I&O, WT and lab values  - Obtain nutrition services referral as needed  7/25/2019 1948 by Veronica Sinclair RN  Outcome: Progressing  7/25/2019 1948 by Veronica Sinclair RN  Outcome: Progressing  Goal: Establish and maintain optimal ostomy function  Description  INTERVENTIONS:  - Assess bowel function  - Encourage oral fluids to ensure adequate hydration  - Administer IV fluids as ordered to ensure adequate hydration  - Administer ordered medications as needed  - Encourage mobilization and activity  - Nutrition services referral to assist patient with appropriate food choices  - Assess stoma site  7/25/2019 1948 by Veronica Sinclair RN  Outcome: Progressing  7/25/2019 1948 by Veronica Sinclair RN  Outcome: Progressing     Problem: METABOLIC, FLUID AND ELECTROLYTES - ADULT  Goal: Electrolytes maintained within normal limits  Description  INTERVENTIONS:  - Monitor labs and assess patient for signs and symptoms of electrolyte imbalances  - Administer electrolyte replacement as ordered  - Monitor response to electrolyte replacements, including repeat lab results as appropriate  - Instruct patient on fluid and nutrition as appropriate  7/25/2019 1948 by Veronica Sinclair RN  Outcome: Progressing  7/25/2019 1948 by Veronica Sinclair RN  Outcome: Progressing  Goal: Fluid balance maintained  Description  INTERVENTIONS:  - Monitor labs and assess for signs and symptoms of volume excess or deficit  - Monitor I/O and WT  - Instruct patient on fluid and nutrition as appropriate  7/25/2019 1948 by Veronica Sinclair RN  Outcome: Progressing  7/25/2019 1948 by Veronica Sinclair RN  Outcome: Progressing  Goal: Glucose maintained within target range  Description  INTERVENTIONS:  - Monitor Blood Glucose as ordered  - Assess for signs and symptoms of hyperglycemia and hypoglycemia  - Administer ordered medications to maintain glucose within target range  - Assess nutritional intake and initiate nutrition service referral as needed  7/25/2019 1948 by Cristiano Xiao RN  Outcome: Progressing  7/25/2019 1948 by Cristiano Xiao, RN  Outcome: Progressing

## 2019-07-25 NOTE — PROGRESS NOTES
Progress Note - Mikayla [address] 1940, 77 y o  male MRN: [id number]    Unit/Bed#: [facility] 813-01 Encounter: [id number]    Primary Care Provider: Sly Sheriff DO   Date and time admitted to hospital: 7/15/2019  2:17 AM        * Parastomal hernia  Assessment & Plan  S/p exploratory laparotomy, lysis of adhesions, small bowel resection, primary repair of parastomal hernia, colonoscopy 7/15    7/23 CT abd pelvis wo con: Fluid filled dilated SB and LB loops throughout abdomen  Decompressed colon meeting to the stoma  Likely postop ileus  7/24 NGT clamped for 4 hours with no output, subsequently removed    7/25 AM: 5 minutes prior to team rounds, the patient fell  Has increasing O2 requirements, on 3L currently  Reported feeling weak and tired  Says he sat on the ground and did not strike his head    Plan  NPO with [email or web address] [email or web address], resumed while NPO- Renal following, appreciate continued recommendations  Restrepo placed 7/20 per urology, defering to urology for mgt, appreciate recs  Urine culture pending from 7/24  OOB, ambulate  Wound care for ostomy  PT/OT  Dispo planning to STR              Subjective/Objective     Subjective: Patient mildly confused  Complains of weakness and says he sat on the floor  No complaints of pain or traumatic injuries    Objective:   Blood pressure 117/58, pulse 105, temperature 97 9 °F (36 6 °C), resp  rate 18, height 5' 6" (1 676 m), weight 98 7 kg (217 lb 9 5 oz), SpO2 (!) 88 %  ,Body mass index is 35 12 kg/m²  Intake/Output Summary (Last 24 hours) at 7/25/2019 0719  Last data filed at 7/25/2019 0630  Gross per 24 hour   Intake 2295 83 ml   Output 1575 ml   Net 720 83 ml       Invasive Devices     Peripheral Intravenous Line            Peripheral IV 07/24/19 Left Hand less than 1 day          Drain            Colostomy LLQ 10 days    Urethral Catheter Non-latex 18 Fr  4 days                Physical Exam:  GEN: NAD  HEENT: MMM  CV: RRR  Lung: Normal effort  Ab: Soft, NT   Still distended  Extrem: No CCE  Neuro:  A+Ox3      Lab, Imaging and other studies:  CBC:   Lab Results   Component Value Date    WBC 16 57 (H) 07/25/2019    HGB 9 4 (L) 07/25/2019    HCT 31 3 (L) 07/25/2019    MCV 96 07/25/2019     07/25/2019    MCH 28 8 07/25/2019    MCHC 30 0 (L) 07/25/2019    RDW 13 7 07/25/2019    MPV 11 5 07/25/2019    NRBC 0 07/25/2019   , CMP:   Lab Results   Component Value Date    SODIUM 141 07/25/2019    K 4 1 07/25/2019     (H) 07/25/2019    CO2 19 (L) 07/25/2019    BUN 58 (H) 07/25/2019    CREATININE 2 25 (H) 07/25/2019    CALCIUM 7 9 (L) 07/25/2019    EGFR 27 07/25/2019     VTE Pharmacologic Prophylaxis: Heparin  VTE Mechanical Prophylaxis: sequential compression device

## 2019-07-25 NOTE — UTILIZATION REVIEW
Elective Surgical Continued Stay Review    Date:    7/25/2019      POD#:   POD   #  10    Current Patient Class:    IP  Current Level of Care:   ACUTE  M/S    Assessment/Plan: 77 y o  male, initial surgery date    7/15/2019    Pt is  /P   Exploratory  Laparotomy, CAMERON, SBR, colonoscopy, and  Primary repair of parastomal hernia, 7/15/2019  Need to cont  IP acute  LOC  D/T  Post op ileus per CT scan  7/23  NGT  D/c   7/24  Remain NPO and cont  IVF  Waiting  Urine c/s  From 7/24  Cont  ostomy  Wound care  Pt  Fell  This  Am, has increasing  O2  Req, on  3L  Presently  C/O  Fatigue, weakness  Did not  Strike his head  Pertinent Labs/Diagnostic Results:    Ref Range & Units 7/25/19 0509   WBC 4 31 - 10 16 Thousand/uL 16  57High     RBC 3 88 - 5 62 Million/uL 3 26Low     Hemoglobin 12 0 - 17 0 g/dL 9 4Low     Hematocrit 36 5 - 49 3 % 31 3Low     MCV 82 - 98 fL 96    MCH 26 8 - 34 3 pg 28 8    MCHC 31 4 - 37 4 g/dL 30 0Low     RDW 11 6 - 15 1 % 13 7    MPV 8 9 - 12 7 fL 11 5    Platelets 698 - 626 Thousands/uL 164    nRBC /100 WBCs 0    Neutrophils Relative 43 - 75 % 88High     Immat GRANS % 0 - 2 % 1    Lymphocytes Relative 14 - 44 % 4Low     Monocytes Relative 4 - 12 % 7    Eosinophils Relative 0 - 6 % 0    Basophils Relative 0 - 1 % 0    Neutrophils Absolute 1 85 - 7 62 Thousands/µL 14  49High        Ref Range & Units 7/25/19 0509   Sodium 136 - 145 mmol/L 141    Potassium 3 5 - 5 3 mmol/L 4 1    Comment: Slightly Hemolyzed; Results May be Affected   Chloride 100 - 108 mmol/L 110High     CO2 21 - 32 mmol/L 19Low     ANION GAP 4 - 13 mmol/L 12    BUN 5 - 25 mg/dL 58High     Creatinine 0 60 - 1 30 mg/dL 2  25High             Vital Signs:   07/25/19 07:01:44    105    117/58  78  88 %Abnormal    07/24/19 14:54:23  97 9 °F (36 6 °C)  96  18  121/64  83  94 %     CXR:       Bibasilar consolidations most consistent with atelectasis  Multiple old right rib fractures      Medications:   Scheduled Meds:   Current Facility-Administered Medications:  amLODIPine 5 mg Oral Daily   atorvastatin 40 mg Oral Daily With Dinner   carbidopa-levodopa 1 5 tablet Oral TID With Meals   carvedilol 12 5 mg Oral BID With Meals   [START ON 7/26/2019] cefepime 500 mg Intravenous Q24H   dextrose 5 % and sodium chloride 0 9 % with KCl 20 mEq/L 125 mL/hr Intravenous Continuous   finasteride 5 mg Oral Daily   heparin (porcine) 5,000 Units Subcutaneous Q8H [address]   HYDROmorphone 0 5 mg Intravenous Q2H PRN   insulin lispro 1-6 Units Subcutaneous Q6H   melatonin 3 mg Oral HS   naloxone 0 04 mg Intravenous Q3 min PRN   ondansetron 4 mg Intravenous Q4H PRN   oxyCODONE 10 mg Oral Q6H PRN   oxyCODONE 5 mg Oral Q4H PRN   pantoprazole 40 mg Intravenous Q24H [address]   phenol 1 spray Mouth/Throat Q2H PRN       Discharge Plan:    rehab    Network Utilization Review Department  Phone: [phone]; Fax [phone]  [email or web address]  ATTENTION: Please call with any questions or concerns to [phone]  and carefully listen to the prompts so that you are directed to the right person  Send all requests for admission clinical reviews, approved or denied determinations and any other requests to fax [phone]   All voicemails are confidential

## 2019-07-25 NOTE — PLAN OF CARE
[facility] PHYSICIANS  [facility]  [address] [address]  Dept: [phone]    11/3/2017    CHIEF COMPLAINT    Chief Complaint   Patient presents with    Diabetes    Hypertension       HPI    Prince Pak is a 46 y.o. female who presents   Chief Complaint   Patient presents with    Diabetes    Hypertension   . Recheck diabetes. Last a1c 5.7. Taking meds with no side effects. Pt is seeing cardiologist, on an event monitor for palpitations. Is having sob, chest pain. Is concerned because she may have to have another valve repair. Did not get labs ordered in June. Diabetes   She presents for her follow-up diabetic visit. She has type 2 diabetes mellitus. No MedicAlert identification noted. Her disease course has been stable. There are no hypoglycemic associated symptoms. Pertinent negatives for hypoglycemia include no dizziness or nervousness/anxiousness. Associated symptoms include chest pain, fatigue and weakness. Pertinent negatives for diabetes include no weight loss. There are no hypoglycemic complications. Symptoms are improving. Diabetic complications include heart disease. Risk factors for coronary artery disease include hypertension, obesity, sedentary lifestyle, stress, post-menopausal, diabetes mellitus and dyslipidemia. Current diabetic treatment includes diet and oral agent (dual therapy). She is compliant with treatment most of the time. Her weight is stable. She is following a generally healthy diet. Meal planning includes avoidance of concentrated sweets. She has not had a previous visit with a dietitian. She rarely participates in exercise. Her home blood glucose trend is decreasing steadily. An ACE inhibitor/angiotensin II receptor blocker is being taken. She does not see a podiatrist.Eye exam is not current. Hypertension   This is a chronic problem. The current episode started more than 1 year ago.  The problem has Problem: Potential for Falls  Goal: Patient will remain free of falls  Description  INTERVENTIONS:  - Assess patient frequently for physical needs  -  Identify cognitive and physical deficits and behaviors that affect risk of falls  -  [place] fall precautions as indicated by assessment   - Educate patient/family on patient safety including physical limitations  - Instruct patient to call for assistance with activity based on assessment  - Modify environment to reduce risk of injury  - Consider OT/PT consult to assist with strengthening/mobility  Outcome: Progressing     Problem: Nutrition/Hydration-ADULT  Goal: Nutrient/Hydration intake appropriate for improving, restoring or maintaining nutritional needs  Description  Monitor and assess patient's nutrition/hydration status for malnutrition (ex- brittle hair, bruises, dry skin, pale skin and conjunctiva, muscle wasting, smooth red tongue, and disorientation)  Collaborate with interdisciplinary team and initiate plan and interventions as ordered  Monitor patient's weight and dietary intake as ordered or per policy  Utilize nutrition screening tool and intervene per policy  Determine patient's food preferences and provide high-protein, high-caloric foods as appropriate       INTERVENTIONS:  - Monitor oral intake, urinary output, labs, and treatment plans  - Assess nutrition and hydration status and recommend course of action  - Evaluate amount of meals eaten  - Assist patient with eating if necessary   - Allow adequate time for meals  - Recommend/ encourage appropriate diets, oral nutritional supplements, and vitamin/mineral supplements  - Order, calculate, and assess calorie counts as needed  - Recommend, monitor, and adjust tube feedings and TPN/PPN based on assessed needs  - Assess need for intravenous fluids  - Provide specific nutrition/hydration education as appropriate  - Include patient/family/caregiver in decisions related to nutrition  Outcome: Progressing     Problem: PAIN - ADULT  Goal: Verbalizes/displays adequate comfort level or baseline comfort level  Description  Interventions:  - Encourage patient to monitor pain and request assistance  - Assess pain using appropriate pain scale  - Administer analgesics based on type and severity of pain and evaluate response  - Implement non-pharmacological measures as appropriate and evaluate response  - Consider cultural and social influences on pain and pain management  - Notify physician/advanced practitioner if interventions unsuccessful or patient reports new pain  Outcome: Progressing     Problem: INFECTION - ADULT  Goal: Absence or prevention of progression during hospitalization  Description  INTERVENTIONS:  - Assess and monitor for signs and symptoms of infection  - Monitor lab/diagnostic results  - Monitor all insertion sites, i e  indwelling lines, tubes, and drains  - Monitor endotracheal (as able) and nasal secretions for changes in amount and color  - [place] appropriate cooling/warming therapies per order  - Administer medications as ordered  - Instruct and encourage patient and family to use good hand hygiene technique  - Identify and instruct in appropriate isolation precautions for identified infection/condition  Outcome: Progressing  Goal: Absence of fever/infection during neutropenic period  Description  INTERVENTIONS:  - Monitor WBC  - Implement neutropenic guidelines  Outcome: Progressing     Problem: SAFETY ADULT  Goal: Patient will remain free of falls  Description  INTERVENTIONS:  - Assess patient frequently for physical needs  -  Identify cognitive and physical deficits and behaviors that affect risk of falls    -  [place] fall precautions as indicated by assessment   - Educate patient/family on patient safety including physical limitations  - Instruct patient to call for assistance with activity based on assessment  - Modify environment to reduce risk of injury  - Consider OT/PT been waxing and waning since onset. The problem is resistant. Associated symptoms include chest pain, malaise/fatigue, palpitations and shortness of breath. Pertinent negatives include no peripheral edema. Past treatments include calcium channel blockers, ACE inhibitors and diuretics. REVIEW OF SYSTEMS    Review of Systems   Constitutional: Positive for fatigue and malaise/fatigue. Negative for fever and weight loss. HENT: Negative. Eyes: Negative. Respiratory: Positive for shortness of breath. Negative for cough. Cardiovascular: Positive for chest pain and palpitations. Negative for leg swelling. Gastrointestinal: Negative for abdominal pain. Genitourinary: Negative for frequency and urgency. Musculoskeletal: Positive for joint pain (rt wrist and forearm. has increased pain recently since injuring it again. taking dilaudid. ). Skin: Negative. Neurological: Positive for focal weakness (rt hand) and weakness. Negative for dizziness. Endo/Heme/Allergies: Negative. Psychiatric/Behavioral: Negative for depression. The patient is not nervous/anxious and does not have insomnia.         PAST MEDICAL HISTORY    Past Medical History:   Diagnosis Date    [facility]     Diabetes mellitus ([facility]     Hypertension     Periumbilical hernia     Sleep apnea     C-pap, nebulizer at home     Valvular heart disease        FAMILY HISTORY    Family History   Problem Relation Age of Onset    Diabetes Mother     Kidney Disease Mother        SOCIAL HISTORY    Social History     Social History    Marital status:      Spouse name: N/A    Number of children: N/A    Years of education: N/A     Social History Main Topics    Smoking status: Never Smoker    Smokeless tobacco: Never Used    Alcohol use Yes      Comment: occasionally    Drug use: No    Sexual activity: Not on file     Other Topics Concern    Not on file     Social History Narrative    No narrative on consult to assist with strengthening/mobility  Outcome: Progressing  Goal: Maintain or return to baseline ADL function  Description  INTERVENTIONS:  -  Assess patient's ability to carry out ADLs; assess patient's baseline for ADL function and identify physical deficits which impact ability to perform ADLs (bathing, care of mouth/teeth, toileting, grooming, dressing, etc )  - Assess/evaluate cause of self-care deficits   - Assess range of motion  - Assess patient's mobility; develop plan if impaired  - Assess patient's need for assistive devices and provide as appropriate  - Encourage maximum independence but intervene and supervise when necessary  ¯ Involve family in performance of ADLs  ¯ Assess for home care needs following discharge   ¯ Request OT consult to assist with ADL evaluation and planning for discharge  ¯ Provide patient education as appropriate  Outcome: Progressing  Goal: Maintain or return mobility status to optimal level  Description  INTERVENTIONS:  - Assess patient's baseline mobility status (ambulation, transfers, stairs, etc )    - Identify cognitive and physical deficits and behaviors that affect mobility  - Identify mobility aids required to assist with transfers and/or ambulation (gait belt, sit-to-stand, lift, walker, cane, etc )  - [place] fall precautions as indicated by assessment  - Record patient progress and toleration of activity level on Mobility SBAR; progress patient to next Phase/Stage  - Instruct patient to call for assistance with activity based on assessment  - Request Rehabilitation consult to assist with strengthening/weightbearing, etc   Outcome: Progressing     Problem: DISCHARGE PLANNING  Goal: Discharge to home or other facility with appropriate resources  Description  INTERVENTIONS:  - Identify barriers to discharge w/patient and caregiver  - Arrange for needed discharge resources and transportation as appropriate  - Identify discharge learning needs (meds, wound care, file       SURGICAL HISTORY    Past Surgical History:   Procedure Laterality Date    CARDIAC VALVE REPLACEMENT      bioprosthetic aortic valve and banding of mitral valve     SECTION      x's 2    COLONOSCOPY      HAND SURGERY Right 2010    ganglion cyst with post op chronic pain    VENTRAL HERNIA REPAIR  11/25/15       CURRENT MEDICATIONS    Current Outpatient Prescriptions   Medication Sig Dispense Refill    verapamil (VERELAN) 180 MG extended release capsule   0    HYDROmorphone (DILAUDID) 1 MG/ML LIQD 1- 2 ml daily prn. 30 mL 0    diazepam (VALIUM) 10 MG tablet Take 1 tablet by mouth every 6 hours as needed for Anxiety 120 tablet 0    ONETOUCH DELICA LANCETS 92F MISC use as directed UP TO three times a day if needed 100 each 1    oxyCODONE-acetaminophen (PERCOCET) 5-325 MG per tablet Take 2 tablets by mouth every 4 hours as needed for Pain . 60 tablet 0    ONE TOUCH ULTRA TEST strip TEST three times a day 200 strip 1    metFORMIN (GLUCOPHAGE) 500 MG tablet take 1 tablet by mouth twice a day with food (Patient taking differently: once daily) 60 tablet 3    lisinopril (PRINIVIL;ZESTRIL) 20 MG tablet take 1 tablet by mouth once daily 30 tablet 3    aspirin (ASPIRIN LOW DOSE) 81 MG EC tablet Take 1 tablet by mouth daily 30 tablet 5    nitroGLYCERIN (NITROSTAT) 0.4 MG SL tablet Place 1 tablet under the tongue every 5 minutes as needed for Chest pain 25 tablet 3    hydrocortisone 1 % ointment Apply topically 2 times daily Apply topically 2 times daily.       Multiple Vitamins-Minerals (MULTIVITAMIN ADULT PO) Take 1 tablet by mouth 2 times daily       levalbuterol (XOPENEX) 0.63 MG/3ML nebulization Take 1 ampule by nebulization every 6 hours as needed for Wheezing      Albuterol Sulfate (PROAIR HFA IN) Inhale 1 puff into the lungs 4 times daily as needed 1-2 puffs as needed for emergencies      isosorbide mononitrate (IMDUR) 30 MG extended release tablet   0    VENTOLIN  (90 Base) MCG/ACT inhaler inhale 2 puffs by mouth every 4 hours  0    furosemide (LASIX) 40 MG tablet take 1 tablet by mouth every other day  0    ipratropium-albuterol (DUONEB) 0.5-2.5 (3) MG/3ML SOLN nebulizer solution inhale contents of 1 vial four times a day in nebulizer  0    atorvastatin (LIPITOR) 40 MG tablet Take 1 tablet by mouth daily 30 tablet 3    niacin (NIASPAN) 500 MG extended release tablet take 1 tablet by mouth at bedtime 30 tablet 3    Blood Glucose Monitoring Suppl BHAVANA 1 Device by Does not apply route 3 times daily (before meals) 1 Device 0    omeprazole (PRILOSEC) 20 MG delayed release capsule take 1 capsule by mouth once daily 30 capsule 0     No current facility-administered medications for this visit. ALLERGIES    Allergies   Allergen Reactions    Senokot [Senna] Other (See Comments)     swollen tongue     Advair Diskus [Fluticasone-Salmeterol]     Ammonium Lactate      Topical      Amoxicillin     Colcrys [Colchicine]     Garamycin [Gentamicin]      Eye drops      Ibuprofen Swelling     Lip swelling and hives    Sulfa Antibiotics Swelling     Lip swelling and hives      Tylenol [Acetaminophen] Nausea And Vomiting     Nausea vomiting and severe headache       PHYSICAL EXAM   Physical Exam   Constitutional: She is oriented to person, place, and time. She appears well-developed and well-nourished. obese   HENT:   Head: Normocephalic. Mouth/Throat: Oropharynx is clear and moist.   Eyes: Pupils are equal, round, and reactive to light. Neck: Normal range of motion. Neck supple. No thyromegaly present. Cardiovascular: Normal rate and regular rhythm. Murmur heard. Systolic murmur is present with a grade of 5/6   Pulmonary/Chest: Effort normal and breath sounds normal. She has no wheezes. She has no rales. Abdominal: Soft. There is no tenderness. There is no rebound and no guarding. Musculoskeletal: Normal range of motion. She exhibits tenderness (rt wrist and forearm). etc )  - Arrange for interpretive services to assist at discharge as needed  - Refer to Case Management Department for coordinating discharge planning if the patient needs post-hospital services based on physician/advanced practitioner order or complex needs related to functional status, cognitive ability, or social support system  Outcome: Progressing     Problem: Knowledge Deficit  Goal: Patient/family/caregiver demonstrates understanding of disease process, treatment plan, medications, and discharge instructions  Description  Complete learning assessment and assess knowledge base    Interventions:  - Provide teaching at level of understanding  - Provide teaching via preferred learning methods  Outcome: Progressing     Problem: Prexisting or High Potential for Compromised Skin Integrity  Goal: Skin integrity is maintained or improved  Description  INTERVENTIONS:  - Identify patients at risk for skin breakdown  - Assess and monitor skin integrity  - Assess and monitor nutrition and hydration status  - Monitor labs (i e  albumin)  - Assess for incontinence   - Turn and reposition patient  - Assist with mobility/ambulation  - Relieve pressure over bony prominences  - Avoid friction and shearing  - Provide appropriate hygiene as needed including keeping skin clean and dry  - Evaluate need for skin moisturizer/barrier cream  - Collaborate with interdisciplinary team (i e  Nutrition, Rehabilitation, etc )   - Patient/family teaching  Outcome: Progressing     Problem: GASTROINTESTINAL - ADULT  Goal: Minimal or absence of nausea and/or vomiting  Description  INTERVENTIONS:  - Administer IV fluids as ordered to ensure adequate hydration  - Maintain NPO status until nausea and vomiting are resolved  - Nasogastric tube as ordered  - Administer ordered antiemetic medications as needed  - Provide nonpharmacologic comfort measures as appropriate  - Advance diet as tolerated, if ordered  - Nutrition services referral to assist She exhibits no edema or deformity. Lymphadenopathy:     She has no cervical adenopathy. Neurological: She is alert and oriented to person, place, and time. Skin: Skin is warm and dry. Psychiatric: She has a normal mood and affect. Her behavior is normal.   Tearful, anxious       Assessment/PLan  1. Type 2 diabetes mellitus without complication, without long-term current use of insulin (HCC)  Stable, improved. Ok to stop [place]. Cont to monitor.   - POCT glycosylated hemoglobin (Hb A1C)-5.3    2. Iron deficiency anemia secondary to inadequate dietary iron intake  Check labs, may need supplement  - Iron; Future  - Ferritin; Future  - Vitamin B12 & Folate; Future    3. Other chronic postprocedural pain  Chronic. Discussed risk of accidental overdose with opiate and benzo. Pt will avoid taking meds together.   - HYDROmorphone (DILAUDID) 1 MG/ML LIQD; 1- 2 ml daily prn. Dispense: 30 mL; Refill: 0    4. Anxiety  Chronic, not well controlled. - diazepam (VALIUM) 10 MG tablet; Take 1 tablet by mouth every 6 hours as needed for Anxiety  Dispense: 120 tablet; Refill: 0    5. Chronic diastolic CHF (congestive heart failure) (HCC)  Cont seeing cardio. 6. Valvular heart disease  Worried she may need further surgery      Norma received counseling on the following healthy behaviors: nutrition, exercise and medication adherence  Reviewed prior labs and health maintenance. Continue current medications, diet and exercise. Discussed use, benefit, and side effects of prescribed medications. Barriers to medication compliance addressed. Patient given educational materials - see patient instructions. All patient questions answered. Patient voiced understanding. Return in about 3 months (around 2/2/2018) for Return for diabetes check, anemia.         Electronically signed by Anton Steel MD on 11/2/17 at 3:38 PM patient with adequate nutrition and appropriate food choices  Outcome: Progressing  Goal: Maintains or returns to baseline bowel function  Description  INTERVENTIONS:  - Assess bowel function  - Encourage oral fluids to ensure adequate hydration  - Administer IV fluids as ordered to ensure adequate hydration  - Administer ordered medications as needed  - Encourage mobilization and activity  - Nutrition services referral to assist patient with appropriate food choices  Outcome: Progressing  Goal: Maintains adequate nutritional intake  Description  INTERVENTIONS:  - Monitor percentage of each meal consumed  - Identify factors contributing to decreased intake, treat as appropriate  - Assist with meals as needed  - Monitor I&O, WT and lab values  - Obtain nutrition services referral as needed  Outcome: Progressing  Goal: Establish and maintain optimal ostomy function  Description  INTERVENTIONS:  - Assess bowel function  - Encourage oral fluids to ensure adequate hydration  - Administer IV fluids as ordered to ensure adequate hydration  - Administer ordered medications as needed  - Encourage mobilization and activity  - Nutrition services referral to assist patient with appropriate food choices  - Assess stoma site  Outcome: Progressing     Problem: METABOLIC, FLUID AND ELECTROLYTES - ADULT  Goal: Electrolytes maintained within normal limits  Description  INTERVENTIONS:  - Monitor labs and assess patient for signs and symptoms of electrolyte imbalances  - Administer electrolyte replacement as ordered  - Monitor response to electrolyte replacements, including repeat lab results as appropriate  - Instruct patient on fluid and nutrition as appropriate  Outcome: Progressing  Goal: Fluid balance maintained  Description  INTERVENTIONS:  - Monitor labs and assess for signs and symptoms of volume excess or deficit  - Monitor I/O and WT  - Instruct patient on fluid and nutrition as appropriate  Outcome: Progressing  Goal: Glucose maintained within target range  Description  INTERVENTIONS:  - Monitor Blood Glucose as ordered  - Assess for signs and symptoms of hyperglycemia and hypoglycemia  - Administer ordered medications to maintain glucose within target range  - Assess nutritional intake and initiate nutrition service referral as needed  Outcome: Progressing

## 2019-07-25 NOTE — PROGRESS NOTES
Progress Note - Nephrology   Mat Aguilar 77 y o  male MRN: [id number]  Unit/Bed#: [facility] 813-01 Encounter: [id number]      Assessment:  59-year-old male with a history of CKD (baseline creatinine 1 4), diabetes mellitus type 2, hypertension, hypercholesterolemia, BPH, colon cancer (1998, treated with surgery), Parkinson's disease, neurogenic bladder admitted for parastomal hernia repair   Nephrology consulted for acute on chronic kidney disease  Plan:  1  Acute on chronic kidney disease  -status post parastomal hernia repair  -CKD 3:  baseline creatinine 1 4  -creatinine noted to be  decreased today at 2 25  Lauren Carlisle explained by neurogenic bladder,  due to improvement after Restrepo placement    -Restrepo in place, placed by Urology, draining 50 cc of yellow urine    -continue Restrepo until creatinine normalizes   Urology following  -patient continues to have NG tube,  current NPO  -current isolate S  increased to 125 cc/hour, continue to hydrate  -hypokalemia:  Currently at 4 1, goal of 4 0  -monitor electrolytes and renal function     2  Parastomal hernia  -vital signs stable, patient has an ostomy which is functioning well   -abdomen continues to feel distended  -continue to monitor wound and ostomy   -management as per primary team    3  Urinary tract infection  -UA suggests possible infection, with positive leukocyte esterase, negative nitrites, and innumerable bacteria, moderate blood  -urine culture still pending  -patient's white count continues to rise, however vital signs are stable  -Chest xray ordered to rule out possible pneumonia     -patient currently not on antibiotics  -management as per primary team    4  Hypertension  -120s-130s/70s-80s, appears well controlled  -continue amlodipine 5 daily, carvedilol 12 5 b i d   -management as per primary team     5  BPH/neurogenic bladder  -Restrepo catheter in place, urine output seems adequate (1 5 L yesterday)  -continue to monitor  -management as per primary team       Subjective:   Patient seen and examined sitting in chair, appeared short of breath  He was on 3 L nasal cannula  Around 7:00 a m  this morning patient was being transferred to chair, lost balance and fell onto buttocks  Fall was witnessed by assistant, no report of head trauma or LOC  Colorectal surgery is aware  Currently patient feels tired, short of breath and a little confused  He denies any pain, nausea, vomiting, discomfort from the Restrepo, numbness or tingling  CONSTITUTIONAL: Denies any fever, chills,  CARDIOVASCULAR: No chest pain or palpitations  RESPIRATORY: Denies any cough, hemoptysis, shortness of breath or dyspnea  GASTROINTESTINAL:  Positive for abdominal distention Denies abdominal pain, nausea, vomitng   GENITOURINARY:  Restrepo in place  No problems with urination  Denies any hematuria or dysuria  NEUROLOGIC: Positive for confusion  No dizziness or vertigo, denies headaches  MUSCULOSKELETAL: Denies any muscle or joint pain     SKIN:  Positive for abdominal incision, ostomy  Denies skin rashes or itching        Current Facility-Administered Medications:     amLODIPine (NORVASC) tablet 5 mg, 5 mg, Oral, Daily, Elda Ballard MD, 5 mg at 07/24/19 7519    atorvastatin (LIPITOR) tablet 40 mg, 40 mg, Oral, Daily With Alina Thompson MD, 40 mg at 07/24/19 1632    carbidopa-levodopa (SINEMET)  mg per tablet 1 5 tablet, 1 5 tablet, Oral, TID With Meals, Gordy Duke, 1 5 tablet at 07/24/19 1632    carvedilol (COREG) tablet 12 5 mg, 12 5 mg, Oral, BID With Meals, Iván Dorado MD, 12 5 mg at 07/24/19 1632    finasteride (PROSCAR) tablet 5 mg, 5 mg, Oral, Daily, Grover Giron MD, 5 mg at 07/24/19 0831    heparin (porcine) subcutaneous injection 5,000 Units, 5,000 Units, Subcutaneous, Q8H [facility], 5,000 Units at 07/25/19 0512 **AND** [CANCELED] Platelet count, , , Once, Gordy Duke    HYDROmorphone (DILAUDID) injection 0 5 mg, 0 5 mg, Intravenous, Q2H PRN, Roxanne Dayana Eastman DO    insulin lispro (HumaLOG) 100 units/mL subcutaneous injection 1-6 Units, 1-6 Units, Subcutaneous, Q6H, 1 Units at 07/23/19 1325 **AND** Fingerstick Glucose (POCT), , , Q6H, Rachael Velazco PA-C    melatonin tablet 3 mg, 3 mg, Oral, HS, Antonio Morales MD, 3 mg at 07/24/19 2129    multi-electrolyte (ISOLYTE-S PH 7 4 equivalent) IV solution, 125 mL/hr, Intravenous, Continuous, Davian Briceno MD, Last Rate: 125 mL/hr at 07/25/19 0000, 125 mL/hr at 07/25/19 0000    naloxone (NARCAN) 0 04 mg/mL syringe 0 04 mg, 0 04 mg, Intravenous, Q3 min PRN, Cintia Ayala    ondansetron [facility]) injection 4 mg, 4 mg, Intravenous, Q4H PRN, Lorie Long, 4 mg at 07/15/19 0410    oxyCODONE (ROXICODONE) immediate release tablet 10 mg, 10 mg, Oral, Q6H PRN, Davian Briceno MD    oxyCODONE (ROXICODONE) IR tablet 5 mg, 5 mg, Oral, Q4H PRN, Davian Briceno MD, 5 mg at 07/24/19 2129    pantoprazole (PROTONIX) injection 40 mg, 40 mg, Intravenous, Q24H [address], Rachael Velazco PA-C, 40 mg at 07/24/19 0831    phenol (CHLORASEPTIC) 1 4 % mucosal liquid 1 spray, 1 spray, Mouth/Throat, Q2H PRN, Lisa Talbot MD, 1 spray at 07/17/19 1201     Objective:     Vitals: Blood pressure 117/58, pulse 105, temperature 97 9 °F (36 6 °C), resp  rate 18, height 5' 6" (1 676 m), weight 98 7 kg (217 lb 9 5 oz), SpO2 (!) 88 %  ,Body mass index is 35 12 kg/m²  Weight (last 2 days)     None            Intake/Output Summary (Last 24 hours) at 7/25/2019 0817  Last data filed at 7/25/2019 0630  Gross per 24 hour   Intake 2295 83 ml   Output 1075 ml   Net 1220 83 ml       Urethral Catheter Non-latex 18 Fr   (Active)   Site Assessment Clean;Skin intact 7/25/2019  7:56 AM   Collection Container Standard drainage bag 7/25/2019  7:56 AM   Securement Method Securing device (Describe) 7/25/2019  7:56 AM   Output (mL) 0 mL 7/25/2019  6:30 AM       Physical Exam: /58   Pulse 105   Temp 97 9 °F (36 6 °C)   Resp 18   Ht 5' 6" (1 676 m)   Wt 98 7 kg (217 lb 9 5 oz)   SpO2 (!) 88%   BMI 35 12 kg/m²     Physical Exam:   GENERAL: NAD, appears mildly distressed, on nasal cannula  HEENT:  NC/AT, EOMI, MMM, no scleral icterus  CARDIAC:  RRR, +S1/S2, no S3/S4 heard, no m/g/r  PULMONARY:  CTA B/L, no wheezing/rales/rhonci, mild respiratory distress noted, speaks in short sentences  ABDOMEN:  Ostomy in place, abdominal distention same as yesterday,  Soft, NT/ND, +BS, no rebound/guarding/rigidity  :  Restrepo catheter in place draining clear yellow urine (50 cc)  Extremities:  2+ Pulses in DP/radial  No edema, cyanosis, or clubbing  NEUROLOGIC:  Remains alert/oriented x3, cranial nerves 2-12 grossly intact,  strength 5/5 bilaterally, gross sensation intact     SKIN:  Abdominal incision looks clean without drainage or active bleeding, ostomy draining small amount of stool        Lab, Imaging and other studies:   CBC:   Lab Results   Component Value Date    WBC 16 57 (H) 07/25/2019    HGB 9 4 (L) 07/25/2019    HCT 31 3 (L) 07/25/2019    MCV 96 07/25/2019     07/25/2019    MCH 28 8 07/25/2019    MCHC 30 0 (L) 07/25/2019    RDW 13 7 07/25/2019    MPV 11 5 07/25/2019    NRBC 0 07/25/2019     CMP:   Lab Results   Component Value Date    SODIUM 141 07/25/2019    K 4 1 07/25/2019     (H) 07/25/2019    CO2 19 (L) 07/25/2019    BUN 58 (H) 07/25/2019    CREATININE 2 25 (H) 07/25/2019    CALCIUM 7 9 (L) 07/25/2019    EGFR 27 07/25/2019     Phosphorus: No results found for: PHOS  Magnesium:   Lab Results   Component Value Date    MG 2 2 07/25/2019     Urinalysis:   Lab Results   Component Value Date    COLORU Yellow 07/24/2019    CLARITYU Cloudy 07/24/2019    SPECGRAV 1 017 07/24/2019    PHUR 5 0 07/24/2019    LEUKOCYTESUR Small (A) 07/24/2019    NITRITE Negative 07/24/2019    GLUCOSEU Negative 07/24/2019    KETONESU 15 (1+) (A) 07/24/2019    BILIRUBINUR Interference- unable to analyze (A) 07/24/2019    BLOODU Moderate (A) 07/24/2019 CT abdomen pelvis wo contrast   Final Result by Job Duane, MD (07/23 1619)         1  Postsurgical changes of repair of parastomal hernia with small bowel resection and anastomosis  There are fluid filled dilated small and large bowel loops throughout the abdomen with only decompressed colon seen meeting to the stoma  Findings    likely represent postoperative ileus  This can be followed with serial abdominal radiographs if clinically indicated  2   Increased air within right renal collecting system and ureter as well as new foci of air that appear to be within the bladder wall  As previously reported, 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  3   Bibasilar atelectasis and small pleural effusions  The study was marked in [facility] for immediate notification  Workstation performed: [id number]         XR abdomen 1 vw portable   Final Result by Janessa Alexander MD (07/22 0845)      1  The sidehole of the enteric tube projects at the gastroesophageal junction  It should be advanced if gastric positioning is desired  2   Incompletely imaged gaseous distention of small bowel and colon, likely postoperative ileus  3   Bilateral lower lobe consolidations, likely atelectasis  Workstation performed: [id number]         US kidney and bladder   Final Result by Prosper Higgins DO (07/19 1615)   Bilateral cortical scarring and cortical lobulation  Bilateral mild-moderate hydronephrosis, new from prior exam    Normal arterial resistive indices -this does not suggest a significant obstruction  Incompletely distended urinary bladder which is otherwise unremarkable  Neither ureteral jet detected  *Because the patient has a history of a neurogenic bladder bladder size may be distended for this patient  Although hydronephrosis can be seen in the setting of ascending UTI, Consider computed tomography assess for an obstructing lesion  Alternatively consider nuclear medicine renal flow and function study with Lasix intervention         I personally discussed this study with Dr Kim Jhaveri on 7/19/2019 at 4:13 PM                Workstation performed: [address] [place] [address] DO PGY 1

## 2019-07-25 NOTE — SOCIAL WORK
Pt not medically clear for discharge NGT d/c , pt NPO , IVF possible ileus   Pt requiring  Oxygen 3 L   Cm updated good [facility] and cm will follow in need snf choices from pt

## 2019-07-26 PROBLEM — K43.5 PARASTOMAL HERNIA WITHOUT OBSTRUCTION OR GANGRENE: Status: ACTIVE | Noted: 2019-01-01

## 2019-07-26 PROBLEM — R65.21 SEPTIC SHOCK (HCC): Status: ACTIVE | Noted: 2019-01-01

## 2019-07-26 PROBLEM — D72.829 LEUKOCYTOSIS: Status: ACTIVE | Noted: 2019-01-01

## 2019-07-26 PROBLEM — N39.0 SEPSIS DUE TO GRAM-NEGATIVE UTI (HCC): Status: ACTIVE | Noted: 2019-01-01

## 2019-07-26 PROBLEM — A41.9 SEPTIC SHOCK (HCC): Status: ACTIVE | Noted: 2019-01-01

## 2019-07-26 PROBLEM — R34 OLIGURIA: Status: ACTIVE | Noted: 2019-01-01

## 2019-07-26 PROBLEM — A41.50 SEPSIS DUE TO GRAM-NEGATIVE UTI (HCC): Status: ACTIVE | Noted: 2019-01-01

## 2019-07-26 NOTE — ANESTHESIA PREPROCEDURE EVALUATION
Review of Systems/Medical History  Patient summary reviewed  Chart reviewed  No history of anesthetic complications     Cardiovascular  Exercise tolerance (METS): >4,  Hyperlipidemia, Hypertension ,   Comment: Septic shock on high dose pressors,  Pulmonary    Comment: Acute hypoxemia requiring intubation      GI/Hepatic    GERD ,   Comment: Free air in abdomen with suspected bowel leak          Endo/Other  Diabetes ,      GYN       Hematology   Musculoskeletal    Arthritis     Neurology   Psychology           Physical Exam    Airway       Dental       Cardiovascular  Rhythm: regular, Rate: normal,     Pulmonary  Decreased breath sounds,     Other Findings  Intubated, minimally responsive without sedation  Distended, tense abdomen  Levo 40 mcg/min, epi 10 mcg/min, vaso 0 04  R CVL, R sandy        Anesthesia Plan  ASA Score- 5 Emergent    Anesthesia Type- general with ASA Monitors  Additional Monitors: arterial line and central venous line  Airway Plan: ETT  Comment: Emergent procedure  Implied consent  Plan Factors-    Induction- intravenous  Postoperative Plan-     Informed Consent-   I personally reviewed this patient with the CRNA  Discussed and agreed on the Anesthesia Plan with the CRNA  Zena Mackenzie

## 2019-07-26 NOTE — TREATMENT PLAN
Met with patient's spouse discussed overall renal status and overall condition  Risks and benefits and the whole procedure for renal replacement therapy were explained in depth  And what to expect in terms of transition from CVVH to IHD and renal recovery course  She wishes to go ahead with renal replacement therapy at this time however wishes to do only a 5-7 day time-limited trial to see if there is any improvement if not she will likely not pursue it further  As per her her 's wishes were not to be on any mechanical support however she currently is willing to give it a trial for 5-7 days in the interim her family will also be visiting from [place]  Consent for dialysis obtained and placed in the chart on 07/26/2019  Patient to be started on CVVH and to run even for now    Orders placed  Case was discussed with team

## 2019-07-26 NOTE — PROCEDURES
Central Line Insertion  Date/Time: 7/26/2019 12:32 PM  Performed by: Mal Mims PA-C  Authorized by: Mal Mims PA-C     Patient location:  Bedside  Other Assisting Provider: Yes (comment) (Dr Flower Almeida Me)    Consent:     Consent obtained:  Verbal    Consent given by:  Spouse    Risks discussed:  Arterial puncture, bleeding, pneumothorax, incorrect placement and infection    Alternatives discussed:  No treatment  Universal protocol:     Procedure explained and questions answered to patient or proxy's satisfaction: yes      Site/side marked: yes      Patient identity confirmed:  Arm band and provided demographic data  Pre-procedure details:     Hand hygiene: Hand hygiene performed prior to insertion      Sterile barrier technique: All elements of maximal sterile technique followed      Skin preparation:  ChloraPrep    Skin preparation agent: Skin preparation agent completely dried prior to procedure    Indications:     Central line indications: medications requiring central line    Anesthesia (see MAR for exact dosages): Anesthesia method:  Local infiltration (Increased fentanyl gtt to 100)    Local anesthetic:  Lidocaine 1% w/o epi  Procedure details:     Location:  Left subclavian    Vessel type: vein      Laterality:  Left    Approach: percutaneous technique used      Patient position:  Flat    Catheter type:  Triple lumen 20cm    Landmarks identified: yes      Ultrasound guidance: no      Number of attempts:  2    Successful placement: yes    Post-procedure details:     Post-procedure:  Dressing applied and line sutured    Assessment:  Blood return through all ports, free fluid flow, no pneumothorax on x-ray and placement verified by x-ray    Patient tolerance of procedure:   Tolerated well, no immediate complications

## 2019-07-26 NOTE — PROCEDURES
Central Line Insertion  Date/Time: 7/26/2019 12:29 AM  Performed by: Mala Jacob MD  Authorized by: Mala Jacob MD     Patient location:  Bedside  Consent:     Consent obtained:  Written    Consent given by:  Patient and spouse    Risks discussed:  Arterial puncture, bleeding, infection, pneumothorax and incorrect placement  Universal protocol:     Procedure explained and questions answered to patient or proxy's satisfaction: yes      Patient identity confirmed:  Arm band  Pre-procedure details:     Hand hygiene: Hand hygiene performed prior to insertion      Sterile barrier technique: All elements of maximal sterile technique followed      Skin preparation:  2% chlorhexidine    Skin preparation agent: Skin preparation agent completely dried prior to procedure    Indications:     Central line indications: medications requiring central line and hemodynamic monitoring    Anesthesia (see MAR for exact dosages): Anesthesia method:  Local infiltration    Local anesthetic:  Lidocaine 1% w/o epi  Procedure details:     Location:  Right internal jugular    Vessel type: vein      Laterality:  Right    Approach: percutaneous technique used      Patient position:  Trendelenburg    Catheter type:  Triple lumen 16cm    Catheter size:  7 Fr    Ultrasound guidance: yes      Sterile ultrasound techniques: Sterile gel and sterile probe covers were used      Number of attempts:  1    Successful placement: yes      Vessel of catheter tip end:  SVC-atrial junction  Post-procedure details:     Post-procedure:  Dressing applied and line sutured    Assessment:  Blood return through all ports, no pneumothorax on x-ray, placement verified by x-ray and free fluid flow    Post-procedure complications: none      Patient tolerance of procedure:   Tolerated well, no immediate complications

## 2019-07-26 NOTE — PROGRESS NOTES
Progress Note - Joanne Leonard 1940, 77 y o  male MRN: [id number]    Unit/Bed#: MICU 02 Encounter: [id number]    Primary Care Provider: Frederick Nicolas DO   Date and time admitted to hospital: 7/15/2019  2:17 AM        * Parastomal hernia  Assessment & Plan  S/p exploratory laparotomy, lysis of adhesions, small bowel resection, primary repair of parastomal hernia, colonoscopy 7/15    7/23 CT abd pelvis wo con: Fluid filled dilated SB and LB loops throughout abdomen  Decompressed colon meeting to the stoma  Likely postop ileus  7/24 NGT clamped for 4 hours with no output, subsequently removed  7/25 Transferred to MICU for worsening respiratory status  Emergently intubated  R IJ and R radial sandy placed  CXR w free air  Emergent OR for exploration, bowel resection  7/26 POD#0 Ex lap w bowel resection, abdominal washout, wound vac application  Bowel in discontinuity  Plan    Resuscitation necessary before anastomosis can be performed  Pressors create high likelihood of anastomotic breakdown  Hold OR until further resuscitated, hopefully 7/27    Neuro   - fentanyl for pain  - precedex gtt if agitated    Cardio    - continue levo, vaso, epi to maintain MAP > 65   - wean pressors as able  - IV fluid resuscitation    Pulm  -continue ventilation per icu    FEN-GI  - protonix  - plan for second look laparotomy today versus tomorrow, per colorectal surgery    Endocrine  -180    ID  continue cefepime/vancomycin/flagyl                                         Subjective/Objective     Subjective: intubated, critically ill    Objective:  Blood pressure (!) 79/51, pulse 76, temperature 99 1 °F (37 3 °C), temperature source Oral, resp  rate 14, height 5' 6" (1 676 m), weight 98 7 kg (217 lb 9 5 oz), SpO2 97 %  ,Body mass index is 35 12 kg/m²        Intake/Output Summary (Last 24 hours) at 7/26/2019 0605  Last data filed at 7/26/2019 0600  Gross per 24 hour   Intake 6110 26 ml   Output 738 ml   Net 5372 26 ml Invasive Devices     Central Venous Catheter Line            CVC Central Lines 07/26/19 Triple 16cm less than 1 day          Peripheral Intravenous Line            Peripheral IV 07/25/19 Left Antecubital less than 1 day    Peripheral IV 07/25/19 Left;Ventral (anterior) Forearm less than 1 day    Peripheral IV 07/25/19 Right Forearm less than 1 day          Arterial Line            Arterial Line 07/26/19 Radial less than 1 day          Drain            Colostomy LLQ 11 days    Urethral Catheter Non-latex 18 Fr  5 days    NG/OG Tube Orogastric Center mouth less than 1 day    Negative Pressure Wound Therapy (V A C ) Abdomen Anterior;Mid less than 1 day          Airway            ETT  Cuffed; Hi-Lo 8 mm less than 1 day                Physical Exam:  GEN: critically ill  HEENT: MMM  CV: RRR  Lung: Ventilated  Ab: Distended, grimaces on palpation  Extrem: No CCE  Neuro: GCS 6      Lab, Imaging and other studies:  I have personally reviewed pertinent lab results    , CBC:   Lab Results   Component Value Date    WBC 12 89 (H) 07/26/2019    HGB 8 2 (L) 07/26/2019    HCT 27 2 (L) 07/26/2019    MCV 93 07/26/2019     07/26/2019    MCH 28 1 07/26/2019    MCHC 30 1 (L) 07/26/2019    RDW 13 9 07/26/2019    MPV 11 0 07/26/2019    NRBC 0 07/26/2019   , CMP:   Lab Results   Component Value Date    SODIUM 145 07/26/2019    K 4 3 07/26/2019     (H) 07/26/2019    CO2 20 (L) 07/26/2019    CO2 20 (L) 07/26/2019    BUN 60 (H) 07/26/2019    CREATININE 2 61 (H) 07/26/2019    GLUCOSE 147 (H) 07/26/2019    CALCIUM 7 5 (L) 07/26/2019    EGFR 22 07/26/2019   , Coagulation: No results found for: PT, INR, APTT

## 2019-07-26 NOTE — ANESTHESIA POSTPROCEDURE EVALUATION
Post-Op Assessment Note    CV Status:  Stable    Pain management: adequate     Mental Status:  Sleepy   Hydration Status:  Stable   PONV Controlled:  Controlled   Airway Patency:  Patent  Airway: intubated   Post Op Vitals Reviewed: Yes      Staff: Anesthesiologist, CRNA           BP     Temp      Pulse     Resp     SpO2

## 2019-07-26 NOTE — PROCEDURES
Arterial Line Insertion  Date/Time: 7/26/2019 12:32 AM  Performed by: Sadiq Hatfield MD  Authorized by: Sadiq Hatfield MD     Patient location:  Bedside  Consent:     Consent obtained:  Verbal    Consent given by:  Patient and spouse    Risks discussed:  Bleeding, pain and infection  Universal protocol:     Patient identity confirmed:  Arm band  Indications:     Indications: hemodynamic monitoring and multiple ABGs    Pre-procedure details:     Skin preparation:  Chlorhexidine    Preparation: Patient was prepped and draped in sterile fashion    Anesthesia (see MAR for exact dosages): Anesthesia method:  None  Procedure details:     Location / Tip of Catheter:  Radial    Laterality:  Right    Tee's test performed: yes      Tee's test abnormal: no      Needle gauge:  20 G    Placement technique:  Seldinger and ultrasound guided    Number of attempts:  1    Successful placement: yes      Transducer: waveform confirmed    Post-procedure details:     Post-procedure:  Biopatch applied, sterile dressing applied and wrist guard applied    CMS:  Unable to assess    Patient tolerance of procedure:   Tolerated well, no immediate complications

## 2019-07-26 NOTE — PROGRESS NOTES
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  On 07/25/2019 was noted to have pneumoperitoneum and was again taken to the OR emergently and is status post reopen laparotomy with bowel resection and VAC placement, subsequently placed on multiple pressors and intubated  Nephrology has been following for acute kidney injury  1)Acute kidney injury (POA) on CKD stage 3:  - JELLY most likely secondary to initially due to prerenal azotemia and urinary retention and now with sepsis plus failure to auto regulate in the presence of hemodynamic perturbations in this elderly gentleman with multiple comorbidities  - After review of records it appears that the patient has a baseline Creatinine of 1 3-1 7mg/dL  - patient's creatinine today is at 2 61 mg/dL  - Avoid nephrotoxins, adjust meds to appropriate GFR   - Acid base and lytes stable except anion gap metabolic acidosis, mild hypernatremia and hyperchloremia  - clinically patient appears to be minimally hypervolemic  - patient with decreasing urine output and remains on multiple pressors  Awaiting spouse to arrive so I can discuss the initiation of possible CVVH in the next 24-48 hours if urine output continues to be minimal   - Optimize hemodynamic status to avoid delay in renal recovery  - check BMP, magnesium, phosphorus at 2:00 p m  Today  - Await renal recovery    - Place on a renal diet when allowed diet order    - Strict I/O  2)Blood pressure:  - Optimize hemodynamics   - Maintain MAP > 65mmHg  - Avoid BP fluctuations  - on vasopressin, Levophed, epinephrine    3)H/H:  - most recent hemoglobin at 8 2 grams/deciliter  - maintain hemoglobin greater than 8 grams/deciliter  - Management as per primary team     4)Volume status:  - Clinically patient appears to be minimally hypervolemic  - recommend placing him on D5 water with 75 mEq of sodium bicarb at 75 cc/hour for now    5) Hypernatremia:  - will use hypotonic fluids  - Continue to monitor for now    6) metabolic acidosis:  - will place on bicarb drip  - if continues to worsen then may need to be started on dialysis  7)CKD:  - 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 plus age-related nephron loss  - Patient will need follow up for CKD post hospitalization   - Please call the office to arrange for outpatient follow up appointment  8)DM:  - management as per Primary team  - on insulin    9) leukocytosis/urosepsis:  - Management as per primary team  - urine culture gram-negative rods  - antibiotics per primary team  - on cefepime, Flagyl, vancomycin    10) ileus / pneumoperitoneum:  - management as per primary team  - follow up with Colorectal surgery    Thanks for the consult  Will continue to follow  Please call with questions/ concerns  Plan was discussed with the team in [address] MD, FASN, 7/26/2019, 9:32 AM              Objective :   Patient seen and examined in the ICU earlier this a m  Overnight was noted to have 5 pneumoperitoneum taken emergently to the OR is status post small-bowel resection , postoperatively remains on 3 pressors and intubated  Urine output is decreasing is about 30 cc for the last 2 hours        PHYSICAL EXAM  BP (!) 86/52   Pulse 80   Temp 99 4 °F (37 4 °C)   Resp 22   Ht 5' 6" (1 676 m)   Wt 98 7 kg (217 lb 9 5 oz)   SpO2 98%   BMI 35 12 kg/m²   Temp (24hrs), Av 2 °F (37 3 °C), Min:98 2 °F (36 8 °C), Max:100 1 °F (37 8 °C)        Intake/Output Summary (Last 24 hours) at 2019  Last data filed at 2019  Gross per 24 hour   Intake 7537 41 ml   Output 1253 ml   Net 6284 41 ml       I/O last 24 hours: In: 8706 2 [I V :7756 2; IV Piggyback:950]  Out: 8252 [Urine:803; Emesis/NG output:50; Drains:400]      Current Weight: Weight - Scale: 98 7 kg (217 lb 9 5 oz)  First Weight: Weight - Scale: 98 7 kg (217 lb 9 5 oz)  Physical Exam   Constitutional: He appears well-developed and well-nourished  No distress  HENT:   Head: Normocephalic and atraumatic  Intubated   Eyes: No scleral icterus  Neck: Neck supple  Cardiovascular: Normal heart sounds  Exam reveals no friction rub  Pulmonary/Chest: He has no wheezes  Course breath sounds   Abdominal:   Wound VAC in place   Musculoskeletal: He exhibits edema  Plus one edema bilateral upper lower extremities   Skin: Skin is warm  He is not diaphoretic  No pallor  Nursing note and vitals reviewed            Review of Systems   Unable to perform ROS: Intubated       Scheduled Meds:  Current Facility-Administered Medications:  ascorbic acid in sodium chloride 0 9% 100 mL IVPB 1,500 mg Intravenous Q6H Ruperto Pen Last Rate: Stopped (19)   cefepime 500 mg Intravenous Q12H Ruperto Pen Last Rate: Stopped (19)   EPINEPHrine        epinephrine 1-10 mcg/min Intravenous Titrated Ruperto Pen Last Rate: 22 mcg/min (19 0802)   fentaNYL 50 mcg/hr Intravenous Continuous Salvadore Deis, PA-C Last Rate: 50 mcg/hr (19 0920)   heparin (porcine) 5,000 Units Subcutaneous Q8H [address] Socorro Ayala    hydrocortisone sodium succinate 50 mg Intravenous Q6H Socorro Ayala    HYDROmorphone 0 5 mg Intravenous Q2H PRN Buford Ayala    insulin lispro 1-6 Units Subcutaneous Q6H Socorro Ayala    metroNIDAZOLE 500 mg Intravenous Q8H Ruperto Pen Last Rate: Stopped (07/26/19 9971)   naloxone 0 04 mg Intravenous Q3 min PRN Myra Christine    norepinephrine 1-30 mcg/min Intravenous Titrated Mary Christine Last Rate: 35 mcg/min (07/26/19 0805)   ondansetron 4 mg Intravenous Q4H PRN Mary Morenosey    oxyCODONE 10 mg Oral Q6H PRN Mary Christine    oxyCODONE 5 mg Oral Q4H PRN Hayley Page Ayala    pantoprazole 40 mg Intravenous Q24H [facility] Socorro Ayala    phenol 1 spray Mouth/Throat Q2H PRN Hayley Page Ayala    thiamine 200 mg Intravenous Q12H Socorro Ayala    vancomycin 15 mg/kg (Adjusted) Intravenous Q24H Hayley Page Ayala    vasopressin (PITRESSIN) in 0 9 % sodium chloride 100 mL 0 04 Units/min Intravenous Continuous Mary Christine Last Rate: 0 04 Units/min (07/26/19 0626)       PRN Meds:  HYDROmorphone    naloxone    ondansetron    oxyCODONE    oxyCODONE    phenol    Continuous Infusions:  epinephrine 1-10 mcg/min Last Rate: 22 mcg/min (07/26/19 0802)   fentaNYL 50 mcg/hr Last Rate: 50 mcg/hr (07/26/19 0920)   norepinephrine 1-30 mcg/min Last Rate: 35 mcg/min (07/26/19 0805)   vasopressin (PITRESSIN) in 0 9 % sodium chloride 100 mL 0 04 Units/min Last Rate: 0 04 Units/min (07/26/19 0626)         Invasive Devices: Invasive Devices     Central Venous Catheter Line            CVC Central Lines 07/26/19 Triple 16cm less than 1 day          Peripheral Intravenous Line            Peripheral IV 07/25/19 Left Antecubital 1 day    Peripheral IV 07/25/19 Left;Ventral (anterior) Forearm less than 1 day    Peripheral IV 07/25/19 Right Forearm less than 1 day          Arterial Line            Arterial Line 07/26/19 Radial less than 1 day          Drain            Colostomy LLQ 11 days    Urethral Catheter Non-latex 18 Fr  5 days    NG/OG/Enteral Tube 1 day    NG/OG Tube Orogastric Center mouth less than 1 day    Negative Pressure Wound Therapy (V A C ) Abdomen Anterior;Mid less than 1 day          Airway            ETT  Cuffed; Hi-Lo 8 mm less than 1 day LABORATORY:    Results from last 7 days   Lab Units 07/26/19  0444 07/26/19  0248 07/26/19  0124 07/25/19  2134 07/25/19  2118 07/25/19  0509 07/24/19  0433 07/23/19  0428 07/22/19  0454   WBC Thousand/uL 12 89* 9 00  --  4 25*  --  16 57* 14 57* 13 51* 14 50*   HEMOGLOBIN g/dL 8 2* 8 9*  --  9 2*  --  9 4* 9 4* 9 9* 11 0*   I STAT HEMOGLOBIN g/dl  --   --  8 8*  --  8 5*  --   --   --   --    HEMATOCRIT % 27 2* 28 7*  --  30 9*  --  31 3* 30 0* 31 4* 34 6*   HEMATOCRIT, ISTAT %  --   --  26*  --  25*  --   --   --   --    PLATELETS Thousands/uL 250 255  --  215  --  164 217 220 237   POTASSIUM mmol/L 4 3 3 7  --  3 8  --  4 1 3 8 3 4* 3 4*   CHLORIDE mmol/L 114* 114*  --  109*  --  110* 106 107 104   CO2 mmol/L 20* 21  --  24  --  19* 22 22 22   CO2, I-STAT mmol/L  --   --  20*  --  21  --   --   --   --    BUN mg/dL 60* 63*  --  64*  --  58* 62* 58* 46*   CREATININE mg/dL 2 61* 2 65*  --  2 62*  --  2 25* 2 45* 2 72* 2 16*   CALCIUM mg/dL 7 5* 7 2*  --  8 1*  --  7 9* 8 1* 8 1* 8 5   MAGNESIUM mg/dL 2 3 1 8  --  1 9  --  2 2 2 1 2 2 1 3*   PHOSPHORUS mg/dL  --   --   --  4 8*  --   --   --  4 2*  --    GLUCOSE, ISTAT mg/dl  --   --  147*  --  162*  --   --   --   --       rest all reviewed    RADIOLOGY:  XR chest portable   Final Result by Udell Cheadle, MD (07/25 4871)      Bibasilar consolidations most consistent with atelectasis  Multiple old right rib fractures  Workstation performed: [id number]         CT abdomen pelvis wo contrast   Final Result by Jennifer Gan MD (07/23 1619)         1  Postsurgical changes of repair of parastomal hernia with small bowel resection and anastomosis  There are fluid filled dilated small and large bowel loops throughout the abdomen with only decompressed colon seen meeting to the stoma  Findings    likely represent postoperative ileus  This can be followed with serial abdominal radiographs if clinically indicated        2   Increased air within right renal collecting system and ureter as well as new foci of air that appear to be within the bladder wall  As previously reported, 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  3   Bibasilar atelectasis and small pleural effusions  The study was marked in [facility] for immediate notification  Workstation performed: [id number]         XR abdomen 1 vw portable   Final Result by Charito Reyes MD (07/22 6717)      1  The sidehole of the enteric tube projects at the gastroesophageal junction  It should be advanced if gastric positioning is desired  2   Incompletely imaged gaseous distention of small bowel and colon, likely postoperative ileus  3   Bilateral lower lobe consolidations, likely atelectasis  Workstation performed: [id number]         US kidney and bladder   Final Result by Selena Nix DO (07/19 1615)   Bilateral cortical scarring and cortical lobulation  Bilateral mild-moderate hydronephrosis, new from prior exam    Normal arterial resistive indices -this does not suggest a significant obstruction  Incompletely distended urinary bladder which is otherwise unremarkable  Neither ureteral jet detected  *Because the patient has a history of a neurogenic bladder bladder size may be distended for this patient  Although hydronephrosis can be seen in the setting of ascending UTI, Consider computed tomography assess for an obstructing lesion  Alternatively consider nuclear medicine renal flow and function study with Lasix intervention  I personally discussed this study with Dr Franky Smith on 7/19/2019 at 4:13 PM                Workstation performed: [id number]         XR chest portable ICU    (Results Pending)     Rest all reviewed    Portions of the record may have been created with voice recognition software   Occasional wrong word or "sound a like" substitutions may have occurred due to the inherent limitations of voice recognition software  Read the chart carefully and recognize, using context, where substitutions have occurred  If you have any questions, please contact the dictating provider

## 2019-07-26 NOTE — PHYSICAL THERAPY NOTE
Physical Therapy Cancellation Note    PT orders received, chart review performed  Pt is currently intubated/sedated and not appropriate for skilled PT services at this time  PT to follow and initiate treatment when pt is clinically and medically appropriate       Jany Christensen, PT, DPT

## 2019-07-26 NOTE — ASSESSMENT & PLAN NOTE
S/p exploratory laparotomy, lysis of adhesions, small bowel resection, primary repair of parastomal hernia, colonoscopy 7/15    7/23 CT abd pelvis wo con: Fluid filled dilated SB and LB loops throughout abdomen  Decompressed colon meeting to the stoma  Likely postop ileus  7/24 NGT clamped for 4 hours with no output, subsequently removed  7/25 Transferred to MICU for worsening respiratory status  Emergently intubated  R IJ and R radial sandy placed  CXR w free air  Emergent OR for exploration, bowel resection      7/26 POD#1 Ex lap w bowel resection, abdominal washout, wound vac application    Plan    Neuro   - fentanyl for pain  - precedex gtt if agitated    Cardio    - continue levo, vaso, epi   - wean epinephrine as able  - goal MAP > 65  - IV fluid resuscitation    Pulm  -continue ventilation per icu    FEN-GI  - protonix  - plan for second look laparotomy today versus tomorrow, per colorectal surgery    Endocrine  -180    ID  continue cefepime/vancomycin/flagyl

## 2019-07-26 NOTE — OP NOTE
OPERATIVE REPORT  PATIENT NAME: Griselda Salomon    :  1940  MRN: [id number]  Pt Location: BE OR ROOM 10    SURGERY DATE: 2019    Surgeon(s) and Role:     * Lucia Kilgore MD - Primary     * Diana Perez - Assisting    Preop Diagnosis:  Septic shock, pneumoperitoneum, suspicion for perforated viscus  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 [K43 5]    Post-Op Diagnosis Codes:  Septic shock, pneumoperitoneum, perforated viscus site of small bowel anastomosis  * 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 [K43 5]    Procedure(s) (LRB):  REOPENING LAPAROTOMY EXPLORATORY W/ BOWEL RESECTION, WASHOUT PERITONITIS,   VAC APPLICATION     Specimen(s):  ID Type Source Tests Collected by Time Destination   1 : SMALL BOWEL ANASTAMOSIS Tissue Other TISSUE EXAM Lucia Kilgore MD 2019 0131        Estimated Blood Loss:   Minimal    Drains:  Negative Pressure Wound Therapy (V A C ) Abdomen Anterior;Mid (Active)   Blue foam- # applied 3 2019  1:00 AM   Cycle Continuous 2019  1:00 AM   Target Pressure (mmHg) 100 2019  1:00 AM   Dressing Status Clean;Dry; Intact 7/15/2019 12:00 AM   Number of days: 0       Colostomy LLQ (Active)   Stomal Appliance 1 piece 2019  7:35 PM   Stoma Assessment Diamond 2019  7:35 PM   Stoma size (in) 2 25 in 7/15/2019 10:41 AM   Stoma Shape Round 2019  7:35 PM   Peristomal Assessment DOMINICK 2019  7:35 PM   Treatment Placement checked 2019  8:16 AM   Output (mL) 0 mL 2019  6:30 AM   Number of days: 11       Urethral Catheter Non-latex 18 Fr   (Active)   Site Assessment Clean;Skin intact 2019  7:35 PM   Collection Container Standard drainage bag 2019  7:35 PM   Securement Method Securing device (Describe) 2019  7:35 PM   Output (mL) 400 mL 2019  1:53 PM   Number of days: 6       NG/OG Tube Orogastric Center mouth (Active)   Number of days: 0       Anesthesia Type:   General    Operative Indications:  Septic shock, pneumoperitoneum, suspicion for perforated viscus    Operative Findings:  -Pneumoperitoneum, abdomen with small bowel and mesenteric edema, postoperative adhesions, midline under incision with opening in small bowel with stool drainage, staples in the area suggesting site of previous anastomosis, stapled resection, washout of peritonitis, source control and ABThera VAC application   -Patient began operation on multiple pressors in moribund status, septic shock, left operating room in similar status on epinephrine  Complications:   None    Procedure and Technique:  77year-old patient of Dr Thomas Care 9 days from parastomal hernia repair with incarcerated small bowel and small bowel resection  Respiratory decompensation this evening with pneumoperitoneum on x-ray and in septic shock on pressors, acidosis, family was consented by resident team, as patient was in moribund status on multiple pressors  He was brought to the operating room intubated, Venodynes were on and running throughout the procedure cefazolin/Flagyl given prior to incision  Abdomen was chlorhexidine scrubbed, then sterile prepped, Ioban/sterile drapes were placed  After time-out was taken per protocol procedure began with removal of incision staples, subcutaneous tissues were entered and multiple 1 PDS interrupted sutures were grasped and removed, there was immediate drainage of air and turbid fluid, succus  It was apparent with small bowel and mesenteric edema that there was stool drainage from a perforated viscus, staples in the middle of the area otherwise not necrotic suggesting site of previous anastomosis  The mesentery was taken between [facility] clamps and 0 Vicryl ties, the small bowel was mobilized and GRETCHEN 100 stapler was placed, this was placed again more distal for complete source control and removal of the small bowel segment  Remainder of mesentery was excised and passed off the field      Multiple liters of warm irrigation were undertaken and ran clean, ABThera VAC was placed after hemostasis was assured, patient was moved back to the ICU bed and taken to the MICU in moribund status on multiple pressors, his wife Jordan Goff was informed of his condition and poor prognosis this evening at [phone]      I was present/scrubbed for the entire procedure    Patient Disposition:  Critical Care Unit and intubated and critically ill    SIGNATURE: Herman Otero MD  DATE: July 26, 2019  TIME: 2:07 AM

## 2019-07-26 NOTE — RESPIRATORY THERAPY NOTE
RT Ventilator Management Note  Griselda Settler 77 y o  male MRN: [id number]  Unit/Bed#: MICU 02 Encounter: [id number]      Daily Screen     No data found in the last 10 encounters  Physical Exam:   Assessment Type: (P) Assess only  General Appearance: (P) Sedated  Respiratory Pattern: (P) Assisted  Chest Assessment: (P) Chest expansion symmetrical  Bilateral Breath Sounds: (P) Coarse, Diminished  O2 Device: (P) vent  Subjective Data: (P) n/a      Resp Comments: (P) Pt is stable on current PC settings on the vent  NO chagnes made at this time  Will cont to monitor pt overnight per protocol

## 2019-07-26 NOTE — PROGRESS NOTES
Interval note    Pt seen and examined bedside on 8th floor  Pt with labored breathing on highflow NC  Abdomen distended  Pt with UTI on chart review  WBC decreased to 4 from 16 with 12 bands present  CXR reviewed with effusion on right side and some evidence of  volume overload  Abg reviewed with evidence of metabolic acidosis base deficit noted to be -6  Bolus fluids administered  Pt transferred to MICU  Underwent intubation and placement of central lines  Pt became significantly hypotensive on induction for intubation  additional fluids and multiple pressor medications started  Repeat cxr taken for placement of ETT and lines  Free air identified under diaphragm  VBG obtained off central line  Mixed veinous FIO2  noted to be 89%    Pt with evidence of septic shock at this time   1) Aggressive fluid resuscitation   2) Continue levophed vasopressin and epi drips  Wean down as possible  3) Wean down FIO2   4) Will consider stress dose steroids       Will obtain blood cultures  Continue broad spectrum abx  Contact made to Primary service for reevaluation       Total critical care time 1 hour

## 2019-07-26 NOTE — PROCEDURES
Temporary HD Catheter  Date/Time: 7/26/2019 1:45 PM  Performed by: Mal Mims PA-C  Authorized by: Mal Mims PA-C     Patient location:  Bedside  Other Assisting Provider: Yes (comment) (Dr Flower Almeida Me)    Consent:     Consent obtained:  Verbal    Consent given by:  Spouse and healthcare agent    Risks discussed:  Arterial puncture, bleeding, infection, incorrect placement and pneumothorax    Alternatives discussed:  No treatment  Universal protocol:     Procedure explained and questions answered to patient or proxy's satisfaction: yes      Site/side marked: yes      Patient identity confirmed:  Arm band and provided demographic data  Pre-procedure details:     Hand hygiene: Hand hygiene performed prior to insertion      Sterile barrier technique: All elements of maximal sterile technique followed      Skin preparation:  ChloraPrep    Skin preparation agent: Skin preparation agent completely dried prior to procedure    Indications:     Central line indications: dialysis    Anesthesia (see MAR for exact dosages): Anesthesia method:  None  Procedure details:     Location:  Right internal jugular    Vessel type: vein      Laterality:  Right    Approach: percutaneous technique used      Patient position:  Flat    Catheter type:  Triple lumen    Catheter length:  16 cm    Landmarks identified: yes      Ultrasound guidance: no (Wire exchange)      Number of attempts:  1    Successful placement: yes    Post-procedure details:     Post-procedure:  Dressing applied and line sutured    Assessment:  Blood return through all ports, free fluid flow, no pneumothorax on x-ray and placement verified by x-ray    Patient tolerance of procedure:   Tolerated well, no immediate complications

## 2019-07-26 NOTE — CONSULTS
Gonsalo Stewart 77 y o  male MRN: [id number]  Unit/Bed#: [facility] Encounter: [id number]     Physician Requesting Consult: Brian White MD  Consults     Reason for Consultation / Chief Complaint: pneumoperitoneum     History of Present Illness:  Shaista Gill is a 77 y o  male with a history of an APR who presented to the hospital with an incarcerated parastomal hernia  He was taken to the operating room on 7/15 with the colorectal surgery team for exploratory laparotomy and ventral hernia repair, as well as a small bowel resection  His post-operative course was complicated by JELLY as well as ileus  A CT was obtained on 7/23 with 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  On 7/25 his urine culture showed gram negative urinary tract infection  He was started on antibiotics  This evening, he clinically decompensated with worsening tachypnea, hypotension, and abdominal pain  He was transferred to the ICU for further care  He was intubated and a central line was placed  An x-ray revealed significantly increased free intraperitoneal air and he was taken to the operating room  Intraoperatively, an anastomotic breakdown was noted and a small bowel resection was performed  He was left in discontinuity, an Abthera VAC was placed and he was transferred back to the ICU in critical condition  He remains on norepinephrine, vasopressin, and epinephrine infusions  History obtained from chart review       Past Medical History:  Past Medical History:   Diagnosis Date    Benign prostatic hyperplasia with urinary obstruction and other lower urinary tract symptoms     Colon cancer ([facility] [address] ) 1998    Elevated PSA     last assessed 3/11/2014    Hypercholesterolemia     Hypertension     Malignant neoplasm of skin     Microhematuria     Neurogenic bladder     last assessed 6/13/2014    Type 2 diabetes mellitus ([address] )     Urinary incontinence         Past Surgical History:  Past Surgical History:   Procedure Laterality Date    APPENDECTOMY      5 y/o    COLON SURGERY      COLOSTOMY      LAPAROTOMY N/A 7/15/2019    Procedure: LAPAROTOMY EXPLORATORY; BOWEL RESECTION; HERNIA REPAIR;  Surgeon: Janes Arriaza MD;  [facility]: [facility];  Service: Colorectal    PROSTATE BIOPSY      ROTATOR CUFF REPAIR      resolved     SKIN BIOPSY      SMALL INTESTINE SURGERY N/A 7/15/2019    Procedure: RESECTION SMALL BOWEL;  Surgeon: Janes Arriaza MD;  [facility];  Service: Colorectal    TONSILLECTOMY      7 y/o    VASECTOMY          Past Family History:  Family History   Problem Relation Age of Onset    Kidney failure Mother         renal failure    Heart attack Father         MI    Diabetes Father         DM    Hyperlipidemia Father         hypercholesterolemia    Other Sister         mesothelioma    Coronary artery disease Brother         CABG    Cancer Brother         urinary bladder    Prostate cancer Brother         Social History:  Social History     Tobacco Use   Smoking Status Former Smoker    Last attempt to quit: Ceci Roth 38 Years since quittin 5   Smokeless Tobacco Never Used   Tobacco Comment    per [employer] 'former smoker- negative tobacco x 25 yrs, prev 1 ppd x 25 rs'     Social History     Substance and Sexual Activity   Alcohol Use Yes    Frequency: Monthly or less    Binge frequency: Less than monthly    Comment: SOCIAL     Social History     Substance and Sexual Activity   Drug Use No     Marital Status: /Civil Union  Exercise History: unknown     Home Medications:   Prior to Admission medications    Medication Sig Start Date End Date Taking?  Authorizing Provider   carbidopa-levodopa (SINEMET)  mg per tablet Take 1 5 tablets by mouth 3 (three) times a day 3/23/18   Historical Provider, MD   carvedilol (COREG CR) 40 MG 24 hr capsule Take 1 capsule (40 mg total) by mouth daily 3/14/19   Jaki Singh,    Cranberry 200 MG CAPS Take by mouth    Historical Provider, MD   finasteride (PROSCAR) 5 mg tablet Take 1 tablet (5 mg total) by mouth daily 10/22/18   Deniz Khan, DO   glucose blood (WALI CONTOUR TEST) test strip 1 each by Other route daily 2/19/18   Eugenie Gudino, DO   hydrochlorothiazide (HYDRODIURIL) 25 mg tablet Take 1 tablet (25 mg total) by mouth daily 3/14/19   Eugenie Gudino, DO   lisinopril (ZESTRIL) 20 mg tablet TAKE 1 TABLET BY MOUTH EVERY DAY 6/14/19   Eugenie Gudino, DO   Multiple Vitamins-Minerals (CENTRUM SILVER) tablet Take by mouth    Historical Provider, MD   omeprazole (PriLOSEC) 20 mg delayed release capsule Take 1 capsule (20 mg total) by mouth daily 10/22/18   Deniz Khan, DO   rosuvastatin (CRESTOR) 20 MG tablet Take 1 tablet (20 mg total) by mouth daily 10/22/18   Denizkaren Khan, DO   sitaGLIPtin-metFORMIN (JANUMET)  MG per tablet Take 1 tablet by mouth daily 10/22/18   Deniz Erin, DO        Inpatient Medications:  Scheduled Meds:  Current Facility-Administered Medications:  ascorbic acid in sodium chloride 0 9% 100 mL IVPB 1,500 mg Intravenous Q6H Socorro Ayala    atorvastatin 40 mg Oral Daily With oCrinne Ayala    cefepime 500 mg Intravenous Q12H Saima Crystal Last Rate: 500 mg (07/25/19 1712)   EPINEPHrine        epinephrine 1-10 mcg/min Intravenous Titrated Saima Crystal Last Rate: 50 mcg/min (07/26/19 0121)   heparin (porcine) 5,000 Units Subcutaneous Q8H [facility] Socorro Ayala    hydrocortisone sodium succinate 50 mg Intravenous Q6H Socorro Ayala    HYDROmorphone 0 5 mg Intravenous Q2H PRN Patricia Loupe Ayala    insulin lispro 1-6 Units Subcutaneous Q6H Socorro Ayala    metroNIDAZOLE 500 mg Intravenous Q8H Socorro Ayala    multi-electrolyte 1,000 mL Intravenous Once Erik Ayon MD    naloxone 0 04 mg Intravenous Q3 min PRN Patricia Loupe Ayala    norepinephrine 1-30 mcg/min Intravenous Titrated Saima Crystal Last Rate: 40 mcg/min (07/26/19 0112)   ondansetron 4 mg Intravenous Q4H PRN Saima Crystal oxyCODONE 10 mg Oral Q6H PRN Ardith Schilder    oxyCODONE 5 mg Oral Q4H PRN Sanjuana Quill Ayala    pantoprazole 40 mg Intravenous Q24H [facility] Socorro Ayala    phenol 1 spray Mouth/Throat Q2H PRN Sanjuana Quill Ayala    thiamine 200 mg Intravenous Q12H Socorro Kingentino    vancomycin 15 mg/kg (Adjusted) Intravenous Q24H Sanjuana Quill Ayala    vasopressin (PITRESSIN) in 0 9 % sodium chloride 100 mL 0 04 Units/min Intravenous Continuous Ardith Schilder Last Rate: 0 04 Units/min (19)     Continuous Infusions:  epinephrine 1-10 mcg/min Last Rate: 50 mcg/min (19)   norepinephrine 1-30 mcg/min Last Rate: 40 mcg/min (19)   vasopressin (PITRESSIN) in 0 9 % sodium chloride 100 mL 0 04 Units/min Last Rate: 0 04 Units/min (19)     PRN Meds:    HYDROmorphone 0 5 mg Q2H PRN   naloxone 0 04 mg Q3 min PRN   ondansetron 4 mg Q4H PRN   oxyCODONE 10 mg Q6H PRN   oxyCODONE 5 mg Q4H PRN   phenol 1 spray Q2H PRN        Allergies: Allergies   Allergen Reactions    Atorvastatin Myalgia    Lyrica [Pregabalin]         ROS:   Review of Systems   Unable to perform ROS: Intubated        Vitals:  Vitals:    19 2300 194 19 0212   BP: (!) 59/49      Pulse: 96      Resp:       Temp:       TempSrc:       SpO2: 99% 97% 94% 92%   Weight:       Height:         Temperature:   Temp (24hrs), Av 7 °F (37 1 °C), Min:98 2 °F (36 8 °C), Max:99 2 °F (37 3 °C)    Current Temperature: 99 2 °F (37 3 °C)     Weights:   IBW: 63 8 kg  Body mass index is 35 12 kg/m²       Hemodynamic Monitoring:  N/A     Non-Invasive/Invasive Ventilation Settings:  Respiratory    Lab Data (Last 4 hours)    None         O2/Vent Data (Last 4 hours)       0212         Vent Mode AC/PC AC/PC      Resp Rate (BPM) (BPM) 14 14      Pressure Control Set (cm H2O) (cm) 20 20      Insp Time (sec) (sec) 0 9 0 9      FiO2 (%) (%) 100 90      PEEP (cmH2O) (cmH2O) 8 8      MV 4 8 16 7                No results found for: PHART, TXF1PQF, PO2ART, CGE8NYW, P2TIMNIT, BEART, SOURCE  SpO2: SpO2: 92 %     Physical Exam:  Physical Exam   Constitutional: He appears well-developed  HENT:   Head: Normocephalic  Eyes: Pupils are equal, round, and reactive to light  Neck: Neck supple  Cardiovascular:   Tachycardic   Pulmonary/Chest:   Mechanically ventilated   Abdominal: He exhibits distension  There is tenderness  There is guarding  Musculoskeletal: He exhibits no deformity  Neurological:   Intubated and sedated   Skin: Skin is warm  Capillary refill takes 2 to 3 seconds  Labs:  Results from last 7 days   Lab Units 07/25/19 2134 07/25/19 2118 07/25/19 0509 07/24/19  0433 07/23/19  0428   WBC Thousand/uL 4 25*  --  16 57* 14 57* 13 51*   HEMOGLOBIN g/dL 9 2*  --  9 4* 9 4* 9 9*   I STAT HEMOGLOBIN g/dl  --  8 5*  --   --   --    HEMATOCRIT % 30 9*  --  31 3* 30 0* 31 4*   HEMATOCRIT, ISTAT %  --  25*  --   --   --    PLATELETS Thousands/uL 215  --  164 217 220   NEUTROS PCT %  --   --  88* 86* 83*   MONOS PCT %  --   --  7 6 7   MONO PCT % 2*  --   --   --   --     Results from last 7 days   Lab Units 07/25/19 2134 07/25/19 2118 07/25/19 0509 07/24/19  0433   SODIUM mmol/L 141  --  141 140   POTASSIUM mmol/L 3 8  --  4 1 3 8   CHLORIDE mmol/L 109*  --  110* 106   CO2 mmol/L 24  --  19* 22   CO2, I-STAT mmol/L  --  21  --   --    BUN mg/dL 64*  --  58* 62*   CREATININE mg/dL 2 62*  --  2 25* 2 45*   CALCIUM mg/dL 8 1*  --  7 9* 8 1*   GLUCOSE, ISTAT mg/dl  --  162*  --   --      Results from last 7 days   Lab Units 07/25/19 2134 07/25/19 0509 07/24/19  0433   MAGNESIUM mg/dL 1 9 2 2 2 1     Results from last 7 days   Lab Units 07/25/19 2134 07/23/19  0428   PHOSPHORUS mg/dL 4 8* 4 2*              0   Lab Value Date/Time    TROPONINI 0 03 07/25/2019 1144    TROPONINI 0 02 07/25/2019 0841        Imaging: I have personally reviewed pertinent reports      EKG: sinus tachycardia with PVCs This was personally reviewed by myself  Micro:  Lab Results   Component Value Date    BLOODCX No Growth After 5 Days  07/14/2019    BLOODCX No Growth After 5 Days  07/14/2019    URINECX (A) 07/24/2019     >100,000 cfu/ml Non lactose fermenting gram negative darwin    URINECX No Growth <1000 cfu/mL 11/04/2016       Assessment: 77year old male with severe septic shock secondary to urinary tract infection and intra-abdominal infection        Plan:                  Neuro:   - fentanyl for pain   - precedex gtt if agitated                 CV: septic shock    - continue levo, vaso, epi   - wean epinephrine as able   - goal MAP > 65   - IV fluid resuscitation   - stress dose steroids, vitamin C/thiamine                 Lung: acute hypoxic respiratory failure   - continue mechanical ventilation                 GI: parastomal hernia s/p SBR complicated by anastomotic leak, now s/p small bowel resection in discontinuity and temporary abdominal closure   - continue NPO/NGT   - Continue abthera to suction   - protonix   - plan for second look laparotomy today versus tomorrow, per colorectal surgery                 FEN: NPO, JELLY on CKD   - replete electrolytes prn   - may require renal replacement   - continue IV fluid resuscitation                 : neurogenic bladder, BPH   - continue ramos                 ID: GNR UTI and severe intra-abdominal sepsis   - continue cefepime/vancomycin/flagyl                 Heme: acute blood loss anemia   - heparin ppx                 Endo: diabetes   - continue SSI                 Msk/Skin:   - turning and repositioning   - ostomy care                 Disposition:    - ICU     VTE Pharmacologic Prophylaxis: Heparin  VTE Mechanical Prophylaxis: sequential compression device     Invasive lines and devices:   Invasive Devices     Central Venous Catheter Line            CVC Central Lines 07/26/19 Triple 16cm less than 1 day          Peripheral Intravenous Line            Peripheral IV 07/25/19 Left Antecubital less than 1 day    Peripheral IV 07/25/19 Left;Ventral (anterior) Forearm less than 1 day    Peripheral IV 07/25/19 Right Forearm less than 1 day          Arterial Line            Arterial Line 07/26/19 Radial less than 1 day          Drain            Colostomy LLQ 11 days    Urethral Catheter Non-latex 18 Fr  5 days    NG/OG Tube Orogastric Center mouth less than 1 day    Negative Pressure Wound Therapy (V A C ) Abdomen Anterior;Mid less than 1 day          Airway            ETT  Cuffed; Hi-Lo 8 mm less than 1 day                 Code Status: Level 1 - Full Code  POA:    POLST:       Given critical illness, patient length of stay will require greater than two midnights  Portions of the record may have been created with voice recognition software  Occasional wrong word or "sound a like" substitutions may have occurred due to the inherent limitations of voice recognition software  Read the chart carefully and recognize, using context, where substitutions have occurred          Braden Davis MD

## 2019-07-26 NOTE — PROCEDURES
Intubation  Date/Time: 7/26/2019 12:26 AM  Performed by: Rafat Garza MD  Authorized by: Rafat Garza MD     Patient location:  Bedside  Consent:     Consent obtained:  Verbal    Consent given by:  Patient and spouse    Risks discussed:  Aspiration and hypoxia  Universal protocol:     Patient identity confirmed:  Arm band and verbally with patient  Pre-procedure details:     Patient status:  Altered mental status    Mallampati score:  3    Pretreatment medications:  Etomidate    Paralytics:  Rocuronium  Indications:     Indications for intubation: respiratory failure    Procedure details:     Preoxygenation: high flow nasal cannula  CPR in progress: no      Intubation method:  Oral    Oral intubation technique:  Fiber optic    Laryngoscope blade: Mac 3    Tube size (mm):  8 0    Tube type:  Cuffed and hi-lo    Number of attempts:  2    Ventilation between attempts: yes      Cricoid pressure: yes      Tube visualized through cords: yes    Placement assessment:     ETT to lip:  24 cm    Tube secured with:  ETT velez    Breath sounds:  Equal    Placement verification: chest rise, condensation, CXR verification, equal breath sounds and capnography      CXR findings:  ETT in proper place  Post-procedure details:     Patient tolerance of procedure:   Tolerated with difficulty    Complication (if applicable):  Rapid desaturation

## 2019-07-26 NOTE — QUICK NOTE
Patient transferred to MICU this evening for worsening respiratory status  Emergently intubated by critical care team, R IJ and R radial sandy placed by critical care  CXR shows free air, not present on prior exam  Patient increasingly distended with tenderness, rebound, and guarding  Plan for emergent OR for exploration, possible bowel resection, all indicated procedure  Marinell Bowie Oralia Leyden, MD  General Surgery  PGY5

## 2019-07-26 NOTE — NURSING NOTE
Pt placed on high flow at 50% w O2 sat around 94% and continued to be tachypnic in the 45s  Surgery aware  Critical care consulted  Pt transported to MICU

## 2019-07-26 NOTE — PROGRESS NOTES
Renal on call note:    I was called by nurse about patient's respiratory distress and requiring 50% FiO2 which is significant change since AM  Cr improving today AM    CXR shows bibasilar consolidation with small pleural effusion  ? aspiration vs component of congestion vs atelectasis  Ok with giving Lasix 60 mg IV once now  IVF discontinued    Discussed with primary team

## 2019-07-26 NOTE — NUTRITION
When hemodynamically stable consider check TRIG and Std TPN Day #1   On Day #2 increase to 700 ml (10% AA), 600 ml (D40W), 250 ml (20% Lipids) to = qd:1550 ml,1596 Total Kcal,1316 NPC,70 gm PRO,240 gm CHO,50 gm Fat,11 2 gm N,1171 (NPC:N),1 7 mg CHO/kg/min

## 2019-07-26 NOTE — NURSING NOTE
O2 sat 84-87% on 6L NC  Pt is shallow breathing and unable to do incentive  Pt is able to use flutter valve and coughing up some white sputum  Placed pt on non-rebreather  O2 now  97-99%  Pt is oriented x4  Spoke w Meliza Coates surgery  Ordered respiratory protocol  Paged respiratory

## 2019-07-26 NOTE — RESPIRATORY THERAPY NOTE
RT Ventilator Management Note  Jud Shine 77 y o  male MRN: [id number]  [facility]: [id number]      Daily Screen       7/26/2019  0710             Patient safety screen outcome[de-identified]  Failed    Not Ready for Weaning due to[de-identified]  Underline problem not resolved; Alternative forms of ventilation            Physical Exam:   Assessment Type: Assess only  General Appearance: Sedated  Respiratory Pattern: Assisted  Chest Assessment: Chest expansion symmetrical  Bilateral Breath Sounds: Coarse, Diminished  O2 Device: vent  Subjective Data: n/a      Resp Comments: (P) pt tolerating vent settings at this time will continue to monitor per protocol

## 2019-07-26 NOTE — RESPIRATORY THERAPY NOTE
RT Protocol Note  Grazyna Mercado 77 y o  male MRN: [id number]  Unit/Bed#: [facility] 801-01 Encounter: [id number]    Assessment    Principal Problem:    Parastomal hernia  Active Problems:    Malignant neoplasm of colon ([place] [address] )    Acute kidney injury ([place] [address] )    Chronic kidney disease    Hypertension      Home Pulmonary Medications:         Past Medical History:   Diagnosis Date    Benign prostatic hyperplasia with urinary obstruction and other lower urinary tract symptoms     Colon cancer ([place] [address] ) 1998    Elevated PSA     last assessed 3/11/2014    Hypercholesterolemia     Hypertension     Malignant neoplasm of skin     Microhematuria     Neurogenic bladder     last assessed 2014    Type 2 diabetes mellitus ([place] [address] )     Urinary incontinence      Social History     Socioeconomic History    Marital status: /Civil Union     Spouse name: Not on file    Number of children: Not on file    Years of education: Not on file    Highest education level: Not on file   Occupational History    Not on file   Social Needs    Financial resource strain: Not on file    Food insecurity:     Worry: Not on file     Inability: Not on file    Transportation needs:     Medical: Not on file     Non-medical: Not on file   Tobacco Use    Smoking status: Former Smoker     Last attempt to quit: 1980     Years since quittin 5    Smokeless tobacco: Never Used    Tobacco comment: per [employer] 'former smoker- negative tobacco x 25 yrs, prev 1 ppd x 25 rs'   Substance and Sexual Activity    Alcohol use: Yes     Frequency: Monthly or less     Binge frequency: Less than monthly     Comment: SOCIAL    Drug use: No    Sexual activity: Not on file   Lifestyle    Physical activity:     Days per week: Not on file     Minutes per session: Not on file    Stress: Not on file   Relationships    Social connections:     Talks on phone: Not on file     Gets together: Not on file     Attends [employer] service: Not on file     Active member of club or organization: Not on file     Attends meetings of clubs or organizations: Not on file     Relationship status: Not on file    Intimate partner violence:     Fear of current or ex partner: Not on file     Emotionally abused: Not on file     Physically abused: Not on file     Forced sexual activity: Not on file   Other Topics Concern    Not on file   Social History Narrative    Daily coffee consumption (1 cup/day)       Subjective         Objective    Physical Exam:   Assessment Type: Pre-treatment  General Appearance: Drowsy  Respiratory Pattern: Tachypneic  Chest Assessment: Chest expansion symmetrical  Bilateral Breath Sounds: Diminished    Vitals:  Blood pressure 92/53, pulse 96, temperature 98 2 °F (36 8 °C), resp  rate (!) 40, height 5' 6" (1 676 m), weight 98 7 kg (217 lb 9 5 oz), SpO2 95 %  Imaging and other studies: I have personally reviewed pertinent reports  Plan    Respiratory Plan: Mild Distress pathway        Resp Comments: Physician in rm  ABG done and run on i-stat  Pt cont  to be tachypnic after 20min of HFNC  Sats are stable on current Fio2  He cont  to appear labored and have decreased bs

## 2019-07-27 NOTE — RESPIRATORY THERAPY NOTE
RT Ventilator Management Note  Flor Perera 77 y o  male MRN: [id number]  Unit/Bed#: [facility] Encounter: [id number]      Daily Screen       7/26/2019  0710 7/26/2019  1645          Patient safety screen outcome[de-identified]  Failed  Failed      Not Ready for Weaning due to[de-identified]  Underline problem not resolved; Alternative forms of ventilation  Underline problem not resolved              Physical Exam:   Assessment Type: (P) Assess only  General Appearance: (P) Sedated  Respiratory Pattern: (P) Assisted  Chest Assessment: (P) Chest expansion symmetrical  Bilateral Breath Sounds: (P) Coarse, Diminished      Resp Comments: (P) pt remains on ventilator at this time  no changes made will continue to monitor per respiratory protocol

## 2019-07-27 NOTE — PROGRESS NOTES
Progress Note - Lenin Le 1940, 77 y o  male MRN: [id number]    [facility]: [id number]    Primary Care Provider: Emily Reid DO   Date and time admitted to hospital: 7/15/2019  2:17 AM        * Parastomal hernia  Assessment & Plan  S/p exploratory laparotomy, lysis of adhesions, small bowel resection, primary repair of parastomal hernia, colonoscopy 7/15    7/23 CT abd pelvis wo con: Fluid filled dilated SB and LB loops throughout abdomen  Decompressed colon meeting to the stoma  Likely postop ileus  7/24 NGT clamped for 4 hours with no output, subsequently removed  7/25 Transferred to MICU for worsening respiratory status  Emergently intubated  R IJ and R radial sandy placed  CXR w free air  Emergent OR for exploration, bowel resection  7/26 Ex lap w bowel resection, abdominal washout, wound vac application    Plan  NPO/NGT  Consider initiating TPN today  Wean vent as able  Wean pressors as able  Continue Zosyn  CVVH per nephrology  Continue resuscitation  Stress dose steroids per ICU  Maintain abthera [address] for take back to OR tomorrow for washout and VAC change  Will not close abdomen if still requiring high amounts of pressors  ICU level of care - remains critically ill                                               Subjective/Objective   Chief Complaint:     Subjective: Pressor requirements improved over past 24 hours  Poor urine output - initiated on CVVH yesterday  Afebrile    Objective:     Blood pressure 112/54, pulse 72, temperature 97 5 °F (36 4 °C), temperature source Axillary, resp  rate 17, height 5' 6" (1 676 m), weight 98 7 kg (217 lb 9 5 oz), SpO2 100 %  ,Body mass index is 35 12 kg/m²        Intake/Output Summary (Last 24 hours) at 7/27/2019 0750  Last data filed at 7/27/2019 0700  Gross per 24 hour   Intake 6370 1 ml   Output 4569 ml   Net 1801 1 ml       Invasive Devices     Central Venous Catheter Line            CVC Central Lines 07/26/19 Triple 20cm Left Subclavian less than 1 day          Peripheral Intravenous Line            Peripheral IV 07/25/19 Left Antecubital 2 days    Peripheral IV 07/25/19 Right Forearm 1 day          Arterial Line            Arterial Line 07/26/19 Radial 1 day          Line            Temporary HD Catheter less than 1 day          Drain            Colostomy LLQ 12 days    Urethral Catheter Non-latex 18 Fr  6 days    NG/OG/Enteral Tube 2 days    NG/OG Tube Orogastric Center mouth 1 day    Negative Pressure Wound Therapy (V A C ) Abdomen Anterior;Mid 1 day          Airway            ETT  Cuffed; Hi-Lo 8 mm 1 day                Physical Exam:   NAD  Follows commands  RRR  nonlabored respirations on vent  Abdomen mildly distended, VAC in place with good seal and serosang drainage   Stoma pink with no stool in bag      Lab, Imaging and other studies:  CBC:   Lab Results   Component Value Date    WBC 23 34 (H) 07/27/2019    HGB 7 9 (L) 07/27/2019    HCT 26 4 (L) 07/27/2019    MCV 95 07/27/2019     07/27/2019    MCH 28 4 07/27/2019    MCHC 29 9 (L) 07/27/2019    RDW 14 3 07/27/2019    MPV 10 5 07/27/2019    NRBC 0 07/27/2019   , CMP:   Lab Results   Component Value Date    SODIUM 144 07/27/2019    K 4 4 07/27/2019     (H) 07/27/2019    CO2 14 (L) 07/27/2019    CO2 16 (L) 07/26/2019    BUN 40 (H) 07/27/2019    CREATININE 1 89 (H) 07/27/2019    GLUCOSE 172 (H) 07/26/2019    CALCIUM 8 2 (L) 07/27/2019    AST 24 07/27/2019    ALT <6 (L) 07/27/2019    ALKPHOS 66 07/27/2019    EGFR 33 07/27/2019   , Coagulation:   Lab Results   Component Value Date    INR 1 69 (H) 07/27/2019   , Urinalysis: No results found for: COLORU, CLARITYU, SPECGRAV, PHUR, LEUKOCYTESUR, NITRITE, PROTEINUA, GLUCOSEU, KETONESU, BILIRUBINUR, BLOODU, Amylase: No results found for: AMYLASE, Lipase: No results found for: LIPASE  VTE Pharmacologic Prophylaxis: Sequential compression device (Venodyne)   VTE Mechanical Prophylaxis: sequential compression device

## 2019-07-27 NOTE — RESPIRATORY THERAPY NOTE
RT Ventilator Management Note  Belen Nyhan 77 y o  male MRN: [id number]  [facility] [id number]      Daily Screen       7/26/2019  0710 7/26/2019  1645          Patient safety screen outcome[de-identified]  Failed  Failed      Not Ready for Weaning due to[de-identified]  Underline problem not resolved; Alternative forms of ventilation  Underline problem not resolved              Physical Exam:   Assessment Type: (P) Assess only  General Appearance: (P) Sedated  Respiratory Pattern: (P) Assisted  Chest Assessment: (P) Chest expansion symmetrical  Bilateral Breath Sounds: (P) Clear, Diminished  O2 Device: (P) vent  Subjective Data: (P) n/a      Resp Comments: (P) Pt is stable at this time  NO changes made overnight   Will cont to monitor pt

## 2019-07-27 NOTE — ANESTHESIA PREPROCEDURE EVALUATION
Review of Systems/Medical History  Patient summary reviewed  Chart reviewed  No history of anesthetic complications     Cardiovascular  Exercise tolerance (METS): >4,  Hyperlipidemia, Hypertension ,   Comment: EF 55%,  Pulmonary    Comment: ETT in situ     GI/Hepatic    GERD ,   Comment: Free air in abdomen with suspected bowel leak     Kidney disease (CVVH) ARF,        Endo/Other  Diabetes ,   Comment: Septic, Acidotic    GYN       Hematology  Anemia ,     Musculoskeletal    Arthritis     Neurology  Negative neurology ROS      Psychology   Negative psychology ROS              Physical Exam    Airway    Mallampati score: II  TM Distance: >3 FB  Neck ROM: full     Dental       Cardiovascular      Pulmonary      Other Findings        Anesthesia Plan  ASA Score- 4     Anesthesia Type- general with ASA Monitors  Additional Monitors:   Airway Plan:     Comment: Possible PRBC transfusion, will return to ICU postop, sedated and on mechanical ventilation        Plan Factors-    Induction- intravenous  Postoperative Plan-     Informed Consent- Anesthetic plan and risks discussed with patient  I personally reviewed this patient with the CRNA  Discussed and agreed on the Anesthesia Plan with the CRNA  Alexander Perez

## 2019-07-27 NOTE — RESPIRATORY THERAPY NOTE
RT Ventilator Management Note  Bevelyn Gene 77 y o  male MRN: [id number]  Unit/Bed#: [facility] 02 Encounter: [id number]      Daily Screen       7/26/2019  1645 7/27/2019  0838          Patient safety screen outcome[de-identified]  Failed  Failed      Not Ready for Weaning due to[de-identified]  Underline problem not resolved  Alternative forms of ventilation              Physical Exam:   Assessment Type: Assess only  General Appearance: Sedated  Respiratory Pattern: Assisted  Chest Assessment: Chest expansion symmetrical  Bilateral Breath Sounds: Clear, Diminished  O2 Device: vent  Subjective Data: n/a      Resp Comments: Pt  on A/C PC  02 decreased to 80%  BS coarse  No rx needed at thsi time

## 2019-07-27 NOTE — PROGRESS NOTES
Progress Note - Critical Care   Viridiana Clifford 77 y o  male MRN: [id number]  Unit/Bed#: [facility] 02 Encounter: [id number]    Assessment:   Principal Problem:    Parastomal hernia  Active Problems:    Malignant neoplasm of colon ([facility] [address] )    Acute kidney injury ([facility] [address] )    Chronic kidney disease    Hypertension    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    Septic shock (HCC)    Leukocytosis    Oliguria    UTI (urinary tract infection)        Plan: Critical Care Management as outlined below:     Neuro:  Awake and alert currently  Appears in mild distress  Fentanyl infusion on hold due to over-sedation  Will restart at 48  No focal deficits  Follows commands briskly  Continue frequent neurologic exams, daily CAM ICU, delirium precautions  Maintain adequate sleep-wake cycle  CV:  Combined septic and cardiogenic shock  Currently on Levophed at 22, vasopressin at 0 04  Epinephrine on hold and dobutamine started overnight at 5  Blood pressure currently appropriate on these doses  Remains in anion gap metabolic acidosis and just received 2 amps of bicarb  Continue fluid resuscitation  Continue stress dose steroids  Echocardiogram yesterday shows ejection fraction 55% with no other significant abnormalities  Lung:  Acute respiratory failure with hypoxia  Remains on pressure control ventilation  Taking large tidal volumes, consider decrease pressure  Chest x-ray yesterday with small bilateral pleural effusions and mild bibasilar atelectasis with no other abnormalities  Continue aggressive pulmonary toilet and frequent suctioning  Wean ventilator as tolerated  Daily spontaneous breathing trial      GI:  Parastomal hernia status post small bowel resection complicated by anastomotic leak and now status post small bowel resection left in discontinuity with temporary abdominal closure  Abdomen moderately distended and tender without peritonitis  Remains NPO with NG tube to suction    ABThera VAC to suction with 1 1 L serosanguineous output  Continue GI prophylaxis  Plan for second-look laparotomy when more stable  FEN:  1 5 L positive in 24 hours  14 9 L positive since admission  Mild hyperchloremia and hyperphosphatemia  Remaining electrolytes unremarkable  Continue to monitor and replete as needed  :  JELLY with significant oliguria, 5-10  Began CVVH overnight running even  Continue CVVH today  Continue running even due to relative hypotension  BUN and creatinine somewhat better than yesterday but remains elevated  Maintain Restrepo catheter  ID:  Persistent leukocytosis at 23,300  No bandemia  Remains afebrile  Continue Zosyn for intra-abdominal infection  Heme:  Acute blood loss anemia with hemoglobin stable at 7 9  Platelet count normal   Continue subcutaneous heparin and SCDs for DVT prophylaxis  Endo:  History of type 2 diabetes  Blood glucose mildly increased to 222 overnight, slowly rising  Continue sliding scale coverage, algorithm 3 for now  Msk/Skin:  No skin breakdown  Continue frequent repositioning and offloading  Disposition:  Continue ICU management     ______________________________________________________________________    Chief Complaint:  None given      HPI/24hr events:  Became more hypotensive yesterday requiring increasing doses of pressors  Echocardiogram done shows EF 55%  Dobutamine started and epinephrine weaned down with good effect  Continues to have anion gap metabolic acidosis  ______________________________________________________________________    Physical Exam:   Physical Exam   Constitutional: He appears well-developed  He is cooperative  He is easily aroused  He appears toxic  He has a sickly appearance  He appears ill  No distress  He is sedated, intubated and restrained  Eyes: Pupils are equal, round, and reactive to light   Conjunctivae are normal    Cardiovascular: Normal rate, regular rhythm, intact distal pulses and normal pulses  Pulmonary/Chest: Effort normal  No stridor  He is intubated  He has no decreased breath sounds  He has no wheezes  He has rhonchi in the right upper field, the right middle field, the right lower field, the left upper field, the left middle field and the left lower field  He has no rales  Abdominal: Soft  He exhibits distension  There is generalized tenderness  There is guarding (Voluntary)  There is no rigidity  Neurological: He is alert and easily aroused  He has normal strength  GCS eye subscore is 3  GCS verbal subscore is 1  GCS motor subscore is 6  Skin: Skin is warm, dry and intact              ______________________________________________________________________  Vitals:    19 0455 19 0500 19 0515 19 0530   BP: 103/53      Pulse: 74 62 84 72   Resp: 18 16 17 15   Temp:       TempSrc:       SpO2: 100% 100% 100% 100%   Weight:       Height:         Arterial Line BP: 138/54  Arterial Line MAP (mmHg): 82 mmHg     Temperature:   Temp (24hrs), Av 1 °F (36 7 °C), Min:97 4 °F (36 3 °C), Max:99 4 °F (37 4 °C)    Current Temperature: 97 5 °F (36 4 °C)  Weights:   IBW: 63 8 kg    Body mass index is 35 12 kg/m²    Weight (last 2 days)     None        Hemodynamic Monitoring:  SvO2:   , CO: CO (L/min): 7 3 L/min, CI: CI (L/min/m2): 3 5 L/min/m2, SVR:  , PVR:  , SV: SV (mL): 105 mL  CO:  [6 1 L/min-7 8 L/min] 7 3 L/min  CI:  [3 L/min/m2-3 8 L/min/m2] 3 5 L/min/m2  Non-Invasive/Invasive Ventilation Settings:  Respiratory    Lab Data (Last 4 hours)       0414            pH, Arterial       7 287           pCO2, Arterial       27 7           pO2, Arterial       176 6           HCO3, Arterial       12 9           Base Excess, Arterial       -12 4                O2/Vent Data        0405   Most Recent         Vent Mode AC/PC  AC/PC      Resp Rate (BPM) (BPM) 14  14      Pressure Control Set (cm H2O) (cm) 20  20      Insp Time (sec) (sec) 0 9  0 9      FiO2 (%) (%) 90  90      PEEP (cmH2O) (cmH2O) 8  8      MV 16  16                Lab Results   Component Value Date    PHART 7 287 (L) 07/27/2019    LRP5VWQ 27 7 (LL) 07/27/2019    PO2ART 176 6 (H) 07/27/2019    DEM1OMR 12 9 (L) 07/27/2019    BEART -12 4 07/27/2019    SOURCE Line, Arterial 07/26/2019     SpO2: SpO2: 100 %, SpO2 Activity: SpO2 Activity: At Rest, SpO2 Device: O2 Device: Other (comment)(intubated/vented)  Intake and Outputs:  I/O       07/25 0701 - 07/26 0700 07/26 0701 - 07/27 0700    P  O  0     I V  (mL/kg) 6865 2 (69 6) 4958 4 (50 2)    IV Piggyback 850 1320 7    Total Intake(mL/kg) 7715 2 (78 2) 6279 1 (63 6)    Urine (mL/kg/hr) 763 (0 3) 355 (0 1)    Emesis/NG output  200    Drains  1100    Other  3175    Stool  0    Total Output 763 4830    Net +6952 2 +1449 1          Unmeasured Stool Occurrence  0 x        UOP:  0 mL/Kg/hour   Nutrition:        Diet Orders   (From admission, onward)             Start     Ordered    07/24/19 1516  Diet NPO; Sips of clear liquids  Diet effective now     Question Answer Comment   Diet Type NPO    NPO Except: Sips of clear liquids    RD to adjust diet per protocol? Yes        07/24/19 1515                Labs:   Results from last 7 days   Lab Units 07/27/19  0409 07/26/19  2155 07/26/19  1848 07/26/19  1216  07/26/19  0444   WBC Thousand/uL 23 34* 22 43*  --  19 23*  --  12 89*   HEMOGLOBIN g/dL 7 9* 8 0* 7 8* 8 3*  --  8 2*   I STAT HEMOGLOBIN   --   --   --   --    < >  --    HEMATOCRIT % 26 4* 26 5* 26 0* 27 3*  --  27 2*   HEMATOCRIT, ISTAT   --   --   --   --    < >  --    PLATELETS Thousands/uL 163 183  --  273  --  250   NEUTROS PCT % 90* 90*  --   --   --  91*   MONOS PCT % 5 5  --   --   --  5    < > = values in this interval not displayed      Results from last 7 days   Lab Units 07/27/19  0409 07/26/19  2155 07/26/19  1643  07/26/19  1129  07/26/19  0124  07/25/19  2118   POTASSIUM mmol/L 4 4 4 5 4 4   < >  --    < >  --    < >  --    CHLORIDE mmol/L 112* 113* 114* < >  --    < >  --    < >  --    CO2 mmol/L 14* 13* 13*   < >  --    < >  --    < >  --    CO2, I-STAT mmol/L  --   --   --   --  16*  --  20*  --  21   BUN mg/dL 40* 49* 55*   < >  --    < >  --    < >  --    CREATININE mg/dL 1 89* 2 22* 2 45*   < >  --    < >  --    < >  --    CALCIUM mg/dL 8 2* 7 8* 7 4*   < >  --    < >  --    < >  --    ALK PHOS U/L 66  --   --   --   --   --   --   --   --    ALT U/L <6*  --   --   --   --   --   --   --   --    AST U/L 24  --   --   --   --   --   --   --   --    GLUCOSE, ISTAT mg/dl  --   --   --   --  172*  --  147*  --  162*    < > = values in this interval not displayed  Results from last 7 days   Lab Units 07/27/19  0409 07/26/19 2155 07/26/19  1643   MAGNESIUM mg/dL 2 0 1 9 2 1     Results from last 7 days   Lab Units 07/27/19  0409 07/26/19 2155 07/26/19  1643   PHOSPHORUS mg/dL 5 1* 5 2* 5 0*      Results from last 7 days   Lab Units 07/27/19  0409   INR  1 69*     Results from last 7 days   Lab Units 07/26/19  0444   LACTIC ACID mmol/L 1 1     0   Lab Value Date/Time    TROPONINI 0 02 07/26/2019 1210    TROPONINI 0 02 07/26/2019 1000    TROPONINI 0 03 07/26/2019 0712    TROPONINI 0 03 07/25/2019 1144    TROPONINI 0 02 07/25/2019 0841     Imaging:  None today  I have personally reviewed pertinent reports  and I have personally reviewed pertinent films in PACS    Micro:  Lab Results   Component Value Date    BLOODCX No Growth After 5 Days  07/14/2019    BLOODCX No Growth After 5 Days  07/14/2019    URINECX >100,000 cfu/ml Escherichia coli (A) 07/24/2019    URINECX 40,000-49,000 cfu/ml Enterococcus faecalis (A) 07/24/2019    URINECX No Growth <1000 cfu/mL 11/04/2016     Allergies:    Allergies   Allergen Reactions    Atorvastatin Myalgia    Lyrica [Pregabalin]      Medications:   Scheduled Meds:  Current Facility-Administered Medications:  ascorbic acid in sodium chloride 0 9% 100 mL IVPB 1,500 mg Intravenous Q6H Sonya Li Last Rate: Stopped (07/27/19 0100)   chlorhexidine 15 mL Swish & Spit Q12H [address] [place], [place]    dexmedetomidine 0 1-0 7 mcg/kg/hr Intravenous Titrated Jeovanny Horseman Petit-Me Last Rate: Stopped (07/26/19 1615)   DOBUTamine in dextrose 5% 5 mcg/kg/min Intravenous Continuous Jeovanny Horseman Petit-Me Last Rate: 5 mcg/kg/min (07/27/19 0650)   DOBUTamine in dextrose 5% 5 mcg/kg/min Intravenous Continuous Jeovanny Horseman Petit-Me    epinephrine 1-10 mcg/min Intravenous Titrated Lorie Rana Last Rate: 3 mcg/min (07/27/19 0615)   fentaNYL 100 mcg/hr Intravenous Continuous Kathi Mcgovern PA-C Last Rate: 75 mcg/hr (07/27/19 0530)   heparin (porcine) 5,000 Units Subcutaneous Q8H [address] [place] Ayala    hydrocortisone sodium succinate 50 mg Intravenous Q6H Socorro Ayala    HYDROmorphone 0 5 mg Intravenous Q2H PRN Cintia Ayala    insulin lispro 1-6 Units Subcutaneous Q6H Socorro Ayala    naloxone 0 04 mg Intravenous Q3 min PRN Cintia Ayala    norepinephrine 1-30 mcg/min Intravenous Titrated Lorie Rana Last Rate: 22 mcg/min (07/27/19 0350)   NxStage K 4/Ca 3 20,000 mL Dialysis Continuous Jody MD Jes    ondansetron 4 mg Intravenous Q4H PRN Lorie Rana    oxyCODONE 10 mg Oral Q6H PRN Lorie Rana    oxyCODONE 5 mg Oral Q4H PRN Cintia Ayala    pantoprazole 40 mg Intravenous Q24H [address] [place] Ayala    phenol 1 spray Mouth/Throat Q2H PRN Cintia Ayala    piperacillin-tazobactam 3 375 g Intravenous Q6H Carmen Ag PA-C Last Rate: Stopped (07/27/19 0600)   thiamine 200 mg Intravenous Q12H Lorie Rana Last Rate: Stopped (07/27/19 0200)   vasopressin (PITRESSIN) in 0 9 % sodium chloride 100 mL 0 04 Units/min Intravenous Continuous Lorie Rana Last Rate: 0 04 Units/min (07/27/19 0551)     Continuous Infusions:  dexmedetomidine 0 1-0 7 mcg/kg/hr Last Rate: Stopped (07/26/19 7485)   DOBUTamine in dextrose 5% 5 mcg/kg/min Last Rate: 5 mcg/kg/min (07/27/19 0650)   DOBUTamine in dextrose 5% 5 mcg/kg/min    epinephrine 1-10 mcg/min Last Rate: 3 mcg/min (07/27/19 0615)   fentaNYL 100 mcg/hr Last Rate: 75 mcg/hr (07/27/19 0530)   norepinephrine 1-30 mcg/min Last Rate: 22 mcg/min (07/27/19 0350)   NxStage K 4/Ca 3 20,000 mL    vasopressin (PITRESSIN) in 0 9 % sodium chloride 100 mL 0 04 Units/min Last Rate: 0 04 Units/min (07/27/19 0551)     PRN Meds:    HYDROmorphone 0 5 mg Q2H PRN   naloxone 0 04 mg Q3 min PRN   ondansetron 4 mg Q4H PRN   oxyCODONE 10 mg Q6H PRN   oxyCODONE 5 mg Q4H PRN   phenol 1 spray Q2H PRN     VTE Pharmacologic Prophylaxis: Heparin  VTE Mechanical Prophylaxis: sequential compression device  Invasive lines and devices: Invasive Devices     Central Venous Catheter Line            CVC Central Lines 07/26/19 Triple 20cm Left Subclavian less than 1 day          Peripheral Intravenous Line            Peripheral IV 07/25/19 Left Antecubital 1 day    Peripheral IV 07/25/19 Right Forearm 1 day          Arterial Line            Arterial Line 07/26/19 Radial 1 day          Line            Temporary HD Catheter less than 1 day          Drain            Colostomy LLQ 12 days    Urethral Catheter Non-latex 18 Fr  6 days    NG/OG/Enteral Tube 2 days    NG/OG Tube Orogastric Center mouth 1 day    Negative Pressure Wound Therapy (V A C ) Abdomen Anterior;Mid 1 day          Airway            ETT  Cuffed; Hi-Lo 8 mm 1 day                   Counseling / Coordination of Care  Total Critical Care time spent 48 minutes excluding procedures, teaching and family updates  Code Status: Level 2 - DNAR: but accepts endotracheal intubation    Portions of the record may have been created with voice recognition software  Occasional wrong word or "sound a like" substitutions may have occurred due to the inherent limitations of voice recognition software  Read the chart carefully and recognize, using context, where substitutions have occurred      Carl Otero PA-C

## 2019-07-27 NOTE — ASSESSMENT & PLAN NOTE
S/p exploratory laparotomy, lysis of adhesions, small bowel resection, primary repair of parastomal hernia, colonoscopy 7/15    7/23 CT abd pelvis wo con: Fluid filled dilated SB and LB loops throughout abdomen  Decompressed colon meeting to the stoma  Likely postop ileus  7/24 NGT clamped for 4 hours with no output, subsequently removed  7/25 Transferred to MICU for worsening respiratory status  Emergently intubated  R IJ and R radial sandy placed  CXR w free air  Emergent OR for exploration, bowel resection      7/26 Ex lap w bowel resection, abdominal washout, wound vac application    Plan  NPO/NGT  Continue TPN today  Wean vent as able  Continue to wean pressors  Continue Zosyn  CVVH per nephrology  Stress dose steroids per ICU  Maintain abthera [address] for take back to OR today for re-exploration, possible closure of abdomen vs VAC change  ICU level of care - patient overall improving but remains critically ill

## 2019-07-27 NOTE — ASSESSMENT & PLAN NOTE
S/p exploratory laparotomy, lysis of adhesions, small bowel resection, primary repair of parastomal hernia, colonoscopy 7/15    7/23 CT abd pelvis wo con: Fluid filled dilated SB and LB loops throughout abdomen  Decompressed colon meeting to the stoma  Likely postop ileus  7/24 NGT clamped for 4 hours with no output, subsequently removed  7/25 Transferred to MICU for worsening respiratory status  Emergently intubated  R IJ and R radial sandy placed  CXR w free air  Emergent OR for exploration, bowel resection  7/26 Ex lap w bowel resection, abdominal washout, wound vac application    Plan  NPO/NGT  Consider initiating TPN today  Wean vent as able  Wean pressors as able  Continue Zosyn  CVVH per nephrology  Continue resuscitation  Stress dose steroids per ICU  Maintain abthera [address] for take back to OR tomorrow for washout and VAC change   Will not close abdomen if still requiring high amounts of pressors  ICU level of care - remains critically ill

## 2019-07-27 NOTE — PROGRESS NOTES
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  On 07/25/2019 was noted to have pneumoperitoneum and was again taken to the OR emergently and is status post reopen laparotomy with bowel resection and VAC placement, subsequently placed on multiple pressors and intubated  Nephrology has been following for acute kidney injury     1)Acute kidney injury (POA) on CKD stage 3:  - JELLY most likely secondary to initially due to prerenal azotemia and urinary retention and now with sepsis plus failure to auto regulate in the presence of hemodynamic perturbations in this elderly gentleman with multiple comorbidities  - After review of records it appears that the patient has a baseline Creatinine of 1 3-1 7mg/dL  - patient's creatinine today is at 1 89 mg/dL  - Avoid nephrotoxins, adjust meds to appropriate GFR   - Acid base and lytes stable except anion gap metabolic acidosis, mild hypernatremia and hyperchloremia  - clinically patient appears to be hypervolemic  -  all risks and benefits of the procedure as well as outcomes with dialysis specially CVVH was discussed with patient's spouse in depth all questions and concerns were answered    Consent for dialysis was obtained and placed in the chart on 07/26/2019   - started on CVVH on 07/26/2019   - when I had a discussion about dialysis with the patient's spouse for consent she reported that patient never wanted to be on machines however was agreeable to do a time-limited trial of 5-7 days of dialysis to see if patient recovers and if not then she will make further decisions at that time  - will increase dialysate flow rate to help with clearance/acidosis  Based on weights will increase dialysate flow rate to 2500 today  - continue on 4K bath  - will start fluid removal at net -20 cc/hour an aim for 50 cc/hour as tolerated keeping map greater than 65  - Optimize hemodynamic status to avoid delay in renal recovery  - continue checking BMP, magnesium, phosphorus as per CVVH protocol   - Await renal recovery  - Place on a renal diet when allowed diet order    - Strict I/O      2)Blood pressure:  - Optimize hemodynamics   - Maintain MAP > 65mmHg  - Avoid BP fluctuations  - on vasopressin, Levophed, dobutamine     3)H/H:  - most recent hemoglobin at  7 9 grams/deciliter  - maintain hemoglobin greater than 8 grams/deciliter  - Management as per primary team      4)Volume status:  - Clinically patient appears to be minimally hypervolemic  -  will start with net -20 cc/hour in aiming for 50 cc/hour keeping map greater than 65 by the end of the day      5) Hypernatremia:  -  to be corrected with dialysis  - Continue to monitor for now     6) metabolic acidosis:  -  to be corrected with dialysis  - will increase dialysis flow rates     7)CKD:  - 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 plus age-related nephron loss  - Patient will need follow up for CKD post hospitalization   - Please call the office to arrange for outpatient follow up appointment       8)DM:  - management as per Primary team  - on insulin     9) leukocytosis/urosepsis:  - Management as per primary team  - urine culture gram-negative rods  - antibiotics per primary team  - on cefepime, Flagyl, vancomycin  - most recent WBC of 23 3     10) ileus / pneumoperitoneum:  - management as per primary team  - follow up with Colorectal surgery     Thanks for the consult  Will continue to follow  Please call with questions/ concerns  Plan was discussed with the team in [address] MD, FASN, 2019, 10:08 AM              Objective :   Patient seen and examined in the ICU while on CVVH at 7:45 a m  No overnight events hemodynamically stable currently on Levophed, dobutamine and vasopressin  Remains intubated however is able to communicate  PHYSICAL EXAM  /54   Pulse 84   Temp (!) 96 5 °F (35 8 °C) Comment: fluid warmer turned to high on cvvh  Resp (!) 27   Ht 5' 6" (1 676 m)   Wt 113 kg (248 lb 10 9 oz)   SpO2 100%   BMI 40 14 kg/m²   Temp (24hrs), Av 6 °F (36 4 °C), Min:96 5 °F (35 8 °C), Max:98 8 °F (37 1 °C)        Intake/Output Summary (Last 24 hours) at 2019 1008  Last data filed at 2019 0806  Gross per 24 hour   Intake 5602 09 ml   Output 4722 ml   Net 880 09 ml       I/O last 24 hours: In: 6593 1 [I V :5148 4; IV Piggyback:1444 7]  Out: 8823 [Urine:370; Emesis/NG output:200; Drains:1225; FHWHH:]      Current Weight: Weight - Scale: 113 kg (248 lb 10 9 oz)  First Weight: Weight - Scale: 98 7 kg (217 lb 9 5 oz)  Physical Exam   Constitutional: He is oriented to person, place, and time  He appears well-developed and well-nourished  No distress  HENT:   Head: Normocephalic and atraumatic  Intubated   Eyes: Conjunctivae are normal  No scleral icterus  Neck: Neck supple  No JVD present  Cardiovascular: Normal heart sounds  Exam reveals no friction rub  Pulmonary/Chest: Effort normal    Abdominal: Soft  Wound VAC in place, colostomy   Musculoskeletal: He exhibits edema  Plus one edema bilateral upper extremities, +2 edema bilateral lower extremities   Neurological: He is alert and oriented to person, place, and time  Skin: Skin is warm  He is not diaphoretic  No pallor  Psychiatric: He has a normal mood and affect   His behavior is normal    Nursing note and vitals reviewed            Review of Systems   Unable to perform ROS: Intubated       Scheduled Meds:  Current Facility-Administered Medications:  ascorbic acid in sodium chloride 0 9% 100 mL IVPB 1,500 mg Intravenous Q6H Timmothy Ion Last Rate: Stopped (07/27/19 0806)   chlorhexidine 15 mL Swish & Spit Q12H [facility] Mateo Rockwell PA-C    dexmedetomidine 0 1-0 7 mcg/kg/hr Intravenous Titrated Rebeca Gianotti Petit-Me Last Rate: Stopped (07/26/19 1615)   DOBUTamine in dextrose 5% 5 mcg/kg/min Intravenous Continuous Rebeca Gianotti Petit-Me Last Rate: 5 mcg/kg/min (07/27/19 0650)   DOBUTamine in dextrose 5% 5 mcg/kg/min Intravenous Continuous Rebeca Gianotti Petit-Me    epinephrine 1-10 mcg/min Intravenous Titrated Timmothy Ion Last Rate: Stopped (07/27/19 0706)   fentaNYL 100 mcg/hr Intravenous Continuous Brielle Hayes PA-C Last Rate: 75 mcg/hr (07/27/19 0530)   heparin (porcine) 5,000 Units Subcutaneous Q8H [facility] Socorro Ayala    hydrocortisone sodium succinate 50 mg Intravenous Q6H Socorro Ayala    HYDROmorphone 0 5 mg Intravenous Q2H PRN Tsosie Chad Ayala    insulin lispro 1-6 Units Subcutaneous Q6H Socorro Ayala    naloxone 0 04 mg Intravenous Q3 min PRN Tsosie Chad Ayala    norepinephrine 1-30 mcg/min Intravenous Titrated Timmothy Ion Last Rate: 15 mcg/min (07/27/19 0845)   NxStage K 4/Ca 3 20,000 mL Dialysis Continuous Lawrence Marks MD    ondansetron 4 mg Intravenous Q4H PRN Timmothy Ion    oxyCODONE 10 mg Oral Q6H PRN Timmothy Ion    oxyCODONE 5 mg Oral Q4H PRN Tsosisudhir Bonilla Ayala    pantoprazole 40 mg Intravenous Q24H [facility] Socorro Ayala    phenol 1 spray Mouth/Throat Q2H PRN Tsosisudhir Ayala    piperacillin-tazobactam 3 375 g Intravenous Q6H KODAK CardozoC Last Rate: Stopped (07/27/19 0600)   thiamine 200 mg Intravenous Q12H Timmothy Ion Last Rate: Stopped (07/27/19 0200)   vasopressin (PITRESSIN) in 0 9 % sodium chloride 100 mL 0 04 Units/min Intravenous Continuous Timmothy Ion Last Rate: 0 04 Units/min (07/27/19 0551)       PRN Meds:  HYDROmorphone    naloxone    ondansetron    oxyCODONE    oxyCODONE    phenol    Continuous Infusions:  dexmedetomidine 0 1-0 7 mcg/kg/hr Last Rate: Stopped (07/26/19 1615)   DOBUTamine in dextrose 5% 5 mcg/kg/min Last Rate: 5 mcg/kg/min (07/27/19 0650)   DOBUTamine in dextrose 5% 5 mcg/kg/min    epinephrine 1-10 mcg/min Last Rate: Stopped (07/27/19 0706)   fentaNYL 100 mcg/hr Last Rate: 75 mcg/hr (07/27/19 0530)   norepinephrine 1-30 mcg/min Last Rate: 15 mcg/min (07/27/19 0845)   NxStage K 4/Ca 3 20,000 mL    vasopressin (PITRESSIN) in 0 9 % sodium chloride 100 mL 0 04 Units/min Last Rate: 0 04 Units/min (07/27/19 0551)         Invasive Devices: Invasive Devices     Central Venous Catheter Line            CVC Central Lines 07/26/19 Triple 20cm Left Subclavian less than 1 day          Peripheral Intravenous Line            Peripheral IV 07/25/19 Left Antecubital 2 days    Peripheral IV 07/25/19 Right Forearm 1 day          Arterial Line            Arterial Line 07/26/19 Radial 1 day          Line            Temporary HD Catheter less than 1 day          Drain            Colostomy LLQ 12 days    Urethral Catheter Non-latex 18 Fr  6 days    NG/OG/Enteral Tube 2 days    NG/OG Tube Orogastric Center mouth 1 day    Negative Pressure Wound Therapy (V A C ) Abdomen Anterior;Mid 1 day          Airway            ETT  Cuffed; Hi-Lo 8 mm 1 day                  LABORATORY:    Results from last 7 days   Lab Units 07/27/19  0409 07/26/19  2155 07/26/19  1848 07/26/19  1643 07/26/19  1216 07/26/19  1210 07/26/19  1129 07/26/19  0444 07/26/19  0248 07/26/19  0124 07/25/19  2134 07/25/19  2118  07/25/19  0509  07/23/19  0428   WBC Thousand/uL 23 34* 22 43*  --   --  19 23*  --   --  12 89* 9 00  --  4 25*  --   --  16 57*   < > 13 51*   HEMOGLOBIN g/dL 7 9* 8 0* 7 8*  --  8 3*  --   --  8 2* 8 9*  --  9 2*  --   --  9 4*   < > 9 9*   I STAT HEMOGLOBIN g/dl  --   -- --   --   --   --  8 5*  --   --  8 8*  --  8 5*   < >  --   --   --    HEMATOCRIT % 26 4* 26 5* 26 0*  --  27 3*  --   --  27 2* 28 7*  --  30 9*  --   --  31 3*   < > 31 4*   HEMATOCRIT, ISTAT %  --   --   --   --   --   --  25*  --   --  26*  --  25*   < >  --   --   --    PLATELETS Thousands/uL 163 183  --   --  273  --   --  250 255  --  215  --   --  164   < > 220   POTASSIUM mmol/L 4 4 4 5  --  4 4  --  4 5  --  4 3 3 7  --  3 8  --   --  4 1   < > 3 4*   CHLORIDE mmol/L 112* 113*  --  114*  --  113*  --  114* 114*  --  109*  --   --  110*   < > 107   CO2 mmol/L 14* 13*  --  13*  --  16*  --  20* 21  --  24  --   --  19*   < > 22   CO2, I-STAT mmol/L  --   --   --   --   --   --  16*  --   --  20*  --  21   < >  --   --   --    BUN mg/dL 40* 49*  --  55*  --  61*  --  60* 63*  --  64*  --   --  58*   < > 58*   CREATININE mg/dL 1 89* 2 22*  --  2 45*  --  2 76*  --  2 61* 2 65*  --  2 62*  --   --  2 25*   < > 2 72*   CALCIUM mg/dL 8 2* 7 8*  --  7 4*  --  7 2*  --  7 5* 7 2*  --  8 1*  --   --  7 9*   < > 8 1*   MAGNESIUM mg/dL 2 0 1 9  --  2 1  --  2 0  --  2 3 1 8  --  1 9  --   --  2 2   < > 2 2   PHOSPHORUS mg/dL 5 1* 5 2*  --  5 0*  --  5 3*  --   --   --   --  4 8*  --   --   --   --  4 2*   GLUCOSE, ISTAT mg/dl  --   --   --   --   --   --  172*  --   --  147*  --  162*  --   --   --   --     < > = values in this interval not displayed  rest all reviewed    RADIOLOGY:  XR chest 1 view   Final Result by Norbert Zimmer MD (07/26 4875)      1  No evidence of pneumothorax following placement of right IJ hemodialysis catheter  Workstation performed: [id number]         XR chest 1 view   Final Result by Cuauhtemoc Soni MD (07/26 1305)      Left subclavian catheter tip in the cavoatrial junction region  No pneumothorax  Persistent bibasilar opacities and pleural effusions           Workstation performed: [id number]         XR chest portable ICU   Final Result by Rosalva Delgado MD (07/26 4462)      Large volume of pneumoperitoneum  Patient has already been taken to the OR  Bibasilar atelectasis  Workstation performed: [id number]         XR chest portable   Final Result by Margie Clay MD (07/25 1401)      Bibasilar consolidations most consistent with atelectasis  Multiple old right rib fractures  Workstation performed: [id number]         CT abdomen pelvis wo contrast   Final Result by Jannie Lowe MD (07/23 1619)         1  Postsurgical changes of repair of parastomal hernia with small bowel resection and anastomosis  There are fluid filled dilated small and large bowel loops throughout the abdomen with only decompressed colon seen meeting to the stoma  Findings    likely represent postoperative ileus  This can be followed with serial abdominal radiographs if clinically indicated  2   Increased air within right renal collecting system and ureter as well as new foci of air that appear to be within the bladder wall  As previously reported, 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  3   Bibasilar atelectasis and small pleural effusions  The study was marked in [facility] for immediate notification  Workstation performed: [id number]         XR abdomen 1 vw portable   Final Result by Alvarze Proctor MD (07/22 0845)      1  The sidehole of the enteric tube projects at the gastroesophageal junction  It should be advanced if gastric positioning is desired  2   Incompletely imaged gaseous distention of small bowel and colon, likely postoperative ileus  3   Bilateral lower lobe consolidations, likely atelectasis  Workstation performed: [id number]         US kidney and bladder   Final Result by Hardy Patel DO (07/19 1615)   Bilateral cortical scarring and cortical lobulation     Bilateral mild-moderate hydronephrosis, new from prior exam    Normal arterial resistive indices -this does not suggest a significant obstruction  Incompletely distended urinary bladder which is otherwise unremarkable  Neither ureteral jet detected  *Because the patient has a history of a neurogenic bladder bladder size may be distended for this patient  Although hydronephrosis can be seen in the setting of ascending UTI, Consider computed tomography assess for an obstructing lesion  Alternatively consider nuclear medicine renal flow and function study with Lasix intervention  I personally discussed this study with Dr Marly Miller on 7/19/2019 at 4:13 PM                Workstation performed: [id number]           Rest all reviewed    Portions of the record may have been created with voice recognition software  Occasional wrong word or "sound a like" substitutions may have occurred due to the inherent limitations of voice recognition software  Read the chart carefully and recognize, using context, where substitutions have occurred  If you have any questions, please contact the dictating provider

## 2019-07-27 NOTE — PLAN OF CARE
Problem: Potential for Falls  Goal: Patient will remain free of falls  Description  INTERVENTIONS:  - Assess patient frequently for physical needs  -  Identify cognitive and physical deficits and behaviors that affect risk of falls  -  [place] fall precautions as indicated by assessment   - Educate patient/family on patient safety including physical limitations  - Instruct patient to call for assistance with activity based on assessment  - Modify environment to reduce risk of injury  - Consider OT/PT consult to assist with strengthening/mobility  Outcome: Progressing     Problem: Nutrition/Hydration-ADULT  Goal: Nutrient/Hydration intake appropriate for improving, restoring or maintaining nutritional needs  Description  Monitor and assess patient's nutrition/hydration status for malnutrition (ex- brittle hair, bruises, dry skin, pale skin and conjunctiva, muscle wasting, smooth red tongue, and disorientation)  Collaborate with interdisciplinary team and initiate plan and interventions as ordered  Monitor patient's weight and dietary intake as ordered or per policy  Utilize nutrition screening tool and intervene per policy  Determine patient's food preferences and provide high-protein, high-caloric foods as appropriate       INTERVENTIONS:  - Monitor oral intake, urinary output, labs, and treatment plans  - Assess nutrition and hydration status and recommend course of action  - Evaluate amount of meals eaten  - Assist patient with eating if necessary   - Allow adequate time for meals  - Recommend/ encourage appropriate diets, oral nutritional supplements, and vitamin/mineral supplements  - Order, calculate, and assess calorie counts as needed  - Recommend, monitor, and adjust tube feedings and TPN/PPN based on assessed needs  - Assess need for intravenous fluids  - Provide specific nutrition/hydration education as appropriate  - Include patient/family/caregiver in decisions related to nutrition  Outcome: Progressing     Problem: PAIN - ADULT  Goal: Verbalizes/displays adequate comfort level or baseline comfort level  Description  Interventions:  - Encourage patient to monitor pain and request assistance  - Assess pain using appropriate pain scale  - Administer analgesics based on type and severity of pain and evaluate response  - Implement non-pharmacological measures as appropriate and evaluate response  - Consider cultural and social influences on pain and pain management  - Notify physician/advanced practitioner if interventions unsuccessful or patient reports new pain  Outcome: Progressing     Problem: INFECTION - ADULT  Goal: Absence or prevention of progression during hospitalization  Description  INTERVENTIONS:  - Assess and monitor for signs and symptoms of infection  - Monitor lab/diagnostic results  - Monitor all insertion sites, i e  indwelling lines, tubes, and drains  - Monitor endotracheal (as able) and nasal secretions for changes in amount and color  - [place] appropriate cooling/warming therapies per order  - Administer medications as ordered  - Instruct and encourage patient and family to use good hand hygiene technique  - Identify and instruct in appropriate isolation precautions for identified infection/condition  Outcome: Progressing     Problem: SAFETY ADULT  Goal: Patient will remain free of falls  Description  INTERVENTIONS:  - Assess patient frequently for physical needs  -  Identify cognitive and physical deficits and behaviors that affect risk of falls    -  [place] fall precautions as indicated by assessment   - Educate patient/family on patient safety including physical limitations  - Instruct patient to call for assistance with activity based on assessment  - Modify environment to reduce risk of injury  - Consider OT/PT consult to assist with strengthening/mobility  Outcome: Progressing     Problem: SAFETY ADULT  Goal: Maintain or return to baseline ADL function  Description  INTERVENTIONS:  -  Assess patient's ability to carry out ADLs; assess patient's baseline for ADL function and identify physical deficits which impact ability to perform ADLs (bathing, care of mouth/teeth, toileting, grooming, dressing, etc )  - Assess/evaluate cause of self-care deficits   - Assess range of motion  - Assess patient's mobility; develop plan if impaired  - Assess patient's need for assistive devices and provide as appropriate  - Encourage maximum independence but intervene and supervise when necessary  ¯ Involve family in performance of ADLs  ¯ Assess for home care needs following discharge   ¯ Request OT consult to assist with ADL evaluation and planning for discharge  ¯ Provide patient education as appropriate  Outcome: Progressing     Problem: SAFETY ADULT  Goal: Maintain or return mobility status to optimal level  Description  INTERVENTIONS:  - Assess patient's baseline mobility status (ambulation, transfers, stairs, etc )    - Identify cognitive and physical deficits and behaviors that affect mobility  - Identify mobility aids required to assist with transfers and/or ambulation (gait belt, sit-to-stand, lift, walker, cane, etc )  - [place] fall precautions as indicated by assessment  - Record patient progress and toleration of activity level on Mobility SBAR; progress patient to next Phase/Stage  - Instruct patient to call for assistance with activity based on assessment  - Request Rehabilitation consult to assist with strengthening/weightbearing, etc   Outcome: Progressing     Problem: Knowledge Deficit  Goal: Patient/family/caregiver demonstrates understanding of disease process, treatment plan, medications, and discharge instructions  Description  Complete learning assessment and assess knowledge base    Interventions:  - Provide teaching at level of understanding  - Provide teaching via preferred learning methods  Outcome: Progressing     Problem: Prexisting or High Potential for Compromised Skin Integrity  Goal: Skin integrity is maintained or improved  Description  INTERVENTIONS:  - Identify patients at risk for skin breakdown  - Assess and monitor skin integrity  - Assess and monitor nutrition and hydration status  - Monitor labs (i e  albumin)  - Assess for incontinence   - Turn and reposition patient  - Assist with mobility/ambulation  - Relieve pressure over bony prominences  - Avoid friction and shearing  - Provide appropriate hygiene as needed including keeping skin clean and dry  - Evaluate need for skin moisturizer/barrier cream  - Collaborate with interdisciplinary team (i e  Nutrition, Rehabilitation, etc )   - Patient/family teaching  Outcome: Progressing     Problem: GASTROINTESTINAL - ADULT  Goal: Minimal or absence of nausea and/or vomiting  Description  INTERVENTIONS:  - Administer IV fluids as ordered to ensure adequate hydration  - Maintain NPO status until nausea and vomiting are resolved  - Nasogastric tube as ordered  - Administer ordered antiemetic medications as needed  - Provide nonpharmacologic comfort measures as appropriate  - Advance diet as tolerated, if ordered  - Nutrition services referral to assist patient with adequate nutrition and appropriate food choices  Outcome: Progressing     Problem: GASTROINTESTINAL - ADULT  Goal: Maintains or returns to baseline bowel function  Description  INTERVENTIONS:  - Assess bowel function  - Encourage oral fluids to ensure adequate hydration  - Administer IV fluids as ordered to ensure adequate hydration  - Administer ordered medications as needed  - Encourage mobilization and activity  - Nutrition services referral to assist patient with appropriate food choices  Outcome: Progressing     Problem: GASTROINTESTINAL - ADULT  Goal: Maintains adequate nutritional intake  Description  INTERVENTIONS:  - Monitor percentage of each meal consumed  - Identify factors contributing to decreased intake, treat as appropriate  - Assist with meals as needed  - Monitor I&O, WT and lab values  - Obtain nutrition services referral as needed  Outcome: Progressing     Problem: GASTROINTESTINAL - ADULT  Goal: Establish and maintain optimal ostomy function  Description  INTERVENTIONS:  - Assess bowel function  - Encourage oral fluids to ensure adequate hydration  - Administer IV fluids as ordered to ensure adequate hydration  - Administer ordered medications as needed  - Encourage mobilization and activity  - Nutrition services referral to assist patient with appropriate food choices  - Assess stoma site  Outcome: Progressing     Problem: METABOLIC, FLUID AND ELECTROLYTES - ADULT  Goal: Electrolytes maintained within normal limits  Description  INTERVENTIONS:  - Monitor labs and assess patient for signs and symptoms of electrolyte imbalances  - Administer electrolyte replacement as ordered  - Monitor response to electrolyte replacements, including repeat lab results as appropriate  - Instruct patient on fluid and nutrition as appropriate  Outcome: Progressing     Problem: METABOLIC, FLUID AND ELECTROLYTES - ADULT  Goal: Fluid balance maintained  Description  INTERVENTIONS:  - Monitor labs and assess for signs and symptoms of volume excess or deficit  - Monitor I/O and WT  - Instruct patient on fluid and nutrition as appropriate  Outcome: Progressing     Problem: METABOLIC, FLUID AND ELECTROLYTES - ADULT  Goal: Glucose maintained within target range  Description  INTERVENTIONS:  - Monitor Blood Glucose as ordered  - Assess for signs and symptoms of hyperglycemia and hypoglycemia  - Administer ordered medications to maintain glucose within target range  - Assess nutritional intake and initiate nutrition service referral as needed  Outcome: Progressing     Problem: SAFETY,RESTRAINT: NV/NON-SELF DESTRUCTIVE BEHAVIOR  Goal: Remains free of harm/injury (restraint for non violent/non self-detsructive behavior)  Description  INTERVENTIONS:  - Instruct patient/family regarding restraint use   - Assess and monitor physiologic and psychological status   - Provide interventions and comfort measures to meet assessed patient needs   - Identify and implement measures to help patient regain control  - Assess readiness for release of restraint   Outcome: Progressing     Problem: SAFETY,RESTRAINT: NV/NON-SELF DESTRUCTIVE BEHAVIOR  Goal: Returns to optimal restraint-free functioning  Description  INTERVENTIONS:  - Assess the patient's behavior and symptoms that indicate continued need for restraint  - Identify and implement measures to help patient regain control  - Assess readiness for release of restraint   Outcome: Progressing     Problem: COPING  Goal: Pt/Family able to verbalize concerns and demonstrate effective coping strategies  Description  INTERVENTIONS:  - Assist patient/family to identify coping skills, available support systems and cultural and spiritual values  - Provide emotional support, including active listening and acknowledgement of concerns of patient and caregivers  - Reduce environmental stimuli, as able  - Provide patient education  - Assess for spiritual pain/suffering and initiate spiritual care, including notification of Pastoral Care or erna based community as needed  - Assess effectiveness of coping strategies  Outcome: Progressing     Problem: COPING  Goal: Will report anxiety at manageable levels  Description  INTERVENTIONS:  - Administer medication as ordered  - Teach and encourage coping skills  - Provide emotional support  - Assess patient/family for anxiety and ability to cope  Outcome: Progressing     Problem: CHANGE IN BODY IMAGE  Goal: Pt/Family communicate acceptance of loss or change in body image and expresses psychological comfort and peace  Description  INTERVENTIONS:  - Assess patient/family anxiety and grief process related to change in body image, loss of functional status and loss of sense of self  - Assess patient/family's coping skills and provide emotional, spiritual and psychosocial support  - Provide information about the patient's health status with consideration of family and cultural values  - Communicate willingness to discuss loss and facilitate grief process with patient/family as appropriate  - Emphasize sustaining relationships within family system and community, or erna/spiritual traditions  - Refer to community support groups as appropriate  - [id number], Pastoral care or other ancillary consults as needed  Outcome: Progressing     Problem: CONFUSION/THOUGHT DISTURBANCE  Goal: Thought disturbances (confusion, delirium, depression, dementia or psychosis) are managed to maintain or return to baseline mental status and functional level  Description  INTERVENTIONS:  - Assess for possible contributors to  thought disturbance, including but not limited to medications, infection, impaired vision or hearing, underlying metabolic abnormalities, dehydration, respiratory compromise,  psychiatric diagnoses and notify attending PHYSICAN/AP  - Monitor and intervene to maintain adequate nutrition, hydration, elimination, sleep and activity  - Decrease environmental stimuli, including noise as appropriate  - Provide frequent contacts to provide refocusing, direction and reassurance as needed  Approach patient calmly with eye contact and at their level    - [place] high risk fall precautions, aspiration precautions and other safety measures, as indicated  - If delirium suspected, notify physician/AP of change in condition and request immediate in-person evaluation  - Pursue consults as appropriate including Geriatric (campus dependent), OT for cognitive evaluation/activity planning, psychiatric, pastoral care, etc   Outcome: Progressing     Problem: CARDIOVASCULAR - ADULT  Goal: Maintains optimal cardiac output and hemodynamic stability  Description  INTERVENTIONS:  - Monitor I/O, vital signs and rhythm  - Monitor for S/S and trends of decreased cardiac output i e  bleeding, hypotension  - Administer and titrate ordered vasoactive medications to optimize hemodynamic stability  - Assess quality of pulses, skin color and temperature  - Assess for signs of decreased coronary artery perfusion - ex   Angina  - Instruct patient to report change in severity of symptoms  Outcome: Progressing     Problem: CARDIOVASCULAR - ADULT  Goal: Absence of cardiac dysrhythmias or at baseline rhythm  Description  INTERVENTIONS:  - Continuous cardiac monitoring, monitor vital signs, obtain 12 lead EKG if indicated  - Administer antiarrhythmic and heart rate control medications as ordered  - Monitor electrolytes and administer replacement therapy as ordered  Outcome: Progressing     Problem: RESPIRATORY - ADULT  Goal: Achieves optimal ventilation and oxygenation  Description  INTERVENTIONS:  - Assess for changes in respiratory status  - Assess for changes in mentation and behavior  - Position to facilitate oxygenation and minimize respiratory effort  - Oxygen administration by appropriate delivery method based on oxygen saturation (per order) or ABGs  - Initiate smoking cessation education as indicated  - Encourage broncho-pulmonary hygiene including cough, deep breathe, Incentive Spirometry  - Assess the need for suctioning and aspirate as needed  - Assess and instruct to report SOB or any respiratory difficulty  - Respiratory Therapy support as indicated  Outcome: Progressing     Problem: GENITOURINARY - ADULT  Goal: Urinary catheter remains patent  Description  INTERVENTIONS:  - Assess patency of urinary catheter  - If patient has a chronic ramos, consider changing catheter if non-functioning  - Follow guidelines for intermittent irrigation of non-functioning urinary catheter  Outcome: Progressing     Problem: SKIN/TISSUE INTEGRITY - ADULT  Goal: Incision(s), wounds(s) or drain site(s) healing without S/S of infection  Description  INTERVENTIONS  - Assess and document risk factors for skin impairment   - Assess and document dressing, incision, wound bed, drain sites and surrounding tissue  - Initiate Nutrition services consult and/or wound management as needed  Outcome: Progressing     Problem: HEMATOLOGIC - ADULT  Goal: Maintains hematologic stability  Description  INTERVENTIONS  - Assess for signs and symptoms of bleeding or hemorrhage  - Monitor labs  - Administer supportive blood products/factors as ordered and appropriate  Outcome: Progressing

## 2019-07-27 NOTE — NURSING NOTE
Dr Nobles and Dr Gustabo Muñoz made aware of access issues and [facility] currently in recirculation mode/ not running secondary to decreased access pressures leading to inability to run

## 2019-07-28 PROBLEM — D69.6 THROMBOCYTOPENIA (HCC): Status: ACTIVE | Noted: 2019-01-01

## 2019-07-28 NOTE — PROGRESS NOTES
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  On 07/25/2019 was noted to have pneumoperitoneum and was again taken to the OR emergently and is status post reopen laparotomy with bowel resection and VAC placement, subsequently placed on multiple pressors and intubated  Nephrology has been following for acute kidney injury     1)Acute kidney injury (POA) on CKD stage 3:  - JELLY most likely secondary to initially due to prerenal azotemia and urinary retention and now with sepsis plus failure to auto regulate in the presence of hemodynamic perturbations in this elderly gentleman with multiple comorbidities  - After review of records it appears that the patient has a baseline Creatinine of 1 3-1 7mg/dL  - patient's creatinine today is at 1 48mg/dL  - Avoid nephrotoxins, adjust meds to appropriate GFR   - Acid base and lytes stable except anion gap metabolic acidosis, mild hypernatremia and hyperchloremia  - clinically patient appears to be hypervolemic  -  all risks and benefits of the procedure as well as outcomes with dialysis specially CVVH was discussed with patient's spouse in depth all questions and concerns were answered    Consent for dialysis was obtained and placed in the chart on 07/26/2019   - started on CVVH on 07/26/2019   - when I had a discussion about dialysis with the patient's spouse for consent she reported that patient never wanted to be on machines however was agreeable to do a time-limited trial of 5-7 days of dialysis to see if patient recovers and if not then she will make further decisions at that time   -continue on dialysate flow rate to 2500 today  - continue on 4K bath  - will run net even for now since there is increasing urine output while on CVVH  - if postoperatively patient with worsening volume status then may increase fluid removal   - Optimize hemodynamic status to avoid delay in renal recovery  - continue checking BMP, magnesium, phosphorus as per CVVH protocol   - Await renal recovery  - Place on a renal diet when allowed diet order    - Strict I/O      2)Blood pressure:  - Optimize hemodynamics   - Maintain MAP > 65mmHg  - Avoid BP fluctuations  - on dobutamine only     3)H/H:  - most recent hemoglobin at  7 6 grams/deciliter  - maintain hemoglobin greater than 8 grams/deciliter  - Management as per primary team   - recommend PRBC     4)Volume status:  - Clinically patient appears to be minimally hypervolemic  -  will run even for now since patient is starting to make urine however if urine output drops and patient gets significant volume in then will start UF at net -20 per hour as long as     5) Hypernatremia:  -  to be corrected with dialysis  - Continue to monitor for now map greater than 65  - improved     6) metabolic acidosis:  -  to be corrected with dialysis  - improved     7)CKD:  - 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 plus age-related nephron loss  - Patient will need follow up for CKD post hospitalization   - Please call the office to arrange for outpatient follow up appointment       8)DM:  - management as per Primary team  - on insulin     9) leukocytosis/urosepsis:  - Management as per primary team  - urine culture gram-negative rods  - antibiotics per primary team  - on Zosyn  - most recent WBC of 18 89     10) ileus / pneumoperitoneum:  - management as per primary team  - follow up with Colorectal surgery  - going to the OR today for re-exploration, possible closure of abdomen versus VAC change     Thanks for the consult  Will continue to follow  Please call with questions/ concerns  Plan was discussed with the team in [address] [facility], 9:24 AM              Objective :   Patient seen and examined while on CVVH at 7:00 a m  Today hemodynamically stable and improved from yesterday remains afebrile urine output documented close to 375 cc in last 24 hours  Is off of all pressors just remains on dobutamine  Also on TPN, noted to have 1st episodes of formed stool overnight  Plan is to go to the OR later this morning  UF has been running at at net even for now  Increasing urine output  Needed 1 filter changed overnight due to clotting  Status post 1 unit PRBC yesterday  PHYSICAL EXAM  /65   Pulse 86   Temp (!) 96 4 °F (35 8 °C)   Resp 15   Ht 5' 6" (1 676 m)   Wt 117 kg (258 lb 13 1 oz)   SpO2 99%   BMI 41 77 kg/m²   Temp (24hrs), Av 3 °F (36 3 °C), Min:96 4 °F (35 8 °C), Max:97 9 °F (36 6 °C)        Intake/Output Summary (Last 24 hours) at 2019 0924  Last data filed at 2019 0801  Gross per 24 hour   Intake 4155 ml   Output 3751 ml   Net 404 ml       I/O last 24 hours: In: 2222 [I V :1766; Blood:350; IV Piggyback:1705; TPN:557]  Out: 4249 [Urine:405; Emesis/NG output:350; Drains:900; MYBKN:5227; Stool:100]      Current Weight: Weight - Scale: 117 kg (258 lb 13 1 oz)  First Weight: Weight - Scale: 98 7 kg (217 lb 9 5 oz)  Physical Exam   Constitutional: He appears well-developed and well-nourished  No distress  Obese male   HENT:   Head: Normocephalic and atraumatic  Intubated right IJ temporary dialysis line   Eyes: No scleral icterus  Neck: Neck supple  Cardiovascular: Normal heart sounds  Exam reveals no friction rub  Pulmonary/Chest: He has no wheezes   He has no rales  Abdominal: Soft  Positive wound VAC positive colostomy   Musculoskeletal: He exhibits edema  Plus two edema bilateral lower extremity, +1 edema bilateral upper extremity   Neurological: He is alert  Skin: Skin is warm  He is not diaphoretic  No pallor  Nursing note and vitals reviewed            Review of Systems   Unable to perform ROS: Intubated       Scheduled Meds:    Current Facility-Administered Medications:  Adult TPN (CUSTOM BASE/CUSTOM ELECTROLYTE)  Intravenous Continuous Natacha Herrera PA-C    Adult TPN (STANDARD BASE/STANDARD ELECTROLYTE)  Intravenous Continuous Emely Bhagat MD Last Rate: 48 6 mL/hr at 07/27/19 2029   ascorbic acid in sodium chloride 0 9% 100 mL IVPB 1,500 mg Intravenous Q6H Gorge Opal Last Rate: Stopped (07/28/19 0801)   calcium gluconate 1 G  100 mL IVPB 1 g Intravenous Once Chetna Henry MD Last Rate: 1 g (07/28/19 0812)   chlorhexidine 15 mL Swish & Spit Q12H [address], MD    DOBUTamine in dextrose 5% 5 mcg/kg/min Intravenous Continuous Patricia Evangelista Petit-Me Last Rate: 2 5 mcg/kg/min (07/28/19 0725)   epinephrine 1-10 mcg/min Intravenous Titrated Gorge Opal Last Rate: Stopped (07/27/19 0706)   fentaNYL 100 mcg/hr Intravenous Continuous Emmanuelle Alvarenga PA-C Last Rate: 100 mcg/hr (07/28/19 0904)   fentanyl citrate (PF) 25 mcg Intravenous Q2H PRN Chetna Henry MD    heparin (porcine) 5,000 Units Subcutaneous Q8H [address] [place]    hydrocortisone sodium succinate 50 mg Intravenous Q6H [place]    HYDROmorphone 0 5 mg Intravenous Q2H PRN Moe Tarango [place]    insulin lispro 1-6 Units Subcutaneous Q6H [place]    naloxone 0 04 mg Intravenous Q3 min PRN Moe Tarango [place]    norepinephrine 1-30 mcg/min Intravenous Titrated Gorge Opal Last Rate: Stopped (07/28/19 0140)   NxStage K 4/Ca 3 20,000 mL Dialysis Continuous Tala Espino MD    ondansetron 4 mg Intravenous Q4H PRN Moe Ornelaso    pantoprazole 40 mg Intravenous Q24H [address] [place]    piperacillin-tazobactam 3 375 g Intravenous Q6H Ryan Ag PA-C Last Rate: Stopped (07/28/19 0600)   thiamine 200 mg Intravenous Q12H Graylon Epp Last Rate: Stopped (07/28/19 0219)   vasopressin (PITRESSIN) in 0 9 % sodium chloride 100 mL 0 04 Units/min Intravenous Continuous Graylon Epp Last Rate: Stopped (07/28/19 0200)       PRN Meds: fentanyl citrate (PF)    HYDROmorphone    naloxone    ondansetron    Continuous Infusions:    Adult TPN (CUSTOM BASE/CUSTOM ELECTROLYTE)     Adult TPN (STANDARD BASE/STANDARD ELECTROLYTE)  Last Rate: 48 6 mL/hr at 07/27/19 2029   DOBUTamine in dextrose 5% 5 mcg/kg/min Last Rate: 2 5 mcg/kg/min (07/28/19 0725)   epinephrine 1-10 mcg/min Last Rate: Stopped (07/27/19 0706)   fentaNYL 100 mcg/hr Last Rate: 100 mcg/hr (07/28/19 0904)   norepinephrine 1-30 mcg/min Last Rate: Stopped (07/28/19 0140)   NxStage K 4/Ca 3 20,000 mL    vasopressin (PITRESSIN) in 0 9 % sodium chloride 100 mL 0 04 Units/min Last Rate: Stopped (07/28/19 0200)         Invasive Devices: Invasive Devices     Central Venous Catheter Line            CVC Central Lines 07/26/19 Triple 20cm Left Subclavian 1 day          Peripheral Intravenous Line            Peripheral IV 07/25/19 Left Antecubital 3 days    Peripheral IV 07/25/19 Right Forearm 2 days          Arterial Line            Arterial Line 07/26/19 Radial 2 days          Line            Temporary HD Catheter 1 day          Drain            Colostomy LLQ 13 days    Urethral Catheter Non-latex 18 Fr  7 days    NG/OG/Enteral Tube 3 days    NG/OG Tube Orogastric Center mouth 2 days    Negative Pressure Wound Therapy (V A C ) Abdomen Anterior;Mid 2 days          Airway            ETT  Cuffed; Hi-Lo 8 mm 2 days                  LABORATORY:    Results from last 7 days   Lab Units 07/28/19  0540 07/28/19  0045 07/27/19  1728 07/27/19  1642 07/27/19  1151 07/27/19  0409 07/26/19  2155 07/26/19  1848 07/26/19  2945 07/26/19  1216  07/26/19  1129 07/26/19  0444 07/26/19  0248 07/26/19  0124 07/25/19  2134 07/25/19  2118   WBC Thousand/uL 18 89*  --   --   --   --  23 34* 22 43*  --   --  19 23*  --   --  12 89* 9 00  --  4 25*  --    HEMOGLOBIN g/dL 7 6*  --   --  7 0*  --  7 9* 8 0* 7 8*  --  8 3*  --   --  8 2* 8 9*  --  9 2*  --    I STAT HEMOGLOBIN g/dl  --   --   --   --   --   --   --   --   --   --   --  8 5*  --   --  8 8*  --  8 5*   HEMATOCRIT % 24 7*  --   --  24 1*  --  26 4* 26 5* 26 0*  --  27 3*  --   --  27 2* 28 7*  --  30 9*  --    HEMATOCRIT, ISTAT %  --   --   --   --   --   --   --   --   --   --   --  25*  --   --  26*  --  25*   PLATELETS Thousands/uL 102*  --   --   --   --  163 183  --   --  273  --   --  250 255  --  215  --    POTASSIUM mmol/L 4 0 4 3 4 1  --  4 2 4 4 4 5  --  4 4  --    < >  --  4 3 3 7  --  3 8  --    CHLORIDE mmol/L 111* 112* 111*  --  112* 112* 113*  --  114*  --    < >  --  114* 114*  --  109*  --    CO2 mmol/L 21 16* 15*  --  16* 14* 13*  --  13*  --    < >  --  20* 21  --  24  --    CO2, I-STAT mmol/L  --   --   --   --   --   --   --   --   --   --   --  16*  --   --  20*  --  21   BUN mg/dL 35* 32* 40*  --  40* 40* 49*  --  55*  --    < >  --  60* 63*  --  64*  --    CREATININE mg/dL 1 48* 1 57* 1 59*  --  1 69* 1 89* 2 22*  --  2 45*  --    < >  --  2 61* 2 65*  --  2 62*  --    CALCIUM mg/dL 8 1* 7 9* 7 9*  --  7 7* 8 2* 7 8*  --  7 4*  --    < >  --  7 5* 7 2*  --  8 1*  --    MAGNESIUM mg/dL 2 4 1 9 2 1  --  1 9 2 0 1 9  --  2 1  --    < >  --  2 3 1 8  --  1 9  --    PHOSPHORUS mg/dL 3 7 4 3* 4 3*  --  4 2* 5 1* 5 2*  --  5 0*  --    < >  --   --   --   --  4 8*  --    GLUCOSE, ISTAT mg/dl  --   --   --   --   --   --   --   --   --   --   --  172*  --   --  147*  --  162*    < > = values in this interval not displayed  rest all reviewed    RADIOLOGY:  XR chest 1 view   Final Result by Jose Stovall MD (07/26 0450)      1    No evidence of pneumothorax following placement of right IJ hemodialysis catheter  Workstation performed: [id number]         XR chest 1 view   Final Result by Faiza Green MD (07/26 1347)      Left subclavian catheter tip in the cavoatrial junction region  No pneumothorax  Persistent bibasilar opacities and pleural effusions  Workstation performed: [id number]         XR chest portable ICU   Final Result by Alyssa Dallas MD (07/26 1370)      Large volume of pneumoperitoneum  Patient has already been taken to the OR  Bibasilar atelectasis  Workstation performed: [id number]         XR chest portable   Final Result by Janene Zepeda MD (07/25 1401)      Bibasilar consolidations most consistent with atelectasis  Multiple old right rib fractures  Workstation performed: [id number]         CT abdomen pelvis wo contrast   Final Result by Argenis Espinoza MD (07/23 1619)         1  Postsurgical changes of repair of parastomal hernia with small bowel resection and anastomosis  There are fluid filled dilated small and large bowel loops throughout the abdomen with only decompressed colon seen meeting to the stoma  Findings    likely represent postoperative ileus  This can be followed with serial abdominal radiographs if clinically indicated  2   Increased air within right renal collecting system and ureter as well as new foci of air that appear to be within the bladder wall  As previously reported, 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  3   Bibasilar atelectasis and small pleural effusions  The study was marked in [facility] for immediate notification  Workstation performed: [id number]         XR abdomen 1 vw portable   Final Result by Mason Goyal MD (07/22 2682)      1  The sidehole of the enteric tube projects at the gastroesophageal junction  It should be advanced if gastric positioning is desired        2   Incompletely imaged gaseous distention of small bowel and colon, likely postoperative ileus  3   Bilateral lower lobe consolidations, likely atelectasis  Workstation performed: [id number]         US kidney and bladder   Final Result by Regan Post DO (07/19 1615)   Bilateral cortical scarring and cortical lobulation  Bilateral mild-moderate hydronephrosis, new from prior exam    Normal arterial resistive indices -this does not suggest a significant obstruction  Incompletely distended urinary bladder which is otherwise unremarkable  Neither ureteral jet detected  *Because the patient has a history of a neurogenic bladder bladder size may be distended for this patient  Although hydronephrosis can be seen in the setting of ascending UTI, Consider computed tomography assess for an obstructing lesion  Alternatively consider nuclear medicine renal flow and function study with Lasix intervention  I personally discussed this study with Dr Wero Mcbride on 7/19/2019 at 4:13 PM                Workstation performed: [id number]           Rest all reviewed    Portions of the record may have been created with voice recognition software  Occasional wrong word or "sound a like" substitutions may have occurred due to the inherent limitations of voice recognition software  Read the chart carefully and recognize, using context, where substitutions have occurred  If you have any questions, please contact the dictating provider

## 2019-07-28 NOTE — ANESTHESIA POSTPROCEDURE EVALUATION
Post-Op Assessment Note    CV Status:  Stable  Pain Score: 0    Pain management: adequate     Mental Status:  Somnolent (IV sedation)   PONV Controlled:  Controlled   Airway Patency:  Patent  Airway: intubated   Post Op Vitals Reviewed: Yes      Staff: CRNA   Comments: to ICU with ETT in place          BP      Temp     Pulse     Resp      SpO2

## 2019-07-28 NOTE — PROGRESS NOTES
Progress Note - Critical Care   Jud Shine 77 y o  male MRN: [id number]  Unit/Bed#: MICU 02 Encounter: [id number]    Assessment:   Principal Problem:    Parastomal hernia  Active Problems:    Malignant neoplasm of colon ([facility] )    Acute kidney injury ([facility] [address] )    Chronic kidney disease    Hypertension    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    Septic shock (HCC)    Leukocytosis    Oliguria    UTI (urinary tract infection)    Thrombocytopenia ([facility] [address] )        Plan: Critical Care Management as outlined below:     Neuro:  Wakes to voice  Falls asleep quickly  RASS -1  No focal deficits  Follows commands when awake  Fentanyl infusion increased to 100 last night secondary to agitation  Currently appears comfortable  Received fentanyl 25 mcg x2 overnight  Continue frequent neurologic exams, daily Chem ICU, delirium precautions  Maintain adequate sleep wake cycle  CV:  Septic and cardiogenic shock improved  Levophed weaned to off over night  Vasopressin off overnight  Epinephrine has remained off for 24 hours  Remains on dobutamine at 5  Blood pressure adequate  No further bradycardia  Anion gap metabolic acidosis resolved  Attempt to wean off dopamine postoperatively today  Lung:  Acute respiratory failure with hypoxia  Has done well on [facility] ventilation 14/550/60/8  Changed to pressure support 15/8, 60% this morning and doing well  Continue to wean ventilator as tolerated  Daily spontaneous breathing trial   Continue aggressive pulmonary toilet and frequent suctioning  GI:  Parastomal hernia status post small bowel resection complicated by anastomotic leak now status post small bowel resection left in discontinuity with temporary abdominal closure  Abdominal exam benign  Abdominal distension somewhat improved  Colostomy viable  No stool output  Plan for second-look laparotomy today with possible reanastomosis and possible closure  Continue NPO with TPN    NG tube to suction  FEN:  Intake and output nearly even on CVVH  BUN and creatinine improved     Continue TPN for nutritional support  Begin tube feeds when feasible  :  Acute renal failure with oliguria  Urine output appears to be improving on CVVH  Currently with 4K bath at 2 5 L  Continue to run even for now, will reassess postoperatively  Appreciate Nephrology input  Continue to monitor urine output and electrolytes closely  ID:  Afebrile  Leukocytosis improved to 18,900  No bandemia  1/2 blood cultures from 7/26 with gram-positive cocci in pairs and chains  Urine culture from 07/24 with E coli  Continue Zosyn for now  Monitor blood cultures  Heme:  Acute blood loss anemia with hemoglobin stable at 7 6  Thrombocytopenia 102,000, continue to monitor  Continue subcutaneous heparin and SCDs for DVT prophylaxis  Endo:  History of type 2 diabetes  Blood glucose remains in the 200s  Reassess blood glucose postoperatively and consider increase sliding scale algorithm  Msk/Skin:  No skin breakdown  Continue frequent repositioning and offloading  Disposition:  Continue ICU management     ______________________________________________________________________    Chief Complaint:  None given  HPI/24hr events:  CVVH initiated yesterday  Pressor requirements decreased significantly, now on dobutamine only  Urine output appears to be improving     ______________________________________________________________________    Physical Exam:   Physical Exam   Constitutional: Vital signs are normal  He appears well-developed  He appears listless  He is sleeping  He is easily aroused  Non-toxic appearance  He has a sickly appearance  He appears ill  No distress  He is sedated, intubated and restrained  Eyes: Pupils are equal, round, and reactive to light  Conjunctivae are normal    Cardiovascular: Normal rate, regular rhythm, intact distal pulses and normal pulses     Pulmonary/Chest: Effort normal  No stridor  He is intubated  He has decreased breath sounds in the right lower field and the left lower field  He has no wheezes  He has rhonchi in the right upper field, the right middle field, the right lower field, the left upper field, the left middle field and the left lower field  He has no rales  Abdominal: Soft  He exhibits distension  There is generalized tenderness  There is no rigidity and no guarding  Neurological: He is easily aroused  He has normal strength  He appears listless  GCS eye subscore is 3  GCS verbal subscore is 1  GCS motor subscore is 6  Skin: Skin is warm, dry and intact  Capillary refill takes less than 2 seconds  2+ anasarca             ______________________________________________________________________  Vitals:    19 0330 19 0345 19 0400 19 0547   BP:       Pulse: 86  80    Resp: 18  18    Temp:   97 8 °F (36 6 °C)    TempSrc:   Oral    SpO2: 100% 99% 100%    Weight:    117 kg (258 lb 13 1 oz)   Height:         Arterial Line BP: 122/52  Arterial Line MAP (mmHg): 72 mmHg     Temperature:   Temp (24hrs), Av 3 °F (36 3 °C), Min:96 5 °F (35 8 °C), Max:97 9 °F (36 6 °C)    Current Temperature: 97 8 °F (36 6 °C)  Weights:   IBW: 63 8 kg    Body mass index is 41 77 kg/m²    Weight (last 2 days)     Date/Time   Weight    19 0547   117 (258 82)    19 1007   113 (248 68)            Hemodynamic Monitoring:  N/A  CO:  [5 5 L/min-9 5 L/min] 5 5 L/min  CI:  [2 7 L/min/m2-4 1 L/min/m2] 2 7 L/min/m2  Non-Invasive/Invasive Ventilation Settings:  Respiratory    Lab Data (Last 4 hours)    None         O2/Vent Data (Last 4 hours)       0345           Vent Mode AC/VC       Resp Rate (BPM) (BPM) 14       Vt (mL) (mL) 550       FIO2 (%) (%) 60       PEEP (cmH2O) (cmH2O) 8       MV 9 67                 No results found for: PHART, JYO1PWG, PO2ART, BPC5XIK, T9DEFODN, BEART, SOURCE  SpO2: SpO2: 100 %, SpO2 Activity: SpO2 Activity: At Rest, SpO2 Device: O2 Device: Other (comment)(intubated/vented)  Intake and Outputs:  I/O       07/26 0701 - 07/27 0700 07/27 0701 - 07/28 0700    I V  (mL/kg) 5039 4 (51 1) 1702 (14 5)    Blood  350    IV Piggyback 1330 7 1422    TPN  420    Total Intake(mL/kg) 6370 1 (64 5) 3894 (33 3)    Urine (mL/kg/hr) 360 (0 2) 350 (0 1)    Emesis/NG output 200 250    Drains 1100 850    Other 3309 1901    Stool 0 0    Total Output 4969 3351    Net +1401 1 +543          Unmeasured Stool Occurrence 0 x         UOP:  10-20 mL/hour   Nutrition:        Diet Orders   (From admission, onward)             Start     Ordered    07/27/19 1840  Diet NPO  Diet effective now     Question Answer Comment   Diet Type NPO    RD to adjust diet per protocol?  Yes        07/27/19 1840                Labs:   Results from last 7 days   Lab Units 07/28/19  0540 07/27/19  1642 07/27/19  0409 07/26/19  2155   WBC Thousand/uL 18 89*  --  23 34* 22 43*   HEMOGLOBIN g/dL 7 6* 7 0* 7 9* 8 0*   HEMATOCRIT % 24 7* 24 1* 26 4* 26 5*   PLATELETS Thousands/uL 102*  --  163 183   NEUTROS PCT % 90*  --  90* 90*   MONOS PCT % 4  --  5 5    Results from last 7 days   Lab Units 07/28/19  0045 07/27/19  1728 07/27/19  1151 07/27/19  0409  07/26/19  1129  07/26/19  0124  07/25/19  2118   POTASSIUM mmol/L 4 3 4 1 4 2 4 4   < >  --    < >  --    < >  --    CHLORIDE mmol/L 112* 111* 112* 112*   < >  --    < >  --    < >  --    CO2 mmol/L 16* 15* 16* 14*   < >  --    < >  --    < >  --    CO2, I-STAT mmol/L  --   --   --   --   --  16*  --  20*  --  21   BUN mg/dL 32* 40* 40* 40*   < >  --    < >  --    < >  --    CREATININE mg/dL 1 57* 1 59* 1 69* 1 89*   < >  --    < >  --    < >  --    CALCIUM mg/dL 7 9* 7 9* 7 7* 8 2*   < >  --    < >  --    < >  --    ALK PHOS U/L  --   --   --  66  --   --   --   --   --   --    ALT U/L  --   --   --  <6*  --   --   --   --   --   --    AST U/L  --   --   --  24  --   --   --   --   --   --    GLUCOSE, ISTAT mg/dl  --   --   --   --   -- 172*  --  147*  --  162*    < > = values in this interval not displayed  Results from last 7 days   Lab Units 07/28/19  0045 07/27/19  1728 07/27/19  1151   MAGNESIUM mg/dL 1 9 2 1 1 9     Results from last 7 days   Lab Units 07/28/19  0045 07/27/19  1728 07/27/19  1151   PHOSPHORUS mg/dL 4 3* 4 3* 4 2*      Results from last 7 days   Lab Units 07/27/19  0409   INR  1 69*     Results from last 7 days   Lab Units 07/27/19  0959   LACTIC ACID mmol/L 1 0     0   Lab Value Date/Time    TROPONINI 0 02 07/26/2019 1210    TROPONINI 0 02 07/26/2019 1000    TROPONINI 0 03 07/26/2019 0712    TROPONINI 0 03 07/25/2019 1144    TROPONINI 0 02 07/25/2019 0841     Imaging:  None today  I have personally reviewed pertinent reports  and I have personally reviewed pertinent films in PACS    Micro:  Lab Results   Component Value Date    BLOODCX No Growth at 24 hrs  07/26/2019    BLOODCX No Growth After 5 Days  07/14/2019    BLOODCX No Growth After 5 Days  07/14/2019    URINECX >100,000 cfu/ml Escherichia coli (A) 07/24/2019    URINECX 40,000-49,000 cfu/ml Enterococcus faecalis (A) 07/24/2019    URINECX No Growth <1000 cfu/mL 11/04/2016     Allergies:    Allergies   Allergen Reactions    Atorvastatin Myalgia    Lyrica [Pregabalin]      Medications:   Scheduled Meds:  Current Facility-Administered Medications:  Adult TPN (STANDARD BASE/STANDARD ELECTROLYTE)  Intravenous Continuous Gordy MD Oly Last Rate: 48 6 mL/hr at 07/27/19 2029   ascorbic acid in sodium chloride 0 9% 100 mL IVPB 1,500 mg Intravenous Q6H Perrin Shavon Last Rate: Stopped (07/28/19 0200)   dexmedetomidine 0 1-0 7 mcg/kg/hr Intravenous Titrated April Monas Petit-Me Last Rate: Stopped (07/26/19 1615)   DOBUTamine in dextrose 5% 5 mcg/kg/min Intravenous Continuous April Monas Petit-Me Last Rate: 5 mcg/kg/min (07/27/19 2128)   epinephrine 1-10 mcg/min Intravenous Titrated Perrin Shavon Last Rate: Stopped (07/27/19 0706)   fentaNYL 100 mcg/hr Intravenous Continuous Samanta Stone PA-C Last Rate: 100 mcg/hr (07/28/19 0000)   fentanyl citrate (PF) 25 mcg Intravenous Q2H PRN Vidya Sparks MD    heparin (porcine) 5,000 Units Subcutaneous Q8H [address] [place]    hydrocortisone sodium succinate 50 mg Intravenous Q6H Socorro Ayala    HYDROmorphone 0 5 mg Intravenous Q2H PRN Scott  [place]ala    insulin lispro 1-6 Units Subcutaneous Q6H [place]    naloxone 0 04 mg Intravenous Q3 min PRN Scott  [place]    norepinephrine 1-30 mcg/min Intravenous Titrated Anca Kin Last Rate: Stopped (07/28/19 0140)   NxStage K 4/Ca 3 20,000 mL Dialysis Continuous Juleen Siemens, MD    ondansetron 4 mg Intravenous Q4H PRN Scott  [place]    pantoprazole 40 mg Intravenous Q24H [facility] [place]    piperacillin-tazobactam 3 375 g Intravenous Q6H Za Ag PA-C Last Rate: 3 375 g (07/28/19 0514)   thiamine 200 mg Intravenous Q12H Anca Kin Last Rate: Stopped (07/28/19 0219)   vasopressin (PITRESSIN) in 0 9 % sodium chloride 100 mL 0 04 Units/min Intravenous Continuous Anca Kin Last Rate: Stopped (07/28/19 0200)     Continuous Infusions:  Adult TPN (STANDARD BASE/STANDARD ELECTROLYTE)  Last Rate: 48 6 mL/hr at 07/27/19 2029   dexmedetomidine 0 1-0 7 mcg/kg/hr Last Rate: Stopped (07/26/19 1615)   DOBUTamine in dextrose 5% 5 mcg/kg/min Last Rate: 5 mcg/kg/min (07/27/19 2128)   epinephrine 1-10 mcg/min Last Rate: Stopped (07/27/19 0706)   fentaNYL 100 mcg/hr Last Rate: 100 mcg/hr (07/28/19 0000)   norepinephrine 1-30 mcg/min Last Rate: Stopped (07/28/19 0140)   NxStage K 4/Ca 3 20,000 mL    vasopressin (PITRESSIN) in 0 9 % sodium chloride 100 mL 0 04 Units/min Last Rate: Stopped (07/28/19 0200)     PRN Meds:    fentanyl citrate (PF) 25 mcg Q2H PRN   HYDROmorphone 0 5 mg Q2H PRN   naloxone 0 04 mg Q3 min PRN   ondansetron 4 mg Q4H PRN     VTE Pharmacologic Prophylaxis: Heparin  VTE Mechanical Prophylaxis: sequential compression device  Invasive lines and devices: Invasive Devices     Central Venous Catheter Line            CVC Central Lines 07/26/19 Triple 20cm Left Subclavian 1 day          Peripheral Intravenous Line            Peripheral IV 07/25/19 Left Antecubital 2 days    Peripheral IV 07/25/19 Right Forearm 2 days          Arterial Line            Arterial Line 07/26/19 Radial 2 days          Line            Temporary HD Catheter 1 day          Drain            Colostomy LLQ 13 days    Urethral Catheter Non-latex 18 Fr  7 days    NG/OG/Enteral Tube 3 days    NG/OG Tube Orogastric Center mouth 2 days    Negative Pressure Wound Therapy (V A C ) Abdomen Anterior;Mid 2 days          Airway            ETT  Cuffed; Hi-Lo 8 mm 2 days                   Counseling / Coordination of Care  Total Critical Care time spent Forty-two minutes excluding procedures, teaching and family updates  Code Status: Level 2 - DNAR: but accepts endotracheal intubation    Portions of the record may have been created with voice recognition software  Occasional wrong word or "sound a like" substitutions may have occurred due to the inherent limitations of voice recognition software  Read the chart carefully and recognize, using context, where substitutions have occurred      Pierre Frankel PA-C

## 2019-07-28 NOTE — RESPIRATORY THERAPY NOTE
RT Ventilator Management Note  Patricia Pablo 77 y o  male MRN: [id number]  [id number]      Daily Screen       7/26/2019  1645 7/27/2019  0838          Patient safety screen outcome[de-identified]  Failed  Failed      Not Ready for Weaning due to[de-identified]  Underline problem not resolved  Alternative forms of ventilation              Physical Exam:   Assessment Type: Assess only  General Appearance: Sedated  Respiratory Pattern: Assisted  Chest Assessment: Chest expansion symmetrical      Resp Comments: Pt  on CPAP/PS   BS clear no rx needed at this time

## 2019-07-28 NOTE — PROGRESS NOTES
Progress Note - Crystal Latham 1940, 77 y o  male MRN: [id number]    [id number]    Primary Care Provider: Aman Otero DO   Date and time admitted to hospital: 7/15/2019  2:17 AM        * Parastomal hernia  Assessment & Plan  S/p exploratory laparotomy, lysis of adhesions, small bowel resection, primary repair of parastomal hernia, colonoscopy 7/15    7/23 CT abd pelvis wo con: Fluid filled dilated SB and LB loops throughout abdomen  Decompressed colon meeting to the stoma  Likely postop ileus  7/24 NGT clamped for 4 hours with no output, subsequently removed  7/25 Transferred to MICU for worsening respiratory status  Emergently intubated  R IJ and R radial sandy placed  CXR w free air  Emergent OR for exploration, bowel resection  7/26 Ex lap w bowel resection, abdominal washout, wound vac application    Plan  NPO/NGT  Continue TPN today  Wean vent as able  Continue to wean pressors  Continue Zosyn  CVVH per nephrology  Stress dose steroids per ICU  Maintain abthera [address] for take back to OR today for re-exploration, possible closure of abdomen vs VAC change  ICU level of care - patient overall improving but remains critically ill                Subjective/Objective   Chief Complaint:     Subjective: No acute events overnight  His pressor requirements have greatly improved over the past 24 hrs  He required transfusion of 1U PRBC yesterday  He is tolerating pressure support ventilation on rounds this morning    Objective:     Blood pressure 117/61, pulse 84, temperature (!) 96 4 °F (35 8 °C), resp  rate 17, height 5' 6" (1 676 m), weight 117 kg (258 lb 13 1 oz), SpO2 98 %  ,Body mass index is 41 77 kg/m²        Intake/Output Summary (Last 24 hours) at 7/28/2019 0745  Last data filed at 7/28/2019 0700  Gross per 24 hour   Intake 4167 ml   Output 3907 ml   Net 260 ml       Invasive Devices     Central Venous Catheter Line            CVC Central Lines 07/26/19 Triple 20cm Left Subclavian 1 day          Peripheral Intravenous Line            Peripheral IV 07/25/19 Left Antecubital 3 days    Peripheral IV 07/25/19 Right Forearm 2 days          Arterial Line            Arterial Line 07/26/19 Radial 2 days          Line            Temporary HD Catheter 1 day          Drain            Colostomy LLQ 13 days    Urethral Catheter Non-latex 18 Fr  7 days    NG/OG/Enteral Tube 3 days    NG/OG Tube Orogastric Center mouth 2 days    Negative Pressure Wound Therapy (V A C ) Abdomen Anterior;Mid 2 days          Airway            ETT  Cuffed; Hi-Lo 8 mm 2 days                Physical Exam:   NAD  RRR  nonlabored respirations on vent  Abdomen soft, mildly distended  Ostomy pink with stool and air in ostomy appliance   Darlis Locker in place with good seal and serosang drainage    Lab, Imaging and other studies:  CBC:   Lab Results   Component Value Date    WBC 18 89 (H) 07/28/2019    HGB 7 6 (L) 07/28/2019    HCT 24 7 (L) 07/28/2019    MCV 93 07/28/2019     (L) 07/28/2019    MCH 28 7 07/28/2019    MCHC 30 8 (L) 07/28/2019    RDW 14 7 07/28/2019    MPV 10 4 07/28/2019    NRBC 0 07/28/2019   , CMP:   Lab Results   Component Value Date    SODIUM 143 07/28/2019    K 4 0 07/28/2019     (H) 07/28/2019    CO2 21 07/28/2019    BUN 35 (H) 07/28/2019    CREATININE 1 48 (H) 07/28/2019    CALCIUM 8 1 (L) 07/28/2019    EGFR 44 07/28/2019   , Coagulation: No results found for: PT, INR, APTT, Urinalysis: No results found for: COLORU, CLARITYU, SPECGRAV, PHUR, LEUKOCYTESUR, NITRITE, PROTEINUA, GLUCOSEU, KETONESU, BILIRUBINUR, BLOODU, Amylase: No results found for: AMYLASE, Lipase: No results found for: LIPASE  VTE Pharmacologic Prophylaxis: Sequential compression device (Venodyne)   VTE Mechanical Prophylaxis: sequential compression device

## 2019-07-28 NOTE — RESPIRATORY THERAPY NOTE
RT Ventilator Management Note  Mat Aguilar 77 y o  male MRN: [id number]  [id number]      Daily Screen       7/26/2019  1645 7/27/2019  0838          Patient safety screen outcome[de-identified]  Failed  Failed      Not Ready for Weaning due to[de-identified]  Underline problem not resolved  Alternative forms of ventilation              Physical Exam:   Assessment Type: Assess only  General Appearance: Sedated  Respiratory Pattern: Assisted  Chest Assessment: Chest expansion symmetrical  Bilateral Breath Sounds: Diminished, Coarse  Suction: ET Tube      Resp Comments: Pt is doing well on current vent settings  No changes made on vent through the night

## 2019-07-28 NOTE — PLAN OF CARE
Problem: Potential for Falls  Goal: Patient will remain free of falls  Description  INTERVENTIONS:  - Assess patient frequently for physical needs  -  Identify cognitive and physical deficits and behaviors that affect risk of falls  -  [place] fall precautions as indicated by assessment   - Educate patient/family on patient safety including physical limitations  - Instruct patient to call for assistance with activity based on assessment  - Modify environment to reduce risk of injury  - Consider OT/PT consult to assist with strengthening/mobility  Outcome: Progressing     Problem: Nutrition/Hydration-ADULT  Goal: Nutrient/Hydration intake appropriate for improving, restoring or maintaining nutritional needs  Description  Monitor and assess patient's nutrition/hydration status for malnutrition (ex- brittle hair, bruises, dry skin, pale skin and conjunctiva, muscle wasting, smooth red tongue, and disorientation)  Collaborate with interdisciplinary team and initiate plan and interventions as ordered  Monitor patient's weight and dietary intake as ordered or per policy  Utilize nutrition screening tool and intervene per policy  Determine patient's food preferences and provide high-protein, high-caloric foods as appropriate       INTERVENTIONS:  - Monitor oral intake, urinary output, labs, and treatment plans  - Assess nutrition and hydration status and recommend course of action  - Evaluate amount of meals eaten  - Assist patient with eating if necessary   - Allow adequate time for meals  - Recommend/ encourage appropriate diets, oral nutritional supplements, and vitamin/mineral supplements  - Order, calculate, and assess calorie counts as needed  - Recommend, monitor, and adjust tube feedings and TPN/PPN based on assessed needs  - Assess need for intravenous fluids  - Provide specific nutrition/hydration education as appropriate  - Include patient/family/caregiver in decisions related to nutrition  Outcome: Progressing     Problem: PAIN - ADULT  Goal: Verbalizes/displays adequate comfort level or baseline comfort level  Description  Interventions:  - Encourage patient to monitor pain and request assistance  - Assess pain using appropriate pain scale  - Administer analgesics based on type and severity of pain and evaluate response  - Implement non-pharmacological measures as appropriate and evaluate response  - Consider cultural and social influences on pain and pain management  - Notify physician/advanced practitioner if interventions unsuccessful or patient reports new pain  Outcome: Progressing     Problem: INFECTION - ADULT  Goal: Absence or prevention of progression during hospitalization  Description  INTERVENTIONS:  - Assess and monitor for signs and symptoms of infection  - Monitor lab/diagnostic results  - Monitor all insertion sites, i e  indwelling lines, tubes, and drains  - Monitor endotracheal (as able) and nasal secretions for changes in amount and color  - [place] appropriate cooling/warming therapies per order  - Administer medications as ordered  - Instruct and encourage patient and family to use good hand hygiene technique  - Identify and instruct in appropriate isolation precautions for identified infection/condition  Outcome: Progressing     Problem: SAFETY ADULT  Goal: Patient will remain free of falls  Description  INTERVENTIONS:  - Assess patient frequently for physical needs  -  Identify cognitive and physical deficits and behaviors that affect risk of falls    -  [place] fall precautions as indicated by assessment   - Educate patient/family on patient safety including physical limitations  - Instruct patient to call for assistance with activity based on assessment  - Modify environment to reduce risk of injury  - Consider OT/PT consult to assist with strengthening/mobility  Outcome: Progressing  Goal: Maintain or return to baseline ADL function  Description  INTERVENTIONS:  -  Assess patient's ability to carry out ADLs; assess patient's baseline for ADL function and identify physical deficits which impact ability to perform ADLs (bathing, care of mouth/teeth, toileting, grooming, dressing, etc )  - Assess/evaluate cause of self-care deficits   - Assess range of motion  - Assess patient's mobility; develop plan if impaired  - Assess patient's need for assistive devices and provide as appropriate  - Encourage maximum independence but intervene and supervise when necessary  ¯ Involve family in performance of ADLs  ¯ Assess for home care needs following discharge   ¯ Request OT consult to assist with ADL evaluation and planning for discharge  ¯ Provide patient education as appropriate  Outcome: Progressing  Goal: Maintain or return mobility status to optimal level  Description  INTERVENTIONS:  - Assess patient's baseline mobility status (ambulation, transfers, stairs, etc )    - Identify cognitive and physical deficits and behaviors that affect mobility  - Identify mobility aids required to assist with transfers and/or ambulation (gait belt, sit-to-stand, lift, walker, cane, etc )  - [place] fall precautions as indicated by assessment  - Record patient progress and toleration of activity level on Mobility SBAR; progress patient to next Phase/Stage  - Instruct patient to call for assistance with activity based on assessment  - Request Rehabilitation consult to assist with strengthening/weightbearing, etc   Outcome: Progressing     Problem: DISCHARGE PLANNING  Goal: Discharge to home or other facility with appropriate resources  Description  INTERVENTIONS:  - Identify barriers to discharge w/patient and caregiver  - Arrange for needed discharge resources and transportation as appropriate  - Identify discharge learning needs (meds, wound care, etc )  - Arrange for interpretive services to assist at discharge as needed  - Refer to Case Management Department for coordinating discharge planning if the patient needs post-hospital services based on physician/advanced practitioner order or complex needs related to functional status, cognitive ability, or social support system  Outcome: Progressing     Problem: Knowledge Deficit  Goal: Patient/family/caregiver demonstrates understanding of disease process, treatment plan, medications, and discharge instructions  Description  Complete learning assessment and assess knowledge base    Interventions:  - Provide teaching at level of understanding  - Provide teaching via preferred learning methods  Outcome: Progressing     Problem: Prexisting or High Potential for Compromised Skin Integrity  Goal: Skin integrity is maintained or improved  Description  INTERVENTIONS:  - Identify patients at risk for skin breakdown  - Assess and monitor skin integrity  - Assess and monitor nutrition and hydration status  - Monitor labs (i e  albumin)  - Assess for incontinence   - Turn and reposition patient  - Assist with mobility/ambulation  - Relieve pressure over bony prominences  - Avoid friction and shearing  - Provide appropriate hygiene as needed including keeping skin clean and dry  - Evaluate need for skin moisturizer/barrier cream  - Collaborate with interdisciplinary team (i e  Nutrition, Rehabilitation, etc )   - Patient/family teaching  Outcome: Progressing     Problem: GASTROINTESTINAL - ADULT  Goal: Minimal or absence of nausea and/or vomiting  Description  INTERVENTIONS:  - Administer IV fluids as ordered to ensure adequate hydration  - Maintain NPO status until nausea and vomiting are resolved  - Nasogastric tube as ordered  - Administer ordered antiemetic medications as needed  - Provide nonpharmacologic comfort measures as appropriate  - Advance diet as tolerated, if ordered  - Nutrition services referral to assist patient with adequate nutrition and appropriate food choices  Outcome: Progressing  Goal: Maintains or returns to baseline bowel function  Description  INTERVENTIONS:  - Assess bowel function  - Encourage oral fluids to ensure adequate hydration  - Administer IV fluids as ordered to ensure adequate hydration  - Administer ordered medications as needed  - Encourage mobilization and activity  - Nutrition services referral to assist patient with appropriate food choices  Outcome: Progressing  Goal: Maintains adequate nutritional intake  Description  INTERVENTIONS:  - Monitor percentage of each meal consumed  - Identify factors contributing to decreased intake, treat as appropriate  - Assist with meals as needed  - Monitor I&O, WT and lab values  - Obtain nutrition services referral as needed  Outcome: Progressing  Goal: Establish and maintain optimal ostomy function  Description  INTERVENTIONS:  - Assess bowel function  - Encourage oral fluids to ensure adequate hydration  - Administer IV fluids as ordered to ensure adequate hydration  - Administer ordered medications as needed  - Encourage mobilization and activity  - Nutrition services referral to assist patient with appropriate food choices  - Assess stoma site  Outcome: Progressing     Problem: METABOLIC, FLUID AND ELECTROLYTES - ADULT  Goal: Electrolytes maintained within normal limits  Description  INTERVENTIONS:  - Monitor labs and assess patient for signs and symptoms of electrolyte imbalances  - Administer electrolyte replacement as ordered  - Monitor response to electrolyte replacements, including repeat lab results as appropriate  - Instruct patient on fluid and nutrition as appropriate  Outcome: Progressing  Goal: Fluid balance maintained  Description  INTERVENTIONS:  - Monitor labs and assess for signs and symptoms of volume excess or deficit  - Monitor I/O and WT  - Instruct patient on fluid and nutrition as appropriate  Outcome: Progressing  Goal: Glucose maintained within target range  Description  INTERVENTIONS:  - Monitor Blood Glucose as ordered  - Assess for signs and symptoms of hyperglycemia and hypoglycemia  - Administer ordered medications to maintain glucose within target range  - Assess nutritional intake and initiate nutrition service referral as needed  Outcome: Progressing     Problem: SAFETY,RESTRAINT: NV/NON-SELF DESTRUCTIVE BEHAVIOR  Goal: Remains free of harm/injury (restraint for non violent/non self-detsructive behavior)  Description  INTERVENTIONS:  - Instruct patient/family regarding restraint use   - Assess and monitor physiologic and psychological status   - Provide interventions and comfort measures to meet assessed patient needs   - Identify and implement measures to help patient regain control  - Assess readiness for release of restraint   Outcome: Progressing  Goal: Returns to optimal restraint-free functioning  Description  INTERVENTIONS:  - Assess the patient's behavior and symptoms that indicate continued need for restraint  - Identify and implement measures to help patient regain control  - Assess readiness for release of restraint   Outcome: Progressing     Problem: COPING  Goal: Pt/Family able to verbalize concerns and demonstrate effective coping strategies  Description  INTERVENTIONS:  - Assist patient/family to identify coping skills, available support systems and cultural and spiritual values  - Provide emotional support, including active listening and acknowledgement of concerns of patient and caregivers  - Reduce environmental stimuli, as able  - Provide patient education  - Assess for spiritual pain/suffering and initiate spiritual care, including notification of Pastoral Care or erna based community as needed  - Assess effectiveness of coping strategies  Outcome: Progressing  Goal: Will report anxiety at manageable levels  Description  INTERVENTIONS:  - Administer medication as ordered  - Teach and encourage coping skills  - Provide emotional support  - Assess patient/family for anxiety and ability to cope  Outcome: Progressing     Problem: CHANGE IN BODY IMAGE  Goal: Pt/Family communicate acceptance of loss or change in body image and expresses psychological comfort and peace  Description  INTERVENTIONS:  - Assess patient/family anxiety and grief process related to change in body image, loss of functional status and loss of sense of self  - Assess patient/family's coping skills and provide emotional, spiritual and psychosocial support  - Provide information about the patient's health status with consideration of family and cultural values  - Communicate willingness to discuss loss and facilitate grief process with patient/family as appropriate  - Emphasize sustaining relationships within family system and community, or erna/spiritual traditions  - Refer to community support groups as appropriate  - Initiate Spiritual Care, Pastoral care or other ancillary consults as needed  Outcome: Progressing     Problem: CONFUSION/THOUGHT DISTURBANCE  Goal: Thought disturbances (confusion, delirium, depression, dementia or psychosis) are managed to maintain or return to baseline mental status and functional level  Description  INTERVENTIONS:  - Assess for possible contributors to  thought disturbance, including but not limited to medications, infection, impaired vision or hearing, underlying metabolic abnormalities, dehydration, respiratory compromise,  psychiatric diagnoses and notify attending PHYSICAN/AP  - Monitor and intervene to maintain adequate nutrition, hydration, elimination, sleep and activity  - Decrease environmental stimuli, including noise as appropriate  - Provide frequent contacts to provide refocusing, direction and reassurance as needed  Approach patient calmly with eye contact and at their level    - [place] high risk fall precautions, aspiration precautions and other safety measures, as indicated  - If delirium suspected, notify physician/AP of change in condition and request immediate in-person evaluation  - Pursue consults as appropriate including Geriatric (campus dependent), OT for cognitive evaluation/activity planning, psychiatric, pastoral care, etc   Outcome: Progressing     Problem: CARDIOVASCULAR - ADULT  Goal: Maintains optimal cardiac output and hemodynamic stability  Description  INTERVENTIONS:  - Monitor I/O, vital signs and rhythm  - Monitor for S/S and trends of decreased cardiac output i e  bleeding, hypotension  - Administer and titrate ordered vasoactive medications to optimize hemodynamic stability  - Assess quality of pulses, skin color and temperature  - Assess for signs of decreased coronary artery perfusion - ex   Angina  - Instruct patient to report change in severity of symptoms  Outcome: Progressing  Goal: Absence of cardiac dysrhythmias or at baseline rhythm  Description  INTERVENTIONS:  - Continuous cardiac monitoring, monitor vital signs, obtain 12 lead EKG if indicated  - Administer antiarrhythmic and heart rate control medications as ordered  - Monitor electrolytes and administer replacement therapy as ordered  Outcome: Progressing     Problem: RESPIRATORY - ADULT  Goal: Achieves optimal ventilation and oxygenation  Description  INTERVENTIONS:  - Assess for changes in respiratory status  - Assess for changes in mentation and behavior  - Position to facilitate oxygenation and minimize respiratory effort  - Oxygen administration by appropriate delivery method based on oxygen saturation (per order) or ABGs  - Initiate smoking cessation education as indicated  - Encourage broncho-pulmonary hygiene including cough, deep breathe, Incentive Spirometry  - Assess the need for suctioning and aspirate as needed  - Assess and instruct to report SOB or any respiratory difficulty  - Respiratory Therapy support as indicated  Outcome: Progressing     Problem: GENITOURINARY - ADULT  Goal: Urinary catheter remains patent  Description  INTERVENTIONS:  - Assess patency of urinary catheter  - If patient has a chronic ramos, consider changing catheter if non-functioning  - Follow guidelines for intermittent irrigation of non-functioning urinary catheter  Outcome: Progressing     Problem: SKIN/TISSUE INTEGRITY - ADULT  Goal: Incision(s), wounds(s) or drain site(s) healing without S/S of infection  Description  INTERVENTIONS  - Assess and document risk factors for skin impairment   - Assess and document dressing, incision, wound bed, drain sites and surrounding tissue  - Initiate Nutrition services consult and/or wound management as needed  Outcome: Progressing     Problem: HEMATOLOGIC - ADULT  Goal: Maintains hematologic stability  Description  INTERVENTIONS  - Assess for signs and symptoms of bleeding or hemorrhage  - Monitor labs  - Administer supportive blood products/factors as ordered and appropriate  Outcome: Progressing

## 2019-07-28 NOTE — PROGRESS NOTES
Post Op Check Note    Pt returns from OR after reanastomosis of small bowel with abdominal closure  Has wound vac in place  No change to colostomy  No significant events intraoperatively  50 mL UOP  2 units pRBCs, 800 mL crystalloid given  EBL reported minimal     HR 87, /47, O2 sat 98%, RR 18  General- no acute distress  CV- regular rate, rhythm  Pulmonary- coarse bilateral breath sounds  Abdomen- soft, wound vac in place  No rebound or guarding  Ostomy pink, viable  Skin-warm, dry    Currently on vent with rate of 14, Vt 550, PEEP 8, FiO2 60%  Levophed at 3, Dobutamine at 2 5  Fentanyl at 100  Will obtain post-op lactic acid  Continue to monitor  Wean vent and pressors as tolerated

## 2019-07-29 PROBLEM — A41.9 SEPTIC SHOCK (HCC): Status: RESOLVED | Noted: 2019-01-01 | Resolved: 2019-01-01

## 2019-07-29 PROBLEM — R65.21 SEPTIC SHOCK (HCC): Status: RESOLVED | Noted: 2019-01-01 | Resolved: 2019-01-01

## 2019-07-29 NOTE — PROGRESS NOTES
NEPHROLOGY CVVH PROCEDURE NOTE    Seen and examined on CVVH  Patient is sedated, intubated  Currently off all pressors, blood pressure appears improved, urine output also improving  QB: 250  Dialysate: 2500 - 4 K  Ultrafiltration: even    Physical Exam:    Vitals:    07/29/19 0900   BP: 121/78   Pulse: 86   Resp: 20   Temp:    SpO2: 97%     General:  Currently on CVVH, appears comfortable, off pressor  CVS:  Regular  Lungs:  Coarse, decreased at bases  Abdomen:  Distended, soft  Access:  Temp dialysis catheter  Extremities:  2+ edema      Assessment:  1  Acute kidney injury, likely secondary to ATN secondary to sepsis and hypotension, urine output is improving  2  Sepsis/hypotension, currently off pressors  3  Appears overloaded, we would trial IV Lasix to augment urine output  4  Metabolic acidosis, resolved  5  Multiple abdominal procedures with exploratory laparotomy, small-bowel resection, abdominal washout and anastomosis of the small bowel    Plan:  · Overall patient appears clinically improved  · Given improvement in blood pressure as well as urine output which trial IV Lasix  If he is able to significantly diurese with Lasix 40-80 mg x1 hopefully can then stop CVVH  · Electrolytes and acid-base appear stable  Again currently off all pressors    · Discussed with Critical Care

## 2019-07-29 NOTE — PROGRESS NOTES
07/29/19 [address] Leader Aware/Contacted Leader/[facility] aware of patient's status   Spiritual Beliefs/Perceptions   Support Systems Spouse/significant other;Children   Stress Factors   Family Stress Factors None identified   Coping Responses   Family Coping Open/discussion   Plan of Care   Comments Family presence at bedside, explored for relational needs, provided a caring, supportive presence     Assessment Completed by: Unit visit

## 2019-07-29 NOTE — PROGRESS NOTES
Progress Note - Critical Care   Fran Kenny 77 y o  male MRN: [id number]  Unit/Bed#: MICU 02 Encounter: [id number]      HPI/24hr events: Weaned from dobutamine  ______________________________________________________________________    Assessment:   Principal Problem:    Parastomal hernia  Active Problems:    GERD without esophagitis    Hyperlipidemia    Malignant neoplasm of colon ([facility] [address] )    Acute kidney injury ([facility] )    Chronic kidney disease    Hypertension    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    Leukocytosis    Oliguria    UTI (urinary tract infection)    Thrombocytopenia (HCC)  Resolved Problems:    Septic shock (HCC)      Plan:     Neuro:   · Sedation: None  · RASS goal: 0 Alert and Calm  · Analgesia with: Fentanyl gtt 100mcg, PRN fentanyl 4/24hrs  · Delirium Precautions: CAM ICU daily, regulate sleep/wake cycle, avoid benzos and opiates as able  · Consider HS zyprexa ODT  · Trend neuro exam    CV:   · Shock s/p methylene blue, vitamin c and high dose pressors- resolved  · Cardiac infusions: None  · MAP goal > 65   · Keep Arterial line  · Consider d/c stress dose steroids  · Rhythm: NSR  · Follow rhythm on telemetry      Lung:   · Acute hypoxic resp failure  · SBT plan: Trial today  · Chlorhexidine ordered: yes  · SpO2 goal >92%    GI:   · Parastomal hernia s/p small bowel resection complicated by anastomotic leak now s/p small bowel resection left in discontinuity with temporary abdominal closure, now with end to end small bowel anastomosis as of 7/28      FEN:   · Nutrition/diet plan: TPN  · Replete electrolytes with goals: K >4 0, Mag >2 0, and Phos >3 0    :   · Indwelling Ramos present: yes   · Secondary to swelling would not be able to replace Ramos if removed  For this reason continue ramos to gravity, would remove at earliest possible time given anatomy and swelling     · Trend UOP and BUN/creat    ID:   · Source of infection: UTI  · Abx ordered: Zosyn  · Day # 4 of 5 day course  · Trend temps and WBC count  · Consider recheck blood cultures given  grew enterococcus faecalis and it was also present in urine    Heme:   · Thrombocytopenia- likely secondary to sepsis  · Trend  · VTE Pharmacologic Prophylaxis: Heparin  · VTE Mechanical Prophylaxis: sequential compression device    Endo:   · Glycemic control plan: Currently uncontrolled BGL on SSI algorithm 6, given stress dose steroids transition to insulin gtt     MSK/Skin:  · Mobility goal:   · PT consult: yes  · OT consult: yes  · Frequent turning and pressure off-loading  · Local wound care as needed    Disposition:  Continue ICU care    ______________________________________________________________________    Physical Exam:  [place] Agitation Sedation Scale (RASS): Drowsy  Physical Exam   Constitutional: No distress  HENT:   Head: Normocephalic and atraumatic  Eyes: Pupils are equal, round, and reactive to light  Cardiovascular: Normal rate, regular rhythm and intact distal pulses  Pulmonary/Chest: Effort normal  No respiratory distress  He has no wheezes  He has no rales  Abdominal: He exhibits distension  There is tenderness  Stoma pink with + air in bag    Musculoskeletal:   +3 pitting edema in b/l upper and lower extremities    Neurological: He is alert  CAM ICU +  Intermittently follows commands  Moves all extremities to noxious stimuli    Skin: Skin is warm and dry  He is not diaphoretic      ______________________________________________________________________  Temperature:   Temp (24hrs), Av 8 °F (36 6 °C), Min:97 2 °F (36 2 °C), Max:98 °F (36 7 °C)    Current      Vitals:    19 0536 19 0600 19 0630 19 0700   BP: 122/67 117/67 117/74 120/83   BP Location:       Pulse: 90 82 78 90   Resp:  18 16 21   Temp:       TempSrc:       SpO2: 95% 96% 98% 98%   Weight:       Height:         Arterial Line BP: 152/88       Weights:   IBW: 63 8 kg    Body mass index is 41 7 kg/m²    Weight (last 2 days)     Date/Time   Weight    07/29/19 0500   117 (258 38)    07/28/19 0547   117 (258 82)    07/27/19 1007   113 (248 68)            Height: 5' 6" (167 6 cm)  Hemodynamic Monitoring:  CO:  [5 L/min-6 2 L/min] 5 L/min  CI:  [2 4 L/min/m2-3 L/min/m2] 2 4 L/min/m2    Ventilator Settings:   Vent Mode: AC/VC    Settings  Resp Rate (BPM): 14 BPM  Vt (mL): 550 mL  FIO2 (%): 60 %  PEEP (cmH2O): 10 cmH2O  Flow (LPM): 60 L/min    Observed  Resp Rate (BPM): 21 BPM  VT (mL): 429  MV: 12 1  Peak Pressure (cmH2O): 19 cmH2O  Plateau Pressure (cm H2O): 22 cm H2O  I/E Ratio (Obs): 1/3 3   Static Compliance (mL/cmH20): 39 mL/cmH2O      No results found for: PHART, RIH7JWK, PO2ART, ZZT2GNS, A2UBOZWK, BEART, SOURCE    Intake and Outputs:    Intake/Output Summary (Last 24 hours) at 7/29/2019 0743  Last data filed at 7/29/2019 0600  Gross per 24 hour   Intake 5015 ml   Output 4611 ml   Net 404 ml     I/O last 24 hours: In: 5242 [I V :1486; Blood:700; IV Piggyback:1524; TPN:1305]  Out: 0 [Urine:635; Emesis/NG output:100; Drains:25; WZQZN:3603; Stool:10]    UOP: 0 2cc/kg/hour     Nutrition:        Diet Orders   (From admission, onward)             Start     Ordered    07/27/19 1840  Diet NPO  Diet effective now     Question Answer Comment   Diet Type NPO    RD to adjust diet per protocol?  Yes        07/27/19 1840                Labs:   Results from last 7 days   Lab Units 07/29/19  0422 07/28/19  1337 07/28/19  1215 07/28/19  1036 07/28/19  0540 07/27/19  1642 07/27/19  0409  07/25/19  2134   WBC Thousand/uL 19 24* 20 20*  --   --  18 89*  --  23 34*   < > 4 25*   HEMOGLOBIN g/dL 9 4* 9 4*  --   --  7 6* 7 0* 7 9*   < > 9 2*   I STAT HEMOGLOBIN g/dl  --   --  8 5* 7 1*  --   --   --    < >  --    HEMATOCRIT % 30 0* 29 9*  --   --  24 7* 24 1* 26 4*   < > 30 9*   HEMATOCRIT, ISTAT %  --   --  25* 21*  --   --   --    < >  --    PLATELETS Thousands/uL 79* 100*  --   --  102*  --  163   < > 215   NEUTROS PCT % 92* 92*  -- --  90*  --  90*   < >  --    BANDS PCT %  --   --   --   --   --   --   --   --  12*   MONOS PCT % 3* 4  --   --  4  --  5   < >  --    MONO PCT %  --   --   --   --   --   --   --   --  2*    < > = values in this interval not displayed  Results from last 7 days   Lab Units 07/29/19 0422 07/28/19 1955 07/28/19  1337 07/28/19  1215 07/28/19  1036  07/27/19  0409  07/26/19  1129   SODIUM mmol/L 141 141 144  --   --    < > 144   < >  --    POTASSIUM mmol/L 4 0 3 9 4 0  --   --    < > 4 4   < >  --    CHLORIDE mmol/L 112* 111* 113*  --   --    < > 112*   < >  --    CO2 mmol/L 23 23 22  --   --    < > 14*   < >  --    CO2, I-STAT mmol/L  --   --   --  21 23  --   --   --  16*   BUN mg/dL 25 29* 32*  --   --    < > 40*   < >  --    CREATININE mg/dL 1 28 1 41* 1 62*  --   --    < > 1 89*   < >  --    CALCIUM mg/dL 7 8* 7 7* 7 8*  --   --    < > 8 2*   < >  --    ALBUMIN g/dL  --   --   --   --   --   --  1 2*  --   --    ALK PHOS U/L  --   --   --   --   --   --  66  --   --    ALT U/L  --   --   --   --   --   --  <6*  --   --    AST U/L  --   --   --   --   --   --  24  --   --    GLUCOSE, ISTAT mg/dl  --   --   --  217* 202*  --   --   --  172*    < > = values in this interval not displayed       Results from last 7 days   Lab Units 07/29/19 0422 07/28/19 1955 07/28/19  1337   MAGNESIUM mg/dL 2 0 1 9 2 1   PHOSPHORUS mg/dL 2 2* 2 9 4 0      Results from last 7 days   Lab Units 07/27/19  0409   INR  1 69*     Results from last 7 days   Lab Units 07/26/19  1210 07/26/19  1000 07/26/19  0712   TROPONIN I ng/mL 0 02 0 02 0 03     Results from last 7 days   Lab Units 07/28/19  1337 07/27/19  0959 07/26/19  0444   LACTIC ACID mmol/L 1 0 1 0 1 1     ABG:  Lab Results   Component Value Date    PHART 7 287 (L) 07/27/2019    FIL7BNU 27 7 (LL) 07/27/2019    PO2ART 176 6 (H) 07/27/2019    KTO7AVD 12 9 (L) 07/27/2019    BEART -12 4 07/27/2019    SOURCE Line, Arterial 07/26/2019     VBG:  Results from last 7 days   Lab Units 07/29/19  0724 07/26/19 2147   PH SANDRA  7 355   < >  --    PCO2 SANDRA mm Hg 46 0   < >  --    PO2 SANDRA mm Hg 32 5*   < >  --    HCO3 SANDRA mmol/L 25 1   < >  --    BASE EXC SANDRA mmol/L -0 6   < >  --    ABG SOURCE   --   --  Line, Arterial    < > = values in this interval not displayed  Imaging: No new     EKG: No new      Micro:  Blood Culture:   Lab Results   Component Value Date    BLOODCX No Growth at 48 hrs  07/26/2019    BLOODCX Enterococcus faecalis (A) 07/26/2019    BLOODCX No Growth After 5 Days  07/14/2019    BLOODCX No Growth After 5 Days  07/14/2019     Urine Culture:   Lab Results   Component Value Date    URINECX >100,000 cfu/ml Escherichia coli (A) 07/24/2019    URINECX 40,000-49,000 cfu/ml Enterococcus faecalis (A) 07/24/2019    URINECX No Growth <1000 cfu/mL 11/04/2016     Sputum Culture: No components found for: SPUTUMCX  Wound Culure: No results found for: WOUNDCULT    Lab Results   Component Value Date    BLOODCX No Growth at 48 hrs  07/26/2019    BLOODCX Enterococcus faecalis (A) 07/26/2019    BLOODCX No Growth After 5 Days  07/14/2019    BLOODCX No Growth After 5 Days  07/14/2019    URINECX >100,000 cfu/ml Escherichia coli (A) 07/24/2019    URINECX 40,000-49,000 cfu/ml Enterococcus faecalis (A) 07/24/2019    URINECX No Growth <1000 cfu/mL 11/04/2016       Allergies:    Allergies   Allergen Reactions    Atorvastatin Myalgia    Lyrica [Pregabalin]        Medications:   Scheduled Meds:    Current Facility-Administered Medications:  Adult TPN (CUSTOM BASE/CUSTOM ELECTROLYTE)  Intravenous Continuous Artur Eller PA-C Last Rate: 68 7 mL/hr at 07/28/19 2042   ascorbic acid in sodium chloride 0 9% 100 mL IVPB 1,500 mg Intravenous Q6H Severo White MD Last Rate: Stopped (07/29/19 0230)   chlorhexidine 15 mL Swish & Spit Q12H [facility] Severo White MD    fentaNYL 100 mcg/hr Intravenous Continuous Mateo Ag PA-C Last Rate: 100 mcg/hr (07/29/19 6149)   heparin (porcine) 5,000 Units Subcutaneous Q8H [address] Dinora Woods MD    hydrocortisone sodium succinate 50 mg Intravenous Q6H Dinora Woods MD    HYDROmorphone 0 5 mg Intravenous Q3H PRN Ami Olivas PA-C    HYDROmorphone 1 mg Intravenous Q3H PRN Ami Olivas PA-C    insulin regular (HumuLIN R,NovoLIN R) infusion 0 3-21 Units/hr Intravenous Titrated Ami Olivas PA-C    NxStage K 4/Ca 3 20,000 mL Dialysis Continuous Maddie Alvarez MD    ondansetron 4 mg Intravenous Q4H PRN Dinora Woods MD    pantoprazole 40 mg Intravenous Q24H [address] Dinora Woods MD    piperacillin-tazobactam 3 375 g Intravenous Q6H Dinora Woods MD Last Rate: Stopped (07/29/19 0500)   sodium phosphate 6 mmol Intravenous Once CHRISTINE Baker Last Rate: 6 mmol (07/29/19 9617)   thiamine 200 mg Intravenous Q12H Dinora Woods MD Last Rate: Stopped (07/29/19 0301)     Continuous Infusions:    Adult TPN (CUSTOM BASE/CUSTOM ELECTROLYTE)  Last Rate: 68 7 mL/hr at 07/28/19 2042   fentaNYL 100 mcg/hr Last Rate: 100 mcg/hr (07/29/19 0401)   insulin regular (HumuLIN R,NovoLIN R) infusion 0 3-21 Units/hr    NxStage K 4/Ca 3 20,000 mL      PRN Meds:    HYDROmorphone 0 5 mg Q3H PRN   HYDROmorphone 1 mg Q3H PRN   ondansetron 4 mg Q4H PRN       Invasive lines and devices: Invasive Devices     Central Venous Catheter Line            CVC Central Lines 07/26/19 Triple 20cm Left Subclavian 2 days          Peripheral Intravenous Line            Peripheral IV 07/25/19 Right Forearm 3 days          Arterial Line            Arterial Line 07/28/19 Radial less than 1 day          Line            Temporary HD Catheter 2 days          Drain            Colostomy LLQ 14 days    Urethral Catheter Non-latex 18 Fr  8 days    NG/OG/Enteral Tube 4 days    NG/OG Tube Orogastric Center mouth 3 days    Negative Pressure Wound Therapy (V A C ) Abdomen Anterior;Mid 3 days          Airway            ETT  Cuffed; Hi-Lo 8 mm 3 days                   Code Status: Level 2 - DNAR: but accepts endotracheal intubation    Portions of the record may have been created with voice recognition software  Occasional wrong word or "sound a like" substitutions may have occurred due to the inherent limitations of voice recognition software  Read the chart carefully and recognize, using context, where substitutions have occurred      Olive Bullock PA-C

## 2019-07-29 NOTE — PLAN OF CARE
Problem: Potential for Falls  Goal: Patient will remain free of falls  Description  INTERVENTIONS:  - Assess patient frequently for physical needs  -  Identify cognitive and physical deficits and behaviors that affect risk of falls  -  [place] fall precautions as indicated by assessment   - Educate patient/family on patient safety including physical limitations  - Instruct patient to call for assistance with activity based on assessment  - Modify environment to reduce risk of injury  - Consider OT/PT consult to assist with strengthening/mobility  Outcome: Progressing     Problem: Nutrition/Hydration-ADULT  Goal: Nutrient/Hydration intake appropriate for improving, restoring or maintaining nutritional needs  Description  Monitor and assess patient's nutrition/hydration status for malnutrition (ex- brittle hair, bruises, dry skin, pale skin and conjunctiva, muscle wasting, smooth red tongue, and disorientation)  Collaborate with interdisciplinary team and initiate plan and interventions as ordered  Monitor patient's weight and dietary intake as ordered or per policy  Utilize nutrition screening tool and intervene per policy  Determine patient's food preferences and provide high-protein, high-caloric foods as appropriate       INTERVENTIONS:  - Monitor oral intake, urinary output, labs, and treatment plans  - Assess nutrition and hydration status and recommend course of action  - Evaluate amount of meals eaten  - Assist patient with eating if necessary   - Allow adequate time for meals  - Recommend/ encourage appropriate diets, oral nutritional supplements, and vitamin/mineral supplements  - Order, calculate, and assess calorie counts as needed  - Recommend, monitor, and adjust tube feedings and TPN/PPN based on assessed needs  - Assess need for intravenous fluids  - Provide specific nutrition/hydration education as appropriate  - Include patient/family/caregiver in decisions related to nutrition  Outcome: Progressing     Problem: PAIN - ADULT  Goal: Verbalizes/displays adequate comfort level or baseline comfort level  Description  Interventions:  - Encourage patient to monitor pain and request assistance  - Assess pain using appropriate pain scale  - Administer analgesics based on type and severity of pain and evaluate response  - Implement non-pharmacological measures as appropriate and evaluate response  - Consider cultural and social influences on pain and pain management  - Notify physician/advanced practitioner if interventions unsuccessful or patient reports new pain  Outcome: Progressing     Problem: INFECTION - ADULT  Goal: Absence or prevention of progression during hospitalization  Description  INTERVENTIONS:  - Assess and monitor for signs and symptoms of infection  - Monitor lab/diagnostic results  - Monitor all insertion sites, i e  indwelling lines, tubes, and drains  - Monitor endotracheal (as able) and nasal secretions for changes in amount and color  - [place] appropriate cooling/warming therapies per order  - Administer medications as ordered  - Instruct and encourage patient and family to use good hand hygiene technique  - Identify and instruct in appropriate isolation precautions for identified infection/condition  Outcome: Progressing     Problem: SAFETY ADULT  Goal: Patient will remain free of falls  Description  INTERVENTIONS:  - Assess patient frequently for physical needs  -  Identify cognitive and physical deficits and behaviors that affect risk of falls    -  [place] fall precautions as indicated by assessment   - Educate patient/family on patient safety including physical limitations  - Instruct patient to call for assistance with activity based on assessment  - Modify environment to reduce risk of injury  - Consider OT/PT consult to assist with strengthening/mobility  Outcome: Progressing  Goal: Maintain or return to baseline ADL function  Description  INTERVENTIONS:  -  Assess patient's ability to carry out ADLs; assess patient's baseline for ADL function and identify physical deficits which impact ability to perform ADLs (bathing, care of mouth/teeth, toileting, grooming, dressing, etc )  - Assess/evaluate cause of self-care deficits   - Assess range of motion  - Assess patient's mobility; develop plan if impaired  - Assess patient's need for assistive devices and provide as appropriate  - Encourage maximum independence but intervene and supervise when necessary  ¯ Involve family in performance of ADLs  ¯ Assess for home care needs following discharge   ¯ Request OT consult to assist with ADL evaluation and planning for discharge  ¯ Provide patient education as appropriate  Outcome: Progressing  Goal: Maintain or return mobility status to optimal level  Description  INTERVENTIONS:  - Assess patient's baseline mobility status (ambulation, transfers, stairs, etc )    - Identify cognitive and physical deficits and behaviors that affect mobility  - Identify mobility aids required to assist with transfers and/or ambulation (gait belt, sit-to-stand, lift, walker, cane, etc )  - [place] fall precautions as indicated by assessment  - Record patient progress and toleration of activity level on Mobility SBAR; progress patient to next Phase/Stage  - Instruct patient to call for assistance with activity based on assessment  - Request Rehabilitation consult to assist with strengthening/weightbearing, etc   Outcome: Progressing     Problem: DISCHARGE PLANNING  Goal: Discharge to home or other facility with appropriate resources  Description  INTERVENTIONS:  - Identify barriers to discharge w/patient and caregiver  - Arrange for needed discharge resources and transportation as appropriate  - Identify discharge learning needs (meds, wound care, etc )  - Arrange for interpretive services to assist at discharge as needed  - Refer to Case Management Department for coordinating discharge planning if the patient needs post-hospital services based on physician/advanced practitioner order or complex needs related to functional status, cognitive ability, or social support system  Outcome: Progressing     Problem: Knowledge Deficit  Goal: Patient/family/caregiver demonstrates understanding of disease process, treatment plan, medications, and discharge instructions  Description  Complete learning assessment and assess knowledge base    Interventions:  - Provide teaching at level of understanding  - Provide teaching via preferred learning methods  Outcome: Progressing     Problem: Prexisting or High Potential for Compromised Skin Integrity  Goal: Skin integrity is maintained or improved  Description  INTERVENTIONS:  - Identify patients at risk for skin breakdown  - Assess and monitor skin integrity  - Assess and monitor nutrition and hydration status  - Monitor labs (i e  albumin)  - Assess for incontinence   - Turn and reposition patient  - Assist with mobility/ambulation  - Relieve pressure over bony prominences  - Avoid friction and shearing  - Provide appropriate hygiene as needed including keeping skin clean and dry  - Evaluate need for skin moisturizer/barrier cream  - Collaborate with interdisciplinary team (i e  Nutrition, Rehabilitation, etc )   - Patient/family teaching  Outcome: Progressing     Problem: GASTROINTESTINAL - ADULT  Goal: Minimal or absence of nausea and/or vomiting  Description  INTERVENTIONS:  - Administer IV fluids as ordered to ensure adequate hydration  - Maintain NPO status until nausea and vomiting are resolved  - Nasogastric tube as ordered  - Administer ordered antiemetic medications as needed  - Provide nonpharmacologic comfort measures as appropriate  - Advance diet as tolerated, if ordered  - Nutrition services referral to assist patient with adequate nutrition and appropriate food choices  Outcome: Progressing  Goal: Maintains or returns to baseline bowel function  Description  INTERVENTIONS:  - Assess bowel function  - Encourage oral fluids to ensure adequate hydration  - Administer IV fluids as ordered to ensure adequate hydration  - Administer ordered medications as needed  - Encourage mobilization and activity  - Nutrition services referral to assist patient with appropriate food choices  Outcome: Progressing  Goal: Maintains adequate nutritional intake  Description  INTERVENTIONS:  - Monitor percentage of each meal consumed  - Identify factors contributing to decreased intake, treat as appropriate  - Assist with meals as needed  - Monitor I&O, WT and lab values  - Obtain nutrition services referral as needed  Outcome: Progressing  Goal: Establish and maintain optimal ostomy function  Description  INTERVENTIONS:  - Assess bowel function  - Encourage oral fluids to ensure adequate hydration  - Administer IV fluids as ordered to ensure adequate hydration  - Administer ordered medications as needed  - Encourage mobilization and activity  - Nutrition services referral to assist patient with appropriate food choices  - Assess stoma site  Outcome: Progressing     Problem: METABOLIC, FLUID AND ELECTROLYTES - ADULT  Goal: Electrolytes maintained within normal limits  Description  INTERVENTIONS:  - Monitor labs and assess patient for signs and symptoms of electrolyte imbalances  - Administer electrolyte replacement as ordered  - Monitor response to electrolyte replacements, including repeat lab results as appropriate  - Instruct patient on fluid and nutrition as appropriate  Outcome: Progressing  Goal: Fluid balance maintained  Description  INTERVENTIONS:  - Monitor labs and assess for signs and symptoms of volume excess or deficit  - Monitor I/O and WT  - Instruct patient on fluid and nutrition as appropriate  Outcome: Progressing  Goal: Glucose maintained within target range  Description  INTERVENTIONS:  - Monitor Blood Glucose as ordered  - Assess for signs and symptoms of hyperglycemia and hypoglycemia  - Administer ordered medications to maintain glucose within target range  - Assess nutritional intake and initiate nutrition service referral as needed  Outcome: Progressing     Problem: SAFETY,RESTRAINT: NV/NON-SELF DESTRUCTIVE BEHAVIOR  Goal: Remains free of harm/injury (restraint for non violent/non self-detsructive behavior)  Description  INTERVENTIONS:  - Instruct patient/family regarding restraint use   - Assess and monitor physiologic and psychological status   - Provide interventions and comfort measures to meet assessed patient needs   - Identify and implement measures to help patient regain control  - Assess readiness for release of restraint   Outcome: Progressing  Goal: Returns to optimal restraint-free functioning  Description  INTERVENTIONS:  - Assess the patient's behavior and symptoms that indicate continued need for restraint  - Identify and implement measures to help patient regain control  - Assess readiness for release of restraint   Outcome: Progressing     Problem: COPING  Goal: Pt/Family able to verbalize concerns and demonstrate effective coping strategies  Description  INTERVENTIONS:  - Assist patient/family to identify coping skills, available support systems and cultural and spiritual values  - Provide emotional support, including active listening and acknowledgement of concerns of patient and caregivers  - Reduce environmental stimuli, as able  - Provide patient education  - Assess for spiritual pain/suffering and initiate spiritual care, including notification of Pastoral Care or erna based community as needed  - Assess effectiveness of coping strategies  Outcome: Progressing  Goal: Will report anxiety at manageable levels  Description  INTERVENTIONS:  - Administer medication as ordered  - Teach and encourage coping skills  - Provide emotional support  - Assess patient/family for anxiety and ability to cope  Outcome: Progressing     Problem: CHANGE IN BODY IMAGE  Goal: Pt/Family communicate acceptance of loss or change in body image and expresses psychological comfort and peace  Description  INTERVENTIONS:  - Assess patient/family anxiety and grief process related to change in body image, loss of functional status and loss of sense of self  - Assess patient/family's coping skills and provide emotional, spiritual and psychosocial support  - Provide information about the patient's health status with consideration of family and cultural values  - Communicate willingness to discuss loss and facilitate grief process with patient/family as appropriate  - Emphasize sustaining relationships within family system and community, or erna/spiritual traditions  - Refer to community support groups as appropriate  - Initiate Spiritual Care, Pastoral care or other ancillary consults as needed  Outcome: Progressing     Problem: CONFUSION/THOUGHT DISTURBANCE  Goal: Thought disturbances (confusion, delirium, depression, dementia or psychosis) are managed to maintain or return to baseline mental status and functional level  Description  INTERVENTIONS:  - Assess for possible contributors to  thought disturbance, including but not limited to medications, infection, impaired vision or hearing, underlying metabolic abnormalities, dehydration, respiratory compromise,  psychiatric diagnoses and notify attending PHYSICAN/AP  - Monitor and intervene to maintain adequate nutrition, hydration, elimination, sleep and activity  - Decrease environmental stimuli, including noise as appropriate  - Provide frequent contacts to provide refocusing, direction and reassurance as needed  Approach patient calmly with eye contact and at their level    - [place] high risk fall precautions, aspiration precautions and other safety measures, as indicated  - If delirium suspected, notify physician/AP of change in condition and request immediate in-person evaluation  - Pursue consults as appropriate including Geriatric (campus dependent), OT for cognitive evaluation/activity planning, psychiatric, pastoral care, etc   Outcome: Progressing     Problem: CARDIOVASCULAR - ADULT  Goal: Maintains optimal cardiac output and hemodynamic stability  Description  INTERVENTIONS:  - Monitor I/O, vital signs and rhythm  - Monitor for S/S and trends of decreased cardiac output i e  bleeding, hypotension  - Administer and titrate ordered vasoactive medications to optimize hemodynamic stability  - Assess quality of pulses, skin color and temperature  - Assess for signs of decreased coronary artery perfusion - ex   Angina  - Instruct patient to report change in severity of symptoms  Outcome: Progressing  Goal: Absence of cardiac dysrhythmias or at baseline rhythm  Description  INTERVENTIONS:  - Continuous cardiac monitoring, monitor vital signs, obtain 12 lead EKG if indicated  - Administer antiarrhythmic and heart rate control medications as ordered  - Monitor electrolytes and administer replacement therapy as ordered  Outcome: Progressing     Problem: RESPIRATORY - ADULT  Goal: Achieves optimal ventilation and oxygenation  Description  INTERVENTIONS:  - Assess for changes in respiratory status  - Assess for changes in mentation and behavior  - Position to facilitate oxygenation and minimize respiratory effort  - Oxygen administration by appropriate delivery method based on oxygen saturation (per order) or ABGs  - Initiate smoking cessation education as indicated  - Encourage broncho-pulmonary hygiene including cough, deep breathe, Incentive Spirometry  - Assess the need for suctioning and aspirate as needed  - Assess and instruct to report SOB or any respiratory difficulty  - Respiratory Therapy support as indicated  Outcome: Progressing     Problem: GENITOURINARY - ADULT  Goal: Urinary catheter remains patent  Description  INTERVENTIONS:  - Assess patency of urinary catheter  - If patient has a chronic ramos, consider changing catheter if non-functioning  - Follow guidelines for intermittent irrigation of non-functioning urinary catheter  Outcome: Progressing     Problem: SKIN/TISSUE INTEGRITY - ADULT  Goal: Incision(s), wounds(s) or drain site(s) healing without S/S of infection  Description  INTERVENTIONS  - Assess and document risk factors for skin impairment   - Assess and document dressing, incision, wound bed, drain sites and surrounding tissue  - Initiate Nutrition services consult and/or wound management as needed  Outcome: Progressing     Problem: HEMATOLOGIC - ADULT  Goal: Maintains hematologic stability  Description  INTERVENTIONS  - Assess for signs and symptoms of bleeding or hemorrhage  - Monitor labs  - Administer supportive blood products/factors as ordered and appropriate  Outcome: Progressing

## 2019-07-29 NOTE — PROCEDURES
Arterial Line Insertion  Date/Time: 7/28/2019 10:00 PM  Performed by: CHRISTINE Rm  Authorized by: CHRISTINE Rm     Patient location:  Bedside  Consent:     Consent obtained:  Emergent situation  Universal protocol:     Immediately prior to procedure a time out was called: yes      Patient identity confirmed:  Arm band  Indications:     Indications: hemodynamic monitoring, multiple ABGs and continuous blood pressure monitoring    Pre-procedure details:     Skin preparation:  Chlorhexidine    Preparation: Patient was prepped and draped in sterile fashion    Anesthesia (see MAR for exact dosages): Anesthesia method:  None  Procedure details:     Location / Tip of Catheter:  Radial    Laterality:  Left    Needle gauge:  20 G    Placement technique:  Ultrasound guided    Number of attempts:  2    Successful placement: yes      Transducer: waveform confirmed    Post-procedure details:     Post-procedure:  Secured with tape, sterile dressing applied and sutured    CMS:  Normal    Patient tolerance of procedure:   Tolerated well, no immediate complications

## 2019-07-29 NOTE — ASSESSMENT & PLAN NOTE
S/p exploratory laparotomy, lysis of adhesions, small bowel resection, primary repair of parastomal hernia, colonoscopy 7/15  7/25 Transferred to MICU for worsening respiratory status  Emergently intubated  R IJ and R radial sandy placed  CXR w free air  Emergent OR for exploration, bowel resection    7/26 Ex lap w bowel resection, abdominal washout, wound vac application  6/45 Re-exploration, abdominal washout and anastomosis small bowel  7/31 Suture tied downs, vac removed    Plan  NPO/NGT  Continue tube feeds today; at goal of 40 cc/hr  Continue TPN today  Wean vent as able  Continue abx, repeat blood cx pending from 7/30  Pain control  Maintain abdominal binder with hole cut for ostomy

## 2019-07-29 NOTE — ASSESSMENT & PLAN NOTE
S/p exploratory laparotomy, lysis of adhesions, small bowel resection, primary repair of parastomal hernia, colonoscopy 7/15  7/25 Transferred to MICU for worsening respiratory status  Emergently intubated  R IJ and R radial sandy placed  CXR w free air  Emergent OR for exploration, bowel resection    7/26 Ex lap w bowel resection, abdominal washout, wound vac application  7/00 Re-exploration, abdominal washout and anastomosis small bowel    Plan  NPO/NGT  Continue TPN today  Wean vent as able  Wean pressors off today  Continue Zosyn  CVVH per nephrology  Stress dose steroids per ICU  Pain control  Maintain wound VAC  ICU level of care - patient overall improving

## 2019-07-29 NOTE — PROGRESS NOTES
Progress Note - Gemma Ha 1940, 77 y o  male MRN: [id number]    [facility]: [id number]    Primary Care Provider: Donnell Espinal DO   Date and time admitted to hospital: 7/15/2019  2:17 AM        * Parastomal hernia  Assessment & Plan  S/p exploratory laparotomy, lysis of adhesions, small bowel resection, primary repair of parastomal hernia, colonoscopy 7/15  7/25 Transferred to MICU for worsening respiratory status  Emergently intubated  R IJ and R radial sandy placed  CXR w free air  Emergent OR for exploration, bowel resection  7/26 Ex lap w bowel resection, abdominal washout, wound vac application  3/21 Re-exploration, abdominal washout and anastomosis small bowel    Plan  NPO/NGT  Continue TPN today  Wean vent as able  Wean pressors off today  Continue Zosyn  CVVH per nephrology  Stress dose steroids per ICU  Pain control  Maintain wound VAC  ICU level of care - patient overall improving                 Subjective/Objective   Chief Complaint:     Subjective: Underwent second look laparotomy with small bowel anastomosis yesterday  He is doing well and is almost completely off pressors  Urine output has been improving  No ostomy output  Afebrile past 24 hrs    Objective:     Blood pressure 122/67, pulse 90, temperature 97 8 °F (36 6 °C), temperature source Oral, resp  rate 22, height 5' 6" (1 676 m), weight 117 kg (258 lb 6 1 oz), SpO2 95 %  ,Body mass index is 41 7 kg/m²        Intake/Output Summary (Last 24 hours) at 7/29/2019 0546  Last data filed at 7/29/2019 0500  Gross per 24 hour   Intake 5146 ml   Output 4988 ml   Net 158 ml       Invasive Devices     Central Venous Catheter Line            CVC Central Lines 07/26/19 Triple 20cm Left Subclavian 2 days          Peripheral Intravenous Line            Peripheral IV 07/25/19 Right Forearm 3 days          Arterial Line            Arterial Line 07/28/19 Radial less than 1 day          Line            Temporary HD Catheter 2 days Drain            Colostomy LLQ 14 days    Urethral Catheter Non-latex 18 Fr  8 days    NG/OG/Enteral Tube 4 days    NG/OG Tube Orogastric Center mouth 3 days    Negative Pressure Wound Therapy (V A C ) Abdomen Anterior;Mid 3 days          Airway            ETT  Cuffed; Hi-Lo 8 mm 3 days                Physical Exam:  NAD  Awake and following commands  Nonlabored respirations on vent  RRR  Abdomen obese, soft, tender, midline VAC in place with SS drainage   Ostomy pink with no stool or air in bag    Lab, Imaging and other studies:  CBC:   Lab Results   Component Value Date    WBC 20 20 (H) 07/28/2019    HGB 9 4 (L) 07/28/2019    HCT 29 9 (L) 07/28/2019    MCV 91 07/28/2019     (L) 07/28/2019    MCH 28 7 07/28/2019    MCHC 31 4 07/28/2019    RDW 14 9 07/28/2019    MPV 10 5 07/28/2019    NRBC 0 07/28/2019   , CMP:   Lab Results   Component Value Date    SODIUM 141 07/29/2019    K 4 0 07/29/2019     (H) 07/29/2019    CO2 23 07/29/2019    CO2 21 07/28/2019    BUN 25 07/29/2019    CREATININE 1 28 07/29/2019    GLUCOSE 217 (H) 07/28/2019    CALCIUM 7 8 (L) 07/29/2019    EGFR 53 07/29/2019   , Coagulation: No results found for: PT, INR, APTT, Urinalysis: No results found for: COLORU, CLARITYU, SPECGRAV, PHUR, LEUKOCYTESUR, NITRITE, PROTEINUA, GLUCOSEU, KETONESU, BILIRUBINUR, BLOODU, Amylase: No results found for: AMYLASE, Lipase: No results found for: LIPASE  VTE Pharmacologic Prophylaxis: Sequential compression device (Venodyne)   VTE Mechanical Prophylaxis: sequential compression device

## 2019-07-29 NOTE — PROGRESS NOTES
07/29/19 1500   Clinical Encounter Type   Visited With Family   Sacramental Encounters   Sacrament of Sick-Anointing Anointed   Family Spiritual Encounters   Family Coping Open/discussion

## 2019-07-29 NOTE — RESPIRATORY THERAPY NOTE
RT Ventilator Management Note  Belen Nyhan 77 y o  male MRN: [id number]  [facility]: [id number]      Daily Screen       7/27/2019  0838 7/29/2019  0820          Patient safety screen outcome[de-identified]  Failed  Failed      Not Ready for Weaning due to[de-identified]  Alternative forms of ventilation  PEEP > 8cmH2O              Physical Exam:   Assessment Type: Assess only  General Appearance: Sedated  Respiratory Pattern: Assisted  Chest Assessment: Chest expansion symmetrical      Resp Comments: (P) Pt resting on ACVC 550/14/60%/+10  No changes made at this time  No SBT performed due to high peep  Will continue to monitor

## 2019-07-30 NOTE — SOCIAL WORK
CM reviewed pt's medical records  Pt is intubated, sedated, on insulin gtt, on TPN and IV abx  Pt is not a good candidate for weaning d/t b/l pleural effusion d/t vol overload  Pt need for large vol diureses  CM will continue to follow pt with dc needs

## 2019-07-30 NOTE — PROGRESS NOTES
Progress Note - Arianne Melissa 1940, 77 y o  male MRN: [id number]    Unit/Bed#: MICU 02 Encounter: [id number]    Primary Care Provider: Estela Alonzo DO   Date and time admitted to hospital: 7/15/2019  2:17 AM      * Parastomal hernia  Assessment & Plan  S/p exploratory laparotomy, lysis of adhesions, small bowel resection, primary repair of parastomal hernia, colonoscopy 7/15  7/25 Transferred to MICU for worsening respiratory status  Emergently intubated  R IJ and R radial sandy placed  CXR w free air  Emergent OR for exploration, bowel resection  7/26 Ex lap w bowel resection, abdominal washout, wound vac application  4/70 Re-exploration, abdominal washout and anastomosis small bowel    Plan  NPO/NGT  Continue TPN today  Wean vent as able  Continue Zosyn  CVVH per nephrology  Stress dose steroids per ICU  Pain control  Maintain wound VAC until 7/31  Can start trickle tube feeds at 10cc/hr      Subjective/Objective     Subjective: ***    Objective:     Blood pressure 141/81, pulse 84, temperature 98 6 °F (37 °C), temperature source Oral, resp  rate 18, height 5' 6" (1 676 m), weight 116 kg (255 lb 8 2 oz), SpO2 95 %  ,Body mass index is 41 24 kg/m²  Intake/Output Summary (Last 24 hours) at 7/30/2019 0452  Last data filed at 7/30/2019 0400  Gross per 24 hour   Intake 3337 34 ml   Output 4899 ml   Net -1561 66 ml       Invasive Devices     Central Venous Catheter Line            CVC Central Lines 07/26/19 Triple 20cm Left Subclavian 3 days          Arterial Line            Arterial Line 07/28/19 Radial 1 day          Line            Temporary HD Catheter 3 days          Drain            Colostomy LLQ 15 days    Urethral Catheter Non-latex 18 Fr  9 days    NG/OG/Enteral Tube 5 days    NG/OG Tube Orogastric Center mouth 4 days    Negative Pressure Wound Therapy (V A C ) Abdomen Anterior;Mid 4 days          Airway            ETT  Cuffed; Hi-Lo 8 mm 4 days                Physical Exam: {Exam, Complete:81413}    Lab, Imaging and other studies:  I have personally reviewed pertinent lab results    , CBC: No results found for: WBC, HGB, HCT, MCV, PLT, ADJUSTEDWBC, MCH, MCHC, RDW, MPV, NRBC, CMP:   Lab Results   Component Value Date    SODIUM 144 07/29/2019    K 3 8 07/29/2019     (H) 07/29/2019    CO2 23 07/29/2019    BUN 26 (H) 07/29/2019    CREATININE 1 45 (H) 07/29/2019    CALCIUM 7 9 (L) 07/29/2019    EGFR 45 07/29/2019

## 2019-07-30 NOTE — NUTRITION
When clinically ready to transition to enteral feeding and in light of Propofol @ 14 ml/hr, consider Jevity 1 5 @ 10 ml/hr and increase by 10 ml q 4 hrs as vini to goal of  40 ml/hr to = qd:960 ml,1440 Kcal,61 gm PRO,207 gm CHO,48 gm Fat,730 ml Free H2O  Consider 100 ml Free H2O flush q 4 hrs as tolerated  Concurrently wean off TPN as TF increased to goal rate and tolerated  Monitor renal parameters  Check Ionized Ca

## 2019-07-30 NOTE — PROGRESS NOTES
Progress Note - Critical Care   Yann Chan 77 y o  male MRN: [id number]  Unit/Bed#: MICU 02 Encounter: [id number]      HPI/24hr events: CVVH discontinued yesterday and patient given lasix 40mg BID with good UOP    ______________________________________________________________________    Assessment:   Principal Problem:    Parastomal hernia  Active Problems:    DM type 2 (diabetes mellitus, type 2) ([facility])    GERD without esophagitis    Hyperlipidemia    Malignant neoplasm of colon ([facility] [place] [facility] )    Acute kidney injury ([facility] )    Chronic kidney disease    Hypertension    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    Leukocytosis    Oliguria    UTI (urinary tract infection)    Thrombocytopenia ([facility])  Resolved Problems:    Septic shock ([facility] )    Plan:     Neuro:   · Sedation: propofol  · RASS goal: 0 Alert and Calm  · Analgesia with: Fentanyl  · Delirium Precautions: CAM ICU daily, regulate sleep/wake cycle, avoid benzos and opiates as able  · Trend neuro exam  · Parkinson's disorder consider restarting sinemet     CV:   · Shock s/p methylene blue, vitamin C, stress dose steroids, and high dose pressors- resolved  · Cardiac infusions: None  · MAP goal > 65   · Discontinue Arterial line  · Rhythm: NSR  · Follow rhythm on telemetry  · Discontinue Cardiac output monitoring    Lung:   · Acute hypoxic respiratory failure  · B/l pleural effusions secondary to volume overload- continue to diuresis   Plan for lasix 40mg BID today   · SBT plan: Trial but not good candidate for extubation secondary to volume overload  · Chlorhexidine ordered: yes  · Wean PEEP as tolerated  · SpO2 goal >92%    GI:   · Parastomal hernia  · POD#15 s/p exlap, CAMERON, small bowel resection, primary repair of parastomal hernia and colonoscopy  · POD#5 Emergent exlap and bowel resection  · POD#4 Exlap with bowel resection, abdominal washout  · POD#2 Re-exploration, abdominal washout and end to end small bowel anastomosis  · Plan to start trickle TF today  · Continuing on TPN per surgery  · PPI for GERD  · Bowel regiment None currently    FEN:   · Nutrition/diet plan: TPN and start trickle feeds  · Replete electrolytes with goals: K >4 0, Mag >2 0, and Phos >3 0    :   · JELLY on CKD  · Continue Lasix 40mg BID given improvement of UOP and need for large volume diuresis now that SBP stable >24hrs  His weight is up 37lbs  · Discontinue dialysis catheter  Even if worsening renal failure and requiring IHD, he would need a new catheter given this one had difficulty  · Indwelling Ramos present: yes   · Secondary to swelling would not be able to replace Ramos if removed and patient has neurogenic bladder 2/2 APR in 2012 at baseline straight cath's  For this reason continue ramos and discontinue after continued diuresis   · Trend UOP and BUN/creat    ID:   · Source of infection: UTI and anastomotic leak  · Abx ordered: Ampicillin  · Day # 5 of 7 day course  · Follow up blood cultures  · Trend temps and WBC count    Heme:   · Anemia  · Trend hgb and plts likely downtrend secondary to CVVH and sepsis, no obvious source of bleeding  · Transfuse as needed for goal hgb >7 0  · VTE Pharmacologic Prophylaxis: Heparin  · VTE Mechanical Prophylaxis: sequential compression device    Endo:   · Glycemic control plan: Continue insulin gtt    MSK/Skin:  · Mobility goal:   · PT consult: yes  · OT consult: yes  · Frequent turning and pressure off-loading  · Local wound care as needed    Disposition:  Continue ICU care    ______________________________________________________________________    Physical Exam:  [place] Agitation Sedation Scale (RASS): Drowsy  Physical Exam   Constitutional: No distress  HENT:   Head: Normocephalic and atraumatic  Eyes: Pupils are equal, round, and reactive to light  Cardiovascular: Normal rate, regular rhythm and intact distal pulses  Pulmonary/Chest: Effort normal  No respiratory distress  He has no wheezes  He has no rales     Diminished breath sounds throughout   Abdominal: There is tenderness  Firm  Ostomy pink with stool in bag   Musculoskeletal: He exhibits edema  Neurological: He is alert  Intermittently follows commands, CAM +  Moves all extremities to noxious stimuli    Skin: Skin is warm and dry  He is not diaphoretic      ______________________________________________________________________  Temperature:   Temp (24hrs), Av 4 °F (36 9 °C), Min:97 9 °F (36 6 °C), Max:98 8 °F (37 1 °C)    Current      Vitals:    19 0400 19 0500 19 0600 19 0700   BP:       BP Location:       Pulse: 84 80 92 90   Resp:  19   Temp: 98 6 °F (37 °C)      TempSrc: Oral      SpO2: 95%      Weight: 116 kg (255 lb 8 2 oz)      Height:         Arterial Line BP: 128/68       Weights:   IBW: 63 8 kg    Body mass index is 41 24 kg/m²  Weight (last 2 days)     Date/Time   Weight    19 0400   116 (255 51)    19 0500   117 (258 38)    19 0547   117 (258 82)            Height: 5' 6" (167 6 cm)  Hemodynamic Monitoring:  CO:  [5 8 L/min-6 2 L/min] 5 8 L/min  CI:  [2 8 L/min/m2-2 9 L/min/m2] 2 8 L/min/m2    Ventilator Settings:   Vent Mode: AC/VC    Settings  Resp Rate (BPM): 14 BPM  Vt (mL): 550 mL  FIO2 (%): 40 %  PEEP (cmH2O): 8 cmH2O  Flow (LPM): 60 L/min    Observed  Resp Rate (BPM): 19 BPM  VT (mL): 413  MV: 11 4  Peak Pressure (cmH2O): 28 cmH2O  Plateau Pressure (cm H2O): 20 cm H2O  I/E Ratio (Obs): 1:2 5   Static Compliance (mL/cmH20): 49 mL/cmH2O      No results found for: PHART, KGJ3XFY, PO2ART, GYH6MIC, Q7BYHPND, BEART, SOURCE    Intake and Outputs:    Intake/Output Summary (Last 24 hours) at 2019 07  Last data filed at 2019 0601  Gross per 24 hour   Intake 3011 34 ml   Output 4723 ml   Net -1711 66 ml     I/O last 24 hours: In: 3011 3 [I V :508 5; IV Piggyback:1095; TPN:1407 8]  Out: 5272 [Urine:2902; Emesis/NG output:150;  KYDMU:951; Stool:925]    UOP: 0 7cc/kg/hour     Nutrition: Diet Orders   (From admission, onward)             Start     Ordered    07/27/19 1840  Diet NPO  Diet effective now     Question Answer Comment   Diet Type NPO    RD to adjust diet per protocol? Yes        07/27/19 1840                Labs:   Results from last 7 days   Lab Units 07/30/19 0447 07/29/19 0422 07/28/19  1337 07/28/19  1215 07/28/19  1036 07/28/19  0540 07/27/19  1642  07/25/19  2134   WBC Thousand/uL 14 49* 19 24* 20 20*  --   --  18 89*  --    < > 4 25*   HEMOGLOBIN g/dL 7 8* 9 4* 9 4*  --   --  7 6* 7 0*   < > 9 2*   I STAT HEMOGLOBIN g/dl  --   --   --  8 5* 7 1*  --   --    < >  --    HEMATOCRIT % 25 1* 30 0* 29 9*  --   --  24 7* 24 1*   < > 30 9*   HEMATOCRIT, ISTAT %  --   --   --  25* 21*  --   --    < >  --    PLATELETS Thousands/uL 58* 79* 100*  --   --  102*  --    < > 215   NEUTROS PCT %  --  92* 92*  --   --  90*  --    < >  --    BANDS PCT %  --   --   --   --   --   --   --   --  12*   MONOS PCT %  --  3* 4  --   --  4  --    < >  --    MONO PCT % 2*  --   --   --   --   --   --   --  2*    < > = values in this interval not displayed       Results from last 7 days   Lab Units 07/30/19 0447 07/29/19 2014 07/29/19  1205  07/28/19  1215 07/28/19  1036  07/27/19  0409  07/26/19  1129   SODIUM mmol/L 146* 144 142   < >  --   --    < > 144   < >  --    POTASSIUM mmol/L 3 5 3 8 3 8   < >  --   --    < > 4 4   < >  --    CHLORIDE mmol/L 116* 113* 113*   < >  --   --    < > 112*   < >  --    CO2 mmol/L 24 23 23   < >  --   --    < > 14*   < >  --    CO2, I-STAT mmol/L  --   --   --   --  21 23  --   --   --  16*   BUN mg/dL 30* 26* 23   < >  --   --    < > 40*   < >  --    CREATININE mg/dL 1 64* 1 45* 1 32*   < >  --   --    < > 1 89*   < >  --    CALCIUM mg/dL 7 6* 7 9* 8 0*   < >  --   --    < > 8 2*   < >  --    ALBUMIN g/dL  --   --   --   --   --   --   --  1 2*  --   --    ALK PHOS U/L  --   --   --   --   --   --   --  66  --   --    ALT U/L  --   --   --   --   --   --   --  <6*  -- --    AST U/L  --   --   --   --   --   --   --  24  --   --    GLUCOSE, ISTAT mg/dl  --   --   --   --  217* 202*  --   --   --  172*    < > = values in this interval not displayed  Results from last 7 days   Lab Units 07/29/19  1205 07/29/19  0422 07/28/19  1955   MAGNESIUM mg/dL 2 0 2 0 1 9   PHOSPHORUS mg/dL 2 0* 2 2* 2 9      Results from last 7 days   Lab Units 07/27/19  0409   INR  1 69*     Results from last 7 days   Lab Units 07/26/19  1210 07/26/19  1000 07/26/19  0712   TROPONIN I ng/mL 0 02 0 02 0 03     Results from last 7 days   Lab Units 07/28/19  1337 07/27/19  0959 07/26/19  0444   LACTIC ACID mmol/L 1 0 1 0 1 1     ABG:  Lab Results   Component Value Date    PHART 7 287 (L) 07/27/2019    NIE0JBB 27 7 (LL) 07/27/2019    PO2ART 176 6 (H) 07/27/2019    CML5NSG 12 9 (L) 07/27/2019    BEART -12 4 07/27/2019    SOURCE Line, Arterial 07/26/2019     VBG:  Results from last 7 days   Lab Units 07/29/19  0724  07/26/19  2147   PH SANDRA  7 355   < >  --    PCO2 SANDRA mm Hg 46 0   < >  --    PO2 SANDRA mm Hg 32 5*   < >  --    HCO3 SANDRA mmol/L 25 1   < >  --    BASE EXC SANDRA mmol/L -0 6   < >  --    ABG SOURCE   --   --  Line, Arterial    < > = values in this interval not displayed  Imaging: No new     EKG: No new      Micro:  Blood Culture:   Lab Results   Component Value Date    BLOODCX No Growth at 72 hrs  07/26/2019    BLOODCX Enterococcus faecalis (A) 07/26/2019    BLOODCX No Growth After 5 Days  07/14/2019    BLOODCX No Growth After 5 Days   07/14/2019     Urine Culture:   Lab Results   Component Value Date    URINECX >100,000 cfu/ml Escherichia coli (A) 07/24/2019    URINECX 40,000-49,000 cfu/ml Enterococcus faecalis (A) 07/24/2019    URINECX No Growth <1000 cfu/mL 11/04/2016     Sputum Culture: No components found for: SPUTUMCX  Wound Culure: No results found for: [facility]     Lab Results   Component Value Date    BLOODCX No Growth at 72 hrs  07/26/2019    BLOODCX Enterococcus faecalis (A) 07/26/2019 BLOODCX No Growth After 5 Days  07/14/2019    BLOODCX No Growth After 5 Days  07/14/2019    URINECX >100,000 cfu/ml Escherichia coli (A) 07/24/2019    URINECX 40,000-49,000 cfu/ml Enterococcus faecalis (A) 07/24/2019    URINECX No Growth <1000 cfu/mL 11/04/2016       Allergies:    Allergies   Allergen Reactions    Atorvastatin Myalgia    Lyrica [Pregabalin]        Medications:   Scheduled Meds:    Current Facility-Administered Medications:  Adult TPN (CUSTOM BASE/CUSTOM ELECTROLYTE)  Intravenous Continuous Rey Place, PA-C Last Rate: 68 7 mL/hr at 07/29/19 2109   ampicillin 2,000 mg Intravenous Q6H Rey Place, PA-C Last Rate: Stopped (07/30/19 0534)   chlorhexidine 15 mL Swish & Spit Q12H [facility] Dorothy Reese MD    fentaNYL 100 mcg/hr Intravenous Continuous Rey Place, PA-C Last Rate: 100 mcg/hr (07/29/19 1914)   heparin (porcine) 5,000 Units Subcutaneous [facility] Dorothy Reese MD    HYDROmorphone 0 5 mg Intravenous Q3H PRN Rey Place, PA-C    HYDROmorphone 1 mg Intravenous Q3H PRN Rey Place, PA-C    insulin regular (HumuLIN R,NovoLIN R) infusion 0 3-21 Units/hr Intravenous Titrated Rey Place, PA-C Last Rate: 3 Units/hr (07/30/19 0600)   ondansetron 4 mg Intravenous Q4H PRN Dorothy Reese MD    pantoprazole 40 mg Intravenous Q24H [facility] Dorothy Reese MD    potassium chloride 60 mEq Intravenous Once Rey Place, PA-C    propofol 5-50 mcg/kg/min Intravenous Titrated Genevieve Dakin, PA-C Last Rate: 15 mcg/kg/min (07/30/19 0450)     Continuous Infusions:    Adult TPN (CUSTOM BASE/CUSTOM ELECTROLYTE)  Last Rate: 68 7 mL/hr at 07/29/19 2109   fentaNYL 100 mcg/hr Last Rate: 100 mcg/hr (07/29/19 1914)   insulin regular (HumuLIN R,NovoLIN R) infusion 0 3-21 Units/hr Last Rate: 3 Units/hr (07/30/19 0600)   propofol 5-50 mcg/kg/min Last Rate: 15 mcg/kg/min (07/30/19 0450)     PRN Meds:    HYDROmorphone 0 5 mg Q3H PRN   HYDROmorphone 1 mg Q3H PRN   ondansetron 4 mg Q4H PRN       Invasive lines and devices: Invasive Devices     Central Venous Catheter Line            CVC Central Lines 07/26/19 Triple 20cm Left Subclavian 3 days          Arterial Line            Arterial Line 07/28/19 Radial 1 day          Line            Temporary HD Catheter 3 days          Drain            Colostomy LLQ 15 days    Urethral Catheter Non-latex 18 Fr  9 days    NG/OG/Enteral Tube 5 days    NG/OG Tube Orogastric Center mouth 4 days    Negative Pressure Wound Therapy (V A C ) Abdomen Anterior;Mid 4 days          Airway            ETT  Cuffed; Hi-Lo 8 mm 4 days                   Code Status: Level 2 - DNAR: but accepts endotracheal intubation    Portions of the record may have been created with voice recognition software  Occasional wrong word or "sound a like" substitutions may have occurred due to the inherent limitations of voice recognition software  Read the chart carefully and recognize, using context, where substitutions have occurred      Rai Avila PA-C

## 2019-07-30 NOTE — RESPIRATORY THERAPY NOTE
RT Ventilator Management Note  Mat Aguilar 77 y o  male MRN: [id number]  [facility] [id number]      Daily Screen       7/27/2019  0838 7/29/2019  0820          Patient safety screen outcome[de-identified]  Failed  Failed      Not Ready for Weaning due to[de-identified]  Alternative forms of ventilation  PEEP > 8cmH2O              Physical Exam:   Assessment Type: (P) Assess only      Resp Comments: (P) Pt  doing well on A/C  No changes throughout the night

## 2019-07-30 NOTE — PROGRESS NOTES
Progress Note - Colorectal   Nicholas Sees 77 y o  male MRN: [id number]  [id number]    Assessment/Plan:  77 y o  male with parastomal hernia     * Parastomal hernia  Assessment & Plan  S/p exploratory laparotomy, lysis of adhesions, small bowel resection, primary repair of parastomal hernia, colonoscopy 7/15  7/25 Transferred to MICU for worsening respiratory status  Emergently intubated  R IJ and R radial sandy placed  CXR w free air  Emergent OR for exploration, bowel resection  7/26 Ex lap w bowel resection, abdominal washout, wound vac application  1/09 Re-exploration, abdominal washout and anastomosis small bowel    Plan  NPO/NGT  Start trickle feeds  Continue TPN today  Wean vent as able  Continue abx, repeat blood cx pending  CVVH per nephrology  Pain control  Maintain wound VAC until 7/31, plan for bedside removal and tie down of sutures tomorrow      Subjective/Objective     Subjective: weaned off pressors yesterday  trialed IV lasix in attempt to diurese patient to get off CVVH, put out 2 6L urine  Stress steroids d/c'd, abx changed to ampicillin      Objective:     Vitals: Temp:  [97 9 °F (36 6 °C)-98 8 °F (37 1 °C)] 98 6 °F (37 °C)  HR:  [64-94] 80  Resp:  [15-23] 16  BP: (117-141)/(67-90) 141/81  Arterial Line BP: ()/(52-98) 130/60  Body mass index is 41 24 kg/m²      I/O       07/28 0701 - 07/29 0700 07/29 0701 - 07/30 0700    I V  (mL/kg) 1498 (12 8) 508 5 (4 4)    Blood 700     NG/GT  0    IV Piggyback 1470 1095    TPN 1379 1407 8    Total Intake(mL/kg) 5047 (43 1) 3011 3 (26)    Urine (mL/kg/hr) 690 (0 2) 2602 (0 9)    Emesis/NG output 100 150    Drains 25 0    Other 3932 746    Stool 10 925    Total Output 4757 4423    Net +290 -1411 7                Physical Exam:   NAD, intubated/sedated  Following commands when off sedation  Normocephalic, atraumatic  MMM, EOMI, PERRLA  Norm resp effort on AC 14/550/40/8  RRR  Abd soft, NT/ND, wound vac in place holding suction with no leak, ostomy in place with liquid output  No calf tenderness or peripheral edema  Motor/sensation intact in distal extremities  CN grossly intact  -rash/lesions    Lab, Imaging and other studies:   CBC with diff: No results found for: WBC, HGB, HCT, MCV, PLT, ADJUSTEDWBC, MCH, MCHC, RDW, MPV, NRBC, BMP/CMP:   Lab Results   Component Value Date    SODIUM 146 (H) 07/30/2019    K 3 5 07/30/2019     (H) 07/30/2019    CO2 24 07/30/2019    BUN 30 (H) 07/30/2019    CREATININE 1 64 (H) 07/30/2019    CALCIUM 7 6 (L) 07/30/2019    EGFR 39 07/30/2019     VTE Pharmacologic Prophylaxis: Heparin  VTE Mechanical Prophylaxis: sequential compression device

## 2019-07-30 NOTE — PROGRESS NOTES
NEPHROLOGY PROGRESS NOTE   Caden Gallego 77 y o  male MRN: [id number]  Unit/Bed#: MICU 02 Encounter: [id number]  Reason for Consult: JELLY    Assessment:  1  Acute kidney injury, likely secondary to ATN secondary to sepsis and hypotension, urine output is improving  2  Hyponatremia, reduce sodium concentration in TPN, consider free water i e  D5W at 50  3  Sepsis/hypotension, currently off pressors  4  Appears overloaded, continue with IV Lasix as needed  5  Multiple abdominal procedures with exploratory laparotomy, small-bowel resection, abdominal washout and anastomosis of the small bowel    PLAN:  · Urine output improved, continue Lasix as needed  · Will continue to trend renal function and electrolytes  No current need for hemodialysis  · Decrease sodium in TPN to increase free water intake    SUBJECTIVE:  Seen examined  Patient remains intubated  Does awaken with stimuli  No acute distress  Review of Systems    OBJECTIVE:  Current Weight: Weight - Scale: 116 kg (255 lb 8 2 oz)  Vitals:    07/30/19 0500 07/30/19 0600 07/30/19 0700 07/30/19 0802   BP:       BP Location:       Pulse: 80 92 90    Resp: 16 21 19    Temp:       TempSrc:       SpO2:    96%   Weight:       Height:           Intake/Output Summary (Last 24 hours) at 7/30/2019 0913  Last data filed at 7/30/2019 0601  Gross per 24 hour   Intake 2608 14 ml   Output 4150 ml   Net -1541 86 ml       Physical Exam   Constitutional: No distress  Neck: Neck supple  Cardiovascular: Normal rate and regular rhythm  Pulmonary/Chest: Effort normal  He has decreased breath sounds in the right lower field and the left lower field  Abdominal: Soft  He exhibits distension  Musculoskeletal: He exhibits edema  Neurological: He is alert  Skin: Skin is warm         Medications:    Current Facility-Administered Medications:     Adult 3-in-1 TPN (custom base / custom electrolytes), , Intravenous, Continuous, Stephanie Sen PA-C, Last Rate: 68 7 mL/hr at 07/29/19 2109    ampicillin (OMNIPEN) 2,000 mg in sodium chloride 0 9 % 100 mL IVPB, 2,000 mg, Intravenous, Q6H, [employer], PA-C, Stopped at 07/30/19 0534    chlorhexidine (PERIDEX) 0 12 % oral rinse 15 mL, 15 mL, [employer] [place], Q12H [facility], Sony Morgan MD, 15 mL at 07/29/19 2014    fentaNYL 20 mcg/mL (DOUBLE CONCENTRATED) infusion in sodium chloride 0 9% 100 mL, 100 mcg/hr, Intravenous, Continuous, [employer], PA-C, Last Rate: 5 mL/hr at 07/29/19 1914, 100 mcg/hr at 07/29/19 1914    heparin (porcine) subcutaneous injection 5,000 Units, 5,000 Units, Subcutaneous, Q8H [facility], 5,000 Units at 07/30/19 0500 **AND** [CANCELED] Platelet count, , , Once, Edmar Puri    HYDROmorphone (DILAUDID) injection 0 5 mg, 0 5 mg, Intravenous, Q3H PRN, [employer], PA-C    HYDROmorphone (DILAUDID) injection 1 mg, 1 mg, Intravenous, Q3H PRN, [employer], PA-C    insulin regular (HumuLIN R,NovoLIN R) 1 Units/mL in sodium chloride 0 9 % 100 mL infusion, 0 3-21 Units/hr, Intravenous, Titrated, Stephanie Sen PA-C, Last Rate: 3 mL/hr at 07/30/19 0600, 3 Units/hr at 07/30/19 0600    ondansetron (ZOFRAN) injection 4 mg, 4 mg, Intravenous, Q4H PRN, Sony Morgan MD, 4 mg at 07/15/19 0410    pantoprazole (PROTONIX) injection 40 mg, 40 mg, Intravenous, Q24H [facility], Sony Morgan MD, 40 mg at 07/29/19 1861    potassium chloride 20 mEq IVPB (premix), 60 mEq, Intravenous, Once, [employer], PA-C    propofol (DIPRIVAN) 1000 mg in 100 mL infusion (premix), 5-50 mcg/kg/min, Intravenous, Titrated, Gideon Pratt PA-C, Last Rate: 10 5 mL/hr at 07/30/19 0450, 15 mcg/kg/min at 07/30/19 0450    Laboratory Results:  Results from last 7 days   Lab Units 07/30/19  0634 07/30/19  0447 07/29/19 2014 07/29/19  1205 07/29/19  0422 07/28/19  1955 07/28/19  1337 07/28/19  1215 07/28/19  1036 07/28/19  0540 07/28/19  0045 07/27/19  1728  07/27/19  0409 07/26/19  2155  07/26/19  1129  07/26/19  0124  07/25/19  2118   WBC Thousand/uL 15 92* 14 49*  --   --  19 24*  --  20 20*  --   --  18 89*  --   --   --  23 34* 22 43*   < >  --    < >  --    < >  --    HEMOGLOBIN g/dL 8 4* 7 8*  --   --  9 4*  --  9 4*  --   --  7 6*  --   --    < > 7 9* 8 0*   < >  --    < >  --    < >  --    I STAT HEMOGLOBIN g/dl  --   --   --   --   --   --   --  8 5* 7 1*  --   --   --   --   --   --   --  8 5*  --  8 8*  --  8 5*   HEMATOCRIT % 26 2* 25 1*  --   --  30 0*  --  29 9*  --   --  24 7*  --   --    < > 26 4* 26 5*   < >  --    < >  --    < >  --    HEMATOCRIT, ISTAT %  --   --   --   --   --   --   --  25* 21*  --   --   --   --   --   --   --  25*  --  26*  --  25*   PLATELETS Thousands/uL 62* 58*  --   --  79*  --  100*  --   --  102*  --   --   --  163 183   < >  --    < >  --    < >  --    POTASSIUM mmol/L  --  3 5 3 8 3 8 4 0 3 9 4 0  --   --  4 0 4 3 4 1   < > 4 4 4 5   < >  --    < >  --    < >  --    CHLORIDE mmol/L  --  116* 113* 113* 112* 111* 113*  --   --  111* 112* 111*   < > 112* 113*   < >  --    < >  --    < >  --    CO2 mmol/L  --  24 23 23 23 23 22  --   --  21 16* 15*   < > 14* 13*   < >  --    < >  --    < >  --    CO2, I-STAT mmol/L  --   --   --   --   --   --   --  21 23  --   --   --   --   --   --   --  16*  --  20*  --  21   BUN mg/dL  --  30* 26* 23 25 29* 32*  --   --  35* 32* 40*   < > 40* 49*   < >  --    < >  --    < >  --    CREATININE mg/dL  --  1 64* 1 45* 1 32* 1 28 1 41* 1 62*  --   --  1 48* 1 57* 1 59*   < > 1 89* 2 22*   < >  --    < >  --    < >  --    CALCIUM mg/dL  --  7 6* 7 9* 8 0* 7 8* 7 7* 7 8*  --   --  8 1* 7 9* 7 9*   < > 8 2* 7 8*   < >  --    < >  --    < >  --    MAGNESIUM mg/dL  --   --   --  2 0 2 0 1 9 2 1  --   --  2 4 1 9 2 1   < > 2 0 1 9   < >  --    < >  --    < >  --    PHOSPHORUS mg/dL  --   --   --  2 0* 2 2* 2 9 4 0  --   --  3 7 4 3* 4 3*   < > 5 1* 5 2*   < >  --   --   --    < >  --    GLUCOSE, ISTAT mg/dl  --   --   --   --   --   --   --  217* 202*  --   --   --   --   --   --   --  172* --  147*  --  162*    < > = values in this interval not displayed

## 2019-07-30 NOTE — RESPIRATORY THERAPY NOTE
RT Ventilator Management Note  Flor Perera 77 y o  male MRN: [id number]  Unit/Bed#: MICU 02 Encounter: [id number]      Daily Screen       7/27/2019  0838 7/29/2019  0820          Patient safety screen outcome[de-identified]  Failed  Failed      Not Ready for Weaning due to[de-identified]  Alternative forms of ventilation  PEEP > 8cmH2O              Physical Exam:   Assessment Type: Assess only  General Appearance: Sedated  Respiratory Pattern: Assisted  Chest Assessment: Chest expansion symmetrical  Bilateral Breath Sounds: Diminished, Coarse  Cough: Productive  O2 Device: vent      Resp Comments: pt continues on ac/vc settings, fio2 weaned down at this time per spo2 reading, pt appears to be comfortable, will continue to monitor pt

## 2019-07-30 NOTE — UTILIZATION REVIEW
Continued Stay Review    Date: 7/30/2019                          Current Patient Class:  Inpatient   Current Level of Care:  Critical care     HPI:78 y o  male initially admitted on 7/15/2019  As a transfer from [facility] preformed on 7/15 - LAPAROTOMY EXPLORATORY; BOWEL RESECTION; HERNIA REPAIR (N/A)  RESECTION SMALL BOWEL- returned to the OR on 7/26 for REOPENING LAPAROTOMY EXPLORATORY W/ BOWEL RESECTION, WASHOUT PERITONITIS,   Vac application  and again on 7/28 for  Second look LAPAROTOMY EXPLORATORY, removal of Abthera VAC, abdominal washout, lysis of adhesions,  end to end small bowel anastomosis, Vac dressing application  Assessment/Plan: 78 yo m was transferred to MICU on 7/26  He required pressors throughout the course of the day to maintain  MAP > 60-65, on norepinephrine, dobutamine and vasopressin  He  Is on CVVR for acute renal failure contributing to metabolic acidosis  He is intubated on assist control ventilator, wean as tolerated  He is now s/p multiple abdominal procedures,  CVVH stopped on 7/29 on lasix  As needed, currently off pressors  Continue TPN, on vac dressing with a plan to maintain until 87/31 then bedside removal and tie down of sutures  Continue on Antibiotics  Pertinent Labs/Diagnostic Results:   Results from last 7 days   Lab Units 07/30/19  0634 07/30/19  0447 07/29/19  0422 07/28/19  1337 07/28/19  1215  07/25/19  2134   WBC Thousand/uL 15 92* 14 49* 19 24* 20 20*  --    < > 4 25*   HEMOGLOBIN g/dL 8 4* 7 8* 9 4* 9 4*  --    < > 9 2*   I STAT HEMOGLOBIN g/dl  --   --   --   --  8 5*   < >  --    HEMATOCRIT % 26 2* 25 1* 30 0* 29 9*  --    < > 30 9*   HEMATOCRIT, ISTAT %  --   --   --   --  25*   < >  --    PLATELETS Thousands/uL 62* 58* 79* 100*  --    < > 215   NEUTROS ABS Thousands/µL 12 70*  --  17 74* 18 45*  --    < >  --    BANDS PCT %  --   --   --   --   --   --  12*    < > = values in this interval not displayed       Results from last 7 days   Lab Units 07/30/19  0447 07/29/19 2014 07/29/19  1205 07/29/19  0422 07/28/19  1955 07/28/19  1337 07/28/19  1215  07/28/19  0540   SODIUM mmol/L 146* 144 142 141 141 144  --   --  143   POTASSIUM mmol/L 3 5 3 8 3 8 4 0 3 9 4 0  --   --  4 0   CHLORIDE mmol/L 116* 113* 113* 112* 111* 113*  --   --  111*   CO2 mmol/L 24 23 23 23 23 22  --   --  21   CO2, I-STAT mmol/L  --   --   --   --   --   --  21   < >  --    ANION GAP mmol/L 6 8 6 6 7 9  --   --  11   BUN mg/dL 30* 26* 23 25 29* 32*  --   --  35*   CREATININE mg/dL 1 64* 1 45* 1 32* 1 28 1 41* 1 62*  --   --  1 48*   EGFR ml/min/1 73sq m 39 45 51 53 47 40  --   --  44   CALCIUM mg/dL 7 6* 7 9* 8 0* 7 8* 7 7* 7 8*  --   --  8 1*   CALCIUM, IONIZED mmol/L  --   --  1 12 1 11* 1 09* 1 11*  --   --  1 18   CALCIUM, IONIZED, ISTAT mmol/L  --   --   --   --   --   --  1 13   < >  --    MAGNESIUM mg/dL  --   --  2 0 2 0 1 9 2 1  --   --  2 4   PHOSPHORUS mg/dL  --   --  2 0* 2 2* 2 9 4 0  --   --  3 7    < > = values in this interval not displayed       Results from last 7 days   Lab Units 07/27/19  0409   AST U/L 24   ALT U/L <6*   ALK PHOS U/L 66   TOTAL PROTEIN g/dL 4 7*   ALBUMIN g/dL 1 2*   TOTAL BILIRUBIN mg/dL 0 63     Results from last 7 days   Lab Units 07/30/19  0805 07/30/19  0558 07/30/19  0357 07/30/19  0155 07/29/19  2352 07/29/19  2236 07/29/19  2042 07/29/19  1816 07/29/19  1626 07/29/19  1353   POC GLUCOSE mg/dl 125 123 160* 182* 180* 167* 164* 160* 145* 143*     Results from last 7 days   Lab Units 07/30/19  0447 07/29/19 2014 07/29/19  1205 07/29/19  0422 07/28/19  1955 07/28/19  1337 07/28/19  0540 07/28/19  0045 07/27/19  1728 07/27/19  1151   GLUCOSE RANDOM mg/dL 125 153* 130 244* 228* 209* 221* 209* 181* 174*     Results from last 7 days   Lab Units 07/27/19  0414 07/26/19  2147 07/26/19  1643 07/26/19  0444   PH ART  7 287* 7 292* 7 290* 7 338*   PCO2 ART mm Hg 27 7* 27 3* 25 6* 34 1*   PO2 ART mm Hg 176 6* 126 3 83 4 78 6   HCO3 ART mmol/L 12 9* 12 9* 12 0* 17 9*   BASE EXC ART mmol/L -12 4 -12 4 -13 1 -7 1   O2 CONTENT ART mL/dL 12 8* 12 5* 12 3* 12 4*   O2 HGB, ARTERIAL % 98 9* 97 9* 94 3 93 9*   ABG SOURCE   --  Line, Arterial Line, Arterial Artery     Results from last 7 days   Lab Units 07/29/19  0724 07/26/19  2329 07/26/19  1822   PH SANDRA  7 355 7 223* 7 243*   PCO2 SANDRA mm Hg 46 0 32 6* 30 2*   PO2 SANDRA mm Hg 32 5* 42 7 51 3*   HCO3 SANDRA mmol/L 25 1 13 1* 12 7*   BASE EXC SANDRA mmol/L -0 6 -13 4 -13 4   O2 CONTENT SANDRA ml/dL 9 4 8 6 10 1   O2 HGB, VENOUS % 60 0 68 4 79 2     Results from last 7 days   Lab Units 07/28/19  1215 07/28/19  1036 07/26/19  1129 07/26/19  0124 07/25/19  2118   PH, SANDRA I-STAT   --   --  7 292* 7 303  --    PCO2, SANDRA ISTAT mm HG  --   --  31 3* 37 5*  --    PO2, SANDRA ISTAT mm HG  --   --  121 0* 60 0*  --    HCO3, SANDRA ISTAT mmol/L  --   --  15 1* 18 6*  --    I STAT BASE EXC mmol/L -6* -4* -10* -7* -5*   I STAT O2 SAT % 94* 97 98 88* 94*   ISTAT PH ART  7 320* 7 316*  --   --  7 354   I STAT ART PCO2 mm HG 38 4 41 9  --   --  36 1   I STAT ART PO2 mm HG 75 0 95 0  --   --  74 0*   I STAT ART HCO3 mmol/L 19 8* 21 4*  --   --  20 1*     Results from last 7 days   Lab Units 07/26/19  1210 07/26/19  1000 07/26/19  0712 07/25/19  1144 07/25/19  0841   TROPONIN I ng/mL 0 02 0 02 0 03 0 03 0 02     Results from last 7 days   Lab Units 07/27/19  0409   PROTIME seconds 19 4*   INR  1 69*     Results from last 7 days   Lab Units 07/28/19  1337 07/27/19  0959 07/26/19  0444 07/26/19  0248   LACTIC ACID mmol/L 1 0 1 0 1 1 1 1     Results from last 7 days   Lab Units 07/24/19  1304   CLARITY UA  Cloudy   COLOR UA  Yellow   SPEC GRAV UA  1 017   PH UA  5 0   GLUCOSE UA mg/dl Negative   KETONES UA mg/dl 15 (1+)*   BLOOD UA  Moderate*   PROTEIN UA mg/dl 30 (1+)*   NITRITE UA  Negative   BILIRUBIN UA  Interference- unable to analyze*   UROBILINOGEN UA E U /dl 1 0   LEUKOCYTES UA  Small*   WBC UA /hpf 10-20*   RBC UA /hpf 10-20* BACTERIA UA /hpf Innumerable*   EPITHELIAL CELLS WET PREP /hpf Occasional     Results from last 7 days   Lab Units 07/26/19  0653 07/26/19  0638 07/24/19  1304   BLOOD CULTURE  No Growth After 4 Days   Enterococcus faecalis*  --    GRAM STAIN RESULT   --  Gram positive cocci in pairs and chains*  --    URINE CULTURE   --   --  >100,000 cfu/ml Escherichia coli*  40,000-49,000 cfu/ml Enterococcus faecalis*         Vital Signs:   Date/Time  Temp  Pulse  Resp  BP  MAP (mmHg)  Arterial Line BP  MAP  SpO2  O2 Device  CO  CI  Patient Position - Orthostatic VS   07/30/19 1100    96  25Abnormal       138/54  82 mmHg  96 %           07/30/19 1000    98  25Abnormal       128/62  88 mmHg  95 %           07/30/19 0900    102  23Abnormal       116/108  112 mmHg  96 %           07/30/19 0802                96 %           07/30/19 0700    90  19      128/68  92 mmHg             07/30/19 0600    92  21      128/70  94 mmHg             07/30/19 0500    80  16      130/60  80 mmHg             07/30/19 0400  98 6 °F (37 °C)  84  18      124/58  78 mmHg  95 %           07/30/19 0344                95 %           07/30/19 0000  98 8 °F (37 1 °C)  80  17      118/58  76 mmHg  96 %           07/29/19 2330                96 %           07/29/19 2100    82  18      126/60  80 mmHg             07/29/19 2000  98 4 °F (36 9 °C)  88  21      138/64  88 mmHg  96 %           07/29/19 1900    76  16      122/56  74 mmHg             07/29/19 1800    68  15      112/54  70 mmHg  97 %           07/29/19 1700    90  20      134/64  86 mmHg  97 %           07/29/19 1628    90  23Abnormal       130/66  88 mmHg  97 %           07/29/19 1600  98 7 °F (37 1 °C)  94  21      128/68  90 mmHg  97 %    5 8 L/min  2 8 L/min/m2     07/29/19 1513                98 %           07/29/19 1500    64  16      100/54 68 mmHg  97 %           07/29/19 1439    70  17      90/52  62 mmHg  96 %           07/29/19 1400    70  16      116/60  76 mmHg  96 %           07/29/19 1300    68  15      110/56  72 mmHg  95 %           07/29/19 1213    88  15      100/56  70 mmHg  93 %           07/29/19 1200  98 2 °F (36 8 °C)  74  16      98/56  70 mmHg  93 %    6 2 L/min  2 8 L/min/m2           Medications:   Scheduled Meds:   Current Facility-Administered Medications:  Adult TPN (CUSTOM BASE/CUSTOM ELECTROLYTE)  Intravenous Continuous  Rate: 68 7 mL/hr at 07/29/19 2109- to d/c'd on 7/30   ampicillin 2,000 mg Intravenous Q6H    chlorhexidine 15 mL [employer] Q12H [address]    fentaNYL 100 mcg/hr Intravenous Continuous    heparin (porcine) 5,000 Units Subcutaneous Q8H [address]    HYDROmorphone 0 5 mg Intravenous Q3H PRN    HYDROmorphone 1 mg Intravenous Q3H PRN    insulin regular (HumuLIN R,NovoLIN R) infusion 0 3-21 Units/hr Intravenous Titrated    ondansetron 4 mg Intravenous Q4H PRN    pantoprazole 40 mg Intravenous Q24H [address]    potassium chloride 40 mEq Intravenous Once  (07/30/19 1022)   Followed by       potassium chloride 20 mEq Intravenous Once    propofol 5-50 mcg/kg/min Intravenous Titrated    Lasix 40 mg IV Once  7/29 x 2    Mag Oxide 2 G` IV Once  7/28 x 2     Potassium Chloride  40 meq  IV Once  7/30  7/29 x 2    Sodium Phosphate  6 mmol IV Once 7/29 x 2       Nursing Orders -  VS q2  -  neuro checks q 4 - I & O q 2 -  daily weights  -  Wound vac -  Restrepo cath - fall precautions - SCD's to le's  -  Diet tube feeds with  nephro started 7/30- vent weaning    Discharge Plan:  D     Network Utilization Review Department  Phone: [phone]; Fax [phone]  [email or web address]  ATTENTION: Please call with any questions or concerns to [phone]  and carefully listen to the prompts so that you are directed to the right person     Send all requests for admission clinical reviews, approved or denied determinations and any other requests to fax [phone]   All voicemails are confidential

## 2019-07-31 PROBLEM — C18.9 MALIGNANT NEOPLASM OF COLON (HCC): Chronic | Status: ACTIVE | Noted: 2019-01-01

## 2019-07-31 PROBLEM — K43.5 PARASTOMAL HERNIA: Status: RESOLVED | Noted: 2019-01-01 | Resolved: 2019-01-01

## 2019-07-31 PROBLEM — R34 OLIGURIA: Status: RESOLVED | Noted: 2019-01-01 | Resolved: 2019-01-01

## 2019-07-31 PROBLEM — K43.5 PARASTOMAL HERNIA WITHOUT OBSTRUCTION OR GANGRENE: Status: RESOLVED | Noted: 2019-01-01 | Resolved: 2019-01-01

## 2019-07-31 PROBLEM — E46 PROTEIN-CALORIE MALNUTRITION (HCC): Status: ACTIVE | Noted: 2019-01-01

## 2019-07-31 PROBLEM — E87.0 HYPERNATREMIA: Status: ACTIVE | Noted: 2019-01-01

## 2019-07-31 NOTE — PROGRESS NOTES
Progress Note - Critical Care   Reita Aschoff 77 y o  male MRN: [id number]  Unit/Bed#: MICU 02 Encounter: [id number]    ------------------------------------------------------------------------------------------------  Chief Complaint: Patient intubated and sedated     HPI/24hr events: Patient given dose of lasix yesterday to assist with diuresis and volume overload   ----------------------------------------------------------------------------------------------  Assessment and Plan  Principal Problem:    Parastomal hernia  Active Problems:    DM type 2 (diabetes mellitus, type 2) ([place] [id number] )    GERD without esophagitis    Hyperlipidemia    Malignant neoplasm of colon ([facility] )    Acute kidney injury ([facility] )    Chronic kidney disease    Hypertension    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    Leukocytosis    UTI (urinary tract infection)    Thrombocytopenia (HCC)    Hypernatremia    Protein-calorie malnutrition ([facility] )  Resolved Problems:    Septic shock ([facility])    Oliguria      Neuro:    Wean propofol for sedation   Wean fentanyl for sedation/analgesia    Sleep/wake cycle regulation   CAM-ICU daily    CV:    Normotensive off of vasopressors   Maintain MAP >65   Continue on telemetry     Pulm:   Trial PSV today as tolerated  Patient not a candidate for extubation     Suction as needed and closely monitor secretions  Maintain HOB >30 degrees  Q4h oral care with chlorhexidine BID   Maintain SpO2 >92%   BL effusions unchanged on CXR yesterday  Continue with diuresis and nutrition to assist with resolution  No plan to drain at this time    GI:    S/p exploratory lap CAMERON, SBR, primary repair and colostomy    Stress ulcer prophylaxis: PPI    :   [facility] following  Will defer to them for plan for additional diuresis vs HD   Current creatinine 1 75   Strict q1h I/O monitoring   Continue to follow renal function tests    F/E/N:    Continue TPN  Decrease NA   Consider changing some NA to sodium acetate   Plan to slowly advance TF towards goal   Replete electrolytes with as needed to maintain K >4 0, Mag >2 0, Phos >3 0    Heme/Onc:    Anemia unchanged    Current hemoglobin 8 4   Transfuse for hemoglobin <7 0   Thrombocytopenia likely secondary to recent dialysis  No evidence of ongoing bleeding   VTE prophylaxis:  Heparin SQ, SCD's to BLE    Endo:    Continue insulin gtt for goal -180  Lab Results   Component Value Date    HGBA1C 6 1 (H) 2019     ID:    Continue ampicillin for UTI and anastomotic leak   Antibiotic day # 67   Continue to monitor fever and WBC curve 14    MSK/Skin:    Reposition q2h, eliminate pressure points while in bed   Close skin surveillance     Dispo: Remain in ICU     Code Status: Level 2 - DNAR: but accepts endotracheal intubation  ---------------------------------------------------------------------------------------  Physical Exam   Constitutional: He is intubated  Elderly male lying in bed appearing ill   HENT:   Head: Normocephalic  Eyes: Pupils are equal, round, and reactive to light  Cardiovascular: Normal rate and regular rhythm  Pulmonary/Chest: He is intubated  He has decreased breath sounds in the right lower field and the left lower field  Abdominal: Soft  Patient winces to pain when I palpate his abdomen   L ostomy pink   Midline incision dry and intact    Genitourinary:   Genitourinary Comments: Restrepo in place with clear yellow urine   Neurological:   Patient sedated on diprivan and fentanyl   Will respond to verbal stimuli    Skin: Skin is warm and dry  Vitals reviewed      Vitals   Vitals:    19 0300 19 0400 19 0500 19 0600   BP: 110/52 135/65 141/67 135/64   BP Location:       Pulse: 96 96 98 90   Resp: 18 17 18 17   Temp:  98 5 °F (36 9 °C)     TempSrc:  Oral     SpO2: 98% 92% 97% 97%   Weight:       Height:         Temp (24hrs), Av 6 °F (37 °C), Min:98 4 °F (36 9 °C), Max:98 8 °F (37 1 °C)  Current: Temperature: 98 5 °F (36 9 °C)      Invasive/non-invasive ventilation settings   Respiratory    Lab Data (Last 4 hours)    None         O2/Vent Data (Last 4 hours)      07/31 0330           Vent Mode AC/VC       Resp Rate (BPM) (BPM) 14       Vt (mL) (mL) 550       FIO2 (%) (%) 30       PEEP (cmH2O) (cmH2O) 8       MV 10                   Height and Weights   Height: 5' 6" (167 6 cm)  IBW: 63 8 kg  Body mass index is 41 24 kg/m²  Weight (last 2 days)     Date/Time   Weight    07/30/19 0400   116 (255 51)    07/29/19 0500   117 (258 38)              Hemodynamic Monitoring  N/A       Intake and Output  I/O       07/29 0701 - 07/30 0700 07/30 0701 - 07/31 0700 07/31 0701 - 08/01 0700    I V  (mL/kg) 508 5 (4 4) 603 9 (5 2)     Blood       NG/GT 0 86     IV Piggyback 1095 475     TPN 1407 8 921 3     Total Intake(mL/kg) 3011 3 (26) 2086 3 (18)     Urine (mL/kg/hr) 2902 (1) 3550 (1 3)     Emesis/NG output 150      Drains 0 75     Other 746      Stool 925 675     Total Output 4723 4300     Net -1711 7 -2213 7                UOP: 3 3L x 24h    Nutrition       Diet Orders   (From admission, onward)             Start     Ordered    07/30/19 0934  Diet Enteral/Parenteral; Tube Feeding No Oral Diet; Nepro; Continuous; 10  Diet effective now     Question Answer Comment   Diet Type Enteral/Parenteral    Enteral/Parenteral Tube Feeding No Oral Diet    Tube Feeding Formula: Nepro    Bolus/Cyclic/Continuous Continuous    Tube Feeding Goal Rate (mL/hr): 10    RD to adjust diet per protocol? Yes        07/30/19 0935              TF currently running at 10 ml/hr   Formula:Jevity 1 2    Laboratory and Diagnostics:  Results from last 7 days   Lab Units 07/31/19  0438 07/30/19  3293 07/30/19  0447 07/29/19  0422 07/28/19  1337 07/28/19  1215 07/28/19  1036 07/28/19  0540  07/27/19  0409 07/26/19 2155  07/25/19  2134   WBC Thousand/uL 14 83* 15 92* 14 49* 19 24* 20 20*  --   --  18 89*  --  23 34* 22 43*   < > 4 25* HEMOGLOBIN g/dL 8 5* 8 4* 7 8* 9 4* 9 4*  --   --  7 6*   < > 7 9* 8 0*   < > 9 2*   I STAT HEMOGLOBIN g/dl  --   --   --   --   --  8 5* 7 1*  --   --   --   --    < >  --    HEMATOCRIT % 28 1* 26 2* 25 1* 30 0* 29 9*  --   --  24 7*   < > 26 4* 26 5*   < > 30 9*   HEMATOCRIT, ISTAT %  --   --   --   --   --  25* 21*  --   --   --   --    < >  --    PLATELETS Thousands/uL 50* 62* 58* 79* 100*  --   --  102*  --  163 183   < > 215   NEUTROS PCT % 77* 79*  --  92* 92*  --   --  90*  --  90* 90*   < >  --    BANDS PCT %  --   --   --   --   --   --   --   --   --   --   --   --  12*   MONOS PCT % 6 6  --  3* 4  --   --  4  --  5 5   < >  --    MONO PCT %  --   --  2*  --   --   --   --   --   --   --   --   --  2*    < > = values in this interval not displayed  Results from last 7 days   Lab Units 07/31/19  0438 07/30/19  1530 07/30/19  0447 07/29/19 2014 07/29/19  1205 07/29/19  0422 07/28/19  1955  07/27/19  0409   SODIUM mmol/L 150* 150* 146* 144 142 141 141   < > 144   POTASSIUM mmol/L 3 8 4 3 3 5 3 8 3 8 4 0 3 9   < > 4 4   CHLORIDE mmol/L 121* 121* 116* 113* 113* 112* 111*   < > 112*   CO2 mmol/L 24 21 24 23 23 23 23   < > 14*   CO2, I-STAT   --   --   --   --   --   --   --    < >  --    ANION GAP mmol/L 5 8 6 8 6 6 7   < > 18*   BUN mg/dL 31* 31* 30* 26* 23 25 29*   < > 40*   CREATININE mg/dL 1 75* 1 69* 1 64* 1 45* 1 32* 1 28 1 41*   < > 1 89*   CALCIUM mg/dL 7 6* 7 6* 7 6* 7 9* 8 0* 7 8* 7 7*   < > 8 2*   ALT U/L  --   --   --   --   --   --   --   --  <6*   AST U/L  --   --   --   --   --   --   --   --  24   ALK PHOS U/L  --   --   --   --   --   --   --   --  66   ALBUMIN g/dL  --   --   --   --   --   --   --   --  1 2*   TOTAL BILIRUBIN mg/dL  --   --   --   --   --   --   --   --  0 63    < > = values in this interval not displayed       Results from last 7 days   Lab Units 07/31/19  0438 07/29/19  1205 07/29/19  0422 07/28/19  1955 07/28/19  1337 07/28/19  0540 07/28/19  0045   MAGNESIUM mg/dL 1 8 2 0 2 0 1 9 2 1 2 4 1 9   PHOSPHORUS mg/dL 1 4* 2 0* 2 2* 2 9 4 0 3 7 4 3*      Results from last 7 days   Lab Units 07/27/19  0409   INR  1 69*     Results from last 7 days   Lab Units 07/26/19  1210 07/26/19  1000 07/26/19  0712 07/25/19  1144 07/25/19  0841   TROPONIN I ng/mL 0 02 0 02 0 03 0 03 0 02     Results from last 7 days   Lab Units 07/28/19  1337 07/27/19  0959 07/26/19  0444 07/26/19  0248   LACTIC ACID mmol/L 1 0 1 0 1 1 1 1     ABG:  Lab Results   Component Value Date    PHART 7 287 (L) 07/27/2019    [id number] 27 7 (LL) 07/27/2019    PO2ART 176 6 (H) 07/27/2019    [id number] 12 9 (L) 07/27/2019    BEART -12 4 07/27/2019    SOURCE Line, Arterial 07/26/2019     VBG:  Results from last 7 days   Lab Units 07/29/19  0724  07/26/19  2147   PH SANDRA  7 355   < >  --    PCO2 SANDRA mm Hg 46 0   < >  --    PO2 SANDRA mm Hg 32 5*   < >  --    HCO3 SANDRA mmol/L 25 1   < >  --    BASE EXC SANDRA mmol/L -0 6   < >  --    ABG SOURCE   --   --  Line, Arterial    < > = values in this interval not displayed  No results found for: [facility]     EKG: NA  Imaging: I have personally reviewed pertinent reports  and I have personally reviewed pertinent films in PACS    Micro  Results from last 7 days   Lab Units 07/26/19  0653 07/26/19  6807 07/24/19  1304   BLOOD CULTURE  No Growth After 4 Days   Enterococcus faecalis*  --    GRAM STAIN RESULT   --  Gram positive cocci in pairs and chains*  --    URINE CULTURE   --   --  >100,000 cfu/ml Escherichia coli*  40,000-49,000 cfu/ml Enterococcus faecalis*       Allergies   Allergies   Allergen Reactions    Atorvastatin Myalgia    Lyrica [Pregabalin]        Active Medications  Scheduled Meds:    Current Facility-Administered Medications:  Adult TPN (CUSTOM BASE/CUSTOM ELECTROLYTE)  Intravenous Continuous Rebel Terry PA-C Last Rate: 57 7 mL/hr at 07/30/19 2107   ampicillin 2,000 mg Intravenous Q8H Stephanie Sen PA-C Last Rate: Stopped (07/31/19 0537)   chlorhexidine 15 mL Swish & Spit Q12H [facility] Tavares Mcadams MD    fentaNYL 100 mcg/hr Intravenous Continuous Matthiasa ALEXEI Ibarra Last Rate: 100 mcg/hr (07/30/19 1309)   heparin (porcine) 5,000 Units Subcutaneous [facility] Tavares Mcadams MD    HYDROmorphone 0 5 mg Intravenous Q3H PRN Alba Stacey PA-C    HYDROmorphone 1 mg Intravenous Q3H PRN Alba Stacey PA-C    insulin regular (HumuLIN R,NovoLIN R) infusion 0 3-21 Units/hr Intravenous Titrated KODAK JordanC Last Rate: 6 Units/hr (07/31/19 0606)   ondansetron 4 mg Intravenous Q4H PRN Tavares Mcadams MD    pantoprazole 40 mg Intravenous Q24H [facility] Tavares Mcadams MD    propofol 5-50 mcg/kg/min Intravenous Titrated De Dominguez PA-C Last Rate: 20 mcg/kg/min (07/31/19 0215)     Continuous Infusions:    Adult TPN (CUSTOM BASE/CUSTOM ELECTROLYTE)  Last Rate: 57 7 mL/hr at 07/30/19 2107   fentaNYL 100 mcg/hr Last Rate: 100 mcg/hr (07/30/19 1309)   insulin regular (HumuLIN R,NovoLIN R) infusion 0 3-21 Units/hr Last Rate: 6 Units/hr (07/31/19 0606)   propofol 5-50 mcg/kg/min Last Rate: 20 mcg/kg/min (07/31/19 0215)     PRN Meds:     HYDROmorphone 0 5 mg Q3H PRN   HYDROmorphone 1 mg Q3H PRN   ondansetron 4 mg Q4H PRN       VTE Pharmacologic Prophylaxis: Heparin  VTE Mechanical Prophylaxis: sequential compression device    Invasive  Devices  Invasive Devices     Central Venous Catheter Line            CVC Central Lines 07/26/19 Triple 20cm Left Subclavian 4 days          Drain            Colostomy LLQ 16 days    Urethral Catheter Non-latex 18 Fr  10 days    NG/OG/Enteral Tube 6 days    NG/OG Tube Orogastric Center mouth 5 days    Negative Pressure Wound Therapy (V A C ) Abdomen Anterior;Mid 5 days          Airway            ETT  Cuffed; Hi-Lo 8 mm 5 days                Counseling / Coordination of Care  Total Critical Care time spent 34 minutes excluding procedures, teaching and family updates          De Dominguez PA-C    Portions of the record may have been created with voice recognition software  Occasional wrong word or "sound a like" substitutions may have occurred due to the inherent limitations of voice recognition software    Read the chart carefully and recognize, using context, where substitutions have occurred

## 2019-07-31 NOTE — PROGRESS NOTES
Interval Progress Note    Wound VAC removed with 2 black sponges intact  Wound appeared healthy with granulation tissue at wound base  Bedside closure performed by tying down interrupted nylon mattress sutures to approximate skin edges  Patient tolerated the procedure well  Plan to apply a dry sterile dressing and place an abdominal binder          Khadijah Maurice MD

## 2019-07-31 NOTE — PLAN OF CARE
Problem: Potential for Falls  Goal: Patient will remain free of falls  Description  INTERVENTIONS:  - Assess patient frequently for physical needs  -  Identify cognitive and physical deficits and behaviors that affect risk of falls  -  [place] fall precautions as indicated by assessment   - Educate patient/family on patient safety including physical limitations  - Instruct patient to call for assistance with activity based on assessment  - Modify environment to reduce risk of injury  - Consider OT/PT consult to assist with strengthening/mobility  Outcome: Progressing     Problem: Nutrition/Hydration-ADULT  Goal: Nutrient/Hydration intake appropriate for improving, restoring or maintaining nutritional needs  Description  Monitor and assess patient's nutrition/hydration status for malnutrition (ex- brittle hair, bruises, dry skin, pale skin and conjunctiva, muscle wasting, smooth red tongue, and disorientation)  Collaborate with interdisciplinary team and initiate plan and interventions as ordered  Monitor patient's weight and dietary intake as ordered or per policy  Utilize nutrition screening tool and intervene per policy  Determine patient's food preferences and provide high-protein, high-caloric foods as appropriate       INTERVENTIONS:  - Monitor oral intake, urinary output, labs, and treatment plans  - Assess nutrition and hydration status and recommend course of action  - Evaluate amount of meals eaten  - Assist patient with eating if necessary   - Allow adequate time for meals  - Recommend/ encourage appropriate diets, oral nutritional supplements, and vitamin/mineral supplements  - Order, calculate, and assess calorie counts as needed  - Recommend, monitor, and adjust tube feedings and TPN/PPN based on assessed needs  - Assess need for intravenous fluids  - Provide specific nutrition/hydration education as appropriate  - Include patient/family/caregiver in decisions related to nutrition  Outcome: Progressing     Problem: PAIN - ADULT  Goal: Verbalizes/displays adequate comfort level or baseline comfort level  Description  Interventions:  - Encourage patient to monitor pain and request assistance  - Assess pain using appropriate pain scale  - Administer analgesics based on type and severity of pain and evaluate response  - Implement non-pharmacological measures as appropriate and evaluate response  - Consider cultural and social influences on pain and pain management  - Notify physician/advanced practitioner if interventions unsuccessful or patient reports new pain  Outcome: Progressing     Problem: INFECTION - ADULT  Goal: Absence or prevention of progression during hospitalization  Description  INTERVENTIONS:  - Assess and monitor for signs and symptoms of infection  - Monitor lab/diagnostic results  - Monitor all insertion sites, i e  indwelling lines, tubes, and drains  - Monitor endotracheal (as able) and nasal secretions for changes in amount and color  - [place] appropriate cooling/warming therapies per order  - Administer medications as ordered  - Instruct and encourage patient and family to use good hand hygiene technique  - Identify and instruct in appropriate isolation precautions for identified infection/condition  Outcome: Progressing     Problem: SAFETY ADULT  Goal: Patient will remain free of falls  Description  INTERVENTIONS:  - Assess patient frequently for physical needs  -  Identify cognitive and physical deficits and behaviors that affect risk of falls    -  [place] fall precautions as indicated by assessment   - Educate patient/family on patient safety including physical limitations  - Instruct patient to call for assistance with activity based on assessment  - Modify environment to reduce risk of injury  - Consider OT/PT consult to assist with strengthening/mobility  Outcome: Progressing     Problem: DISCHARGE PLANNING  Goal: Discharge to home or other facility with appropriate resources  Description  INTERVENTIONS:  - Identify barriers to discharge w/patient and caregiver  - Arrange for needed discharge resources and transportation as appropriate  - Identify discharge learning needs (meds, wound care, etc )  - Arrange for interpretive services to assist at discharge as needed  - Refer to Case Management Department for coordinating discharge planning if the patient needs post-hospital services based on physician/advanced practitioner order or complex needs related to functional status, cognitive ability, or social support system  Outcome: Progressing     Problem: Knowledge Deficit  Goal: Patient/family/caregiver demonstrates understanding of disease process, treatment plan, medications, and discharge instructions  Description  Complete learning assessment and assess knowledge base    Interventions:  - Provide teaching at level of understanding  - Provide teaching via preferred learning methods  Outcome: Progressing     Problem: Prexisting or High Potential for Compromised Skin Integrity  Goal: Skin integrity is maintained or improved  Description  INTERVENTIONS:  - Identify patients at risk for skin breakdown  - Assess and monitor skin integrity  - Assess and monitor nutrition and hydration status  - Monitor labs (i e  albumin)  - Assess for incontinence   - Turn and reposition patient  - Assist with mobility/ambulation  - Relieve pressure over bony prominences  - Avoid friction and shearing  - Provide appropriate hygiene as needed including keeping skin clean and dry  - Evaluate need for skin moisturizer/barrier cream  - Collaborate with interdisciplinary team (i e  Nutrition, Rehabilitation, etc )   - Patient/family teaching  Outcome: Progressing     Problem: GASTROINTESTINAL - ADULT  Goal: Minimal or absence of nausea and/or vomiting  Description  INTERVENTIONS:  - Administer IV fluids as ordered to ensure adequate hydration  - Maintain NPO status until nausea and vomiting are resolved  - Nasogastric tube as ordered  - Administer ordered antiemetic medications as needed  - Provide nonpharmacologic comfort measures as appropriate  - Advance diet as tolerated, if ordered  - Nutrition services referral to assist patient with adequate nutrition and appropriate food choices  Outcome: Progressing  Goal: Maintains or returns to baseline bowel function  Description  INTERVENTIONS:  - Assess bowel function  - Encourage oral fluids to ensure adequate hydration  - Administer IV fluids as ordered to ensure adequate hydration  - Administer ordered medications as needed  - Encourage mobilization and activity  - Nutrition services referral to assist patient with appropriate food choices  Outcome: Progressing  Goal: Maintains adequate nutritional intake  Description  INTERVENTIONS:  - Monitor percentage of each meal consumed  - Identify factors contributing to decreased intake, treat as appropriate  - Assist with meals as needed  - Monitor I&O, WT and lab values  - Obtain nutrition services referral as needed  Outcome: Progressing  Goal: Establish and maintain optimal ostomy function  Description  INTERVENTIONS:  - Assess bowel function  - Encourage oral fluids to ensure adequate hydration  - Administer IV fluids as ordered to ensure adequate hydration  - Administer ordered medications as needed  - Encourage mobilization and activity  - Nutrition services referral to assist patient with appropriate food choices  - Assess stoma site  Outcome: Progressing     Problem: METABOLIC, FLUID AND ELECTROLYTES - ADULT  Goal: Electrolytes maintained within normal limits  Description  INTERVENTIONS:  - Monitor labs and assess patient for signs and symptoms of electrolyte imbalances  - Administer electrolyte replacement as ordered  - Monitor response to electrolyte replacements, including repeat lab results as appropriate  - Instruct patient on fluid and nutrition as appropriate  Outcome: Progressing  Goal: Fluid balance maintained  Description  INTERVENTIONS:  - Monitor labs and assess for signs and symptoms of volume excess or deficit  - Monitor I/O and WT  - Instruct patient on fluid and nutrition as appropriate  Outcome: Progressing  Goal: Glucose maintained within target range  Description  INTERVENTIONS:  - Monitor Blood Glucose as ordered  - Assess for signs and symptoms of hyperglycemia and hypoglycemia  - Administer ordered medications to maintain glucose within target range  - Assess nutritional intake and initiate nutrition service referral as needed  Outcome: Progressing     Problem: SAFETY,RESTRAINT: NV/NON-SELF DESTRUCTIVE BEHAVIOR  Goal: Remains free of harm/injury (restraint for non violent/non self-detsructive behavior)  Description  INTERVENTIONS:  - Instruct patient/family regarding restraint use   - Assess and monitor physiologic and psychological status   - Provide interventions and comfort measures to meet assessed patient needs   - Identify and implement measures to help patient regain control  - Assess readiness for release of restraint   Outcome: Progressing  Goal: Returns to optimal restraint-free functioning  Description  INTERVENTIONS:  - Assess the patient's behavior and symptoms that indicate continued need for restraint  - Identify and implement measures to help patient regain control  - Assess readiness for release of restraint   Outcome: Progressing     Problem: COPING  Goal: Pt/Family able to verbalize concerns and demonstrate effective coping strategies  Description  INTERVENTIONS:  - Assist patient/family to identify coping skills, available support systems and cultural and spiritual values  - Provide emotional support, including active listening and acknowledgement of concerns of patient and caregivers  - Reduce environmental stimuli, as able  - Provide patient education  - Assess for spiritual pain/suffering and initiate spiritual care, including notification of Pastoral Care or erna based community as needed  - Assess effectiveness of coping strategies  Outcome: Progressing  Goal: Will report anxiety at manageable levels  Description  INTERVENTIONS:  - Administer medication as ordered  - Teach and encourage coping skills  - Provide emotional support  - Assess patient/family for anxiety and ability to cope  Outcome: Progressing     Problem: CHANGE IN BODY IMAGE  Goal: Pt/Family communicate acceptance of loss or change in body image and expresses psychological comfort and peace  Description  INTERVENTIONS:  - Assess patient/family anxiety and grief process related to change in body image, loss of functional status and loss of sense of self  - Assess patient/family's coping skills and provide emotional, spiritual and psychosocial support  - Provide information about the patient's health status with consideration of family and cultural values  - Communicate willingness to discuss loss and facilitate grief process with patient/family as appropriate  - Emphasize sustaining relationships within family system and community, or eran/spiritual traditions  - Refer to community support groups as appropriate  - Initiate Spiritual Care, Pastoral care or other ancillary consults as needed  Outcome: Progressing     Problem: CONFUSION/THOUGHT DISTURBANCE  Goal: Thought disturbances (confusion, delirium, depression, dementia or psychosis) are managed to maintain or return to baseline mental status and functional level  Description  INTERVENTIONS:  - Assess for possible contributors to  thought disturbance, including but not limited to medications, infection, impaired vision or hearing, underlying metabolic abnormalities, dehydration, respiratory compromise,  psychiatric diagnoses and notify attending PHYSICAN/AP  - Monitor and intervene to maintain adequate nutrition, hydration, elimination, sleep and activity  - Decrease environmental stimuli, including noise as appropriate    - Provide frequent contacts to provide refocusing, direction and reassurance as needed  Approach patient calmly with eye contact and at their level  - [place] high risk fall precautions, aspiration precautions and other safety measures, as indicated  - If delirium suspected, notify physician/AP of change in condition and request immediate in-person evaluation  - Pursue consults as appropriate including Geriatric (campus dependent), OT for cognitive evaluation/activity planning, psychiatric, pastoral care, etc   Outcome: Progressing     Problem: CARDIOVASCULAR - ADULT  Goal: Maintains optimal cardiac output and hemodynamic stability  Description  INTERVENTIONS:  - Monitor I/O, vital signs and rhythm  - Monitor for S/S and trends of decreased cardiac output i e  bleeding, hypotension  - Administer and titrate ordered vasoactive medications to optimize hemodynamic stability  - Assess quality of pulses, skin color and temperature  - Assess for signs of decreased coronary artery perfusion - ex   Angina  - Instruct patient to report change in severity of symptoms  Outcome: Progressing  Goal: Absence of cardiac dysrhythmias or at baseline rhythm  Description  INTERVENTIONS:  - Continuous cardiac monitoring, monitor vital signs, obtain 12 lead EKG if indicated  - Administer antiarrhythmic and heart rate control medications as ordered  - Monitor electrolytes and administer replacement therapy as ordered  Outcome: Progressing     Problem: RESPIRATORY - ADULT  Goal: Achieves optimal ventilation and oxygenation  Description  INTERVENTIONS:  - Assess for changes in respiratory status  - Assess for changes in mentation and behavior  - Position to facilitate oxygenation and minimize respiratory effort  - Oxygen administration by appropriate delivery method based on oxygen saturation (per order) or ABGs  - Initiate smoking cessation education as indicated  - Encourage broncho-pulmonary hygiene including cough, deep breathe, Incentive Spirometry  - Assess the need for suctioning and aspirate as needed  - Assess and instruct to report SOB or any respiratory difficulty  - Respiratory Therapy support as indicated  Outcome: Progressing     Problem: GENITOURINARY - ADULT  Goal: Urinary catheter remains patent  Description  INTERVENTIONS:  - Assess patency of urinary catheter  - If patient has a chronic ramos, consider changing catheter if non-functioning  - Follow guidelines for intermittent irrigation of non-functioning urinary catheter  Outcome: Progressing     Problem: SKIN/TISSUE INTEGRITY - ADULT  Goal: Incision(s), wounds(s) or drain site(s) healing without S/S of infection  Description  INTERVENTIONS  - Assess and document risk factors for skin impairment   - Assess and document dressing, incision, wound bed, drain sites and surrounding tissue  - Initiate Nutrition services consult and/or wound management as needed  Outcome: Progressing     Problem: HEMATOLOGIC - ADULT  Goal: Maintains hematologic stability  Description  INTERVENTIONS  - Assess for signs and symptoms of bleeding or hemorrhage  - Monitor labs  - Administer supportive blood products/factors as ordered and appropriate  Outcome: Progressing

## 2019-07-31 NOTE — OCCUPATIONAL THERAPY NOTE
OT CANCEL NOTE    Pt chart reviewed  Pt is currently intubated/sedated and not appropriate to engage in skilled OT services at this time  Will hold OT treatment  Will continue to follow pt on caseload and see pt when medically stable and as clinically appropriate      Mari REYNOLDS, OTR/L

## 2019-07-31 NOTE — PROGRESS NOTES
NEPHROLOGY PROGRESS NOTE   Arden Montes 77 y o  male MRN: [id number]  [facility] Encounter: [id number]  Reason for Consult:  Acute kidney injury    Assessment:  1  Acute kidney injury, likely secondary to ATN secondary to sepsis and hypotension, urine output is improving  2  Hypernatremia, continue with hypotonic TPN, discussed with Critical Care, to add free water flushes  3  Hypophosphatemia, status post phosphorus replacement  4  Thrombocytopenia, platelet count appears stable at 50, given renal failure, check peripheral smear  5  Sepsis/hypotension, improving  6  Volume overload, continue with IV diuresis  7  Multiple abdominal procedures with exploratory laparotomy, small-bowel resection, abdominal washout and anastomosis of the small bowel     PLAN:  · Continue with IV diuresis given patient's volume overload  · Given hyponatremia will need to increase free water specially in lieu of active diuresis  · Continue monitor platelets closely, check peripheral smear  · Discussed with Critical Care  · No need for hemodialysis currently    SUBJECTIVE:  Seen examined  Patient remains is mildly sedated and intubated  Does awaken with stimuli  Review of Systems    OBJECTIVE:  Current Weight: Weight - Scale: 116 kg (255 lb 8 2 oz)  Vitals:    07/31/19 0600 07/31/19 0705 07/31/19 0805 07/31/19 0905   BP: 135/64 141/65 140/64 140/65   BP Location:       Pulse: 90 86 90 88   Resp: 17 17 21 (!) 7   Temp:   98 5 °F (36 9 °C)    TempSrc:   Axillary    SpO2: 97% 98% 96% 98%   Weight:       Height:           Intake/Output Summary (Last 24 hours) at 7/31/2019 1012  Last data filed at 7/31/2019 0957  Gross per 24 hour   Intake 1913 65 ml   Output 3835 ml   Net -1921 35 ml       Physical Exam   Constitutional: No distress  HENT:   Head: Normocephalic  Neck: Neck supple  Cardiovascular: Normal rate     Pulmonary/Chest: Effort normal  He has decreased breath sounds in the right lower field and the left lower field    Abdominal: Soft  Musculoskeletal: He exhibits edema  Neurological: He is alert  Skin: Skin is warm         Medications:    Current Facility-Administered Medications:     Adult 3-in-1 TPN (custom base / custom electrolytes), , Intravenous, Continuous, Roger Overton PA-C, Last Rate: 57 7 mL/hr at 07/30/19 2107    Adult 3-in-1 TPN (custom base / custom electrolytes), , Intravenous, Continuous, Radha Magaña PA-C    ampicillin (OMNIPEN) 2,000 mg in sodium chloride 0 9 % 100 mL IVPB, 2,000 mg, Intravenous, Q8H, Roger Overton PA-C, Stopped at 07/31/19 0528    chlorhexidine (PERIDEX) 0 12 % oral rinse 15 mL, 15 mL, [employer], Q12H [address], Juan Zapata MD, 15 mL at 07/31/19 0945    fentaNYL 20 mcg/mL (DOUBLE CONCENTRATED) infusion in sodium chloride 0 9% 100 mL, 100 mcg/hr, Intravenous, Continuous, Roger Overton PA-C, Last Rate: 5 mL/hr at 07/31/19 0722, 100 mcg/hr at 07/31/19 4847    heparin (porcine) subcutaneous injection 5,000 Units, 5,000 Units, Subcutaneous, Q8H [address], 5,000 Units at 07/31/19 6294 **AND** [CANCELED] Platelet count, , , Once, [employer]    HYDROmorphone (DILAUDID) injection 1 mg, 1 mg, Intravenous, Q1H PRN, Radha Magaña PA-C    insulin regular (HumuLIN R,NovoLIN R) 1 Units/mL in sodium chloride 0 9 % 100 mL infusion, 0 3-21 Units/hr, Intravenous, Titrated, Stephanie Sen PA-C, Last Rate: 6 mL/hr at 07/31/19 1005, 6 Units/hr at 07/31/19 1005    ondansetron (ZOFRAN) injection 4 mg, 4 mg, Intravenous, Q4H PRN, Juan Zapata MD, 4 mg at 07/15/19 0410    pantoprazole (PROTONIX) injection 40 mg, 40 mg, Intravenous, Q24H [address], Juan Zapata MD, 40 mg at 07/31/19 0946    potassium phosphate 21 mmol in sodium chloride 0 9 % 250 mL infusion, 21 mmol, Intravenous, Once, Radha Magaña PA-C    Laboratory Results:  Results from last 7 days   Lab Units 07/31/19  0438 07/30/19  1530 07/30/19  1890 07/30/19  0447 07/29/19  2014 07/29/19  1205 07/29/19  0422 07/28/19  1955 07/28/19  1337 07/28/19  1215 07/28/19  1036 07/28/19  0540 07/28/19  0045  07/27/19  0409  07/26/19  1129  07/26/19  0124  07/25/19  2118   WBC Thousand/uL 14 83*  --  15 92* 14 49*  --   --  19 24*  --  20 20*  --   --  18 89*  --   --  23 34*   < >  --    < >  --    < >  --    HEMOGLOBIN g/dL 8 5*  --  8 4* 7 8*  --   --  9 4*  --  9 4*  --   --  7 6*  --    < > 7 9*   < >  --    < >  --    < >  --    I STAT HEMOGLOBIN g/dl  --   --   --   --   --   --   --   --   --  8 5* 7 1*  --   --   --   --   --  8 5*  --  8 8*  --  8 5*   HEMATOCRIT % 28 1*  --  26 2* 25 1*  --   --  30 0*  --  29 9*  --   --  24 7*  --    < > 26 4*   < >  --    < >  --    < >  --    HEMATOCRIT, ISTAT %  --   --   --   --   --   --   --   --   --  25* 21*  --   --   --   --   --  25*  --  26*  --  25*   PLATELETS Thousands/uL 50*  --  62* 58*  --   --  79*  --  100*  --   --  102*  --   --  163   < >  --    < >  --    < >  --    POTASSIUM mmol/L 3 8 4 3  --  3 5 3 8 3 8 4 0 3 9 4 0  --   --  4 0 4 3   < > 4 4   < >  --    < >  --    < >  --    CHLORIDE mmol/L 121* 121*  --  116* 113* 113* 112* 111* 113*  --   --  111* 112*   < > 112*   < >  --    < >  --    < >  --    CO2 mmol/L 24 21  --  24 23 23 23 23 22  --   --  21 16*   < > 14*   < >  --    < >  --    < >  --    CO2, I-STAT mmol/L  --   --   --   --   --   --   --   --   --  21 23  --   --   --   --   --  16*  --  20*  --  21   BUN mg/dL 31* 31*  --  30* 26* 23 25 29* 32*  --   --  35* 32*   < > 40*   < >  --    < >  --    < >  --    CREATININE mg/dL 1 75* 1 69*  --  1 64* 1 45* 1 32* 1 28 1 41* 1 62*  --   --  1 48* 1 57*   < > 1 89*   < >  --    < >  --    < >  --    CALCIUM mg/dL 7 6* 7 6*  --  7 6* 7 9* 8 0* 7 8* 7 7* 7 8*  --   --  8 1* 7 9*   < > 8 2*   < >  --    < >  --    < >  --    MAGNESIUM mg/dL 1 8  --   --   --   --  2 0 2 0 1 9 2 1  --   --  2 4 1 9   < > 2 0   < >  --    < >  --    < >  --    PHOSPHORUS mg/dL 1 4*  --   --   --   --  2 0* 2 2* 2 9 4 0  --   --  3 7 4 3*   < > 5 1*   < >  --   --   --    < >  --    GLUCOSE, ISTAT mg/dl  --   --   --   --   --   --   --   --   --  217* 202*  --   --   --   --   --  172*  --  147*  --  162*    < > = values in this interval not displayed

## 2019-07-31 NOTE — PROGRESS NOTES
Progress Note - Colorectal   Patricia Pablo 77 y o  male MRN: [id number]  [facility]: [id number]    Assessment/Plan:  77 y o  male with parastomal hernia     * Parastomal hernia  Assessment & Plan  S/p exploratory laparotomy, lysis of adhesions, small bowel resection, primary repair of parastomal hernia, colonoscopy 7/15  7/25 Transferred to MICU for worsening respiratory status  Emergently intubated  R IJ and R radial candida placed  CXR w free air  Emergent OR for exploration, bowel resection  7/26 Ex lap w bowel resection, abdominal washout, wound vac application  1/19 Re-exploration, abdominal washout and anastomosis small bowel    Plan  NPO/NGT  Advance feeds today  Continue TPN today  Wean vent as able  Continue abx, repeat blood cx pending  Pain control  Maintain wound VAC, bedside removal and tie down of sutures today      Subjective/Objective     Subjective: off CVVH, HD cath and Candida out, obeying commands intermittently      Objective:     Vitals: Temp:  [98 4 °F (36 9 °C)-98 8 °F (37 1 °C)] 98 5 °F (36 9 °C)  HR:  [] 90  Resp:  [13-28] 17  BP: (110-141)/(52-67) 135/64  Arterial Line BP: (116-144)/() 116/52  Body mass index is 41 24 kg/m²      I/O       07/28 0701 - 07/29 0700 07/29 0701 - 07/30 0700    I V  (mL/kg) 1498 (12 8) 508 5 (4 4)    Blood 700     NG/GT  0    IV Piggyback 1470 1095    TPN 1379 1407 8    Total Intake(mL/kg) 5047 (43 1) 3011 3 (26)    Urine (mL/kg/hr) 690 (0 2) 2602 (0 9)    Emesis/NG output 100 150    Drains 25 0    Other 3932 746    Stool 10 925    Total Output 4757 4423    Net +290 -1411 7                Physical Exam:   NAD, intubated/sedated  Following commands when off sedation  Normocephalic, atraumatic  MMM, EOMI, PERRLA  Norm resp effort on AC 14/550/30/8  RRR  Abd soft, NT/ND, wound vac in place holding suction with no leak, ostomy in place with liquid output  No calf tenderness or peripheral edema  Motor/sensation intact in distal extremities  CN grossly intact  -rash/lesions    Lab, Imaging and other studies:   CBC with diff:   Lab Results   Component Value Date    WBC 14 83 (H) 07/31/2019    HGB 8 5 (L) 07/31/2019    HCT 28 1 (L) 07/31/2019    MCV 94 07/31/2019    PLT 50 (L) 07/31/2019    MCH 28 5 07/31/2019    MCHC 30 2 (L) 07/31/2019    RDW 15 4 (H) 07/31/2019    MPV 11 5 07/31/2019    NRBC 0 07/31/2019   , BMP/CMP:   Lab Results   Component Value Date    SODIUM 150 (H) 07/31/2019    K 3 8 07/31/2019     (H) 07/31/2019    CO2 24 07/31/2019    BUN 31 (H) 07/31/2019    CREATININE 1 75 (H) 07/31/2019    CALCIUM 7 6 (L) 07/31/2019    EGFR 36 07/31/2019     VTE Pharmacologic Prophylaxis: Heparin  VTE Mechanical Prophylaxis: sequential compression device

## 2019-07-31 NOTE — PROGRESS NOTES
Called by nursing for elevated temp at 102, ordered tylenol  Last BC from 7/30 still pending, treated with ampicillin for UTI and anastomotic leak  Alert and response to name, follows simple commands, At/AC, neck is supple, B/L decreased BS, no wheezing, tachycardia due to elevated temps, no murmur, soft with binder ( wound vac removed today ad closed) ostomy with good liquid stools, +3 edema all 4 ext  Pulses +2   Skin grossly intact Motor and sensation  Intact distally       Plan:   Tylenol   Monitor temps   If repeat repeat urine culture   Labs for the am

## 2019-07-31 NOTE — PHYSICAL THERAPY NOTE
Physical Therapy Cancellation Note    PT orders received, chart review performed  Per discussion with RN, pt is not following commands at this time and is not appropriate for PT treatment   PT to follow     Ronny Tobin, PT, DPT

## 2019-08-01 PROBLEM — A41.9 SEPSIS (HCC): Status: ACTIVE | Noted: 2019-01-01

## 2019-08-01 NOTE — RESPIRATORY THERAPY NOTE
resp care      08/01/19 1152   Respiratory Assessment   Resp Comments pt placed on cpap/ps wean per orders, pt appears comfortable will continue to monitor   O2 Device vent   ETT  Cuffed; Hi-Lo 8 mm   Placement Date/Time: 07/26/19 (c) 002   Type: Cuffed; Hi-Lo  Tube Size: 8 mm  Location: Oral  Insertion attempts: 2  Placement Verification: Chest x-ray;Symmetrical chest wall movement  Secured at (cm): 24 cm   Secured at (cm) 24   Measured from Lips   Secured Location Left   Secured by Commercial tube velez   Site Condition Dry   HI-LO Suction  Intermittent suction

## 2019-08-01 NOTE — RESPIRATORY THERAPY NOTE
resp care      08/01/19 154   Respiratory Assessment   Resp Comments pt tolerated cpap/ps wean x 4 hours, pt placed back on AC settings, appears comfortable will continue to monitor   ETT  Cuffed; Hi-Lo 8 mm   Placement Date/Time: 07/26/19 (c) 0026   Type: Cuffed; Hi-Lo  Tube Size: 8 mm  Location: Oral  Insertion attempts: 2  Placement Verification: Chest x-ray;Symmetrical chest wall movement  Secured at (cm): 24 cm   Secured at (cm) 24   Measured from Lips   Secured Location Left   Secured by Commercial tube velez   Site Condition Dry   HI-LO Suction  Intermittent suction

## 2019-08-01 NOTE — RESPIRATORY THERAPY NOTE
RT Ventilator Management Note  Fran Kenny 77 y o  male MRN: [id number]  [facility]: [id number]      Daily Screen       7/29/2019  0820 7/31/2019  0940          Patient safety screen outcome[de-identified]  Failed  Passed      Not Ready for Weaning due to[de-identified]  PEEP > 8cmH2O        Spont breathing trial % for 30 min:    Yes              Physical Exam:   Assessment Type: (P) Assess only  General Appearance: (P) Sedated  Respiratory Pattern: (P) Assisted  Chest Assessment: (P) Chest expansion symmetrical  Bilateral Breath Sounds: (P) Coarse  O2 Device: (P) vent  Subjective Data: intubated      Resp Comments: (P) Pt has been stable overnight on current AC vent settings  NO chagnes made during shift  Will cont to monitor pt overnight per protocol

## 2019-08-01 NOTE — PROGRESS NOTES
Progress Note - Critical Care   Mat Aguilar 77 y o  male MRN: [id number]  Unit/Bed#: [facility] 02 Encounter: [id number]    Assessment:   Principal Problem:    Parastomal hernia  Active Problems:    DM type 2 (diabetes mellitus, type 2) ([facility] )    GERD without esophagitis    Hyperlipidemia    Malignant neoplasm of colon ([facility] )    Acute kidney injury ([facility] )    Chronic kidney disease    Hypertension    Leukocytosis    UTI (urinary tract infection)    Thrombocytopenia (HCC)    Hypernatremia    Protein-calorie malnutrition ([facility] )    Sepsis ([place] [id number] )        Plan: Critical Care Management as outlined below:     Neuro:  Acute change in mental status with episode of hypoxia this morning  Had dilated, nonreactive pupils with upward gaze and no response to painful stimuli  Now with eyes open and looking around the room without focusing  Not following commands  Concern about possible seizure versus other acute neurologic event  Stat CT head now  Follow that with spot EEG  Continue frequent neurologic exams, daily CAM ICU, delirium precautions  Maintain adequate sleep-wake cycle  CV:  Episode of bradycardia into the 50s with hypoxia  Converted to SVT at 160 beats per minute when hypoxia resolved  Converted to normal sinus rhythm at 120 beats per minute after adenosine 6 mg rapid IV push  Blood pressure significantly elevated during episode, now improved  12 lead EKG showed SVT at 160 beats per minute  Now hemodynamically stable  Continue to monitor closely  Lung:  Acute hypoxic event this morning with oxygen saturations as low as the 40s  Patient removed from ventilator and bag-valve mask ventilation quickly brought oxygen saturation back to the high 90s  Patient placed back on ventilation and peep increased to 10  Suctioning revealed moderate thick secretions  Stat chest x-ray shows persistent bilateral pleural effusions with no other acute abnormalities    Possibly a small amount of cephalization suggesting fluid overload  Continue aggressive pulmonary toilet and frequent suctioning  GI:  Status post exploratory laparotomy, lysis of adhesions, small-bowel resection, primary repair and colostomy, postop day 6  Status post re-exploration, abdominal washout and small bowel anastomosis, postop day 4  Status post delayed primary closure of skin, postop day 1  Incision well-approximated with no erythema or discharge  Dressing intact  Abdominal exam reveals mild tenderness and mild distention  Soft without peritonitis  Ostomy pink and raised  with 275 mL stool output  Tolerating tube feeds at goal   Due to episode described above, concern for intra-abdominal process, however abscess unlikely at this time  Continue to monitor closely  FEN:  1 8 L negative in 24 hours  Has been receiving diuresis and will hold for now in light of above events  Mild hypernatremia persists  Mild hypokalemia and hypomagnesemia, will replete  Continue to monitor and replete as needed  Continue TPN  :  Acute kidney injury BUN and creatinine stable from yesterday  Discontinue diuresis at this time  Excellent urine output at 1 6 milliliters/kilogram per hour  Maintain Restrepo catheter for accurate intake and output measurements  ID:  Febrile last evening to 102  Leukocytosis worsened to 20,000 today  Concern for developing intra-abdominal process verses other source of infection  Had been on ampicillin, will broaden to cefepime, vancomycin and Flagyl for now  Repeat blood cultures, sputum culture, urinalysis  Heme:  H&H stable  Persistent but improved thrombocytopenia at 67,000  Continue subcutaneous heparin and SCDs for DVT prophylaxis  Endo:  History of type 2 diabetes  Continue insulin infusion, currently at 9 units/hour  Msk/Skin:  No skin breakdown  Continue frequent repositioning and offloading       Disposition:  Continue ICU management     ______________________________________________________________________    Chief Complaint:  None given  HPI/24hr events:  Sudden decompensation this morning with hypoxia, bradycardia and altered mental status now resolved  Episode of SVT resolved with adenosine  Mental status remains altered  Febrile last night and increased leukocytosis concerning for new or worsening infection  ______________________________________________________________________    Physical Exam:   Physical Exam   Constitutional: He appears well-developed  He appears lethargic  He appears toxic  He has a sickly appearance  He appears ill  No distress  He is sedated, intubated and restrained  HENT:   Head: Normocephalic and atraumatic  Eyes: Pupils are equal, round, and reactive to light  Conjunctivae are normal    Neck: No JVD present  No tracheal deviation present  Cardiovascular: Regular rhythm, intact distal pulses and normal pulses  Tachycardia present  Pulmonary/Chest: Effort normal  No stridor  He is intubated  He has decreased breath sounds in the right upper field, the right middle field and the right lower field  He has no wheezes  He has rhonchi in the right upper field, the right middle field, the right lower field, the left upper field, the left middle field and the left lower field  He has no rales  Abdominal: Soft  He exhibits distension  There is no tenderness  There is no rigidity and no guarding  Neurological: He appears lethargic  GCS eye subscore is 4  GCS verbal subscore is 1  GCS motor subscore is 1     Skin: Skin is warm and dry              ______________________________________________________________________  Vitals:    08/01/19 0600 08/01/19 0601 08/01/19 0602 08/01/19 0603   BP:       BP Location:       Pulse: 68      Resp:       Temp:       TempSrc:       SpO2: (!) 55% (!) 36% (!) 74% (!) 29%   Weight:       Height:         Arterial Line BP: 116/52  Arterial Line MAP (mmHg): 70 mmHg     Temperature:   Temp (24hrs), Av 6 °F (37 6 °C), Min:98 4 °F (36 9 °C), Max:101 8 °F (38 8 °C)    Current Temperature: 100 4 °F (38 °C)(tyleol given)  Weights:   IBW: 63 8 kg    Body mass index is 41 24 kg/m²  Weight (last 2 days)     Date/Time   Weight    19 0400   116 (255 51)            Hemodynamic Monitoring:  N/A     Non-Invasive/Invasive Ventilation Settings:  Respiratory    Lab Data (Last 4 hours)       0428            pH, Arterial       7 413           pCO2, Arterial       35 9           pO2, Arterial       78 1           HCO3, Arterial       22 4           Base Excess, Arterial       -1 8                O2/Vent Data        0318   Most Recent         Vent Mode AC/VC  AC/VC      Resp Rate (BPM) (BPM) 14  14      Vt (mL) (mL) 550  550      FIO2 (%) (%) 30  30      PEEP (cmH2O) (cmH2O) 8  10      MV 10 3  10 3                Lab Results   Component Value Date    PHART 7 413 2019    ESE7VQL 35 9 (L) 2019    PO2ART 78 1 2019    URO0XKE 22 4 2019    BEART -1 8 2019    SOURCE Radial, Right 2019     SpO2: SpO2: (!) 29 %  , SpO2 Activity: SpO2 Activity: At Rest, SpO2 Device: O2 Device: Other (comment)(vent)  Intake and Outputs:  I/O       701 -  0700  07 -  0700  07 -  0700    I V  (mL/kg) 603 9 (5 2) 334 5 (2 9)     NG/GT 86 630     IV Piggyback 475 630      3 550 1     Feedings  626     Total Intake(mL/kg) 2086 3 (18) 2770 6 (23 9)     Urine (mL/kg/hr) 3550 (1 3) 4360 (1 6)     Emesis/NG output       Drains 75      Other       Stool 675 275     Total Output 4300 4635     Net -2213 7 -1864 4                UOP:  1 6 mL/Kg/hour   Nutrition:        Diet Orders   (From admission, onward)             Start     Ordered    19 1718  Diet Enteral/Parenteral; Tube Feeding No Oral Diet; Nepro; Continuous; 40; 300;  Water; Every 6 hours  Diet effective now     Comments:  Advance tube feeds by 10 mL/hr q 4 hours to goal    Question Answer Comment   Diet Type Enteral/Parenteral    Enteral/Parenteral Tube Feeding No Oral Diet    Tube Feeding Formula: Nepro    Bolus/Cyclic/Continuous Continuous    Tube Feeding Goal Rate (mL/hr): 40    Tube Feeding water flush (mL): 300    Water Flush type: Water    Water flush frequency: Every 6 hours    RD to adjust diet per protocol? Yes        07/31/19 1718                Labs:   Results from last 7 days   Lab Units 08/01/19  0443 07/31/19  0438 07/30/19  0634   WBC Thousand/uL 20 09* 14 83* 15 92*   HEMOGLOBIN g/dL 8 5* 8 5* 8 4*   HEMATOCRIT % 28 7* 28 1* 26 2*   PLATELETS Thousands/uL 67* 50* 62*   NEUTROS PCT % 80* 77* 79*   MONOS PCT % 8 6 6    Results from last 7 days   Lab Units 08/01/19  0509 07/31/19  1543 07/31/19  0438  07/28/19  1215 07/28/19  1036  07/27/19  0409  07/26/19  1129   POTASSIUM mmol/L 3 7 4 2 3 8   < >  --   --    < > 4 4   < >  --    CHLORIDE mmol/L 118* 120* 121*   < >  --   --    < > 112*   < >  --    CO2 mmol/L 24 23 24   < >  --   --    < > 14*   < >  --    CO2, I-STAT mmol/L  --   --   --   --  21 23  --   --   --  16*   BUN mg/dL 37* 34* 31*   < >  --   --    < > 40*   < >  --    CREATININE mg/dL 1 81* 1 82* 1 75*   < >  --   --    < > 1 89*   < >  --    CALCIUM mg/dL 7 6* 7 9* 7 6*   < >  --   --    < > 8 2*   < >  --    ALK PHOS U/L  --   --   --   --   --   --   --  66  --   --    ALT U/L  --   --   --   --   --   --   --  <6*  --   --    AST U/L  --   --   --   --   --   --   --  24  --   --    GLUCOSE, ISTAT mg/dl  --   --   --   --  217* 202*  --   --   --  172*    < > = values in this interval not displayed       Results from last 7 days   Lab Units 08/01/19  0509 07/31/19  0438 07/29/19  1205   MAGNESIUM mg/dL 1 7 1 8 2 0     Results from last 7 days   Lab Units 08/01/19  0509 07/31/19  0438 07/29/19  1205   PHOSPHORUS mg/dL 3 0 1 4* 2 0*      Results from last 7 days   Lab Units 07/27/19  0409   INR  1 69*     Results from last 7 days   Lab Units 07/28/19  1337   LACTIC ACID mmol/L 1 0     0   Lab Value Date/Time    TROPONINI 0 02 07/26/2019 1210    TROPONINI 0 02 07/26/2019 1000    TROPONINI 0 03 07/26/2019 0712    TROPONINI 0 03 07/25/2019 1144    TROPONINI 0 02 07/25/2019 0841     Imaging:  Portable chest x-ray this morning shows mild pulmonary edema and persistent bilateral pleural effusions  Await official read  I have personally reviewed pertinent reports  and I have personally reviewed pertinent films in PACS    Micro:  Lab Results   Component Value Date    BLOODCX No Growth at 24 hrs  07/30/2019    BLOODCX No Growth at 24 hrs  07/30/2019    BLOODCX No Growth After 5 Days  07/26/2019    URINECX >100,000 cfu/ml Escherichia coli (A) 07/24/2019    URINECX 40,000-49,000 cfu/ml Enterococcus faecalis (A) 07/24/2019    URINECX No Growth <1000 cfu/mL 11/04/2016     Allergies:    Allergies   Allergen Reactions    Atorvastatin Myalgia    Lyrica [Pregabalin]      Medications:   Scheduled Meds:  Current Facility-Administered Medications:  acetaminophen 650 mg Oral Q6H PRN Oly Torres PA-C    Adult TPN (CUSTOM BASE/CUSTOM ELECTROLYTE)  Intravenous Continuous Jose Lopez PA-C Last Rate: 57 6 mL/hr at 07/31/19 2154   calcium gluconate 2 g Intravenous Once Elliot Sherley, DO Last Rate: 2 g (08/01/19 0640)   cefepime 2,000 mg Intravenous Q24H Dana Borja PA-C    chlorhexidine 15 mL Swish & Spit Q12H [facility] Urszula Willoughby MD    fentaNYL 100 mcg/hr Intravenous Continuous Mary Jane Lugo PA-C Last Rate: 100 mcg/hr (08/01/19 0145)   heparin (porcine) 5,000 Units Subcutaneous [facility] Urszula Willoughby MD    HYDROmorphone 1 mg Intravenous Q1H PRN Jose Lopez PA-C    insulin regular (HumuLIN R,NovoLIN R) infusion 0 3-21 Units/hr Intravenous Titrated Mary Jane Lugo PA-C Last Rate: 9 Units/hr (08/01/19 2376)   magnesium sulfate 2 g Intravenous Once Elliot Sherley, DO    metroNIDAZOLE 500 mg Intravenous Q8H Regan Fine PA-C    ondansetron 4 mg Intravenous Q4H PRN Juan Zapata MD    pantoprazole 40 mg Intravenous Q24H [address] Juan Zapata MD    potassium chloride 40 mEq Intravenous Once Tony Body, DO Last Rate: 40 mEq (08/01/19 0620)   vancomycin 10 mg/kg Intravenous Q12H Jayla Buckley PA-C      Continuous Infusions:  Adult TPN (CUSTOM BASE/CUSTOM ELECTROLYTE)  Last Rate: 57 6 mL/hr at 07/31/19 2154   fentaNYL 100 mcg/hr Last Rate: 100 mcg/hr (08/01/19 0145)   insulin regular (HumuLIN R,NovoLIN R) infusion 0 3-21 Units/hr Last Rate: 9 Units/hr (08/01/19 0643)     PRN Meds:    acetaminophen 650 mg Q6H PRN   HYDROmorphone 1 mg Q1H PRN   ondansetron 4 mg Q4H PRN     VTE Pharmacologic Prophylaxis: Heparin  VTE Mechanical Prophylaxis: sequential compression device  Invasive lines and devices: Invasive Devices     Central Venous Catheter Line            CVC Central Lines 07/26/19 Triple 20cm Left Subclavian 5 days          Drain            Colostomy LLQ 17 days    Urethral Catheter Non-latex 18 Fr  11 days    NG/OG/Enteral Tube 7 days    NG/OG Tube Orogastric Center mouth 6 days          Airway            ETT  Cuffed; Hi-Lo 8 mm 6 days                   Counseling / Coordination of Care  Total Critical Care time spent 63 minutes excluding procedures, teaching and family updates  Code Status: Level 2 - DNAR: but accepts endotracheal intubation    Portions of the record may have been created with voice recognition software  Occasional wrong word or "sound a like" substitutions may have occurred due to the inherent limitations of voice recognition software  Read the chart carefully and recognize, using context, where substitutions have occurred      Jayla Buckley PA-C

## 2019-08-01 NOTE — PROGRESS NOTES
Progress Note - Lenin Le 1940, 77 y o  male MRN: [id number]    [id number]    Primary Care Provider: Emily Reid DO   Date and time admitted to hospital: 7/15/2019  2:17 AM        * Parastomal hernia  Assessment & Plan  S/p exploratory laparotomy, lysis of adhesions, small bowel resection, primary repair of parastomal hernia, colonoscopy 7/15  7/25 Transferred to MICU for worsening respiratory status  Emergently intubated  R IJ and R radial sandy placed  CXR w free air  Emergent OR for exploration, bowel resection  7/26 Ex lap w bowel resection, abdominal washout, wound vac application  1/01 Re-exploration, abdominal washout and anastomosis small bowel  7/31 Suture tied downs, vac removed    Plan  NPO/NGT  Continue tube feeds today; at goal of 40 cc/hr  Continue TPN today  Wean vent as able  Continue abx, repeat blood cx pending from 7/30  Pain control  Maintain abdominal binder with hole cut for ostomy      Possible CT CAP today       Subjective/Objective     Subjective: Desat overnight and bradycardic, followed by SVT  Given 6mg of adenosine with sinus rhythm underlying  Not following commands this am  Ostomy pink and functioning with liquid brown stool  Objective:   Blood pressure 151/73, pulse (!) 116, temperature 100 4 °F (38 °C), temperature source Oral, resp  rate 16, height 5' 6" (1 676 m), weight 116 kg (255 lb 8 2 oz), SpO2 98 %  ,Body mass index is 41 24 kg/m²        Intake/Output Summary (Last 24 hours) at 8/1/2019 0540  Last data filed at 8/1/2019 0401  Gross per 24 hour   Intake 3035 26 ml   Output 5085 ml   Net -2049 74 ml       Invasive Devices     Central Venous Catheter Line            CVC Central Lines 07/26/19 Triple 20cm Left Subclavian 5 days          Drain            Colostomy LLQ 17 days    Urethral Catheter Non-latex 18 Fr  11 days    NG/OG/Enteral Tube 7 days    NG/OG Tube Orogastric Center mouth 6 days          Airway            ETT Cuffed; Hi-Lo 8 mm 6 days                Physical Exam:  GEN: NAD, encephalopathic this am, eyes open, not following commands   HEENT: MMM  CV: sinus, tachycardic  Lung: increased RR, decreased airflow  Ab: Soft, NT  Distended  Midline incision CDI with sutures in place  Ostomy pink and viable with brown liquid stool in bag  Extrem: pitting edema b/l   Neuro: A+Ox3      Lab, Imaging and other studies:  I have personally reviewed pertinent lab results    , CBC:   Lab Results   Component Value Date    WBC 20 09 (H) 08/01/2019    HGB 8 5 (L) 08/01/2019    HCT 28 7 (L) 08/01/2019    MCV 95 08/01/2019    PLT 67 (L) 08/01/2019    MCH 28 1 08/01/2019    MCHC 29 6 (L) 08/01/2019    RDW 15 4 (H) 08/01/2019    MPV 12 2 08/01/2019    NRBC 0 08/01/2019   , CMP:   Lab Results   Component Value Date    SODIUM 149 (H) 08/01/2019    K 3 7 08/01/2019     (H) 08/01/2019    CO2 24 08/01/2019    BUN 37 (H) 08/01/2019    CREATININE 1 81 (H) 08/01/2019    CALCIUM 7 6 (L) 08/01/2019    EGFR 35 08/01/2019     VTE Pharmacologic Prophylaxis: Heparin  VTE Mechanical Prophylaxis: sequential compression device

## 2019-08-01 NOTE — RESPIRATORY THERAPY NOTE
RT Ventilator Management Note  Mat Aguilar 77 y o  male MRN: [id number]  Unit/Bed#: [facility] 06 Encounter: [id number]      Daily Screen       7/31/2019  0940 8/1/2019  0800          Patient safety screen outcome[de-identified]  Passed  Passed      Not Ready for Weaning due to[de-identified]    Underline problem not resolved;PEEP > 8cmH2O      Spont breathing trial % for 30 min:  Yes                Physical Exam:   Assessment Type: Assess only  General Appearance: Alert, Awake  Respiratory Pattern: Assisted  Chest Assessment: Chest expansion symmetrical  Bilateral Breath Sounds: Coarse  R Breath Sounds: Diminished  O2 Device: vent      Resp Comments: (P) no weaning attempted, pt's peep increased to 10, pt awake, alert, following commands, will continue to monitor(pt transported to ct scan via portable vent, no problems )

## 2019-08-01 NOTE — PHYSICAL THERAPY NOTE
Physical Therapy Cancellation Note    Per discussion with RN, pt remains inappropriate for PT intervention   PT to follow     Geremias Mccloud, PT, DPT

## 2019-08-01 NOTE — PROGRESS NOTES
At 0477 11 28 98: pt extremely agitated, surgical PA called  IV haldol ordered  0600: Pt began desatting into 80s with a decrease in HR, surgical PA and respiratory called  Pt bagged  O2 saturation increased >90%  HR tachycardic in 160s  EKG obtained  Adenosine given  Post administration, HR in 110s-120s  Neuro status changed, pt no longer following commands

## 2019-08-01 NOTE — PROGRESS NOTES
NEPHROLOGY PROGRESS NOTE   Jailene Araujo 77 y o  male MRN: [id number]  [facility]: [id number]  Reason for Consult:  Acute kidney injury    Assessment:  1  Acute kidney injury, likely secondary to ATN secondary to sepsis and hypotension, urine output is improving  2  Hypernatremia, continue with hypotonic TPN, discussed with Critical Care, to add free water flushes  3  Hypophosphatemia, improved, status post phosphorus replacement  4  Thrombocytopenia, platelet count appears stable at 50, given renal failure, check peripheral smear negative for schistocytes  5  Sepsis/hypotension, improving  6  Volume overload, continue with IV diuresis  7  Multiple abdominal procedures with exploratory laparotomy, small-bowel resection, abdominal washout and anastomosis of the small bowel     PLAN:  · Would continue with diuresis given patient's volume overload  · Creatinine seems to have plateaued approximately 1 8  · Sodium slightly improved to 149, continue with 3 water flushes  · Discussed with Critical Care, possible need for ventral CT with contrast   Would recommend at that time stopping diuresis and starting gentle IV fluids  SUBJECTIVE:  Seen and examined  Patient sedated  Remains intubated  Unfortunate last evening had a hypoxic episode with a bradyarrhythmia  Possible mucous plugging  Currently undergoing EEG  Review of Systems    OBJECTIVE:  Current Weight: Weight - Scale: 116 kg (255 lb 8 2 oz)  Vitals:    08/01/19 0601 08/01/19 0602 08/01/19 0603 08/01/19 0800   BP:       BP Location:       Pulse:       Resp:       Temp:       TempSrc:       SpO2: (!) 36% (!) 74% (!) 29% 98%   Weight:       Height:           Intake/Output Summary (Last 24 hours) at 8/1/2019 1030  Last data filed at 8/1/2019 0800  Gross per 24 hour   Intake 2339 06 ml   Output 4825 ml   Net -2485 94 ml       Physical Exam   Constitutional: No distress  HENT:   Head: Normocephalic  Neck: Neck supple     Cardiovascular: Normal rate and regular rhythm  Pulmonary/Chest: Effort normal    Abdominal: Soft  Musculoskeletal: He exhibits edema  Neurological: He is alert  Skin: Skin is warm         Medications:    Current Facility-Administered Medications:     acetaminophen (TYLENOL) oral suspension 650 mg, 650 mg, Oral, Q6H PRN, Oly Torres PA-C, 650 mg at 08/01/19 0443    Adult 3-in-1 TPN (custom base / custom electrolytes), , Intravenous, Continuous, Alec Duncan PA-C, Last Rate: 57 6 mL/hr at 07/31/19 2154    Adult 3-in-1 TPN (custom base / custom electrolytes), , Intravenous, Continuous, Madhuri Tarango MD    cefepime (MAXIPIME) 2,000 mg in dextrose 5 % 50 mL IVPB, 2,000 mg, Intravenous, Q24H, Lucero Pina PA-C, Last Rate: 100 mL/hr at 08/01/19 1005, 2,000 mg at 08/01/19 1005    chlorhexidine (PERIDEX) 0 12 % oral rinse 15 mL, 15 mL, [employer]ish [employer] Sab[employer], Q12H [address], Cherrie Boas, MD, 15 mL at 08/01/19 0856    fentaNYL 20 mcg/mL (DOUBLE CONCENTRATED) infusion in sodium chloride 0 9% 100 mL, 100 mcg/hr, Intravenous, Continuous, Stephanie Sen PA-C, Last Rate: 5 mL/hr at 08/01/19 0145, 100 mcg/hr at 08/01/19 0145    heparin (porcine) subcutaneous injection 5,000 Units, 5,000 Units, Subcutaneous, Q8H [address], 5,000 Units at 08/01/19 6350 **AND** [CANCELED] Platelet count, , , Once, Threasa Naman    HYDROmorphone (DILAUDID) injection 1 mg, 1 mg, Intravenous, Q1H PRN, Alec Duncan PA-C, 1 mg at 08/01/19 0443    insulin regular (HumuLIN R,NovoLIN R) 1 Units/mL in sodium chloride 0 9 % 100 mL infusion, 0 3-21 Units/hr, Intravenous, Titrated, Stephanie Sen PA-C, Last Rate: 9 mL/hr at 08/01/19 0643, 9 Units/hr at 08/01/19 0643    metroNIDAZOLE (FLAGYL) IVPB (premix) 500 mg, 500 mg, Intravenous, Q8H, Lucero Pina PA-C, Last Rate: 200 mL/hr at 08/01/19 0856, 500 mg at 08/01/19 0856    ondansetron (ZOFRAN) injection 4 mg, 4 mg, Intravenous, Q4H PRN, Cherrie Boas, MD, 4 mg at 07/15/19 0410    pantoprazole (PROTONIX) injection 40 mg, 40 mg, Intravenous, Q24H [address], Aki Johnson MD, 40 mg at 08/01/19 0856    vancomycin (VANCOCIN) 2,000 mg in sodium chloride 0 9 % 500 mL IVPB, 2,000 mg, Intravenous, Once, Rosemary Plunkett PA-C    Laboratory Results:  Results from last 7 days   Lab Units 08/01/19  0509 08/01/19  0443 07/31/19  1543 07/31/19  0438 07/30/19  1530 07/30/19  0634 07/30/19  0447 07/29/19 2014 07/29/19  1205 07/29/19  0422 07/28/19  1955 07/28/19  1337 07/28/19  1215 07/28/19  1036 07/28/19  0540  07/26/19  1129  07/26/19  0124  07/25/19  2118   WBC Thousand/uL  --  20 09*  --  14 83*  --  15 92* 14 49*  --   --  19 24*  --  20 20*  --   --  18 89*   < >  --    < >  --    < >  --    HEMOGLOBIN g/dL  --  8 5*  --  8 5*  --  8 4* 7 8*  --   --  9 4*  --  9 4*  --   --  7 6*   < >  --    < >  --    < >  --    I STAT HEMOGLOBIN g/dl  --   --   --   --   --   --   --   --   --   --   --   --  8 5* 7 1*  --   --  8 5*  --  8 8*  --  8 5*   HEMATOCRIT %  --  28 7*  --  28 1*  --  26 2* 25 1*  --   --  30 0*  --  29 9*  --   --  24 7*   < >  --    < >  --    < >  --    HEMATOCRIT, ISTAT %  --   --   --   --   --   --   --   --   --   --   --   --  25* 21*  --   --  25*  --  26*  --  25*   PLATELETS Thousands/uL  --  67*  --  50*  --  62* 58*  --   --  79*  --  100*  --   --  102*   < >  --    < >  --    < >  --    POTASSIUM mmol/L 3 7  --  4 2 3 8 4 3  --  3 5 3 8 3 8 4 0 3 9 4 0  --   --  4 0   < >  --    < >  --    < >  --    CHLORIDE mmol/L 118*  --  120* 121* 121*  --  116* 113* 113* 112* 111* 113*  --   --  111*   < >  --    < >  --    < >  --    CO2 mmol/L 24  --  23 24 21  --  24 23 23 23 23 22  --   --  21   < >  --    < >  --    < >  --    CO2, I-STAT mmol/L  --   --   --   --   --   --   --   --   --   --   --   --  21 23  --   --  16*  --  20*  --  21   BUN mg/dL 37*  --  34* 31* 31*  --  30* 26* 23 25 29* 32*  --   --  35*   < >  --    < >  --    < >  --    CREATININE mg/dL 1 81*  --  1 82* 1 75* 1 69* --  1 64* 1 45* 1 32* 1 28 1 41* 1 62*  --   --  1 48*   < >  --    < >  --    < >  --    CALCIUM mg/dL 7 6*  --  7 9* 7 6* 7 6*  --  7 6* 7 9* 8 0* 7 8* 7 7* 7 8*  --   --  8 1*   < >  --    < >  --    < >  --    MAGNESIUM mg/dL 1 7  --   --  1 8  --   --   --   --  2 0 2 0 1 9 2 1  --   --  2 4   < >  --    < >  --    < >  --    PHOSPHORUS mg/dL 3 0  --   --  1 4*  --   --   --   --  2 0* 2 2* 2 9 4 0  --   --  3 7   < >  --   --   --    < >  --    GLUCOSE, ISTAT mg/dl  --   --   --   --   --   --   --   --   --   --   --   --  217* 202*  --   --  172*  --  147*  --  162*    < > = values in this interval not displayed

## 2019-08-01 NOTE — NUTRITION
Propofol d/yamilet  Consider changing TF to [place] @ 41 ml/hr + 1 PROSOURCE to = qd: 984 ml,1831 Total Kcal,59 gm PRO,193 gm CHO,94 gm Fat,728 ml Free H2O,1 15 mg CHO/kg/min  Wean off TPN when TF tolerated at goal rate  Check Ionized Ca   Monitor renal shyam

## 2019-08-02 PROBLEM — D64.9 ANEMIA: Status: ACTIVE | Noted: 2019-01-01

## 2019-08-02 PROBLEM — E87.0 HYPERNATREMIA: Status: RESOLVED | Noted: 2019-01-01 | Resolved: 2019-01-01

## 2019-08-02 NOTE — PHYSICAL THERAPY NOTE
Physical Therapy Re-Evaluation     Patient's Name: Gemma Lawson    Admitting Diagnosis  Hernia, epigastric [K43 9]  Malignant neoplasm of colon, unspecified part of colon ([facility] ) [C18 9]    Problem List  Patient Active Problem List   Diagnosis    Allergic rhinitis    Cancer, colon ([facility] )    DM type 2 (diabetes mellitus, type 2) ([facility] )    Elevated prostate specific antigen (PSA)    Enlarged prostate without lower urinary tract symptoms (luts)    Benign essential hypertension    GERD without esophagitis    Hyperlipidemia    Displacement of lumbar intervertebral disc without myelopathy    Lumbar radiculopathy    Neurogenic bladder disorder    Right knee pain    Vitamin D deficiency    Primary Parkinsonism ([facility] )    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    Spinal stenosis of lumbar region    Status post lumbar discectomy    Malignant neoplasm of colon ([facility] )    Parastomal hernia    [facility]    Chronic kidney disease    Hypertension    Leukocytosis    UTI (urinary tract infection)    Thrombocytopenia (HCC)    Protein-calorie malnutrition ([facility] )    Sepsis [facility]    Anemia       Past Medical History  Past Medical History:   Diagnosis Date    Benign prostatic hyperplasia with urinary obstruction and other lower urinary tract symptoms     Colon cancer ([facility] ) 1998    Elevated PSA     last assessed 3/11/2014    Hypercholesterolemia     Hypertension     Malignant neoplasm of skin     Microhematuria     Neurogenic bladder     last assessed 6/13/2014    Type 2 diabetes mellitus ([facility]     Urinary incontinence        Past Surgical History  Past Surgical History:   Procedure Laterality Date    APPENDECTOMY      5 y/o    COLON SURGERY      COLOSTOMY      EXPLORATORY LAPAROTOMY W/ BOWEL RESECTION N/A 7/26/2019    Procedure: LAPAROTOMY EXPLORATORY W/ BOWEL RESECTION, IRRIGATION OF INFECTED MATERIAL: VAC APPLICATION;  Surgeon: Alec Bustillos MD;  [facility]: [facility];  Service: Colorectal    LAPAROTOMY N/A 7/15/2019    Procedure: LAPAROTOMY EXPLORATORY; BOWEL RESECTION; HERNIA REPAIR;  Surgeon: Angel Hanson MD;  [facility];  Service: Colorectal    LAPAROTOMY N/A 7/28/2019    Procedure: Second look LAPAROTOMY EXPLORATORY, removal of Abthera VAC, abdominal washout, lysis of adhesions,  end to end small bowel anastomosis, Vac dressing application;  Surgeon: Webb Cogan, MD;  [facility]: [facility];  Service: Colorectal    PROSTATE BIOPSY      ROTATOR CUFF REPAIR      resolved 1999    SKIN BIOPSY      SMALL INTESTINE SURGERY N/A 7/15/2019    Procedure: RESECTION SMALL BOWEL;  Surgeon: Angel Hanson MD;  [facility]: [facility];  Service: Colorectal    TONSILLECTOMY      5 y/o    VASECTOMY            08/02/19 1832   Note Type   Note type Re-eval   Pain Assessment   Pain Assessment No/denies pain   Pain Score No Pain   Home Living   Additional Comments See initial PT evaluation   Prior Function   Comments See initial PT evaluation   Restrictions/Precautions   Weight Bearing Precautions Per Order No   Other Precautions Cognitive; Impulsive;Multiple lines;Telemetry;O2;Fall Risk;Pain   General   Family/Caregiver Present No   Cognition   Overall Cognitive Status Impaired   Arousal/Participation Cooperative   Attention Attends with cues to redirect   Orientation Level Oriented to person;Oriented to place; Disoriented to time   Following Commands Follows one step commands with increased time or repetition   RLE Assessment   RLE Assessment   (Grossly 3/5)   LLE Assessment   LLE Assessment   (Grossly 3/5)   Bed Mobility   Rolling R 2  Maximal assistance   Additional items Assist x 2; Increased time required;Verbal cues;LE management   Rolling L 2  Maximal assistance   Additional items Assist x 2; Increased time required;Verbal cues;LE management   Supine to Sit 2  Maximal assistance   Additional items Assist x 2; Increased time required;Verbal cues;LE management   Sit to Supine 2  Maximal assistance   Additional items Assist x 2; Increased time required;Verbal cues;LE management   Balance   Static Sitting Poor   Dynamic Sitting Poor -   Endurance Deficit   Endurance Deficit Yes   Endurance Deficit Description Deconditioning   Activity Tolerance   Activity Tolerance Patient limited by fatigue   Medical Staff Made Aware OT   Nurse Made Aware Yes    Assessment   Prognosis Fair   Problem List Decreased strength;Decreased endurance; Impaired balance;Decreased mobility; Decreased coordination;Decreased cognition; Impaired judgement;Decreased safety awareness   Assessment Pt seen for physical therapy re-evaluation s/p transfer to MICU, intubation, and extubation on 8/2/19  Pt is supine at start of session and agreeable to therapy  Pt is lethargic but able to participate in therapy session  Pt transferred supine <> sit with maxA x2  Upon sitting EOB pt presents with significant posterior lean requiring maxA x1 for static sitting  Sit <> stand is deferred 2/2 pt's poor sitting tolerance and increasing anxiety while sitting EOB  Pt returned to supine and boosted in bed for comfort  Pt remained supine with RN at bedside and all needs within reach  PT to recommend inpt rehab to maximize safety and independence with functional mobility  PT to follow   Goals   Patient Goals To rest   STG Expiration Date 08/16/19   Short Term Goal #1 In 10 sessions pt will: 1  Roll R<>L with James  2  Transfer supine <> sit with James  3  Tolerate sitting EOB >10 minutes with James for balance  4  Perform LE exercise program  5  PT to assess transfers when appropriate    Treatment Day 4   Plan   Treatment/Interventions Functional transfer training;LE strengthening/ROM; Therapeutic exercise; Endurance training;Bed mobility;Spoke to nursing;OT   PT Frequency   (3-5x/wk)   Recommendation   Recommendation Post acute IP rehab   PT - OK to Discharge Yes  (To rehab when medically stable) Modified Farhad Scale   Modified [place] Scale 5   Barthel Index   Feeding 0   Bathing 0   Grooming Score 0   Dressing Score 5   Bladder Score 0   Bowels Score 0   Toilet Use Score 5   Transfers (Bed/Chair) Score 5   Mobility (Level Surface) Score 0   Stairs Score 0   Barthel Index Score 15         Ashutosh Reyes, PT, DPT

## 2019-08-02 NOTE — PLAN OF CARE
Problem: PHYSICAL THERAPY ADULT  Goal: Performs mobility at highest level of function for planned discharge setting  See evaluation for individualized goals  Description  Treatment/Interventions: Functional transfer training, LE strengthening/ROM, Elevations, Therapeutic exercise, Endurance training, Patient/family training, Equipment eval/education, Bed mobility, Gait training, Spoke to nursing, OT  Equipment Recommended: Walker(RW)       See flowsheet documentation for full assessment, interventions and recommendations  Outcome: Progressing  Note:   Prognosis: Fair  Problem List: Decreased strength, Decreased endurance, Impaired balance, Decreased mobility, Decreased coordination, Decreased cognition, Impaired judgement, Decreased safety awareness  Assessment: Pt seen for physical therapy re-evaluation s/p transfer to MICU, intubation, and extubation on 8/2/19  Pt is supine at start of session and agreeable to therapy  Pt is lethargic but able to participate in therapy session  Pt transferred supine <> sit with maxA x2  Upon sitting EOB pt presents with significant posterior lean requiring maxA x1 for static sitting  Sit <> stand is deferred 2/2 pt's poor sitting tolerance and increasing anxiety while sitting EOB  Pt returned to supine and boosted in bed for comfort  Pt remained supine with RN at bedside and all needs within reach  PT to recommend inpt rehab to maximize safety and independence with functional mobility  PT to follow  Barriers to Discharge: Inaccessible home environment     Recommendation: (S) Post acute IP rehab     PT - OK to Discharge: (S) Yes(To rehab when medically stable)    See flowsheet documentation for full assessment        J Luis Oliveros, PT, DPT

## 2019-08-02 NOTE — OCCUPATIONAL THERAPY NOTE
Occupational Therapy Re-Evaluation     Jailene Araujo    8/2/2019    Patient Active Problem List   Diagnosis    Allergic rhinitis    Cancer, colon ([facility] )    DM type 2 (diabetes mellitus, type 2) (HCC)    Elevated prostate specific antigen (PSA)    Enlarged prostate without lower urinary tract symptoms (luts)    Benign essential hypertension    GERD without esophagitis    Hyperlipidemia    Displacement of lumbar intervertebral disc without myelopathy    Lumbar radiculopathy    Neurogenic bladder disorder    Right knee pain    Vitamin D deficiency    Primary Parkinsonism ([facility] )    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    Spinal stenosis of lumbar region    Status post lumbar discectomy    Malignant neoplasm of colon ([facility] )    Parastomal hernia    Acute kidney injury ([facility] )    Chronic kidney disease    Hypertension    Leukocytosis    UTI (urinary tract infection)    Thrombocytopenia ([facility])    Protein-calorie malnutrition ([facility] )    Sepsis ([facility] )    Anemia       Past Medical History:   Diagnosis Date    Benign prostatic hyperplasia with urinary obstruction and other lower urinary tract symptoms     Colon cancer ([facility] ) 1998    Elevated PSA     last assessed 3/11/2014    Hypercholesterolemia     Hypertension     Malignant neoplasm of skin     Microhematuria     Neurogenic bladder     last assessed 6/13/2014    Type 2 diabetes mellitus ([facility] )     Urinary incontinence        Past Surgical History:   Procedure Laterality Date    APPENDECTOMY      5 y/o    COLON SURGERY      COLOSTOMY      EXPLORATORY LAPAROTOMY W/ BOWEL RESECTION N/A 7/26/2019    Procedure: LAPAROTOMY EXPLORATORY W/ BOWEL RESECTION, IRRIGATION OF INFECTED MATERIAL: VAC APPLICATION;  Surgeon: Shayan Guajardo MD;  [facility];  Service: Colorectal    LAPAROTOMY N/A 7/15/2019    Procedure: LAPAROTOMY EXPLORATORY; BOWEL RESECTION; HERNIA REPAIR;  Surgeon: Reba Hurst MD;  [facility];  Service: Colorectal    LAPAROTOMY N/A 7/28/2019    Procedure: Second look LAPAROTOMY EXPLORATORY, removal of Abthera VAC, abdominal washout, lysis of adhesions,  end to end small bowel anastomosis, Vac dressing application;  Surgeon: Tiago Borges MD;  [facility];  Service: Colorectal    PROSTATE BIOPSY      ROTATOR CUFF REPAIR      resolved 1999    SKIN BIOPSY      SMALL INTESTINE SURGERY N/A 7/15/2019    Procedure: RESECTION SMALL BOWEL;  Surgeon: Wen Brewer MD;  [facility];  Service: Colorectal    TONSILLECTOMY      7 y/o    VASECTOMY          08/02/19 1551   Note Type   Note type Re-eval   Restrictions/Precautions   Weight Bearing Precautions Per Order No   Other Precautions Cognitive; Bed Alarm;Multiple lines;Telemetry;O2;Fall Risk;Pain   Pain Assessment   Pain Assessment No/denies pain   Pain Score No Pain   Home Living   Additional Comments see initial OT eval   Prior Function   Comments see initial OT eval   Lifestyle   Autonomy At baseline pt was completing all ADLs IND, shares IADLs with spouse, (+) driving, ambulating MOD IND with cane  Of note, pt with colostomy & self straight caths at baseline   Reciprocal Relationships supportive spouse   Service to Others retired   [address] enjoys exercising   Psychosocial   Psychosocial (WDL) WDL   ADL   Eating Assistance Unable to assess   Grooming Assistance 3  Moderate Assistance   UB Bathing Assistance 3  Moderate Assistance   LB [address] 2  Maximal Assistance   [address] 3  Moderate Assistance    [address] 2  Maximal Assistance   LB Dressing Deficit Don/doff R sock; Don/doff L sock   Toileting Assistance  2  Maximal Assistance   Functional Assistance 2  Maximal Assistance   Functional Deficit Steadying;Verbal cueing;Supervision/safety; Increased time to complete  (Ax2)   Bed Mobility   Rolling R 2  Maximal assistance   Additional items Assist x 2; Increased time required;Verbal cues;LE management; Bedrails   Rolling L 2  Maximal assistance   Additional items Assist x 2; Increased time required;Verbal cues;LE management   Supine to Sit 2  Maximal assistance   Additional items Assist x 2;HOB elevated; Increased time required;Verbal cues;LE management   Sit to Supine 2  Maximal assistance   Additional items Assist x 2; Increased time required;Verbal cues;LE management   Additional Comments Pt went from supine<> sit w/ Max A x2 for UB support and LE managment, HOB elevated for assist  Pt sat EOB w/ Max A for sitting balance/trunk control  Pt required manual and verbal cues for positioning  Transfers   Sit to Stand Unable to assess   Stand to Sit Unable to assess   Additional Comments Not appropriate at this time 2/2 poor sitting balance and respiratory status   Functional Mobility   Additional Comments Deferred at this time   Balance   Static Sitting Poor -   Dynamic Sitting Poor -   Activity Tolerance   Activity Tolerance Patient limited by fatigue   Medical Staff Made Aware PT    Nurse Made Aware yes, Beba Ely Assessment   RUE Assessment X  (grossly 4-/5)   LUE Assessment   LUE Assessment X  (grossly 4-/5)   Hand Function   Gross Motor Coordination Impaired   Fine Motor Coordination Functional   Cognition   Overall Cognitive Status Impaired   Arousal/Participation Responsive; Cooperative   Attention Attends with cues to redirect   Orientation Level Oriented to person;Oriented to place; Disoriented to time;Disoriented to situation   Memory Decreased recall of precautions;Decreased recall of recent events;Decreased short term memory   Following Commands Follows one step commands with increased time or repetition   Comments Pt is pleasant and cooperative; limited by anxiety  Has decreased safety awareness and understanding of deficits  Assessment   Limitation Decreased ADL status; Decreased UE ROM; Decreased UE strength;Decreased Safe judgement during ADL;Decreased cognition;Decreased endurance;Decreased self-care trans;Decreased high-level ADLs   Prognosis Fair   Assessment Pt is a 76 y/o male seen for OT re-reval s/p  Pt initially admitted  on 7/15/2019 with Parastomal hernia - pt with baseline colostomy (history of rectal cancer) and presented after not having colostomy function for 5 days, and nausea/vomitting  Pt s/p LAPAROTOMY EXPLORATORY; BOWEL RESECTION; HERNIA REPAIR (N/A); RESECTION SMALL BOWEL (N/A) 7/15/2019  Pt seen for initial OT evaluation on 7/16 and performing at a level of MIN A for UB ADLs, MAX A for LB ADLs, and MOD-MAX Ax2 for functional transfers with RW  Pt now s/p the following procedure "REOPENING LAPAROTOMY EXPLORATORY W/ BOWEL RESECTION, WASHOUT PERITONITIS, VAC APPLICATION " performed on 7/26/19 & "Second look LAPAROTOMY EXPLORATORY, removal of Abthera VAC, abdominal washout, lysis of adhesions,  end to end small bowel anastomosis, Vac dressing application (N/A)" performed on 7/28  Pt was then intubated and extubated on 8/2/19 and transferred to the MICU  Pt currently performing @ a level of Mod A UB ADLS, Max A LB ADLS, Max A x2 bed mobility and Max A for EOB sitting balance  Pt remains limited 2* fatigue, anxiety, activity tolerance, strength, endurance, sitting balance, trunk control, forward functional reach, standing tolerance, functional mobility, decreased understanding of deficits and safety awareness and decreased I w/ ADLS compared to previous level of functioning  Continue to recommend STR upon D/C, when medically stable  Pt continues to benefit from immediate inpatient skilled OT services 3-5x/wk  Will continue to follow to address goals stated below to be met within the next 10-14 days:   Goals   Patient Goals to get stronger to be more independent   LTG Time Frame 10-14   Long Term Goal #1 see below listed goals   Plan   Treatment Interventions ADL retraining;Functional transfer training;UE strengthening/ROM; Endurance training;Cognitive reorientation;Patient/family training;Equipment evaluation/education; Compensatory technique education;Continued evaluation; Energy conservation; Activityengagement   Goal Expiration Date 08/16/19   OT Frequency 3-5x/wk   Recommendation   OT Discharge Recommendation Short Term Rehab   OT - OK to Discharge Yes  (when medically stable)   Barthel Index   Feeding 0   Bathing 0   Grooming Score 0   Dressing Score 5   Bladder Score 0  (ramos)   Bowels Score 0  (colostomy bag)   Toilet Use Score 5   Transfers (Bed/Chair) Score 5   Mobility (Level Surface) Score 0   Stairs Score 0   Barthel Index Score 15   Modified Farhad Scale   Modified Bath Scale 5     GOALS    1) Pt will complete rolling left/right in bed with Mod assist, as prerequisite for further engagement in ADLS   2) Pt will complete supine to sit transfer with Mod A using B/L UEs to initiate bed mobility   3) Pt will tolerate sitting at EOB 20 minutes with Min assist and stable vital signs, as prerequisite for further engagement in ADLS   4) Pt will complete grooming task with Min assist and increased time to increase independence in functional tasks  5) Pt will increase B/L UE ROM 1/2 MMT to increase functional UB use during ADLS   6) Pt will complete UB ADLS with Min A and use of AD/DME as needed to increase independence in functional tasks  7) Pt will complete LB ADLS with Mod A and use of AD/DME as needed to increase independence in functional tasks  8) Pt will follow 100% simple one step verbal commands and be A/Ox4 consistently t/o use of external environmental cues to increased awareness for functional tasks  9) Pt will demonstrate 75% carryover of E C  techniques t/o fx'l I/ADL/ leisure tasks w/o cues s/p skilled education    ** OTR to assess transfers/functional mobility as appropriate     Rashida Alvarez MS, OTR/L

## 2019-08-02 NOTE — PROGRESS NOTES
NEPHROLOGY PROGRESS NOTE   Anushka Gene 77 y o  male MRN: [id number]  Unit/Bed#: [facility] 06 Encounter: [id number]  Reason for Consult:  Acute kidney injury    Assessment:  1  Acute kidney injury, likely secondary to ATN secondary to sepsis and hypotension, urine output is improving  2  Chronic kidney disease, baseline creatinine between 1 5 and 1 8  3  Hypernatremia, overall improved, will need to monitor now that he is extubated  4  Thrombocytopenia, improving, no evidence of schistocytes  5  Sepsis/hypotension, improving  6  Volume overload, continue with IV diuresis  7  Multiple abdominal procedures with exploratory laparotomy, small-bowel resection, abdominal washout and anastomosis of the small b    PLAN:  · Would continue with IV Lasix given volume overload  · Monitor sodium closely, awaiting swallow later today  If worsens may need to consider quarter normal saline  · Renal function appears fairly stable    SUBJECTIVE:  Seen and examined  Patient extubated  Awake alert  Feels okay  No acute distress  Review of Systems    OBJECTIVE:  Current Weight: Weight - Scale: 114 kg (251 lb 5 2 oz)  Vitals:    08/02/19 0500 08/02/19 0600 08/02/19 0900 08/02/19 0911   BP: 128/57  128/57    Pulse: 100  (!) 106    Resp: 16      Temp:       TempSrc:       SpO2: 98%  98% 96%   Weight:  114 kg (251 lb 5 2 oz)     Height:           Intake/Output Summary (Last 24 hours) at 8/2/2019 1016  Last data filed at 8/2/2019 0901  Gross per 24 hour   Intake 2908 05 ml   Output 2745 ml   Net 163 05 ml       Physical Exam   Constitutional: No distress  HENT:   Head: Normocephalic  Eyes: No scleral icterus  Neck: Neck supple  Cardiovascular: Normal rate and regular rhythm  Pulmonary/Chest: Effort normal  He has decreased breath sounds in the right lower field and the left lower field  Abdominal: Soft  He exhibits distension  Musculoskeletal: He exhibits edema  Neurological: He is alert  Skin: Skin is warm  Medications:    Current Facility-Administered Medications:     acetaminophen (TYLENOL) oral suspension 650 mg, 650 mg, Oral, Q6H PRN, Oly Torres PA-C, 650 mg at 08/01/19 0443    chlorhexidine (PERIDEX) 0 12 % oral rinse 15 mL, 15 mL, Swish & Elbert, Q12H [facility], Sandy Simon MD, 15 mL at 08/02/19 0924    fentaNYL 20 mcg/mL (DOUBLE CONCENTRATED) infusion in sodium chloride 0 9% 100 mL, 100 mcg/hr, Intravenous, Continuous, Stephanie Sen PA-C, Last Rate: 2 5 mL/hr at 08/02/19 0930, 50 mcg/hr at 08/02/19 0930    heparin (porcine) subcutaneous injection 5,000 Units, 5,000 Units, Subcutaneous, Q8H [facility], 5,000 Units at 08/02/19 0509 **AND** [CANCELED] Platelet count, , , Once, [employer]    HYDROmorphone (DILAUDID) injection 1 mg, 1 mg, Intravenous, Q1H PRN, Norma Prakash PA-C, 1 mg at 08/02/19 0449    insulin regular (HumuLIN R,NovoLIN R) 1 Units/mL in sodium chloride 0 9 % 100 mL infusion, 0 3-21 Units/hr, Intravenous, Titrated, Stephanie Sen PA-C, Last Rate: 4 mL/hr at 08/02/19 0837, 4 Units/hr at 08/02/19 0837    magnesium sulfate 2 g/50 mL IVPB (premix) 2 g, 2 g, Intravenous, Once, Isabela Wood    ondansetron [facility]) injection 4 mg, 4 mg, Intravenous, Q4H PRN, Sandy Simon MD, 4 mg at 07/15/19 0410    pantoprazole (PROTONIX) injection 40 mg, 40 mg, Intravenous, Q24H [facility], Sandy Simon MD, 40 mg at 08/02/19 0924    piperacillin-tazobactam (ZOSYN) 3 375 g in sodium chloride 0 9 % 50 mL IVPB, 3 375 g, Intravenous, Q6H [facility], Nicolas Coello PA-C, Stopped at 08/02/19 0542    Laboratory Results:  Results from last 7 days   Lab Units 08/02/19  0451 08/01/19  1547 08/01/19  0509 08/01/19  0443 07/31/19  1543 07/31/19  0438 07/30/19  1530 07/30/19  0634 07/30/19  0447  07/29/19  1205 07/29/19  0422 07/28/19  1955 07/28/19  1337 07/28/19  1215 07/28/19  1036 07/28/19  0540  07/26/19  1129   WBC Thousand/uL 16 35*  --   --  20 09*  --  14 83*  --  15 92* 14 49*  --   --  19 24*  -- 20 20*  --   --  18 89*   < >  --    HEMOGLOBIN g/dL 7 6*  --   --  8 5*  --  8 5*  --  8 4* 7 8*  --   --  9 4*  --  9 4*  --   --  7 6*   < >  --    I STAT HEMOGLOBIN g/dl  --   --   --   --   --   --   --   --   --   --   --   --   --   --  8 5* 7 1*  --   --  8 5*   HEMATOCRIT % 25 0*  --   --  28 7*  --  28 1*  --  26 2* 25 1*  --   --  30 0*  --  29 9*  --   --  24 7*   < >  --    HEMATOCRIT, ISTAT %  --   --   --   --   --   --   --   --   --   --   --   --   --   --  25* 21*  --   --  25*   PLATELETS Thousands/uL 110*  --   --  67*  --  50*  --  62* 58*  --   --  79*  --  100*  --   --  102*   < >  --    POTASSIUM mmol/L 3 8 3 8 3 7  --  4 2 3 8 4 3  --  3 5   < > 3 8 4 0 3 9 4 0  --   --  4 0   < >  --    CHLORIDE mmol/L 116* 118* 118*  --  120* 121* 121*  --  116*   < > 113* 112* 111* 113*  --   --  111*   < >  --    CO2 mmol/L 24 23 24  --  23 24 21  --  24   < > 23 23 23 22  --   --  21   < >  --    CO2, I-STAT mmol/L  --   --   --   --   --   --   --   --   --   --   --   --   --   --  21 23  --   --  16*   BUN mg/dL 38* 31* 37*  --  34* 31* 31*  --  30*   < > 23 25 29* 32*  --   --  35*   < >  --    CREATININE mg/dL 1 97* 1 79* 1 81*  --  1 82* 1 75* 1 69*  --  1 64*   < > 1 32* 1 28 1 41* 1 62*  --   --  1 48*   < >  --    CALCIUM mg/dL 7 6* 7 9* 7 6*  --  7 9* 7 6* 7 6*  --  7 6*   < > 8 0* 7 8* 7 7* 7 8*  --   --  8 1*   < >  --    MAGNESIUM mg/dL  --   --  1 7  --   --  1 8  --   --   --   --  2 0 2 0 1 9 2 1  --   --  2 4   < >  --    PHOSPHORUS mg/dL  --   --  3 0  --   --  1 4*  --   --   --   --  2 0* 2 2* 2 9 4 0  --   --  3 7   < >  --    GLUCOSE, ISTAT mg/dl  --   --   --   --   --   --   --   --   --   --   --   --   --   --  217* 202*  --   --  172*    < > = values in this interval not displayed

## 2019-08-02 NOTE — RESPIRATORY THERAPY NOTE
resp care      08/02/19 0911   Respiratory Assessment   Resp Comments pt extubated to [place], 02 sat 96%, no stridor noted, prior to extubation + cuff leak, pt appears comfortable will continue to monitor   O2 Device nc   Additional Assessments   SpO2 96 %

## 2019-08-02 NOTE — RESPIRATORY THERAPY NOTE
RT Ventilator Management Note  Shaista Gill 77 y o  male MRN: [id number]  Unit/Bed#: [facility] 06 Encounter: [id number]      Daily Screen       8/2/2019  0733 8/2/2019  0847          Patient safety screen outcome[de-identified]  Passed  Passed      Spont breathing trial % for 30 min:    Yes      Spont breathing trial outcome[de-identified]    Passed      Name of Medical Team Notified[de-identified]    [facility]      Extubation order obtained:    No (comment)      Patient extubated:    No              Physical Exam:   Assessment Type: Assess only  General Appearance: Alert, Awake  Respiratory Pattern: Spontaneous  Chest Assessment: Chest expansion symmetrical  Bilateral Breath Sounds: Diminished, Coarse  O2 Device: vent  Subjective Data: n/a      Resp Comments: (P) pt placed back on AC settings, pt did very well on cpap/ps wean, called [facility], waiting for physician to see pt, per protocol, pt placed back on previous settings, will continue to monitor

## 2019-08-02 NOTE — PLAN OF CARE
Problem: OCCUPATIONAL THERAPY ADULT  Goal: Performs self-care activities at highest level of function for planned discharge setting  See evaluation for individualized goals  Description  Treatment Interventions: ADL retraining, Functional transfer training, Endurance training, Patient/family training, Equipment evaluation/education, Compensatory technique education, Energy conservation, Activityengagement  Equipment Recommended: Bedside commode       See flowsheet documentation for full assessment, interventions and recommendations  Note:   Limitation: Decreased ADL status, Decreased UE ROM, Decreased UE strength, Decreased Safe judgement during ADL, Decreased cognition, Decreased endurance, Decreased self-care trans, Decreased high-level ADLs  Prognosis: Fair  Assessment: Pt is a 76 y/o male seen for OT re-reval s/p  Pt initially admitted  on 7/15/2019 with Parastomal hernia - pt with baseline colostomy (history of rectal cancer) and presented after not having colostomy function for 5 days, and nausea/vomitting  Pt s/p LAPAROTOMY EXPLORATORY; BOWEL RESECTION; HERNIA REPAIR (N/A); RESECTION SMALL BOWEL (N/A) 7/15/2019  Pt seen for initial OT evaluation on 7/16 and performing at a level of MIN A for UB ADLs, MAX A for LB ADLs, and MOD-MAX Ax2 for functional transfers with RW  Pt now s/p the following procedure "REOPENING LAPAROTOMY EXPLORATORY W/ BOWEL RESECTION, WASHOUT PERITONITIS, VAC APPLICATION " performed on 7/26/19 & "Second look LAPAROTOMY EXPLORATORY, removal of Abthera VAC, abdominal washout, lysis of adhesions,  end to end small bowel anastomosis, Vac dressing application (N/A)" performed on 7/28  Pt was then intubated and extubated on 8/2/19 and transferred to the MICU  Pt currently performing @ a level of Mod A UB ADLS, Max A LB ADLS, Max A x2 bed mobility and Max A for EOB sitting balance   Pt remains limited 2* fatigue, anxiety, activity tolerance, strength, endurance, sitting balance, trunk control, forward functional reach, standing tolerance, functional mobility, decreased understanding of deficits and safety awareness and decreased I w/ ADLS compared to previous level of functioning  Continue to recommend STR upon D/C, when medically stable  Pt continues to benefit from immediate inpatient skilled OT services 3-5x/wk   Will continue to follow to address goals stated below to be met within the next 10-14 days:     OT Discharge Recommendation: Short Term Rehab  OT - OK to Discharge: Yes(when medically stable)  Herlinda Ahumada MS, OTR/L

## 2019-08-02 NOTE — PROGRESS NOTES
Progress Note - Lenin Le 1940, 77 y o  male MRN: [id number]    Unit/Bed#: [facility] 06 Encounter: [id number]    Primary Care Provider: Emily Reid DO   Date and time admitted to hospital: 7/15/2019  2:17 AM        * Parastomal hernia  Assessment & Plan  S/p exploratory laparotomy, lysis of adhesions, small bowel resection, primary repair of parastomal hernia, colonoscopy 7/15  7/25 Transferred to MICU for worsening respiratory status  Emergently intubated  R IJ and R radial sandy placed  CXR w free air  Emergent OR for exploration, bowel resection  7/26 Ex lap w bowel resection, abdominal washout, wound vac application  9/28 Re-exploration, abdominal washout and anastomosis small bowel  7/31 Suture tied downs, vac removed    Plan  NPO/NGT  Continue tube feeds today; at goal of 40 cc/hr  Wean vent as able  F/u blood cultures  WBC improving with Zosyn  Pain control  Maintain abdominal binder with hole cut for ostomy    With new RUQ pain this am, recommend add on LFTs/investigation for cholecystitis           Subjective/Objective     Subjective: Brief episode of tachycardia yesterday, not sustained  Patient oriented this am, following commands  Tolerating pressure support during the day, AC at night  Ostomy pink with stool  Objective:     Blood pressure 128/57, pulse 100, temperature 99 °F (37 2 °C), resp  rate 16, height 5' 6" (1 676 m), weight 116 kg (255 lb 8 2 oz), SpO2 98 %  ,Body mass index is 41 24 kg/m²        Intake/Output Summary (Last 24 hours) at 8/2/2019 0545  Last data filed at 8/2/2019 0400  Gross per 24 hour   Intake 3016 02 ml   Output 2950 ml   Net 66 02 ml       Invasive Devices     Central Venous Catheter Line            CVC Central Lines 07/26/19 Triple 20cm Left Subclavian 6 days          Drain            Colostomy LLQ 18 days    Urethral Catheter Non-latex 18 Fr  12 days    NG/OG/Enteral Tube 8 days    NG/OG Tube Orogastric Center mouth 7 days          Airway            ETT Cuffed; Hi-Lo 8 mm 7 days                Physical Exam:   GEN: NAD, awake, alert, following commands   HEENT: MMM  CV: sinus, tachycardic  Lung: normal respiratory effort   Ab: Soft, NT  Distended  Midline incision CDI with sutures in place  Ostomy pink and viable with brown liquid stool in bag  Extrem: pitting edema b/l   Neuro: A+Ox3    Lab, Imaging and other studies:  I have personally reviewed pertinent lab results    , CBC:   Lab Results   Component Value Date    WBC 16 35 (H) 08/02/2019    HGB 7 6 (L) 08/02/2019    HCT 25 0 (L) 08/02/2019    MCV 96 08/02/2019     (L) 08/02/2019    MCH 29 2 08/02/2019    MCHC 30 4 (L) 08/02/2019    RDW 15 4 (H) 08/02/2019    MPV 12 6 08/02/2019   , CMP:   Lab Results   Component Value Date    SODIUM 145 08/02/2019    K 3 8 08/02/2019     (H) 08/02/2019    CO2 24 08/02/2019    BUN 38 (H) 08/02/2019    CREATININE 1 97 (H) 08/02/2019    CALCIUM 7 6 (L) 08/02/2019    EGFR 31 08/02/2019     VTE Pharmacologic Prophylaxis: Heparin  VTE Mechanical Prophylaxis: sequential compression device

## 2019-08-02 NOTE — RESPIRATORY THERAPY NOTE
RT Ventilator Management Note  Apple Bush 77 y o  male MRN: [id number]  Unit/Bed#: [facility] 06 Encounter: [id number]      Daily Screen       7/31/2019  0940 8/1/2019  0800          Patient safety screen outcome[de-identified]  Passed  Passed      Not Ready for Weaning due to[de-identified]    Underline problem not resolved;PEEP > 8cmH2O      Spont breathing trial % for 30 min:  Yes                Physical Exam:   Assessment Type: (P) Assess only  General Appearance: (P) Sedated  Respiratory Pattern: (P) Assisted  Chest Assessment: (P) Chest expansion symmetrical  Bilateral Breath Sounds: (P) Diminished, Coarse  O2 Device: (P) vent  Subjective Data: n/a      Resp Comments: (P) no changes made overnight to the pts vent settings  Will cont to monitor pt per protocol

## 2019-08-02 NOTE — ASSESSMENT & PLAN NOTE
S/p exploratory laparotomy, lysis of adhesions, small bowel resection, primary repair of parastomal hernia, colonoscopy 7/15  7/25 Transferred to MICU for worsening respiratory status  Emergently intubated  R IJ and R radial sandy placed  CXR w free air  Emergent OR for exploration, bowel resection    7/26 Ex lap w bowel resection, abdominal washout, wound vac application  9/20 Re-exploration, abdominal washout and anastomosis small bowel  7/31 Suture tied downs, vac removed    Plan  NPO/NGT  Continue tube feeds today; at goal of 40 cc/hr  Wean vent as able  F/u blood cultures  WBC improving with Zosyn  Pain control  Maintain abdominal binder with hole cut for ostomy    With new RUQ pain this am, recommend add on LFTs/investigation for cholecystitis

## 2019-08-02 NOTE — PROGRESS NOTES
Progress Note - Critical Care   Lloyd David 77 y o  male MRN: [id number]  Unit/Bed#: MICU 06 Encounter: [id number]    Assessment:   Principal Problem:    Parastomal hernia  Active Problems:    DM type 2 (diabetes mellitus, type 2) ([place] [address] )    GERD without esophagitis    Hyperlipidemia    Malignant neoplasm of colon ([place] [address] )    Acute kidney injury ([place] [address] )    Chronic kidney disease    Hypertension    Leukocytosis    UTI (urinary tract infection)    Thrombocytopenia (HCC)    Hypernatremia    Protein-calorie malnutrition (HCC)    Sepsis ([place] [address] )  Resolved Problems:    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    Septic shock ([place] [address] )    Oliguria      Plan  Neuro:   · PAD  · Pain controlled with: double concentrated fentanyl  · Sedation plan/Daily sedation holiday: Fentanyl 100 mcg/min  · RASS goal: 0 Alert and Calm  · Delirium Precautions  · CAM ICU per protocol  · Regulate sleep/wake cycle+  · Trend neuro exam  CV:   · Hemodynamic infusions: None  · MAP goal > 65  · Rhythm: NSR  · Follow rhythm on telemetry  Lung:   · Daily SBT assessment in coordination with SAT per protocol:   ·  Chlorhexidine/HOB>30degrees ordered: yes  · Pulmonary toileting  · Wean vent as able  · Put on PS this AM  GI:   · Stress ulcer prophylaxis: for stress ulcer prophylaxis   · Bowel regimen: None currently  FEN:   · Goal 24 hour fluid balance: even   Fluid/Diuretic plan: Lasix per nephro  · Nutrition/diet plan: NPO/TPN  · Replete electrolytes with goals: K >4 0, Mag >2 0, and Phos >3 0  :   · Indwelling Restrepo present: yes   · Trend UOP and BUN/creat  · Strict I and O  ID:   · Abx ordered: Piperacillin and Tazobactam  · Ordered 8/1  · Trend temps and WBC count  · Last 24 hour WBC 20 09 -> 16 35  Heme:   · Trend hgb and plts  · Transfuse as needed for goal hgb >7 0  Endo:   · Glycemic control plan: Blood glucose controlled on current regimen  · Insulin gtt @ 1  MSK/Skin:  · Mobility goal: Mobilize in bed  · PT consult: not applicable  · OT consult: not applicable  · Frequent turning and pressure off-loading  · PT/OT when extubated  Lines:  · Central venous access: triple lumen catheter - 20 cm  · Keep central line today for TPN  · Arterial line: Assessed  No longer needed  Order will be discontinued  VTE Prophylaxis:  · Pharmacologic Prophylaxis:Heparin  · Mechanical Prophylaxis: sequential compression device    Disposition: Continue ICU care      ______________________________________________________________________  Chief Complaint: n/a      HPI/24hr events: Short burst of tachycardia to 180 overnight, lasted less than 10 seconds, resolved on its own  No other issues  Tolerated pressure support on the vent yesterday, but eventually tired and was put back on AC/VC for the night  Review of Systems   Unable to perform ROS: Patient nonverbal (Intubated)     ______________________________________________________________________  Temperature:   Temp (24hrs), Av °F (37 2 °C), Min:98 1 °F (36 7 °C), Max:99 4 °F (37 4 °C)    Current Temperature: 99 °F (37 2 °C)    Temp:  [98 1 °F (36 7 °C)-99 4 °F (37 4 °C)] 99 °F (37 2 °C)  HR:  [100-118] 100  Resp:  [16-26] 16  BP: (112-178)/(54-77) 128/57    Vitals:    19 0357 19 0400 19 0500 19 0600   BP:  129/65 128/57    Pulse:  100 100    Resp:  22 16    Temp:  99 °F (37 2 °C)     TempSrc:       SpO2: 96% 97% 98%    Weight:    114 kg (251 lb 5 2 oz)   Height:         Arterial Line BP: 116/52  Arterial Line MAP (mmHg): 70 mmHg     Weights:   IBW: 63 8 kg    Body mass index is 40 56 kg/m²    Weight (last 2 days)     Date/Time   Weight    19 0600   114 (251 32)            Height: 5' 6" (167 6 cm)  Hemodynamic Monitoring:  N/A     Noninvasive/Ventilator Settings:  Ventilator Settings:   Vent Mode: AC/VC    Settings  Resp Rate (BPM): 14 BPM  Vt (mL): 550 mL  FIO2 (%): 40 %  PEEP (cmH2O): 8 cmH2O  Flow (LPM): 60 L/min      No results found for: PHART, EVS5EZR, PO2ART, VID5LFZ, Z7UPTZUU, BEART, SOURCE  SpO2: SpO2: 98 %  ______________________________________________________________________  Physical Exam:  [place] Agitation Sedation Scale (RASS): Alert and calm  Physical Exam   Constitutional: He is oriented to person, place, and time  He appears well-developed and well-nourished  No distress  HENT:   Head: Normocephalic and atraumatic  Right Ear: External ear normal    Left Ear: External ear normal    Eyes: Pupils are equal, round, and reactive to light  Conjunctivae and EOM are normal  Right eye exhibits no discharge  Left eye exhibits no discharge  No scleral icterus  Neck: No tracheal deviation present  Cardiovascular: Normal rate and intact distal pulses  Pulmonary/Chest: Effort normal  No stridor  No respiratory distress  Abdominal: Soft  He exhibits no distension  Musculoskeletal: Normal range of motion  He exhibits no edema or deformity  Neurological: He is alert and oriented to person, place, and time  No cranial nerve deficit  Skin: Skin is warm and dry  No rash noted  He is not diaphoretic  No erythema  Vitals reviewed  ______________________________________________________________________  Intake and Outputs:  I/O       07/31 0701 - 08/01 0700 08/01 0701 - 08/02 0700 08/02 0701 - 08/03 0700    I V  (mL/kg) 334 5 (2 9) 267 (2 3)     NG/ 1200     IV Piggyback 630 950      1      Feedings 626 740     Total Intake(mL/kg) 2770 6 (23 9) 3157 (27 7)     Urine (mL/kg/hr) 4360 (1 6) 3050 (1 1)     Drains       Stool 275      Total Output 4635 3050     Net -1864 4 +107                  Nutrition:        Diet Orders   (From admission, onward)             Start     Ordered    07/31/19 1718  Diet Enteral/Parenteral; Tube Feeding No Oral Diet; Nepro; Continuous; 40; 300;  Water; Every 6 hours  Diet effective now     Comments:  Advance tube feeds by 10 mL/hr q 4 hours to goal    Question Answer Comment   Diet Type Enteral/Parenteral Enteral/Parenteral Tube Feeding No Oral Diet    Tube Feeding Formula: Nepro    Bolus/Cyclic/Continuous Continuous    Tube Feeding Goal Rate (mL/hr): 40    Tube Feeding water flush (mL): 300    Water Flush type: Water    Water flush frequency: Every 6 hours    RD to adjust diet per protocol? Yes        07/31/19 1718              Formula:Nepro, currently running at 40ml/hr, with a goal of 40ml/hr  Labs:   Results from last 7 days   Lab Units 08/02/19  0451 08/01/19  0443 07/31/19  0438 07/30/19  0634   WBC Thousand/uL 16 35* 20 09* 14 83* 15 92*   HEMOGLOBIN g/dL 7 6* 8 5* 8 5* 8 4*   HEMATOCRIT % 25 0* 28 7* 28 1* 26 2*   PLATELETS Thousands/uL 110* 67* 50* 62*   NEUTROS PCT %  --  80* 77* 79*   MONOS PCT %  --  8 6 6    Results from last 7 days   Lab Units 08/02/19  0451 08/01/19  1547 08/01/19  0509  07/27/19  0409   SODIUM mmol/L 145 147* 149*   < > 144   POTASSIUM mmol/L 3 8 3 8 3 7   < > 4 4   CHLORIDE mmol/L 116* 118* 118*   < > 112*   CO2 mmol/L 24 23 24   < > 14*   CO2, I-STAT   --   --   --    < >  --    BUN mg/dL 38* 31* 37*   < > 40*   CREATININE mg/dL 1 97* 1 79* 1 81*   < > 1 89*   CALCIUM mg/dL 7 6* 7 9* 7 6*   < > 8 2*   ALK PHOS U/L  --   --   --   --  66   ALT U/L  --   --   --   --  <6*   AST U/L  --   --   --   --  24   GLUCOSE RANDOM mg/dL 115 122 152*   < > 186*    < > = values in this interval not displayed       Results from last 7 days   Lab Units 08/01/19  0509 07/31/19  0438 07/29/19  1205   MAGNESIUM mg/dL 1 7 1 8 2 0     Results from last 7 days   Lab Units 08/01/19  0509 07/31/19  0438 07/29/19  1205   PHOSPHORUS mg/dL 3 0 1 4* 2 0*      Results from last 7 days   Lab Units 07/27/19  0409   INR  1 69*     Results from last 7 days   Lab Units 08/01/19  1038   LACTIC ACID mmol/L 1 2     0   Lab Value Date/Time    TROPONINI 0 02 07/26/2019 1210    TROPONINI 0 02 07/26/2019 1000    TROPONINI 0 03 07/26/2019 0712    TROPONINI 0 03 07/25/2019 1144    TROPONINI 0 02 07/25/2019 0841     Imaging: 8/1 CT Head: No acute intracranial abnormality  Microangiopathic changes  I have personally reviewed pertinent reports  and I have personally reviewed pertinent films in PACS    Micro:  Lab Results   Component Value Date    BLOODCX No Growth at 48 hrs  07/30/2019    BLOODCX No Growth at 48 hrs  07/30/2019    BLOODCX No Growth After 5 Days  07/26/2019    URINECX >100,000 cfu/ml Escherichia coli (A) 07/24/2019    URINECX 40,000-49,000 cfu/ml Enterococcus faecalis (A) 07/24/2019    URINECX No Growth <1000 cfu/mL 11/04/2016     Allergies: Allergies   Allergen Reactions    Atorvastatin Myalgia    Lyrica [Pregabalin]      Medications:   Scheduled Meds:  Current Facility-Administered Medications:  acetaminophen 650 mg Oral Q6H PRN Oly Torres PA-C    chlorhexidine 15 mL Swish & Spit Q12H [address] Odalys Eller MD    fentaNYL 100 mcg/hr Intravenous Continuous Moe Benavidez PA-C Last Rate: 100 mcg/hr (08/01/19 2211)   heparin (porcine) 5,000 Units Subcutaneous [facility] Odalys Eller MD    HYDROmorphone 1 mg Intravenous Q1H PRN Malaika Fagan PA-C    insulin regular (HumuLIN R,NovoLIN R) infusion 0 3-21 Units/hr Intravenous Titrated Meo Benavidez PA-C Last Rate: 1 Units/hr (08/02/19 0358)   magnesium sulfate 2 g Intravenous Once [place] [facility]    ondansetron 4 mg Intravenous Q4H PRN Odalys Eller MD    pantoprazole 40 mg Intravenous Q24H [address] Odalys Eller MD    piperacillin-tazobactam 3 375 g Intravenous Q6H [address] Natalie Cotter Last Rate: Stopped (08/02/19 0542)     Continuous Infusions:  fentaNYL 100 mcg/hr Last Rate: 100 mcg/hr (08/01/19 2211)   insulin regular (HumuLIN R,NovoLIN R) infusion 0 3-21 Units/hr Last Rate: 1 Units/hr (08/02/19 0358)     PRN Meds:    acetaminophen 650 mg Q6H PRN   HYDROmorphone 1 mg Q1H PRN   ondansetron 4 mg Q4H PRN       Invasive lines and devices:   Invasive Devices     Central Venous Catheter Line            CVC Central Lines 07/26/19 Triple 20cm Left Subclavian 6 days          Drain            Colostomy LLQ 18 days    Urethral Catheter Non-latex 18 Fr  12 days    NG/OG/Enteral Tube 8 days    NG/OG Tube O[facility] mouth 7 days          Airway            ETT  Cuffed; Hi-Lo 8 mm 7 days                   Counseling / Coordination of Care  Total Critical Care time spent 35 minutes excluding procedures, teaching and family updates  Code Status: Level 2 - DNAR: but accepts endotracheal intubation    Portions of the record may have been created with voice recognition software  Occasional wrong word or "sound a like" substitutions may have occurred due to the inherent limitations of voice recognition software  Read the chart carefully and recognize, using context, where substitutions have occurred      Ced Guajardo MD

## 2019-08-02 NOTE — SPEECH THERAPY NOTE
Speech-Language Pathology Bedside Swallow Evaluation    Patient Name: Griselda Salomon    YPQAM'B Date: 8/2/2019     Problem List  Patient Active Problem List   Diagnosis    Allergic rhinitis    Cancer, [facility] )    DM type 2 (diabetes mellitus, type 2) ([facility] )    Elevated prostate specific antigen (PSA)    Enlarged prostate without lower urinary tract symptoms (luts)    Benign essential hypertension    GERD without esophagitis    Hyperlipidemia    Displacement of lumbar intervertebral disc without myelopathy    Lumbar radiculopathy    Neurogenic bladder disorder    Right knee pain    Vitamin D deficiency    Primary Parkinsonism ([facility] )    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    Spinal stenosis of lumbar region    Status post lumbar discectomy    Malignant neoplasm of colon ([facility] )    Parastomal hernia    Acute kidney injury ([facility] )    Chronic kidney disease    Hypertension    Leukocytosis    UTI (urinary tract infection)    Thrombocytopenia (HCC)    Protein-calorie malnutrition ([facility] )    Sepsis ([facility] )    Anemia       Past Medical History  Past Medical History:   Diagnosis Date    Benign prostatic hyperplasia with urinary obstruction and other lower urinary tract symptoms     Colon cancer ([facility] ) 1998    Elevated PSA     last assessed 3/11/2014    Hypercholesterolemia     Hypertension     Malignant neoplasm of skin     Microhematuria     Neurogenic bladder     last assessed 6/13/2014    Type 2 diabetes mellitus ([facility] )     Urinary incontinence        Past Surgical History  Past Surgical History:   Procedure Laterality Date    APPENDECTOMY      3 y/o    COLON SURGERY      COLOSTOMY      EXPLORATORY LAPAROTOMY W/ BOWEL RESECTION N/A 7/26/2019    Procedure: LAPAROTOMY EXPLORATORY W/ BOWEL RESECTION, IRRIGATION OF INFECTED MATERIAL: VAC APPLICATION;  Surgeon: Lucia Kilgore MD;  Location: [facility];  Service: Colorectal    LAPAROTOMY N/A 7/15/2019    Procedure: LAPAROTOMY EXPLORATORY; BOWEL RESECTION; HERNIA REPAIR;  Surgeon: Duarte Randall MD;  Location: [facility];  Service: Colorectal    LAPAROTOMY N/A 7/28/2019    Procedure: Second look LAPAROTOMY EXPLORATORY, removal of Abthera VAC, abdominal washout, lysis of adhesions,  end to end small bowel anastomosis, Vac dressing application;  Surgeon: Maria Elena Ortega MD;  Location: [facility];  Service: Colorectal    PROSTATE BIOPSY      ROTATOR CUFF REPAIR      resolved 1999    SKIN BIOPSY      SMALL INTESTINE SURGERY N/A 7/15/2019    Procedure: RESECTION SMALL BOWEL;  Surgeon: Duarte Randall MD;  [facility]: [facility];  Service: Colorectal    TONSILLECTOMY      7 y/o    VASECTOMY         Summary   Pt presented with s/s suggestive of WFL oral and suspected mild pharyngeal dysphagia with the limited materials assessed today  Symptoms or concerns included suspected pharyngeal swallow delay  Pt is aphonic but able to occasional whisper short verbal responses  Suspect aphonia d/t 8 day intubation  Pt declined trial of soft solid foods today and agreed only to trials of puree/jello and thin  No s/s aspiration noted with jello or thin liquids via straw  Per family, pt had difficulty taking meds this morning, discussed attempting meds crushed in puree for the time being and family agreed       Risk for Aspiration: low, with safe swallow strategies, conservative diet, and aspiration precautions    Recommendations: puree/level 1 diet and thin liquids     Recommended Form of Meds: crushed with puree     Aspiration precautions and compensatory swallowing strategies: upright posture, only feed when fully alert, slow rate of feeding and small bites/sips      Current Medical Status  Per Dr Annette Sprague progress note 8/2/2019  * Parastomal hernia  Assessment & Plan  S/p exploratory laparotomy, lysis of adhesions, small bowel resection, primary repair of parastomal hernia, colonoscopy 7/15  7/25 Transferred to MICU for worsening respiratory status  Emergently intubated  R IJ and R radial sandy placed  CXR w free air  Emergent OR for exploration, bowel resection  7/26 Ex lap w bowel resection, abdominal washout, wound vac application  8/84 Re-exploration, abdominal washout and anastomosis small bowel  7/31 Suture tied downs, vac removed     Plan  NPO/NGT  Continue tube feeds today; at goal of 40 cc/hr  Wean vent as able  F/u blood cultures  WBC improving with Zosyn  Pain control  Maintain abdominal binder with hole cut for ostomy    With new RUQ pain this am, recommend add on LFTs/investigation for cholecystitis     Dr Brock Colon H&P 7/15/2019   Mat Aguilar is a 77 y o  male who presents with nausea and vomiting  He has not had colostomy function for 5 days  He has never had this problem before  He is not really having any pain, and he denies fevers and chills  He has a history of a very low anterior resection with end colostomy for a rectal cancer in 1998  His only other abdominal surgery was an appendectomy  He notices appetite has been reducing over the past 5 days, and he has not been able to keep anything down starting yesterday  He presented with an elevated creatinine and lactic acid  He has past medical history significant for diabetes, colon cancer, and neurogenic bladder for which the patient straight caths himself at home  Swallow eval requested s/p 8 day intubation d/t septic shock from peritonitis  Current Precautions:  falls    Past medical history:  Please see H&P for details    Special Studies:  CXR 8/1/2019 IMPRESSION:  Improving pleural effusions with persistent bibasilar atelectasis, right greater than left  CT head 8/1/2019 IMPRESSION:  No acute intracranial abnormality  Microangiopathic changes      Social/Education/Vocational Hx:  Pt lives with spouse      Swallow Information   Current Risks for Dysphagia & Aspiration: recent surgery, recent intubation and prolonged intubation Current Symptoms/Concerns: change in respiratory status and aphonic    Current Diet: NPO      Baseline Diet: suspect regular/thin      Baseline Assessment   Behavior/Cognition: alert but fatigued    Speech/Language Status: able to follow commands and limited verbal output    Patient Positioning: upright in bed    Pain Status/Interventions/Response to Interventions:  No report of or nonverbal indications of pain  Swallow Mechanism Exam   Oral-motor structures and function are [facility] for symmetry, strength, ROM & coordination  Facial: symmetrical  Labial: WFL  Lingual: WFL  Velum: symmetrical  Mandible: adequate ROM  Dentition: adequate  Vocal quality:aphonic   Volitional Cough: weak     Consistencies Assessed and Performance   Consistencies Administered: thin liquids and puree  Materials administered included cranberry juice, jello    Oral Stage: WFL for jello and thin liquid  Bolus manipulation and transfer were slow but functional with no significant oral residue noted  No overt s/s reduced oral control  Some encouragement needed for use of straw  Pharyngeal Stage: mild  Swallow Mechanics: Swallowing initiation appeared mildly delayed however once initiated, laryngeal rise was palpated and judged to be within functional limits  No coughing, throat clearing, change in vocal quality or respiratory status noted today  Esophageal Concerns: hx of GERD    Strategies and Efficacy: Small bites and sips offered at a slow rate, allowing time for swallow and breathing between bites and sips      Summary and Recommendations (see above)    Results Reviewed with: patient, RN and family     Treatment Recommended: ST f/u for analysis of PO diet tolerance and potential to advance    Frequency of treatment: 3-5x/week    Patient Stated Goal: Pt requesting to only have soft foods at this time       Dysphagia Goals per SLP: Pt will tolerate the least restrictive diet with the least restrictive liquid consistency without s/s of aspiration x72hrs  Education: initiated  Pt and caregivers would benefit from/require continued education

## 2019-08-03 NOTE — CODE DOCUMENTATION
RN called RRT for bradycardia into 40's  CRNA at bedside supporting pt with O2 via ambu bag, then non rebreather  CT scan was not completed   Pt returning to MICU with MICU team

## 2019-08-03 NOTE — PROGRESS NOTES
Progress Note - Critical Care   Petr Mena 77 y o  male MRN: [id number]  [facility]: [id number]    Assessment:   Principal Problem:    Parastomal hernia  Active Problems:    DM type 2 (diabetes mellitus, type 2) ([place] [address] )    GERD without esophagitis    Hyperlipidemia    Malignant neoplasm of colon ([place] [address] )    Acute kidney injury ([place] [address] )    Chronic kidney disease    Hypertension    Leukocytosis    UTI (urinary tract infection)    Thrombocytopenia (HCC)    Protein-calorie malnutrition (HCC)    Sepsis ([place] [address] )    Anemia  Resolved Problems:    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    Septic shock ([facility])    Oliguria    Hypernatremia      Plan  Neuro:   · Pain controlled with: PRN Tylenol, Oxycodone, and Dilaudid  · Delirium Precautions  · CAM ICU per protocol  · Regulate sleep/wake cycle+  · Sleep poor, cont melatonin QHS    CV:   · HTN POA: Cont Coreg, holding home lisinopril and HCTZ  · HLD POA: Cont statin  · Rhythm: NSR  · Follow rhythm on telemetry    Lung:   · Extubated 8/2  · Sating well on 2L NC  · Supplemental O2 to maintain sats > 92%    GI:   · Stress ulcer prophylaxis: Daily PPI  · Bowel regimen: None currently     FEN:   · Goal 24 hour fluid balance: -1L   Fluid/Diuretic plan: Lasix per nephro, received total of 80 IV lasix yesterday with good response   UOP: 4500   Net: -3800   Cr: 2 1 from 1 97, Will hold off on repeating lasix today  · Nutrition/diet plan: Diet Dysphagia/Modified Consistency; Dysphagia 1-Pureed; Thin Liquid  · Replete electrolytes with goals: K >4 0, Mag >2 0, and Phos >3 0    :   · BPH POA: Continue home Finasteride  · Indwelling Restrepo present: yes   · Trend UOP and BUN/creat  · Strict I and O    ID:   · UTI:   Urine Culture >100,000 cfu/ml Escherichia coliAbnormal      40,000-49,000 cfu/ml Enterococcus faecalisAbnormal    Atbx:  Ampicillin  · WBC trending down: 14 from 16  · Afebrile    Heme:   · Anemia: Stable 7 4 from 7 6  · Transfuse as needed for goal hgb >7 0    Endo:   · T2DM POA: SSI    MSK/Skin:  · Mobility goal: OOBTC  · PT consult: STR  · OT consult: STR  · Frequent turning and pressure off-loading    Lines:  CVC left subclavian  Ostomy  Restrepo    VTE Prophylaxis:  · Pharmacologic Prophylaxis:Heparin  · Mechanical Prophylaxis: sequential compression device    Disposition: Continue ICU care      ______________________________________________________________________  Chief Complaint: Wants out of the ICU      HPI/24hr events:  No acute events overnight  Patient was extubated yesterday and has been satting well on 2 L nasal cannula  Patient was diuresed yesterday, net -3800 cc  Reported poor sleep overnight due to frequent interruptions  Patient would like to be moved out of ICU  Review of Systems   Constitutional: Negative for chills and fever  HENT: Negative  Eyes: Negative  Respiratory: Negative for cough, chest tightness and shortness of breath  Cardiovascular: Negative for chest pain  Gastrointestinal: Negative for abdominal pain and nausea  Endocrine: Negative  Genitourinary: Negative  Musculoskeletal: Negative  Skin: Negative  Neurological: Negative      Psychiatric/Behavioral: Negative       ______________________________________________________________________  Temperature:   Temp (24hrs), Av 1 °F (36 7 °C), Min:97 9 °F (36 6 °C), Max:98 4 °F (36 9 °C)    Current Temperature: 98 2 °F (36 8 °C)    Temp:  [97 9 °F (36 6 °C)-98 4 °F (36 9 °C)] 98 2 °F (36 8 °C)  HR:  [] 88  Resp:  [23-41] 32  BP: (118-162)/(46-88) 118/46    Vitals:    19 0204 19 0304 19 0404 19 0504   BP: 147/63 126/60 140/60 (!) 118/46   Pulse: 78 62 66 88   Resp: (!) 29 (!) 30 (!) 29 (!) 32   Temp:   98 2 °F (36 8 °C)    TempSrc:   Axillary    SpO2: 94% 97% 97% 96%   Weight:       Height:         Arterial Line BP: 116/52  Arterial Line MAP (mmHg): 70 mmHg     Weights:   IBW: 63 8 kg    Body mass index is 40 56 kg/m²  Weight (last 2 days)     Date/Time   Weight    08/02/19 0600   114 (251 32)            Height: 5' 6" (167 6 cm)  Hemodynamic Monitoring:  N/A     Noninvasive/Ventilator Settings:  On 2L NC    No results found for: PHART, GHR7ASV, PO2ART, RAW6PXA, A3BLHAUO, BEART, SOURCE  SpO2: SpO2: 96 %  ______________________________________________________________________  Physical Exam:  [place] Agitation Sedation Scale (RASS): Alert and calm  Physical Exam   Constitutional: He is oriented to person, place, and time  He appears well-developed and well-nourished  No distress  HENT:   Head: Normocephalic and atraumatic  Mouth/Throat: Oropharynx is clear and moist    Eyes: Pupils are equal, round, and reactive to light  Conjunctivae and EOM are normal  No scleral icterus  Neck: Normal range of motion  Neck supple  No JVD present  No tracheal deviation present  Cardiovascular: Intact distal pulses  Sinus bradycardia   Pulmonary/Chest: Effort normal  He exhibits no tenderness  tachypneic   Abdominal: Soft  He exhibits distension and mass  There is no tenderness  Dressings CDI, Ostomy red and healthy appearing with minimal stool in bag, large bulge lateral to ostomy   Musculoskeletal: Normal range of motion  He exhibits no edema or deformity  Neurological: He is alert and oriented to person, place, and time  No cranial nerve deficit  Skin: Skin is warm and dry  No rash noted  He is not diaphoretic  No erythema  Nursing note and vitals reviewed  ______________________________________________________________________  Intake and Outputs:  I/O       08/01 0701 - 08/02 0700 08/02 0701 - 08/03 0700 08/03 0701 - 08/04 0700    P  O   250     I V  (mL/kg) 267 (2 3) 99 9 (0 9)     NG/GT 1200      IV Piggyback 950 300     TPN       Feedings 740 120     Total Intake(mL/kg) 3157 (27 7) 769 9 (6 8)     Urine (mL/kg/hr) 3050 (1 1) 4505 (1 6)     Stool  50     Total Output 3050 4555     Net +107 -3785  1 Nutrition:        Diet Orders   (From admission, onward)             Start     Ordered    08/02/19 1707  Dietary nutrition supplements  Once     Question Answer Comment   Select Supplement: Glucerna-Chocolate    Frequency Lunch, Dinner        08/02/19 1706 08/02/19 1706  Diet Dysphagia/Modified Consistency; Dysphagia 1-Pureed; Thin Liquid  Diet effective now     Question Answer Comment   Diet Type Dysphagia/Modified Consistency    Dysphagia/Modified Consistency Dysphagia 1-Pureed    Liquid Modifier Thin Liquid    RD to adjust diet per protocol?  Yes        08/02/19 1706              Formula:Nepro, currently running at 40ml/hr, with a goal of 40ml/hr  Labs:   Results from last 7 days   Lab Units 08/03/19  0431 08/02/19  0451 08/01/19  0443 07/31/19  0438   WBC Thousand/uL 14 43* 16 35* 20 09* 14 83*   HEMOGLOBIN g/dL 7 4* 7 6* 8 5* 8 5*   HEMATOCRIT % 24 2* 25 0* 28 7* 28 1*   PLATELETS Thousands/uL 158 110* 67* 50*   NEUTROS PCT % 80*  --  80* 77*   MONOS PCT % 10  --  8 6   MONO PCT %  --  4  --   --       Results from last 7 days   Lab Units 08/03/19  0431 08/02/19  0451 08/01/19  1547   SODIUM mmol/L 146* 145 147*   POTASSIUM mmol/L 3 3* 3 8 3 8   CHLORIDE mmol/L 113* 116* 118*   CO2 mmol/L 25 24 23   BUN mg/dL 40* 38* 31*   CREATININE mg/dL 2 10* 1 97* 1 79*   CALCIUM mg/dL 7 8* 7 6* 7 9*   ALK PHOS U/L  --  81  --    ALT U/L  --  15  --    AST U/L  --  20  --    GLUCOSE RANDOM mg/dL 91 115 122     Results from last 7 days   Lab Units 08/03/19  0431 08/01/19  0509 07/31/19  0438   MAGNESIUM mg/dL 1 7 1 7 1 8     Results from last 7 days   Lab Units 08/03/19  0431 08/01/19  0509 07/31/19  0438   PHOSPHORUS mg/dL 4 4* 3 0 1 4*          Results from last 7 days   Lab Units 08/01/19  1038   LACTIC ACID mmol/L 1 2     0   Lab Value Date/Time    TROPONINI 0 02 07/26/2019 1210    TROPONINI 0 02 07/26/2019 1000    TROPONINI 0 03 07/26/2019 0712    TROPONINI 0 03 07/25/2019 1144    TROPONINI 0 02 07/25/2019 0841 Imagin/1 CT Head: No acute intracranial abnormality  Microangiopathic changes  I have personally reviewed pertinent reports  and I have personally reviewed pertinent films in PACS    Micro:  Lab Results   Component Value Date    BLOODCX No Growth at 24 hrs  2019    BLOODCX No Growth at 24 hrs  2019    BLOODCX No Growth at 72 hrs  2019    URINECX >100,000 cfu/ml Escherichia coli (A) 2019    URINECX 40,000-49,000 cfu/ml Enterococcus faecalis (A) 2019    URINECX No Growth <1000 cfu/mL 2016     Allergies: Allergies   Allergen Reactions    Atorvastatin Myalgia    Lyrica [Pregabalin]      Medications:   Scheduled Meds:    Current Facility-Administered Medications:  acetaminophen 650 mg Oral Q6H PRN Oly Torres PA-C   carbidopa-levodopa 1 5 tablet Oral TID Clair Orozco PA-C   carvedilol 12 5 mg Oral BID With Meals Clair Orozco PA-C   finasteride 5 mg Oral Daily Clair Orozco PA-C   heparin (porcine) 7,500 Units Subcutaneous Q8H [address] Clair Orozco PA-C   HYDROmorphone 0 5 mg Intravenous Q3H PRN Rebeca Alvait-Me   insulin lispro 1-6 Units Subcutaneous TID AC Tate Bolaños MD   insulin lispro 1-6 Units Subcutaneous HS Tate Bolaños MD   ondansetron 4 mg Intravenous Q4H PRN Severo White MD   oxyCODONE 10 mg Oral Q4H PRN Rebeca Alvait-Me   oxyCODONE 5 mg Oral Q4H PRN Rebeca Zamudioanoartie Petit-Me   pantoprazole 40 mg Oral Early Morning Rod Drake PA-C   phenol 1 spray Mouth/Throat Q2H PRN Sherrell Philippe PA-C   pravastatin 80 mg Oral Daily With Torito Terrencekeanu Drake PA-C     Continuous Infusions:   PRN Meds:    acetaminophen 650 mg Q6H PRN   HYDROmorphone 0 5 mg Q3H PRN   ondansetron 4 mg Q4H PRN   oxyCODONE 10 mg Q4H PRN   oxyCODONE 5 mg Q4H PRN   phenol 1 spray Q2H PRN       Invasive lines and devices:   Invasive Devices     Central Venous Catheter Line            CVC Central Lines 19 Triple 20cm Left Subclavian 7 days          Drain Colostomy LLQ 19 days    Urethral Catheter Non-latex 18 Fr  13 days    NG/OG Tube Orogastric Center mouth 8 days                   Counseling / Coordination of Care  Total Critical Care time spent 35 minutes excluding procedures, teaching and family updates  Code Status: Level 2 - DNAR: but accepts endotracheal intubation    Portions of the record may have been created with voice recognition software  Occasional wrong word or "sound a like" substitutions may have occurred due to the inherent limitations of voice recognition software  Read the chart carefully and recognize, using context, where substitutions have occurred      Yanick Blanco MD

## 2019-08-03 NOTE — NURSING NOTE
HR dropped to 47 during CT scan  CT scan aborted  Rapid response called  Patient is lethargic but easily arousable, diaphoretic  Bag valve mask done  Sats 99%, switched to % Patient returned to MICU  Family at bedside and updated on pt's condition by CCS  Right NGT placed-LCWS  NRM switched to NC 6lpm  Will continue to monitor pt closely  Patient is now awake and talking to family

## 2019-08-03 NOTE — PROGRESS NOTES
08/03/19 [facility] Beliefs/Perceptions   Support Systems Spouse/significant other;Children   Plan of Care   Comments Reported to Code in Cat Scan, provided supportive presence to wife and to daughter, listened empathetically, family stated that patient has "been saying good-bye" all day   Assessment Completed by: Unit visit

## 2019-08-03 NOTE — ASSESSMENT & PLAN NOTE
S/p exploratory laparotomy, lysis of adhesions, small bowel resection, primary repair of parastomal hernia, colonoscopy 7/15  7/25 Transferred to MICU for worsening respiratory status  Emergently intubated  R IJ and R radial sandy placed  CXR w free air  Emergent OR for exploration, bowel resection  7/26 Ex lap w bowel resection, abdominal washout, wound vac application  8/93 Re-exploration, abdominal washout and anastomosis small bowel  7/31 Suture tied downs, vac removed  8/2 Extubated  8/3 Patient had bradycardic episode and desaturated, still on 6L NC now    Plan  Diet - Dysphagia Puree;  Thin Liquid  Continue wean O2 - on 6L NC  NGT placed  Lasix per Nephro  D/C Insulin gtt - transition to Sliding Scale  F/u blood cultures  WBC improving with Zosyn   - Zosyn stopped 8/1   - Ampicillin given 8/2 - WBC to 14 4 from 16  Pain control  Maintain abdominal binder with hole cut for ostomy

## 2019-08-03 NOTE — PLAN OF CARE
Problem: Potential for Falls  Goal: Patient will remain free of falls  Description  INTERVENTIONS:  - Assess patient frequently for physical needs  -  Identify cognitive and physical deficits and behaviors that affect risk of falls  -  [place] fall precautions as indicated by assessment   - Educate patient/family on patient safety including physical limitations  - Instruct patient to call for assistance with activity based on assessment  - Modify environment to reduce risk of injury  - Consider OT/PT consult to assist with strengthening/mobility  Outcome: Progressing     Problem: Nutrition/Hydration-ADULT  Goal: Nutrient/Hydration intake appropriate for improving, restoring or maintaining nutritional needs  Description  Monitor and assess patient's nutrition/hydration status for malnutrition (ex- brittle hair, bruises, dry skin, pale skin and conjunctiva, muscle wasting, smooth red tongue, and disorientation)  Collaborate with interdisciplinary team and initiate plan and interventions as ordered  Monitor patient's weight and dietary intake as ordered or per policy  Utilize nutrition screening tool and intervene per policy  Determine patient's food preferences and provide high-protein, high-caloric foods as appropriate       INTERVENTIONS:  - Monitor oral intake, urinary output, labs, and treatment plans  - Assess nutrition and hydration status and recommend course of action  - Evaluate amount of meals eaten  - Assist patient with eating if necessary   - Allow adequate time for meals  - Recommend/ encourage appropriate diets, oral nutritional supplements, and vitamin/mineral supplements  - Order, calculate, and assess calorie counts as needed  - Recommend, monitor, and adjust tube feedings and TPN/PPN based on assessed needs  - Assess need for intravenous fluids  - Provide specific nutrition/hydration education as appropriate  - Include patient/family/caregiver in decisions related to nutrition  Outcome: Progressing     Problem: PAIN - ADULT  Goal: Verbalizes/displays adequate comfort level or baseline comfort level  Description  Interventions:  - Encourage patient to monitor pain and request assistance  - Assess pain using appropriate pain scale  - Administer analgesics based on type and severity of pain and evaluate response  - Implement non-pharmacological measures as appropriate and evaluate response  - Consider cultural and social influences on pain and pain management  - Notify physician/advanced practitioner if interventions unsuccessful or patient reports new pain  Outcome: Progressing     Problem: INFECTION - ADULT  Goal: Absence or prevention of progression during hospitalization  Description  INTERVENTIONS:  - Assess and monitor for signs and symptoms of infection  - Monitor lab/diagnostic results  - Monitor all insertion sites, i e  indwelling lines, tubes, and drains  - Monitor endotracheal (as able) and nasal secretions for changes in amount and color  - [place] appropriate cooling/warming therapies per order  - Administer medications as ordered  - Instruct and encourage patient and family to use good hand hygiene technique  - Identify and instruct in appropriate isolation precautions for identified infection/condition  Outcome: Progressing     Problem: SAFETY ADULT  Goal: Patient will remain free of falls  Description  INTERVENTIONS:  - Assess patient frequently for physical needs  -  Identify cognitive and physical deficits and behaviors that affect risk of falls    -  [place] fall precautions as indicated by assessment   - Educate patient/family on patient safety including physical limitations  - Instruct patient to call for assistance with activity based on assessment  - Modify environment to reduce risk of injury  - Consider OT/PT consult to assist with strengthening/mobility  Outcome: Progressing  Goal: Maintain or return to baseline ADL function  Description  INTERVENTIONS:  -  Assess patient's ability to carry out ADLs; assess patient's baseline for ADL function and identify physical deficits which impact ability to perform ADLs (bathing, care of mouth/teeth, toileting, grooming, dressing, etc )  - Assess/evaluate cause of self-care deficits   - Assess range of motion  - Assess patient's mobility; develop plan if impaired  - Assess patient's need for assistive devices and provide as appropriate  - Encourage maximum independence but intervene and supervise when necessary  ¯ Involve family in performance of ADLs  ¯ Assess for home care needs following discharge   ¯ Request OT consult to assist with ADL evaluation and planning for discharge  ¯ Provide patient education as appropriate  Outcome: Progressing  Goal: Maintain or return mobility status to optimal level  Description  INTERVENTIONS:  - Assess patient's baseline mobility status (ambulation, transfers, stairs, etc )    - Identify cognitive and physical deficits and behaviors that affect mobility  - Identify mobility aids required to assist with transfers and/or ambulation (gait belt, sit-to-stand, lift, walker, cane, etc )  - [place] fall precautions as indicated by assessment  - Record patient progress and toleration of activity level on Mobility SBAR; progress patient to next Phase/Stage  - Instruct patient to call for assistance with activity based on assessment  - Request Rehabilitation consult to assist with strengthening/weightbearing, etc   Outcome: Progressing     Problem: DISCHARGE PLANNING  Goal: Discharge to home or other facility with appropriate resources  Description  INTERVENTIONS:  - Identify barriers to discharge w/patient and caregiver  - Arrange for needed discharge resources and transportation as appropriate  - Identify discharge learning needs (meds, wound care, etc )  - Arrange for interpretive services to assist at discharge as needed  - Refer to Case Management Department for coordinating discharge planning if the patient needs post-hospital services based on physician/advanced practitioner order or complex needs related to functional status, cognitive ability, or social support system  Outcome: Progressing     Problem: Knowledge Deficit  Goal: Patient/family/caregiver demonstrates understanding of disease process, treatment plan, medications, and discharge instructions  Description  Complete learning assessment and assess knowledge base    Interventions:  - Provide teaching at level of understanding  - Provide teaching via preferred learning methods  Outcome: Progressing     Problem: Prexisting or High Potential for Compromised Skin Integrity  Goal: Skin integrity is maintained or improved  Description  INTERVENTIONS:  - Identify patients at risk for skin breakdown  - Assess and monitor skin integrity  - Assess and monitor nutrition and hydration status  - Monitor labs (i e  albumin)  - Assess for incontinence   - Turn and reposition patient  - Assist with mobility/ambulation  - Relieve pressure over bony prominences  - Avoid friction and shearing  - Provide appropriate hygiene as needed including keeping skin clean and dry  - Evaluate need for skin moisturizer/barrier cream  - Collaborate with interdisciplinary team (i e  Nutrition, Rehabilitation, etc )   - Patient/family teaching  Outcome: Progressing     Problem: GASTROINTESTINAL - ADULT  Goal: Minimal or absence of nausea and/or vomiting  Description  INTERVENTIONS:  - Administer IV fluids as ordered to ensure adequate hydration  - Maintain NPO status until nausea and vomiting are resolved  - Nasogastric tube as ordered  - Administer ordered antiemetic medications as needed  - Provide nonpharmacologic comfort measures as appropriate  - Advance diet as tolerated, if ordered  - Nutrition services referral to assist patient with adequate nutrition and appropriate food choices  Outcome: Progressing  Goal: Maintains or returns to baseline bowel function  Description  INTERVENTIONS:  - Assess bowel function  - Encourage oral fluids to ensure adequate hydration  - Administer IV fluids as ordered to ensure adequate hydration  - Administer ordered medications as needed  - Encourage mobilization and activity  - Nutrition services referral to assist patient with appropriate food choices  Outcome: Progressing  Goal: Maintains adequate nutritional intake  Description  INTERVENTIONS:  - Monitor percentage of each meal consumed  - Identify factors contributing to decreased intake, treat as appropriate  - Assist with meals as needed  - Monitor I&O, WT and lab values  - Obtain nutrition services referral as needed  Outcome: Progressing  Goal: Establish and maintain optimal ostomy function  Description  INTERVENTIONS:  - Assess bowel function  - Encourage oral fluids to ensure adequate hydration  - Administer IV fluids as ordered to ensure adequate hydration  - Administer ordered medications as needed  - Encourage mobilization and activity  - Nutrition services referral to assist patient with appropriate food choices  - Assess stoma site  Outcome: Progressing     Problem: METABOLIC, FLUID AND ELECTROLYTES - ADULT  Goal: Electrolytes maintained within normal limits  Description  INTERVENTIONS:  - Monitor labs and assess patient for signs and symptoms of electrolyte imbalances  - Administer electrolyte replacement as ordered  - Monitor response to electrolyte replacements, including repeat lab results as appropriate  - Instruct patient on fluid and nutrition as appropriate  Outcome: Progressing  Goal: Fluid balance maintained  Description  INTERVENTIONS:  - Monitor labs and assess for signs and symptoms of volume excess or deficit  - Monitor I/O and WT  - Instruct patient on fluid and nutrition as appropriate  Outcome: Progressing  Goal: Glucose maintained within target range  Description  INTERVENTIONS:  - Monitor Blood Glucose as ordered  - Assess for signs and symptoms of hyperglycemia and hypoglycemia  - Administer ordered medications to maintain glucose within target range  - Assess nutritional intake and initiate nutrition service referral as needed  Outcome: Progressing     Problem: CARDIOVASCULAR - ADULT  Goal: Maintains optimal cardiac output and hemodynamic stability  Description  INTERVENTIONS:  - Monitor I/O, vital signs and rhythm  - Monitor for S/S and trends of decreased cardiac output i e  bleeding, hypotension  - Administer and titrate ordered vasoactive medications to optimize hemodynamic stability  - Assess quality of pulses, skin color and temperature  - Assess for signs of decreased coronary artery perfusion - ex   Angina  - Instruct patient to report change in severity of symptoms  Outcome: Progressing  Goal: Absence of cardiac dysrhythmias or at baseline rhythm  Description  INTERVENTIONS:  - Continuous cardiac monitoring, monitor vital signs, obtain 12 lead EKG if indicated  - Administer antiarrhythmic and heart rate control medications as ordered  - Monitor electrolytes and administer replacement therapy as ordered  Outcome: Progressing     Problem: RESPIRATORY - ADULT  Goal: Achieves optimal ventilation and oxygenation  Description  INTERVENTIONS:  - Assess for changes in respiratory status  - Assess for changes in mentation and behavior  - Position to facilitate oxygenation and minimize respiratory effort  - Oxygen administration by appropriate delivery method based on oxygen saturation (per order) or ABGs  - Initiate smoking cessation education as indicated  - Encourage broncho-pulmonary hygiene including cough, deep breathe, Incentive Spirometry  - Assess the need for suctioning and aspirate as needed  - Assess and instruct to report SOB or any respiratory difficulty  - Respiratory Therapy support as indicated  Outcome: Progressing     Problem: GENITOURINARY - ADULT  Goal: Urinary catheter remains patent  Description  INTERVENTIONS:  - Assess patency of urinary catheter  - If patient has a chronic ramos, consider changing catheter if non-functioning  - Follow guidelines for intermittent irrigation of non-functioning urinary catheter  Outcome: Progressing     Problem: SKIN/TISSUE INTEGRITY - ADULT  Goal: Incision(s), wounds(s) or drain site(s) healing without S/S of infection  Description  INTERVENTIONS  - Assess and document risk factors for skin impairment   - Assess and document dressing, incision, wound bed, drain sites and surrounding tissue  - Initiate Nutrition services consult and/or wound management as needed  Outcome: Progressing     Problem: HEMATOLOGIC - ADULT  Goal: Maintains hematologic stability  Description  INTERVENTIONS  - Assess for signs and symptoms of bleeding or hemorrhage  - Monitor labs  - Administer supportive blood products/factors as ordered and appropriate  Outcome: Progressing       Problem: COPING  Goal: Pt/Family able to verbalize concerns and demonstrate effective coping strategies  Description  INTERVENTIONS:  - Assist patient/family to identify coping skills, available support systems and cultural and spiritual values  - Provide emotional support, including active listening and acknowledgement of concerns of patient and caregivers  - Reduce environmental stimuli, as able  - Provide patient education  - Assess for spiritual pain/suffering and initiate spiritual care, including notification of Pastoral Care or erna based community as needed  - Assess effectiveness of coping strategies  Outcome: Progressing  Goal: Will report anxiety at manageable levels  Description  INTERVENTIONS:  - Administer medication as ordered  - Teach and encourage coping skills  - Provide emotional support  - Assess patient/family for anxiety and ability to cope  Outcome: Progressing     Problem: CHANGE IN BODY IMAGE  Goal: Pt/Family communicate acceptance of loss or change in body image and expresses psychological comfort and peace  Description  INTERVENTIONS:  - Assess patient/family anxiety and grief process related to change in body image, loss of functional status and loss of sense of self  - Assess patient/family's coping skills and provide emotional, spiritual and psychosocial support  - Provide information about the patient's health status with consideration of family and cultural values  - Communicate willingness to discuss loss and facilitate grief process with patient/family as appropriate  - Emphasize sustaining relationships within family system and community, or erna/spiritual traditions  - Refer to community support groups as appropriate  - Initiate Spiritual Care, Pastoral care or other ancillary consults as needed  Outcome: Progressing     Problem: CONFUSION/THOUGHT DISTURBANCE  Goal: Thought disturbances (confusion, delirium, depression, dementia or psychosis) are managed to maintain or return to baseline mental status and functional level  Description  INTERVENTIONS:  - Assess for possible contributors to  thought disturbance, including but not limited to medications, infection, impaired vision or hearing, underlying metabolic abnormalities, dehydration, respiratory compromise,  psychiatric diagnoses and notify attending PHYSICAN/AP  - Monitor and intervene to maintain adequate nutrition, hydration, elimination, sleep and activity  - Decrease environmental stimuli, including noise as appropriate  - Provide frequent contacts to provide refocusing, direction and reassurance as needed  Approach patient calmly with eye contact and at their level    - [place] high risk fall precautions, aspiration precautions and other safety measures, as indicated  - If delirium suspected, notify physician/AP of change in condition and request immediate in-person evaluation  - Pursue consults as appropriate including Geriatric (campus dependent), OT for cognitive evaluation/activity planning, psychiatric, pastoral care, etc   Outcome: Progressing

## 2019-08-03 NOTE — ASSESSMENT & PLAN NOTE
S/p exploratory laparotomy, lysis of adhesions, small bowel resection, primary repair of parastomal hernia, colonoscopy 7/15  7/25 Transferred to MICU for worsening respiratory status  Emergently intubated  R IJ and R radial sandy placed  CXR w free air  Emergent OR for exploration, bowel resection  7/26 Ex lap w bowel resection, abdominal washout, wound vac application  6/75 Re-exploration, abdominal washout and anastomosis small bowel  7/31 Suture tied downs, vac removed  8/2 Extubated  Plan  Diet - Dysphagia Puree;  Thin Liquid  Continue wean O2 - on 2L NC, maintaining Sat's  Lasix per Nephro  F/u blood cultures  WBC improving with Zosyn   - Zosyn stopped 8/1   - Ampicillin given 8/2 - WBC to 14 4 from 16  Pain control  Maintain abdominal binder with hole cut for ostomy

## 2019-08-03 NOTE — PROGRESS NOTES
Progress Note - Anushka Gene 1940, 77 y o  male MRN: [id number]    Unit/Bed#: MICU 10 Encounter: [id number]    Primary Care Provider: Santosh Hammonds DO   Date and time admitted to hospital: 7/15/2019  2:17 AM        * Parastomal hernia  Assessment & Plan  S/p exploratory laparotomy, lysis of adhesions, small bowel resection, primary repair of parastomal hernia, colonoscopy 7/15  7/25 Transferred to MICU for worsening respiratory status  Emergently intubated  R IJ and R radial sandy placed  CXR w free air  Emergent OR for exploration, bowel resection  7/26 Ex lap w bowel resection, abdominal washout, wound vac application  0/87 Re-exploration, abdominal washout and anastomosis small bowel  7/31 Suture tied downs, vac removed  8/2 Extubated  Plan  Diet - Dysphagia Puree; Thin Liquid  Continue wean O2 - on 2L NC, maintaining Sat's  Lasix per Nephro  D/C Insulin gtt - transition to Sliding Scale  F/u blood cultures  WBC improving with Zosyn   - Zosyn stopped 8/1   - Ampicillin given 8/2 - WBC to 14 4 from 16  Pain control  Maintain abdominal binder with hole cut for ostomy              Subjective/Objective   Chief Complaint: Frustrated    Subjective: Patient maintained sat's overnight  States he is frustrated with not being able to sleep  He feels better, and wants to get out of the ICU  He did have episodes of tachypnea overnight however this occurs when he is drinking and trying to talk  Objective:     Blood pressure (!) 118/46, pulse 88, temperature 98 2 °F (36 8 °C), temperature source Axillary, resp  rate (!) 32, height 5' 6" (1 676 m), weight 114 kg (251 lb 5 2 oz), SpO2 96 %  ,Body mass index is 40 56 kg/m²        Intake/Output Summary (Last 24 hours) at 8/3/2019 0711  Last data filed at 8/3/2019 0618  Gross per 24 hour   Intake 767 02 ml   Output 4555 ml   Net -3787 98 ml       Invasive Devices     Central Venous Catheter Line            CVC Central Lines 07/26/19 Triple 20cm Left Subclavian 7 days          Drain            Colostomy LLQ 19 days    Urethral Catheter Non-latex 18 Fr  13 days    NG/OG Tube Orogastric Center mouth 8 days                Physical Exam: General: AAOx3  Head: normocephalic, atraumatic  Neck: supple, trachea midline  Chest: Left TLC in place  Respiratory: 2L NC, tachypnea with talking and drinking, however recovers  Abdomen: soft, non tender, abdominal binder in place, skin tears with dressing c/d/i,   Stoma pink, bowel sweat and 50 cc liquid stool - bulge near stoma, however appears to have been present since POD 0  Heart: RRR, S1s2  Ext: Warm no cyanosis   Pulse: 2+ radial      Lab, Imaging and other studies:  I have personally reviewed pertinent lab results    , CBC:   Lab Results   Component Value Date    WBC 14 43 (H) 08/03/2019    HGB 7 4 (L) 08/03/2019    HCT 24 2 (L) 08/03/2019    MCV 96 08/03/2019     08/03/2019    MCH 29 2 08/03/2019    MCHC 30 6 (L) 08/03/2019    RDW 15 3 (H) 08/03/2019    MPV 12 1 08/03/2019    NRBC 0 08/03/2019   , CMP:   Lab Results   Component Value Date    SODIUM 146 (H) 08/03/2019    K 3 3 (L) 08/03/2019     (H) 08/03/2019    CO2 25 08/03/2019    BUN 40 (H) 08/03/2019    CREATININE 2 10 (H) 08/03/2019    CALCIUM 7 8 (L) 08/03/2019    EGFR 29 08/03/2019     VTE Pharmacologic Prophylaxis: Sequential compression device (Venodyne)  and Heparin  VTE Mechanical Prophylaxis: sequential compression device

## 2019-08-03 NOTE — RAPID RESPONSE
Progress Note - Rapid Response   Mellissa Castle 77 y o  male MRN: [id number]    Time Called ( Time): 4663  Date Called: 8/3  Level of Care: ICU  Room#: MICU 10 (Rapid in CT scan)  Arrival Time ( Time): 4758  Event End Time ( Time): 5054  Primary reason for call: Acute change in HR  Interventions:  Airway/Breathing:  Bag Mask  Circulation: N/A  Other Treatments: N/A       Assessment:   1  Bradycardia  2  Altered mental status    Plan:   · Surgical critical care at bedside, CT aborted at this time  Patient to return to MICU 10       HPI/Chief Complaint (Background/Situation):   Mellissa Castle is a 77y o  year old male who presents with strangulated parastomal hernia s/p exploratory laparotomy on 7/15  He was in CT scan today when he became bradycardic into the 40s and subsequently unresponsive  CRNA at bedside bagged patient and by the time rapid response team arrived, patient was waking up and breathing on his own  Non-rebreather was applied, CT aborted, patient returned to MICU 10       Historical Information   Past Medical History:   Diagnosis Date    Benign prostatic hyperplasia with urinary obstruction and other lower urinary tract symptoms     Colon cancer ([facility] [address] ) 1998    Elevated PSA     last assessed 3/11/2014    Hypercholesterolemia     Hypertension     Malignant neoplasm of skin     Microhematuria     Neurogenic bladder     last assessed 6/13/2014    Type 2 diabetes mellitus ([facility] [address] )     Urinary incontinence      Past Surgical History:   Procedure Laterality Date    APPENDECTOMY      5 y/o    COLON SURGERY      COLOSTOMY      EXPLORATORY LAPAROTOMY W/ BOWEL RESECTION N/A 7/26/2019    Procedure: LAPAROTOMY EXPLORATORY W/ BOWEL RESECTION, IRRIGATION OF INFECTED MATERIAL: VAC APPLICATION;  Surgeon: Rolly Roblero MD;  [facility];  Service: Colorectal    LAPAROTOMY N/A 7/15/2019    Procedure: LAPAROTOMY EXPLORATORY; BOWEL RESECTION; HERNIA REPAIR;  Surgeon: Jamye Hair MD;  [facility];  Service: Colorectal    LAPAROTOMY N/A 2019    Procedure: Second look LAPAROTOMY EXPLORATORY, removal of Abthera VAC, abdominal washout, lysis of adhesions,  end to end small bowel anastomosis, Vac dressing application;  Surgeon: Smooth Perez MD;  [facility];  Service: Colorectal    PROSTATE BIOPSY      ROTATOR CUFF REPAIR      resolved     SKIN BIOPSY      SMALL INTESTINE SURGERY N/A 7/15/2019    Procedure: RESECTION SMALL BOWEL;  Surgeon: Jamey Hair MD;  [facility];  Service: Colorectal    TONSILLECTOMY      5 y/o    VASECTOMY       Social History   Social History     Substance and Sexual Activity   Alcohol Use Yes    Frequency: Monthly or less    Binge frequency: Less than monthly    Comment: SOCIAL     Social History     Substance and Sexual Activity   Drug Use No     Social History     Tobacco Use   Smoking Status Former Smoker    Last attempt to quit: Melany Mu-ism Years since quittin 6   Smokeless Tobacco Never Used   Tobacco Comment    per [employer] 'former smoker- negative tobacco x 25 yrs, prev 1 ppd x 25 rs'       Meds/Allergies     Current Facility-Administered Medications:  acetaminophen 650 mg Oral Q6H PRN Oly Torres PA-C    carbidopa-levodopa 1 5 tablet Oral TID Bindu Howe PA-C    carvedilol 25 mg Oral BID With Meals Mani Mchugh PA-C    docusate sodium 100 mg Oral BID Swati Dyson-Me    finasteride 5 mg Oral Daily Rod Drake PA-C    heparin (porcine) 7,500 Units Subcutaneous Q8H [address] Bindu Howe PA-C    HYDROmorphone 0 5 mg Intravenous Q3H PRN Swati Dyson-Me    insulin lispro 1-6 Units Subcutaneous TID AC Anthony Chaparro MD    insulin lispro 1-6 Units Subcutaneous HS Anthony Chaparro MD    iohexol 50 mL Oral 90 min pre-procedure Rubio Flores MD    melatonin 6 mg Oral HS Anthony Chaparro MD    multi-electrolyte 50 mL/hr Intravenous Continuous Sw[place]arlton Dyson-Me Last Rate: 50 mL/hr (08/03/19 1233)   ondansetron 4 mg Intravenous Q4H PRN Kita Nichols MD    oxyCODONE 10 mg Oral Q4H PRN Jaron Goodpasture Petit-Me    oxyCODONE 5 mg Oral Q4H PRN Jaron Goodpasture Petit-Me    pantoprazole 40 mg Oral Early Morning Rod Drake PA-C    phenol 1 spray Mouth/Throat Q2H PRN Sherrell Philippe PA-C    pravastatin 80 mg Oral Daily With Graeme Myers PA-C          multi-electrolyte 50 mL/hr Last Rate: 50 mL/hr (08/03/19 1233)       Allergies   Allergen Reactions    Atorvastatin Myalgia    Lyrica [Pregabalin]        Vitals:   Vitals:    08/03/19 0831 08/03/19 0854 08/03/19 1139 08/03/19 1708   BP:  119/56 119/50 168/74   Pulse: 92 72 76 60   Resp: (!) 40  (!) 35    Temp: 97 8 °F (36 6 °C)  97 8 °F (36 6 °C)    TempSrc: Oral  Oral    SpO2: 96%  95% 93%   Weight:       Height:             Physical Exam:  Gen: Lethargic, opens eyes to voice  HEENT: NCAT  Neck: Supple  Chest: Bradycardic into the 40s  Cor: CTA b/l  Abd: Colostomy present, abdomen soft  NT/ND  Ext: Warm and dry  Neuro: Alert and oriented x 3   Skin: Warm and dry      Intake/Output Summary (Last 24 hours) at 8/3/2019 1714  Last data filed at 8/3/2019 1000  Gross per 24 hour   Intake 252 81 ml   Output 3330 ml   Net -3077 19 ml       Respiratory    Lab Data (Last 4 hours)    None         O2/Vent Data (Last 4 hours)    None              Invasive Devices     Central Venous Catheter Line            CVC Central Lines 07/26/19 Triple 20cm Left Subclavian 8 days          Drain            Colostomy LLQ 19 days    Urethral Catheter Non-latex 18 Fr  14 days    NG/OG Tube Orogastric Center mouth 8 days                DIAGNOSTIC DATA:    Lab: I have personally reviewed pertinent lab results     CBC:   Results from last 7 days   Lab Units 08/03/19  0431   WBC Thousand/uL 14 43*   HEMOGLOBIN g/dL 7 4*   HEMATOCRIT % 24 2*   PLATELETS Thousands/uL 158     CMP:   Results from last 7 days   Lab Units 08/03/19  0431 08/02/19  0451 08/01/19  154 07/28/19  1215 07/28/19  1036   POTASSIUM mmol/L 3 3* 3 8 3 8   < >  --   --    CHLORIDE mmol/L 113* 116* 118*   < >  --   --    CO2 mmol/L 25 24 23   < >  --   --    CO2, I-STAT mmol/L  --   --   --   --  21 23   BUN mg/dL 40* 38* 31*   < >  --   --    CREATININE mg/dL 2 10* 1 97* 1 79*   < >  --   --    CALCIUM mg/dL 7 8* 7 6* 7 9*   < >  --   --    ALK PHOS U/L  --  81  --   --   --   --    ALT U/L  --  15  --   --   --   --    AST U/L  --  20  --   --   --   --    GLUCOSE, ISTAT mg/dl  --   --   --   --  217* 202*    < > = values in this interval not displayed  PT/INR:   No results found for: PT, INR,   Magnesium: No components found for: MAG,   Phosphorous:   Lab Results   Component Value Date    PHOS 4 4 (H) 08/03/2019       Microbiology:  Lab Results   Component Value Date    BLOODCX No Growth at 48 hrs  08/03/2019    BLOODCX No Growth at 48 hrs  08/01/2019    BLOODCX No Growth at 72 hrs  07/30/2019    URINECX >100,000 cfu/ml Escherichia coli (A) 07/24/2019    URINECX 40,000-49,000 cfu/ml Enterococcus faecalis (A) 07/24/2019    URINECX No Growth <1000 cfu/mL 11/04/2016         OUTCOME:   Stayed in room   Family notified of transfer: no  Code Status: Level 2 - DNAR: but accepts endotracheal intubation  Critical Care Time: Total Critical Care time spent 0 minutes excluding procedures, teaching and family updates

## 2019-08-03 NOTE — PROGRESS NOTES
NEPHROLOGY PROGRESS NOTE   Peter Gordon 77 y o  male MRN: [id number]  Unit/Bed#: MICU 10 Encounter: [id number]  Reason for Consult:  Acute kidney injury    Assessment:  1  Acute kidney injury, likely secondary to ATN secondary to sepsis and hypotension, urine output is improving, previously requiring CVVH  2  Chronic kidney disease, baseline creatinine between 1 5 and 1 8  3  Hypernatremia, appears stable  4  Thrombocytopenia,  resolved  5  Sepsis/hypotension, improving  6  Volume overload, continue with IV diuresis  7  Multiple abdominal procedures with exploratory laparotomy, small-bowel resection, abdominal washout and anastomosis of the small bowel     PLAN:  · Overall renal function unfortunately continues to worsen however would continue with continue gradual diuresis given patient's volume overload  · Hopeful plateau in renal function  · Replace serum potassium  · Continue monitor serum sodium closely    SUBJECTIVE:  Seen examined  Patient awake alert  Appears mildly short of breath  No active chest pain or pressure  No worsening abdominal pain or discomfort  Review of Systems    OBJECTIVE:  Current Weight: Weight - Scale: 110 kg (241 lb 13 5 oz)  Vitals:    08/03/19 0404 08/03/19 0504 08/03/19 0600 08/03/19 0831   BP: 140/60 (!) 118/46     Pulse: 66 88  92   Resp: (!) 29 (!) 32  (!) 40   Temp: 98 2 °F (36 8 °C)   97 8 °F (36 6 °C)   TempSrc: Axillary   Oral   SpO2: 97% 96%  96%   Weight:   110 kg (241 lb 13 5 oz)    Height:           Intake/Output Summary (Last 24 hours) at 8/3/2019 0854  Last data filed at 8/3/2019 8634  Gross per 24 hour   Intake 673 17 ml   Output 4505 ml   Net -3831 83 ml       Physical Exam   Constitutional: No distress  Neck: Neck supple  Cardiovascular: Normal rate and regular rhythm  Pulmonary/Chest: Tachypnea noted  He has decreased breath sounds in the right lower field and the left lower field  Abdominal: Soft  He exhibits distension     Musculoskeletal: He exhibits edema  Neurological: He is alert  Skin: Skin is warm         Medications:    Current Facility-Administered Medications:     acetaminophen (TYLENOL) oral suspension 650 mg, 650 mg, Oral, Q6H PRN, Oly Torres PA-C, 650 mg at 08/01/19 0443    carbidopa-levodopa (SINEMET)  mg per tablet 1 5 tablet, 1 5 tablet, Oral, TID, Terrence Nunez PA-C, 1 5 tablet at 08/03/19 0847    carvedilol (COREG) tablet 12 5 mg, 12 5 mg, Oral, BID With Meals, Terrence Nunez PA-C, 12 5 mg at 08/02/19 1810    finasteride (PROSCAR) tablet 5 mg, 5 mg, Oral, Daily, Chris Drake PA-C, 5 mg at 08/03/19 0847    heparin (porcine) subcutaneous injection 7,500 Units, 7,500 Units, Subcutaneous, Q8H [address], 7,500 Units at 08/03/19 0619 **AND** [CANCELED] Platelet count, , , Once, [employer]    HYDROmorphone (DILAUDID) injection 0 5 mg, 0 5 mg, Intravenous, Q3H PRN, Erinn Wood    insulin lispro (HumaLOG) 100 units/mL subcutaneous injection 1-6 Units, 1-6 Units, Subcutaneous, TID AC **AND** Fingerstick Glucose (POCT), , , TID AC, Klaus Pressley MD    insulin lispro (HumaLOG) 100 units/mL subcutaneous injection 1-6 Units, 1-6 Units, Subcutaneous, HS, Klaus Pressley MD    magnesium sulfate 2 g/50 mL IVPB (premix) 2 g, 2 g, Intravenous, Once, Klaus Pressley MD    melatonin tablet 6 mg, 6 mg, Oral, HS, Klaus Pressley MD    ondansetron [facility]) injection 4 mg, 4 mg, Intravenous, Q4H PRN, Francesco Ashley MD, 4 mg at 07/15/19 0410    oxyCODONE (ROXICODONE) immediate release tablet 10 mg, 10 mg, Oral, Q4H PRN, Erinn Alvait-Me, 10 mg at 08/02/19 2350    oxyCODONE (ROXICODONE) IR tablet 5 mg, 5 mg, Oral, Q4H PRN, Erinn SenMe, 5 mg at 08/02/19 1828    pantoprazole (PROTONIX) EC tablet 40 mg, 40 mg, Oral, Early Morning, Rod Drake PA-C, 40 mg at 08/03/19 0619    phenol (CHLORASEPTIC) 1 4 % mucosal liquid 1 spray, 1 spray, Mouth/Throat, Q2H PRN, Alina Ghosh PA-C, 1 spray at 08/02/19 1834   potassium chloride 20 mEq IVPB (premix), 20 mEq, Intravenous, Q2H, Khadijah Coker MD    pravastatin (PRAVACHOL) tablet 80 mg, 80 mg, Oral, Daily With Zhang Garcia PA-C, 80 mg at 08/02/19 7228    Laboratory Results:  Results from last 7 days   Lab Units 08/03/19  0431 08/02/19  0451 08/01/19  1547 08/01/19  0509 08/01/19  0443 07/31/19  1543 07/31/19  0438 07/30/19  1530 07/30/19  6811 07/30/19  0447  07/29/19  1205 07/29/19  0422 07/28/19  1955 07/28/19  1337 07/28/19  1215 07/28/19  1036   WBC Thousand/uL 14 43* 16 35*  --   --  20 09*  --  14 83*  --  15 92* 14 49*  --   --  19 24*  --  20 20*  --   --    HEMOGLOBIN g/dL 7 4* 7 6*  --   --  8 5*  --  8 5*  --  8 4* 7 8*  --   --  9 4*  --  9 4*  --   --    I STAT HEMOGLOBIN g/dl  --   --   --   --   --   --   --   --   --   --   --   --   --   --   --  8 5* 7 1*   HEMATOCRIT % 24 2* 25 0*  --   --  28 7*  --  28 1*  --  26 2* 25 1*  --   --  30 0*  --  29 9*  --   --    HEMATOCRIT, ISTAT %  --   --   --   --   --   --   --   --   --   --   --   --   --   --   --  25* 21*   PLATELETS Thousands/uL 158 110*  --   --  67*  --  50*  --  62* 58*  --   --  79*  --  100*  --   --    POTASSIUM mmol/L 3 3* 3 8 3 8 3 7  --  4 2 3 8 4 3  --  3 5   < > 3 8 4 0 3 9 4 0  --   --    CHLORIDE mmol/L 113* 116* 118* 118*  --  120* 121* 121*  --  116*   < > 113* 112* 111* 113*  --   --    CO2 mmol/L 25 24 23 24  --  23 24 21  --  24   < > 23 23 23 22  --   --    CO2, I-STAT mmol/L  --   --   --   --   --   --   --   --   --   --   --   --   --   --   --  21 23   BUN mg/dL 40* 38* 31* 37*  --  34* 31* 31*  --  30*   < > 23 25 29* 32*  --   --    CREATININE mg/dL 2 10* 1 97* 1 79* 1 81*  --  1 82* 1 75* 1 69*  --  1 64*   < > 1 32* 1 28 1 41* 1 62*  --   --    CALCIUM mg/dL 7 8* 7 6* 7 9* 7 6*  --  7 9* 7 6* 7 6*  --  7 6*   < > 8 0* 7 8* 7 7* 7 8*  --   --    MAGNESIUM mg/dL 1 7  --   --  1 7  --   --  1 8  --   --   --   --  2 0 2 0 1 9 2 1  --   --    PHOSPHORUS mg/dL 4 4* --   --  3 0  --   --  1 4*  --   --   --   --  2 0* 2 2* 2 9 4 0  --   --    GLUCOSE, ISTAT mg/dl  --   --   --   --   --   --   --   --   --   --   --   --   --   --   --  217* 202*    < > = values in this interval not displayed

## 2019-08-04 NOTE — PROGRESS NOTES
NEPHROLOGY PROGRESS NOTE   Flor Perera 77 y o  male MRN: [id number]  [facility]: [id number]  Reason for Consult:  Acute kidney injury    Assessment:  1  Acute kidney injury, likely secondary to ATN secondary to sepsis and hypotension, urine output is improving, previously requiring CVVH  2  Chronic kidney disease, baseline creatinine between 1 5 and 1 8  3  Hypernatremia, appears stable  4  Sepsis/hypotension, blood pressure appears stable  5  Volume overload, diuretics currently on hold  6  Multiple abdominal procedures with exploratory laparotomy, small-bowel resection, abdominal washout and anastomosis of the small bowel, now with ileus, status post NG tube placement    PLAN:  · Continue with gentle IV fluids specially in lieu of recent NG tube placement  · Clinically patient still appears volume overload, use Lasix as needed  · Renal function seems to have plateaued around 2 0, may represent new baseline    SUBJECTIVE:  Seen examined  Patient awake alert  Complains of abdominal pain discomfort  Pairs mildly short of breath  Status post NG tube placement  Review of Systems    OBJECTIVE:  Current Weight: Weight - Scale: 109 kg (240 lb 1 3 oz)  Vitals:    08/04/19 0350 08/04/19 0550 08/04/19 0556 08/04/19 0805   BP: 158/68 161/75     Pulse: 80 82  72   Resp: (!) 38 (!) 36  (!) 43   Temp: 97 9 °F (36 6 °C)   98 2 °F (36 8 °C)   TempSrc: Oral   Oral   SpO2: 98% 95%  98%   Weight:   109 kg (240 lb 1 3 oz)    Height:           Intake/Output Summary (Last 24 hours) at 8/4/2019 0823  Last data filed at 8/4/2019 0811  Gross per 24 hour   Intake 1540 ml   Output 2925 ml   Net -1385 ml       Physical Exam   HENT:   Head: Normocephalic  Neck: Neck supple  Cardiovascular: Normal rate and regular rhythm  Pulmonary/Chest: Tachypnea noted  He has decreased breath sounds in the right lower field and the left lower field  Abdominal: Soft  He exhibits distension     Musculoskeletal: He exhibits edema  Neurological: He is alert  Skin: Skin is warm and dry         Medications:    Current Facility-Administered Medications:     heparin (porcine) subcutaneous injection 7,500 Units, 7,500 Units, Subcutaneous, Q8H [facility], 7,500 Units at 08/04/19 0556 **AND** [CANCELED] Platelet count, , , Once, Anna Oakley    HYDROmorphone (DILAUDID) injection 0 2 mg, 0 2 mg, Intravenous, Q4H PRN, Fluor Justin PA-C, 0 2 mg at 08/03/19 2120    HYDROmorphone (DILAUDID) injection 0 5 mg, 0 5 mg, Intravenous, Q4H PRN, Fluor Justin PA-C, 0 5 mg at 08/04/19 0119    insulin lispro (HumaLOG) 100 units/mL subcutaneous injection 1-6 Units, 1-6 Units, Subcutaneous, HS, Amaya Flynn MD    insulin lispro (HumaLOG) 100 units/mL subcutaneous injection 1-6 Units, 1-6 Units, Subcutaneous, Q6H **AND** Fingerstick Glucose (POCT), , , Q6H, Fiona SenMe    iohexol (OMNIPAQUE) 240 MG/ML solution 50 mL, 50 mL, Oral, 90 min pre-procedure, Dot Zepeda MD    metoprolol (LOPRESSOR) injection 2 5 mg, 2 5 mg, Intravenous, Q6H, Sherrell Philippe PA-C, 2 5 mg at 08/03/19 2033    multi-electrolyte (ISOLYTE-S PH 7 4 equivalent) IV solution, 50 mL/hr, Intravenous, Continuous, [employer], PA-C, Last Rate: 50 mL/hr at 08/04/19 0809, 50 mL/hr at 08/04/19 0809    ondansetron (ZOFRAN) injection 4 mg, 4 mg, Intravenous, Q4H PRN, Urszula Willoughby MD, 4 mg at 07/15/19 0410    pantoprazole (PROTONIX) injection 40 mg, 40 mg, Intravenous, Q24H [facility], Sherrell Philippe PA-C    phenol (CHLORASEPTIC) 1 4 % mucosal liquid 1 spray, 1 spray, Mouth/Throat, Q2H PRN, Sherrell Philippe PA-C, 1 spray at 08/02/19 1836    Laboratory Results:  Results from last 7 days   Lab Units 08/04/19  0429 08/03/19  0431 08/02/19  0451 08/01/19  1547 08/01/19  0509 08/01/19  0443 07/31/19  1543 07/31/19  0438  07/30/19  2563 07/30/19  0447  07/29/19  1205 07/29/19  0422 07/28/19  1955 07/28/19  1337  07/28/19  1215 07/28/19  1036   WBC Thousand/uL 12 42* 14 43* 16 35*  --   --  20 09*  --  14 83*  --  15 92* 14 49*  --   --  19 24*  --  20 20*   < >  --   --    HEMOGLOBIN g/dL 7 5* 7 4* 7 6*  --   --  8 5*  --  8 5*  --  8 4* 7 8*  --   --  9 4*  --  9 4*   < >  --   --    I STAT HEMOGLOBIN g/dl  --   --   --   --   --   --   --   --   --   --   --   --   --   --   --   --   --  8 5* 7 1*   HEMATOCRIT % 25 5* 24 2* 25 0*  --   --  28 7*  --  28 1*  --  26 2* 25 1*  --   --  30 0*  --  29 9*   < >  --   --    HEMATOCRIT, ISTAT %  --   --   --   --   --   --   --   --   --   --   --   --   --   --   --   --   --  25* 21*   PLATELETS Thousands/uL 209 158 110*  --   --  67*  --  50*  --  62* 58*  --   --  79*  --  100*   < >  --   --    POTASSIUM mmol/L 3 6 3 3* 3 8 3 8 3 7  --  4 2 3 8   < >  --  3 5   < > 3 8 4 0 3 9 4 0   < >  --   --    CHLORIDE mmol/L 113* 113* 116* 118* 118*  --  120* 121*   < >  --  116*   < > 113* 112* 111* 113*   < >  --   --    CO2 mmol/L 24 25 24 23 24  --  23 24   < >  --  24   < > 23 23 23 22   < >  --   --    CO2, I-STAT mmol/L  --   --   --   --   --   --   --   --   --   --   --   --   --   --   --   --   --  21 23   BUN mg/dL 40* 40* 38* 31* 37*  --  34* 31*   < >  --  30*   < > 23 25 29* 32*   < >  --   --    CREATININE mg/dL 2 01* 2 10* 1 97* 1 79* 1 81*  --  1 82* 1 75*   < >  --  1 64*   < > 1 32* 1 28 1 41* 1 62*   < >  --   --    CALCIUM mg/dL 7 7* 7 8* 7 6* 7 9* 7 6*  --  7 9* 7 6*   < >  --  7 6*   < > 8 0* 7 8* 7 7* 7 8*   < >  --   --    MAGNESIUM mg/dL  --  1 7  --   --  1 7  --   --  1 8  --   --   --   --  2 0 2 0 1 9 2 1  --   --   --    PHOSPHORUS mg/dL  --  4 4*  --   --  3 0  --   --  1 4*  --   --   --   --  2 0* 2 2* 2 9 4 0  --   --   --    GLUCOSE, ISTAT mg/dl  --   --   --   --   --   --   --   --   --   --   --   --   --   --   --   --   --  217* 202*    < > = values in this interval not displayed

## 2019-08-04 NOTE — RESPIRATORY THERAPY NOTE
RT Protocol Note  Christophern Gene 77 y o  male MRN: [id number]  Unit/Bed#: MICU 10 Encounter: [id number]    Assessment    Principal Problem:    Parastomal hernia  Active Problems:    DM type 2 (diabetes mellitus, type 2) ([place] [address] )    GERD without esophagitis    Hyperlipidemia    Malignant neoplasm of colon (HCC)    Acute kidney injury ([place] [address] )    Chronic kidney disease    Hypertension    Leukocytosis    UTI (urinary tract infection)    Thrombocytopenia (HCC)    Protein-calorie malnutrition (HCC)    Sepsis (HCC)    Anemia      Home Pulmonary Medications:  None       Past Medical History:   Diagnosis Date    Benign prostatic hyperplasia with urinary obstruction and other lower urinary tract symptoms     Colon cancer ([place] [address] )     Elevated PSA     last assessed 3/11/2014    Hypercholesterolemia     Hypertension     Malignant neoplasm of skin     Microhematuria     Neurogenic bladder     last assessed 2014    Type 2 diabetes mellitus ([place] [address] )     Urinary incontinence      Social History     Socioeconomic History    Marital status: /Civil Union     Spouse name: Not on file    Number of children: Not on file    Years of education: Not on file    Highest education level: Not on file   Occupational History    Not on file   Social Needs    Financial resource strain: Not on file    Food insecurity:     Worry: Not on file     Inability: Not on file    Transportation needs:     Medical: Not on file     Non-medical: Not on file   Tobacco Use    Smoking status: Former Smoker     Last attempt to quit: 1980     Years since quittin 6    Smokeless tobacco: Never Used    Tobacco comment: per [employer] 'former smoker- negative tobacco x 25 yrs, prev 1 ppd x 25 rs'   Substance and Sexual Activity    Alcohol use: Yes     Frequency: Monthly or less     Binge frequency: Less than monthly     Comment: SOCIAL    Drug use: No    Sexual activity: Not on file   Lifestyle    Physical activity:     Days per week: Not on file     Minutes per session: Not on file    Stress: Not on file   Relationships    Social connections:     Talks on phone: Not on file     Gets together: Not on file     Attends [employer] service: Not on file     Active member of club or organization: Not on file     Attends meetings of clubs or organizations: Not on file     Relationship status: Not on file    Intimate partner violence:     Fear of current or ex partner: Not on file     Emotionally abused: Not on file     Physically abused: Not on file     Forced sexual activity: Not on file   Other Topics Concern    Not on file   Social History Narrative    Daily coffee consumption (1 cup/day)       Subjective    Subjective Data: n/a    Objective    Physical Exam:   Assessment Type: Assess only  General Appearance: Alert, Awake  Respiratory Pattern: Dyspnea with exertion  Chest Assessment: Chest expansion symmetrical  Bilateral Breath Sounds: Clear, Diminished  O2 Device: NC    Vitals:  Blood pressure 141/65, pulse (!) 54, temperature 98 °F (36 7 °C), temperature source Oral, resp  rate (!) 34, height 5' 6" (1 676 m), weight 110 kg (241 lb 13 5 oz), SpO2 96 %  Results from last 7 days   Lab Units 08/01/19  0428   PH ART  7 413   PCO2 ART mm Hg 35 9*   PO2 ART mm Hg 78 1   HCO3 ART mmol/L 22 4   BASE EXC ART mmol/L -1 8   O2 CONTENT ART mL/dL 12 8*   O2 HGB, ARTERIAL % 95 0   ABG SOURCE  Radial, Right   DOMENICO TEST  Yes       Imaging and other studies: I have personally reviewed pertinent reports  O2 Device: NC     Plan    Respiratory Plan: Discontinue Protocol        Resp Comments: (P) Pt extubated 2 days ago  No respiratory issues, no home resp  meds, no pulmonary hx other than former smoker quit in 1980  Respiratory protocol D/C at this time

## 2019-08-04 NOTE — PROGRESS NOTES
Progress Note - Song Hein 1940, 77 y o  male MRN: [id number]    Unit/Bed#: MICU 10 Encounter: [id number]    Primary Care Provider: Angelica Hooker DO   Date and time admitted to hospital: 7/15/2019  2:17 AM        * Parastomal hernia  Assessment & Plan  S/p exploratory laparotomy, lysis of adhesions, small bowel resection, primary repair of parastomal hernia, colonoscopy 7/15  7/25 Transferred to MICU for worsening respiratory status  Emergently intubated  R IJ and R radial sandy placed  CXR w free air  Emergent OR for exploration, bowel resection  7/26 Ex lap w bowel resection, abdominal washout, wound vac application  5/18 Re-exploration, abdominal washout and anastomosis small bowel  7/31 Suture tied downs, vac removed  8/2 Extubated  8/3 Patient had bradycardic episode and desaturated, still on 6L NC now    Plan  Diet - Dysphagia Puree; Thin Liquid  Continue wean O2 - on 6L NC  NGT placed  Lasix per Nephro  D/C Insulin gtt - transition to Sliding Scale  F/u blood cultures  WBC improving with Zosyn   - Zosyn stopped 8/1   - Ampicillin given 8/2 - WBC to 14 4 from 16  Pain control  Maintain abdominal binder with hole cut for ostomy        Plan:  Patient has communicated his wishes to be DNR/DNI Level 3  Wishes to continue care however does not want intubation or CPR  Continue NGT for bowel decompression  Continue Care per ICU with fluids and electrolyte optimization      Subjective/Objective   Chief Complaint: I'm ready to go    Subjective: Patient had an episode of bradycardia in the CT scanner and went unresponsive  He is now on level 3 DNR DNI  Objective:    Blood pressure 161/75, pulse 82, temperature 97 9 °F (36 6 °C), temperature source Oral, resp  rate (!) 36, height 5' 6" (1 676 m), weight 109 kg (240 lb 1 3 oz), SpO2 95 %  ,Body mass index is 38 75 kg/m²        Intake/Output Summary (Last 24 hours) at 8/4/2019 0745  Last data filed at 8/4/2019 0600  Gross per 24 hour   Intake 1432 5 ml Output 2275 ml   Net -842 5 ml       Invasive Devices     Central Venous Catheter Line            CVC Central Lines 07/26/19 Triple 20cm Left Subclavian 8 days          Drain            Colostomy LLQ 20 days    Urethral Catheter Non-latex 18 Fr  14 days    NG/OG Tube Orogastric Center mouth 9 days    NG/OG/Enteral Tube Nasogastric 18 Fr Right nares less than 1 day                Physical Exam: General: AAOx3  Head: normocephalic, atraumatic  Neck: supple, trachea midline  NGT in place with gastric fluid/bilious output  Respiratory: breathing 98% on 6L NC  Abdomen: Soft, NT, no rebound/guarding, ostomy bag without gas today,  Heart: RRR, S1s2  Ext: Warm no cyanosis   Pulse: 2+ radial      Lab, Imaging and other studies:  I have personally reviewed pertinent lab results    , CBC:   Lab Results   Component Value Date    WBC 12 42 (H) 08/04/2019    HGB 7 5 (L) 08/04/2019    HCT 25 5 (L) 08/04/2019    MCV 96 08/04/2019     08/04/2019    MCH 28 3 08/04/2019    MCHC 29 4 (L) 08/04/2019    RDW 15 1 08/04/2019    MPV 12 1 08/04/2019    NRBC 0 08/04/2019   , CMP:   Lab Results   Component Value Date    SODIUM 146 (H) 08/04/2019    K 3 6 08/04/2019     (H) 08/04/2019    CO2 24 08/04/2019    BUN 40 (H) 08/04/2019    CREATININE 2 01 (H) 08/04/2019    CALCIUM 7 7 (L) 08/04/2019    EGFR 31 08/04/2019     VTE Pharmacologic Prophylaxis: Sequential compression device (Venodyne)  and Heparin  VTE Mechanical Prophylaxis: sequential compression device

## 2019-08-04 NOTE — PROGRESS NOTES
Progress Note - Critical Care   Arianne Melissa 77 y o  male MRN: [id number]  Unit/Bed#: MICU 10 Encounter: [id number]    Attending Physician: Alexandra Dickinson MD      ______________________________________________________________________  Assessment and Plan:   Principal Problem:    Parastomal hernia  Active Problems:    DM type 2 (diabetes mellitus, type 2) ([place] [address] )    GERD without esophagitis    Hyperlipidemia    Malignant neoplasm of colon ([place] [address] )    Acute kidney injury ([place] [address] )    Chronic kidney disease    Hypertension    Leukocytosis    UTI (urinary tract infection)    Thrombocytopenia ([place] [address] )    Protein-calorie malnutrition ([place] [address] )    Sepsis ([place] [address] )    Anemia  Resolved Problems:    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    Septic shock ([place] [address] )    Oliguria    Hypernatremia    Plan  Neuro:   · Pain controlled with: PRN Dilaudid  · Delirium Precautions  ? CAM ICU per protocol  ? Regulate sleep/wake cycle+  ? Sleep improving, cont melatonin QHS     CV:   · HTN POA: Cont Metoprolol, Holding home Coreg, lisinopril, and HCTZ  · HLD POA: Holding home statin  · Rhythm: NSR  ? Follow rhythm on telemetry     Lung:   · Extubated 8/2  · Sating well on 6L NC  · Cont IS Q1H while awake, Early mobilization with PT  · Supplemental O2 to maintain sats > 92%     GI:   · Stress ulcer prophylaxis: Daily PPI  · Bowel regimen: None currently   · Continue NG TWS, monitor output  · Monitor ostomy output, currently none     FEN:   · Goal 24 hour fluid balance: -1L              Fluid/Diuretic plan: Lasix held yesterday given slight increased Cr              UOP: 1200              Net: -900              Cr: 2 01 from 2 1, Will hold off on lasix today  · Nutrition/diet plan: Diet Dysphagia/Modified Consistency; Dysphagia 1-Pureed;  Thin Liquid  · Replete electrolytes with goals: K >4 0, Mag >2 0, and Phos >3 0     :   · BPH POA: Self-caths at home, Holding home Finasteride while strict NPO  · Indwelling Restrepo present: yes   · Trend UOP and BUN/creat  · Strict I and O     ID:   · UTI: Resolved    Urine Culture >100,000 cfu/ml Escherichia coliAbnormal        40,000-49,000 cfu/ml Enterococcus faecalisAbnormal    Atbx: Ampicillin 5d course completed 8/3  · WBC trending down: 12 from 14  · Afebrile     Heme:   · Anemia: HGB Stable 7 5 from 7 4  ? Transfuse as needed for goal hgb >7 0     Endo:   · T2DM POA: SSI     MSK/Skin:  · Mobility goal: OOBTC  ? PT consult: STR  ? OT consult: STR  · Frequent turning and pressure off-loading     Lines:  CVC left subclavian  Ostomy  Restrepo     VTE Prophylaxis:  · Pharmacologic Prophylaxis:Heparin  · Mechanical Prophylaxis: sequential compression device     Disposition: Continue ICU care      Code Status: Level 3 - DNAR and DNI    ______________________________________________________________________    Chief Complaint: None    24 Hour Events:  Patient has had significant distention and lack of ostomy output, KUB revealed 10cm T-colon, he was sent for a CT A/P w/ PO contrast, while lying flat in the scanner the patient became hypoxic and bradycardic prompting a Rapid Response to be called  With BVM breaths and sitting the patient up his oxygenation and HR improved  His CT was cancelled  He was made NPO and an NG was placed  NG put out 1100 mL, bilious  Patient continues to breath rapidly/comfortably on 6L NC  Denies abdominal pain, nausea, or fevers  Patient also made DNR 3, per patients request with family present      Review of Systems   10/14 systems reviewed and negative except those mentioned above   ______________________________________________________________________    Physical Exam:   Gen: NAD, Sleeping comfortably prior to exam  Neuro: Alert and Oriented x3, No focal neuro deficits  HEENT: Normocephalic, NG in nare with bilious output, NC in place  Chest: Normal work of breathing, tachypneic, sating 98% on 6L NC  Abd: Soft, distended, non-tender, ostomy bag without gas, lateral parastomal mass, wound dressings CDI  Ext: No edema, 2+ distal pulses  Skin: warm, dry, intact      ______________________________________________________________________  Vitals:    19 0220 19 0350 19 0550 19 0556   BP: 141/65 158/68 161/75    Pulse: (!) 54 80 82    Resp: (!) 34 (!) 38 (!) 36    Temp:  97 9 °F (36 6 °C)     TempSrc:  Oral     SpO2: 96% 98% 95%    Weight:    109 kg (240 lb 1 3 oz)   Height:           Temperature:   Temp (24hrs), Av 9 °F (36 6 °C), Min:97 8 °F (36 6 °C), Max:98 °F (36 7 °C)    Current Temperature: 97 9 °F (36 6 °C)  Weights:   IBW: 63 8 kg    Body mass index is 38 75 kg/m²  Weight (last 2 days)     Date/Time   Weight    19 0556   109 (240 08)    19 0600   110 (241 84)    19 0600   114 (251 32)            Hemodynamic Monitoring:  PAP:  , PAP mean:   , CVP:  , PCWP:   , SvO2:   , ScvO2:  , CO: CO (L/min): 5 8 L/min, CI: CI (L/min/m2): 2 8 L/min/m2, SVR:  , SVRI:  , PVR:  , SV: SV (mL): 64 mL, SVI: SVI: 31, ICP Mean:  , CPP:       Non-Invasive/Invasive Ventilation Settings:  Respiratory    Lab Data (Last 4 hours)    None         O2/Vent Data (Last 4 hours)    None              No results found for: PHART, JWO6KSL, PO2ART, GAC4SJL, F8OYVXIT, BEART, SOURCE  SpO2: SpO2: 95 %, SpO2 Activity: SpO2 Activity: At Rest, SpO2 Device: O2 Device: Nasal cannula, Capnography: Capnography: Yes  Intake and Outputs:  I/O        07 -  0700  07 -  0700    P  O  250 600    I V  (mL/kg) 99 9 (0 9) 832 5 (7 6)    IV Piggyback 300     Feedings 120     Total Intake(mL/kg) 769 9 (7) 1432 5 (13 1)    Urine (mL/kg/hr) 4505 (1 7) 1175 (0 4)    Emesis/NG output  1100    Stool 50     Total Output 4555 2275    Net -3785 1 -842 5              UOP: 0 4 ml/hr   Nutrition:        Diet Orders   (From admission, onward)             Start     Ordered    194  Diet NPO  Diet effective now     Question Answer Comment   Diet Type NPO    RD to adjust diet per protocol?  Yes 08/03/19 2224              Labs:   Results from last 7 days   Lab Units 08/04/19  0429 08/03/19  0431 08/02/19  0451 08/01/19  0443 07/31/19  0438 07/30/19  0634 07/30/19  0447 07/29/19  0422 07/28/19  1337   WBC Thousand/uL 12 42* 14 43* 16 35* 20 09* 14 83* 15 92* 14 49* 19 24* 20 20*   HEMOGLOBIN g/dL 7 5* 7 4* 7 6* 8 5* 8 5* 8 4* 7 8* 9 4* 9 4*   HEMATOCRIT % 25 5* 24 2* 25 0* 28 7* 28 1* 26 2* 25 1* 30 0* 29 9*   PLATELETS Thousands/uL 209 158 110* 67* 50* 62* 58* 79* 100*   NEUTROS PCT % 80* 80*  --  80* 77* 79*  --  92* 92*   BANDS PCT %  --   --  1  --   --   --   --   --   --    MONOS PCT % 9 10  --  8 6 6  --  3* 4   MONO PCT %  --   --  4  --   --   --  2*  --   --      Results from last 7 days   Lab Units 08/04/19  0429 08/03/19  0431 08/02/19 0451 08/01/19  1547 08/01/19  0509 07/31/19  1543 07/31/19  0438   SODIUM mmol/L 146* 146* 145 147* 149* 150* 150*   POTASSIUM mmol/L 3 6 3 3* 3 8 3 8 3 7 4 2 3 8   CHLORIDE mmol/L 113* 113* 116* 118* 118* 120* 121*   CO2 mmol/L 24 25 24 23 24 23 24   ANION GAP mmol/L 9 8 5 6 7 7 5   BUN mg/dL 40* 40* 38* 31* 37* 34* 31*   CREATININE mg/dL 2 01* 2 10* 1 97* 1 79* 1 81* 1 82* 1 75*   CALCIUM mg/dL 7 7* 7 8* 7 6* 7 9* 7 6* 7 9* 7 6*   GLUCOSE RANDOM mg/dL 108 91 115 122 152* 157* 131   ALT U/L  --   --  15  --   --   --   --    AST U/L  --   --  20  --   --   --   --    ALK PHOS U/L  --   --  81  --   --   --   --    ALBUMIN g/dL  --   --  1 0*  --   --   --   --    TOTAL BILIRUBIN mg/dL  --   --  0 32  --   --   --   --      Results from last 7 days   Lab Units 08/03/19  0431 08/01/19  0509 07/31/19  0438 07/29/19  1205 07/29/19  0422 07/28/19  1955 07/28/19  1337   MAGNESIUM mg/dL 1 7 1 7 1 8 2 0 2 0 1 9 2 1   PHOSPHORUS mg/dL 4 4* 3 0 1 4* 2 0* 2 2* 2 9 4 0               Results from last 7 days   Lab Units 08/01/19  1038 07/28/19  1337   LACTIC ACID mmol/L 1 2 1 0     ABG:  Results from last 7 days   Lab Units 08/01/19  0428   PH ART  7 413   PCO2 ART mm Hg 35 9*   PO2 ART mm Hg 78 1   HCO3 ART mmol/L 22 4   BASE EXC ART mmol/L -1 8   ABG SOURCE  Radial, Right     VBG:  Results from last 7 days   Lab Units 08/01/19  0428 07/29/19  0724   PH SANDRA   --  7 355   PCO2 SANDRA mm Hg  --  46 0   PO2 SANDRA mm Hg  --  32 5*   HCO3 SANDRA mmol/L  --  25 1   BASE EXC SANDRA mmol/L  --  -0 6   ABG SOURCE  Radial, Right  --            Imaging:   Procedure: Xr Chest Portable  Result Date: 8/3/2019  Impression: Persistent bibasilar pleural effusions and bibasilar airspace disease unchanged from the recent comparison study  Procedure: Xr Abdomen 1 Vw Portable  Result Date: 8/3/2019  Impression: Persistent but improved ileus with air filled residual dilation of the transverse colon measuring up to 10 3 cm  Micro:  Results from last 7 days   Lab Units 08/03/19  1001 08/01/19  0715 07/30/19  1716 07/30/19  1657   BLOOD CULTURE  No Growth at 48 hrs  No Growth at 48 hrs  No Growth After 4 Days  No Growth After 4 Days  Allergies:    Allergies   Allergen Reactions    Atorvastatin Myalgia    Lyrica [Pregabalin]      Medications:   Scheduled Meds:  Current Facility-Administered Medications:  heparin (porcine) 7,500 Units Subcutaneous Q8H [address] Rod Drake PA-C    HYDROmorphone 0 2 mg Intravenous Q4H PRN Sherrell Philippe PA-C    HYDROmorphone 0 5 mg Intravenous Q4H PRN [facility]    insulin lispro 1-6 Units Subcutaneous  Jess Hernandez MD    insulin lispro 1-6 Units Subcutaneous Q6H Shamir Dyson-Me    iohexol 50 mL Oral 90 min pre-procedure Sukh Nunez MD    metoprolol 2 5 mg Intravenous Q6H Sherrell Philippe PA-C    multi-electrolyte 50 mL/hr Intravenous Continuous [place], [place] Last Rate: 50 mL/hr (08/03/19 2033)   ondansetron 4 mg Intravenous Q4H PRN Luann Molina MD    pantoprazole 40 mg Intravenous Q24H [address] Sherrell Philippe PA-C    phenol 1 spray Mouth/Throat Q2H PRN Sherrell Philippe PA-C      Continuous Infusions:  multi-electrolyte 50 mL/hr Last Rate: 50 mL/hr (08/03/19 2033)     PRN Meds:    HYDROmorphone 0 2 mg Q4H PRN   HYDROmorphone 0 5 mg Q4H PRN   ondansetron 4 mg Q4H PRN   phenol 1 spray Q2H PRN     VTE Pharmacologic Prophylaxis: Heparin  VTE Mechanical Prophylaxis: sequential compression device  Invasive lines and devices: Invasive Devices     Central Venous Catheter Line            CVC Central Lines 07/26/19 Triple 20cm Left Subclavian 8 days          Drain            Colostomy LLQ 20 days    Urethral Catheter Non-latex 18 Fr  14 days    NG/OG Tube Orogastric Center mouth 9 days    NG/OG/Enteral Tube Nasogastric 18 Fr Right nares less than 1 day                     Portions of the record may have been created with voice recognition software  Occasional wrong word or "sound a like" substitutions may have occurred due to the inherent limitations of voice recognition software  Read the chart carefully and recognize, using context, where substitutions have occurred      Anthony Chaparro MD

## 2019-08-05 NOTE — PROGRESS NOTES
NEPHROLOGY PROGRESS NOTE   Griselda Díazr 77 y o  male MRN: [id number]  Unit/Bed#: MICU 10 Encounter: [id number]  Reason for Consult: JELLY/CKD    ASSESSMENT AND PLAN:  Patient is 67-year-old male with CKD stage 3, baseline creatinine 1 5 to 1 8, had multiple abdominal procedures as below  We are consulted for JELLY/CKD management  JELLY on CKD stage 3 with baseline creatinine 1 5 to 1 8  -JELLY suspected to be secondary to ATN in the setting of sepsis/hypotension  -initially required CRRT now remains off dialysis  -urine output good  -creatinine 1 9 seems to have plateaued and slightly improved since yesterday   -urinalysis this admission recently showed 3 to 5 hyaline cast, 30 to 50 RBCs, 4 to 10 WBCs, 1+ proteinuria (?  Restrepo sample), will need repeat urinalysis once Restrepo catheter is removed  -CT scan recently showed no hydronephrosis  Volume overload  -clinically seems volume overloaded, currently remains on 6 L oxygen via nasal cannula  -status post Lasix yesterday with excellent diuresis  Consider repeat dose of Lasix 40 mg IV once today   -echo this admission shows EF 55%, dilated IVC  -daily weight, closely monitor intake and output    Hypernatremia, serum sodium worsening 148 today  -recommend to discontinue Plasma-Lyte  -free water deficit 3 7 later for goal serum sodium 140 in 24 hours  This does not take insensible losses or urine output into account  Recommend to start IV D5 water 80 mL/hour  Hypokalemia, likely secondary to excessive diuresis  Serum potassium 3 3 below goal   Magnesium level stable 1 9   -status post total 40 mEq potassium chloride once  Consider additional 40 mEq later today  Anemia, closely monitor hemoglobin, transfuse p r n  For hemoglobin less than seven  Multiple abdominal procedures with exploratory laparotomy, small-bowel resection, abdominal washout and anastomosis of the small bowel, now with ileus  -surgery follow-up      Discussed with surgery team     SUBJECTIVE:  Patient seen and examined at bedside  He remains on 6 L oxygen via nasal cannula at the time of my encounter  Urine output remains excellent      OBJECTIVE:  Current Weight: Weight - Scale: 109 kg (240 lb 1 3 oz)  Vitals:    08/05/19 0729   BP:    Pulse:    Resp:    Temp:    SpO2: 95%       Intake/Output Summary (Last 24 hours) at 8/5/2019 0846  Last data filed at 8/5/2019 0601  Gross per 24 hour   Intake 1092 5 ml   Output 4525 ml   Net -3432 5 ml       Physical Examination:  General:  Sitting in chair, no acute distress   Eyes:  Mild conjunctival pallor present  ENT:  External examination of ears and nose unremarkable  Neck:  Supple  Respiratory:  Decreased breath sound at bases  CVS:  S1, S2 present  GI:  Ostomy present, soft  CNS:  Active alert oriented x3  Extremities:  2+ edema in both legs  Skin:  No new rash in legs    Medications:    Current Facility-Administered Medications:     acetylcysteine (MUCOMYST) 200 mg/mL inhalation solution 600 mg, 3 mL, Nebulization, TID, Patricia Rubi MD, 600 mg at 08/04/19 1937    heparin (porcine) subcutaneous injection 7,500 Units, 7,500 Units, Subcutaneous, Q8H [address], 7,500 Units at 08/05/19 0509 **AND** [CANCELED] Platelet count, , , Once, [facility]    hydrALAZINE (APRESOLINE) injection 5 mg, 5 mg, Intravenous, Q6H PRN, Tracy Buchanan, DO, 5 mg at 08/05/19 0338    HYDROmorphone (DILAUDID) injection 0 2 mg, 0 2 mg, Intravenous, Q4H PRN, [employer], PA-C, 0 2 mg at 08/03/19 2120    HYDROmorphone (DILAUDID) injection 0 5 mg, 0 5 mg, Intravenous, Q4H PRN, [employer], PA-C, 0 5 mg at 08/04/19 0119    insulin lispro (HumaLOG) 100 units/mL subcutaneous injection 1-6 Units, 1-6 Units, Subcutaneous, Q6H **AND** Fingerstick Glucose (POCT), , , Q6H, Evan Shana Petit-Me    levalbuterol [facility]) inhalation solution 1 25 mg, 1 25 mg, Nebulization, TID, Patricia Rubi MD, 1 25 mg at 08/05/19 0725    magnesium sulfate 2 g/50 mL IVPB (premix) 2 g, 2 g, Intravenous, Once, Nicolas Coello PA-C, 2 g at 08/05/19 0755    metoprolol (LOPRESSOR) injection 2 5 mg, 2 5 mg, Intravenous, Q6H PRN, Francisca Umaña DO    multi-electrolyte (ISOLYTE-S PH 7 4 equivalent) IV solution, 50 mL/hr, Intravenous, Continuous, [employer] ALEXEI Anderson, Last Rate: 50 mL/hr at 08/04/19 1256, 50 mL/hr at 08/04/19 1256    ondansetron (ZOFRAN) injection 4 mg, 4 mg, Intravenous, Q4H PRN, Sandy Simon MD, 4 mg at 07/15/19 0410    pantoprazole (PROTONIX) injection 40 mg, 40 mg, Intravenous, Q24H [address], Sherrell KAYLI Philippe PA-C, 40 mg at 08/04/19 0957    phenol (CHLORASEPTIC) 1 4 % mucosal liquid 1 spray, 1 spray, Mouth/Throat, Q2H PRN, [employer], PA-C, 1 spray at 08/02/19 1836    potassium chloride 20 mEq IVPB (premix), 20 mEq, Intravenous, Once, Last Rate: 50 mL/hr at 08/05/19 0754, 20 mEq at 08/05/19 0754 **FOLLOWED BY** potassium chloride 20 mEq IVPB (premix), 20 mEq, Intravenous, Once, Nicolas Coello PA-C    Laboratory Results:  Results from last 7 days   Lab Units 08/05/19  0518 08/04/19  0429 08/03/19  0431 08/02/19  0451 08/01/19  1547 08/01/19  0509 08/01/19  0443 07/31/19  1543 07/31/19  0438  07/30/19  0634  07/29/19  1205   WBC Thousand/uL 11 59* 12 42* 14 43* 16 35*  --   --  20 09*  --  14 83*  --  15 92*   < >  --    HEMOGLOBIN g/dL 8 5* 7 5* 7 4* 7 6*  --   --  8 5*  --  8 5*  --  8 4*   < >  --    HEMATOCRIT % 28 4* 25 5* 24 2* 25 0*  --   --  28 7*  --  28 1*  --  26 2*   < >  --    PLATELETS Thousands/uL 260 209 158 110*  --   --  67*  --  50*  --  62*   < >  --    SODIUM mmol/L 148* 146* 146* 145 147* 149*  --  150* 150*   < >  --    < > 142   POTASSIUM mmol/L 3 3* 3 6 3 3* 3 8 3 8 3 7  --  4 2 3 8   < >  --    < > 3 8   CHLORIDE mmol/L 114* 113* 113* 116* 118* 118*  --  120* 121*   < >  --    < > 113*   CO2 mmol/L 24 24 25 24 23 24  --  23 24   < >  --    < > 23   BUN mg/dL 33* 40* 40* 38* 31* 37*  --  34* 31*   < >  --    < > 23   CREATININE mg/dL 1 93* 2 01* 2 10* 1 97* 1 79* 1 81*  --  1 82* 1 75*   < >  --    < > 1 32*   CALCIUM mg/dL 8 2* 7 7* 7 8* 7 6* 7 9* 7 6*  --  7 9* 7 6*   < >  --    < > 8 0*   MAGNESIUM mg/dL 1 9  --  1 7  --   --  1 7  --   --  1 8  --   --   --  2 0   PHOSPHORUS mg/dL 3 0  --  4 4*  --   --  3 0  --   --  1 4*  --   --   --  2 0*    < > = values in this interval not displayed  XR abdomen 1 view kub   Final Result by Ramona Mahmood MD (08/04 2127)      Unchanged diffuse large bowel distention  Workstation performed: [id number]         XR abdomen 1 view kub   Final Result by Dana Love MD (08/04 1423)      NG tube tip projects over the left upper quadrant, presumably within the lumen of stomach  Workstation performed: [id number]         XR abdomen 1 vw portable   Final Result by Dana Love MD (08/03 1428)      Persistent but improved ileus with air filled residual dilation of the transverse colon measuring up to 10 3 cm  Workstation performed: [id number]         XR chest portable   Final Result by Dana Love MD (08/03 1429)      Persistent bibasilar pleural effusions and bibasilar airspace disease unchanged from the recent comparison study  Workstation performed: [id number]         CT head wo contrast   Final Result by Sarika Lance MD (08/01 4312)      No acute intracranial abnormality  Microangiopathic changes  Workstation performed: [id number]         XR chest portable   Final Result by Dayami Gonsalez MD (08/01 2636)      Improving pleural effusions with persistent bibasilar atelectasis, right greater than left  Workstation performed: [id number]         XR chest portable   Final Result by Kulwinder Sorensen MD (07/31 6039)      Moderate bilateral pleural effusions and associated bibasilar atelectasis              Workstation performed: [id number]         XR chest 1 view   Final Result by Shane Cisneros MD (07/30 1101)      Stable bibasilar opacities and moderate pleural effusions  Workstation performed: [id number]         XR chest portable   Final Result by Dinorah Hauser MD (07/29 1642)      No significant interval change in bilateral pleural effusions and/or bibasilar atelectasis/infiltrates  Stable lines and tubes  Workstation performed: [id number]         XR chest 1 view portable   Final Result by Kassie Croft MD (07/28 1510)      Unchanged tubes and lines  No significant interval change in bilateral pleural effusions and bibasilar atelectasis  Workstation performed: [id number]         XR chest 1 view   Final Result by Naya Donis MD (07/26 1459)      1  No evidence of pneumothorax following placement of right IJ hemodialysis catheter  Workstation performed: [id number]         XR chest 1 view   Final Result by Bindu Johnson MD (07/26 1347)      Left subclavian catheter tip in the cavoatrial junction region  No pneumothorax  Persistent bibasilar opacities and pleural effusions  Workstation performed: [id number]         XR chest portable ICU   Final Result by Daquan Whitley MD (07/26 4630)      Large volume of pneumoperitoneum  Patient has already been taken to the OR  Bibasilar atelectasis  Workstation performed: [id number]         XR chest portable   Final Result by Udell Cheadle, MD (07/25 1401)      Bibasilar consolidations most consistent with atelectasis  Multiple old right rib fractures  Workstation performed: [id number]         CT abdomen pelvis wo contrast   Final Result by Jennifer Gan MD (07/23 1619)         1  Postsurgical changes of repair of parastomal hernia with small bowel resection and anastomosis  There are fluid filled dilated small and large bowel loops throughout the abdomen with only decompressed colon seen meeting to the stoma    Findings    likely represent postoperative ileus  This can be followed with serial abdominal radiographs if clinically indicated  2   Increased air within right renal collecting system and ureter as well as new foci of air that appear to be within the bladder wall  As previously reported, 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  3   Bibasilar atelectasis and small pleural effusions  The study was marked in [facility] for immediate notification  Workstation performed: [id number]         XR abdomen 1 vw portable   Final Result by Charito Reyes MD (07/22 8643)      1  The sidehole of the enteric tube projects at the gastroesophageal junction  It should be advanced if gastric positioning is desired  2   Incompletely imaged gaseous distention of small bowel and colon, likely postoperative ileus  3   Bilateral lower lobe consolidations, likely atelectasis  Workstation performed: [id number]         US kidney and bladder   Final Result by Selena Nix DO (07/19 4942)   Bilateral cortical scarring and cortical lobulation  Bilateral mild-moderate hydronephrosis, new from prior exam    Normal arterial resistive indices -this does not suggest a significant obstruction  Incompletely distended urinary bladder which is otherwise unremarkable  Neither ureteral jet detected  *Because the patient has a history of a neurogenic bladder bladder size may be distended for this patient  Although hydronephrosis can be seen in the setting of ascending UTI, Consider computed tomography assess for an obstructing lesion  Alternatively consider nuclear medicine renal flow and function study with Lasix intervention  I personally discussed this study with Dr Franky Smith on 7/19/2019 at 4:13 PM                Workstation performed: [id number]         XR abdomen 1 view kub    (Results Pending)       Portions of the record may have been created with voice recognition software  Occasional wrong word or "sound a like" substitutions may have occurred due to the inherent limitations of voice recognition software  Read the chart carefully and recognize, using context, where substitutions have occurred

## 2019-08-05 NOTE — NUTRITION
If unable to advance PO diet in next 24 hrs then consider an alternate means of nutrition support and reconsult RD for recs  Replete K+  Check Ionized Ca  Monitor renal parameters  Was seen by PCP yesterday for a cold. Past evening started getting hot. 6pm was 100 and tylenol was given. Just checking again. Temp 100.1

## 2019-08-05 NOTE — RESPIRATORY THERAPY NOTE
resp care      08/05/19 1521   Non-Invasive Information   Interface HFNC prongs   Non-Invasive Ventilation Mode HFNC   $ CPAP/BiPAP Charge - Initial & Daily Mgmt Yes   SpO2 97 %   $ Pulse Oximetry Spot Check Charge Completed   Resp Comments Pt more confused as day progresses  ABG drawn  Pt hypoxic  Pt appers sob, and was placed on hfnc per sccm      Non-Invasive Settings   FiO2 (%) 55   Flow (lpm) 65   Temperature (Set) 31   Non-Invasive Readings   Heater Temperature (Obs) 31

## 2019-08-05 NOTE — ASSESSMENT & PLAN NOTE
S/p exploratory laparotomy, lysis of adhesions, small bowel resection, primary repair of parastomal hernia, colonoscopy 7/15  7/25 Transferred to MICU for worsening respiratory status  Emergently intubated  R IJ and R radial sandy placed  CXR w free air  Emergent OR for exploration, bowel resection    7/26 Ex lap w bowel resection, abdominal washout, wound vac application  5/40 Re-exploration, abdominal washout and anastomosis small bowel  7/31 Suture tied downs, vac removed  8/2 Extubated  8/3 Patient had bradycardic episode and desaturated, still on 6L NC now    Plan  Keep NPO for now  Continue wean O2 - on 6L NC  Will reinsert NG tube if he vomits  Follow plain film of abdomen today  Will digitize ostomy if colon getting larger  Aggressively replace electrolytes  Mobilize patient  Lasix per Nephro  F/u blood cultures  Off antibiotics  Pain control  Maintain abdominal binder with hole cut for ostomy

## 2019-08-05 NOTE — PROGRESS NOTES
INTERVAL PROGRESS NOTE:    It was noticed by nursing staff earlier this morning that patient was draining feces from his midline wound  The primary surgery team was notified of this new development and recommended CT scan with PO contrast after isolating site with an ostomy bag  Unfortunately,he will not tolerate PO contrast considering his known ileus vs mechanical obstruction, and will desaturate when lying flat (he was recently a rapid response over the weekend in CT scan for this reason)  The primary surgery team spoke to wife explaining therapeutic options not including surgery since he'll unlikely tolerate another major procedure  The patient has rapidly decompensated since this morning and is now tachypneic in the 40's, tachycardic and confused  I personally contacted his wife to update her on his decline and that without surgery will continue to deteriorate  It's suspected his clinical decline is from intraabdominal sepsis  and advised his wife to consider comfort care a this time  She is in agreement and on her way to hospital for further discussion  At this time, there is strong suspicion of colonic perforation resulting in intraabdominal contamination, fecal drainage from wound, and sepsis that will lead to shock without any intervention

## 2019-08-05 NOTE — SPEECH THERAPY NOTE
Speech Language/Pathology    Speech/Language Pathology Progress Note    Patient Name: Grazyna Mercado  NSRNA'W Date: 8/5/2019     Per RN, pt not appropriate for PO trials today, will f/u tomorrow

## 2019-08-05 NOTE — PROGRESS NOTES
Pt crawling out of bed-noncompliant  Got patient out of bed, assist x4 with walker to chair, very weak  Pt can be verbally abusive with staff  Not happy and wants to go home  MD aware  Pt is allowing medication administration and lab work done  Will not allow staff to reinsert NG tube

## 2019-08-05 NOTE — PROGRESS NOTES
Pt has wet cough-but not bringing up secretions  Asked pt if I could NT suction him-he agreed  Was not successful as patient did not want the tube in his nose  Did not attempt again

## 2019-08-05 NOTE — QUICK NOTE
Called to bedside for feculant drainge from midline abdominal wound  Currently, patient lethargic  Non-tender  Significant amount of feculant material draining from midline abdominal wound, focally in middle of incision  Currently controlled with ostomy bag  Discussed with ICU team regarding CT scan with PO contrast if tolerates  Dr Arnoldo Ordoñez aware  Charley Bonilla MD  General Surgery  PGY5

## 2019-08-05 NOTE — ASSESSMENT & PLAN NOTE
S/p exploratory laparotomy, lysis of adhesions, small bowel resection, primary repair of parastomal hernia, colonoscopy 7/15  7/25 Transferred to MICU for worsening respiratory status  Emergently intubated  R IJ and R radial sandy placed  CXR w free air  Emergent OR for exploration, bowel resection  7/26 Ex lap w bowel resection, abdominal washout, wound vac application  5/82 Re-exploration, abdominal washout and anastomosis small bowel  7/31 Suture tied downs, vac removed  8/2 Extubated  8/3 Patient had bradycardic episode and desaturated, still on 6L NC now  8/4 KUB showed unchanged diffuse large bowel distention, pt refused NGT replacement overnight  8/5 Blood cx x2 no growth to date  8/5: Feculent drainage noted coming from pts midline abdominal wound  Recommend Ct scan with PO contrast  Pt likely will not tolerate PO contrast, discussions had with family concerning goals of care and therapeutic options  Assessment and Plan:  Patient was noted to have feculent drainage from his midline abdominal wound and further had decompensated respiratory status requiring High Flow Nasal Cannula  Patient's wife was alerted and upon arrival to hospital and discussions with Colorectal and Critical Care teams decided to transition patient to Comfort Care Measures  All invasive testing was ceased and patient started on fentanyl drip for comfort     - Overnight patient remained comfortable with adequate vitals  Patient this morning 8/6 sleeping with only Nasal Cannula in place      Plan:  Comfort Care Measures  Palliative Team consult for family and patient support and next steps

## 2019-08-05 NOTE — PROGRESS NOTES
Interval ntoe    Patient's wife arrived at bedside  She has elected to make him Level 4 comfort care  Will change orders as appropriate       Israel Joiner MD

## 2019-08-05 NOTE — PROGRESS NOTES
Progress Note - Sam Villanueva 1940, 77 y o  male MRN: [id number]    Unit/Bed#: MICU 10 Encounter: [id number]    Primary Care Provider: Jaki Singh DO   Date and time admitted to hospital: 7/15/2019  2:17 AM        * Parastomal hernia  Assessment & Plan  S/p exploratory laparotomy, lysis of adhesions, small bowel resection, primary repair of parastomal hernia, colonoscopy 7/15  7/25 Transferred to MICU for worsening respiratory status  Emergently intubated  R IJ and R radial sandy placed  CXR w free air  Emergent OR for exploration, bowel resection  7/26 Ex lap w bowel resection, abdominal washout, wound vac application  6/39 Re-exploration, abdominal washout and anastomosis small bowel  7/31 Suture tied downs, vac removed  8/2 Extubated  8/3 Patient had bradycardic episode and desaturated, still on 6L NC now    Plan  Keep NPO for now  Continue wean O2 - on 6L NC  Will reinsert NG tube if he vomits  Follow plain film of abdomen today  Will digitize ostomy if colon getting larger  Aggressively replace electrolytes  Mobilize patient  Lasix per Nephro  F/u blood cultures  Off antibiotics  Pain control  Maintain abdominal binder with hole cut for ostomy                Subjective/Objective   Chief Complaint:     Subjective: Patient pulled out his NG tube overnight and refused to have it reinserted  He remains on 6L nasal cannula this morning    Objective:     Blood pressure (!) 175/72, pulse 76, temperature 98 5 °F (36 9 °C), temperature source Oral, resp  rate (!) 36, height 5' 6" (1 676 m), weight 109 kg (240 lb 1 3 oz), SpO2 93 %  ,Body mass index is 38 75 kg/m²        Intake/Output Summary (Last 24 hours) at 8/5/2019 0619  Last data filed at 8/5/2019 0601  Gross per 24 hour   Intake 1200 ml   Output 5175 ml   Net -3975 ml       Invasive Devices     Peripheral Intravenous Line            Peripheral IV 08/04/19 Left;Ventral (anterior) Forearm less than 1 day    Peripheral IV 08/04/19 Right;Upper;Ventral (anterior) Arm less than 1 day          Drain            Colostomy LLQ 21 days    Urethral Catheter Non-latex 18 Fr  15 days    NG/OG Tube Orogastric Center mouth 10 days                Physical Exam:   NAD  Awake and alert  nonlabored respirations on 6L NC  Abdomen distended, nontender  Ostomy pink with small amount stool in bag   Incision c/d/i    Lab, Imaging and other studies:  CBC:   Lab Results   Component Value Date    WBC 11 59 (H) 08/05/2019    HGB 8 5 (L) 08/05/2019    HCT 28 4 (L) 08/05/2019    MCV 94 08/05/2019     08/05/2019    MCH 28 2 08/05/2019    MCHC 29 9 (L) 08/05/2019    RDW 14 7 08/05/2019    MPV 11 5 08/05/2019    NRBC 0 08/05/2019   , CMP:   Lab Results   Component Value Date    SODIUM 148 (H) 08/05/2019    K 3 3 (L) 08/05/2019     (H) 08/05/2019    CO2 24 08/05/2019    BUN 33 (H) 08/05/2019    CREATININE 1 93 (H) 08/05/2019    CALCIUM 8 2 (L) 08/05/2019    EGFR 32 08/05/2019   , Coagulation: No results found for: PT, INR, APTT, Urinalysis: No results found for: COLORU, CLARITYU, SPECGRAV, PHUR, LEUKOCYTESUR, NITRITE, PROTEINUA, GLUCOSEU, KETONESU, BILIRUBINUR, BLOODU, Amylase: No results found for: AMYLASE, Lipase: No results found for: LIPASE  VTE Pharmacologic Prophylaxis: Sequential compression device (Venodyne)   VTE Mechanical Prophylaxis: sequential compression device

## 2019-08-05 NOTE — PROGRESS NOTES
Progress Note - Critical Care   Dilma Horne 77 y o  male MRN: [id number]  Unit/Bed#: MICU 10 Encounter: [id number]    Assessment:   Principal Problem:    Parastomal hernia  Active Problems:    DM type 2 (diabetes mellitus, type 2) ([place] [address] )    GERD without esophagitis    Hyperlipidemia    Malignant neoplasm of colon ([place] [address] )    Acute kidney injury ([place] [address] )    Chronic kidney disease    Hypertension    Leukocytosis    UTI (urinary tract infection)    Thrombocytopenia (HCC)    Protein-calorie malnutrition (HCC)    Sepsis ([place] [address] )    Anemia        Plan: Critical Care Management as outlined below:     Neuro:  Awake and alert  No focal neurologic deficits  Oriented x3  Continue frequent neurologic exams, daily CAM ICU, delirium precautions  Continue to maintain adequate sleep-wake cycle  Patient agitated overnight stating he wanted to leave  More oriented this morning  CV:  Hemodynamically normal   History of hypertension and hyperlipidemia  Continue metoprolol, p r n  Hydralazine  Lung:  Doing well on nasal cannula oxygen  Wean as tolerated  Encourage deep breathing and coughing  Encourage incentive spirometry  Aggressive pulmonary toilet  GI:  Status post exploratory laparotomy, small-bowel resection, parastomal hernia repair, re-exploration with abdominal washout and anastomosis of the small bowel  Status post primary closure of abdominal wall  Remains NPO  Abdominal exam appropriately tender  No significant findings  150 mL of stool output via colostomy  Patient pulled own NG tube overnight  Denies nausea or vomiting  Will maintain without NG tube unless begins to vomit  Follow-up KUB this morning for ileus  FEN:  4 L negative in 24 hours  750 mL negative since 7/22/19  Moderate hypokalemia, will replete  Minimal hypomagnesemia, will replete  :  JELLY on CKD improving  Good urine output  BUN and creatinine continue to improve  History of BPH with self catheterization at home  Maintain Restrepo catheter for now  Continue voiding trial in the near future  ID:  UTI resolved  Completed course of antibiotics  Afebrile  Leukocytosis continues to improve  Heme:  Hemoglobin improved to 8 5 since yesterday  Platelet count normal   Continue subcutaneous heparin and SCDs for DVT prophylaxis  Endo:  History of type 2 diabetes  Blood glucose well controlled on sliding scale coverage  Msk/Skin:  No skin breakdown  Incision without erythema or discharge  Continue to monitor  Continue frequent repositioning and offloading  Out of bed to chair  Patient sitting in chair this morning  Disposition:  Consider transition to step-down level of care     ______________________________________________________________________    Chief Complaint:  I feel better      HPI/24hr events:  Agitated overnight requesting to leave the hospital   Pulled NG tube  Ileus persists although has some stool output     ______________________________________________________________________    Physical Exam:   Physical Exam   Constitutional: He is oriented to person, place, and time  Vital signs are normal  He appears well-developed  He is cooperative  Non-toxic appearance  He appears ill  No distress  Cardiovascular: Normal rate, regular rhythm, intact distal pulses and normal pulses  Pulmonary/Chest: Effort normal  He has decreased breath sounds in the right lower field and the left lower field  He has no wheezes  He has rhonchi (Scattered, improves with cough )  He has no rales  Abdominal: Soft  He exhibits distension  There is generalized tenderness  There is no rigidity and no guarding  Neurological: He is alert and oriented to person, place, and time  He has normal strength  GCS eye subscore is 4  GCS verbal subscore is 5  GCS motor subscore is 6     Skin: Skin is warm              ______________________________________________________________________  Vitals:    08/05/19 0101 08/05/19 0400 19 0500 19 0729   BP: 169/77 144/62 (!) 175/72    BP Location:  Left arm     Pulse:  96 76    Resp:  (!) 34 (!) 36    Temp:  98 5 °F (36 9 °C)     TempSrc:  Oral     SpO2:  91% 93% 95%   Weight:       Height:         Arterial Line BP: 116/52  Arterial Line MAP (mmHg): 70 mmHg     Temperature:   Temp (24hrs), Av 1 °F (36 7 °C), Min:97 7 °F (36 5 °C), Max:98 5 °F (36 9 °C)    Current Temperature: 98 5 °F (36 9 °C)  Weights:   IBW: 63 8 kg    Body mass index is 38 75 kg/m²  Weight (last 2 days)     Date/Time   Weight    19 0556   109 (240 08)    19 0600   110 (241 84)            Hemodynamic Monitoring:  N/A     Non-Invasive/Invasive Ventilation Settings:  Respiratory    Lab Data (Last 4 hours)    None         O2/Vent Data (Last 4 hours)    None              No results found for: PHART, FKX7JNT, PO2ART, DPK1DXS, B5UGXNEW, BEART, SOURCE  SpO2: SpO2: 95 %, SpO2 Activity: SpO2 Activity: At Rest, SpO2 Device: O2 Device: Nasal cannula  Intake and Outputs:  I/O        07 -  0700  07 -  07 07 -  0700    P  O  600      I V  (mL/kg) 832 5 (7 6) 1200 (11)     IV Piggyback       Feedings       Total Intake(mL/kg) 1432 5 (13 1) 1200 (11)     Urine (mL/kg/hr) 1175 (0 4) 3450 (1 3)     Emesis/NG output 1100 1575     Stool  150     Total Output 2275 5175     Net -842 5 -3975                UOP:  1 3 mL/Kg/hour   Nutrition:        Diet Orders   (From admission, onward)             Start     Ordered    19 0828  Diet NPO; Ice chips  Diet effective now     Comments:  Chips and sips   Question Answer Comment   Diet Type NPO    NPO Except: Ice chips    RD to adjust diet per protocol?  Yes        19 0833                Labs:   Results from last 7 days   Lab Units 19  0518 19  0429 19  0431   WBC Thousand/uL 11 59* 12 42* 14 43*   HEMOGLOBIN g/dL 8 5* 7 5* 7 4*   HEMATOCRIT % 28 4* 25 5* 24 2*   PLATELETS Thousands/uL 260 209 158   NEUTROS PCT % 80* 80* 80*   MONOS PCT % 10 9 10    Results from last 7 days   Lab Units 08/05/19  0518 08/04/19  0429 08/03/19  0431 08/02/19  0451   POTASSIUM mmol/L 3 3* 3 6 3 3* 3 8   CHLORIDE mmol/L 114* 113* 113* 116*   CO2 mmol/L 24 24 25 24   BUN mg/dL 33* 40* 40* 38*   CREATININE mg/dL 1 93* 2 01* 2 10* 1 97*   CALCIUM mg/dL 8 2* 7 7* 7 8* 7 6*   ALK PHOS U/L  --   --   --  81   ALT U/L  --   --   --  15   AST U/L  --   --   --  20     Results from last 7 days   Lab Units 08/05/19  0518 08/03/19  0431 08/01/19  0509   MAGNESIUM mg/dL 1 9 1 7 1 7     Results from last 7 days   Lab Units 08/05/19  0518 08/03/19  0431 08/01/19  0509   PHOSPHORUS mg/dL 3 0 4 4* 3 0          Results from last 7 days   Lab Units 08/01/19  1038   LACTIC ACID mmol/L 1 2     0   Lab Value Date/Time    TROPONINI 0 02 07/26/2019 1210    TROPONINI 0 02 07/26/2019 1000    TROPONINI 0 03 07/26/2019 0712    TROPONINI 0 03 07/25/2019 1144    TROPONINI 0 02 07/25/2019 0841     Imaging:  KUB pending  I have personally reviewed pertinent reports  and I have personally reviewed pertinent films in PACS    Micro:  Lab Results   Component Value Date    BLOODCX No Growth at 72 hrs  08/04/2019    BLOODCX No Growth at 72 hrs  08/01/2019    BLOODCX No Growth After 5 Days  07/30/2019    URINECX >100,000 cfu/ml Escherichia coli (A) 07/24/2019    URINECX 40,000-49,000 cfu/ml Enterococcus faecalis (A) 07/24/2019    URINECX No Growth <1000 cfu/mL 11/04/2016     Allergies:    Allergies   Allergen Reactions    Atorvastatin Myalgia    Lyrica [Pregabalin]      Medications:   Scheduled Meds:  Current Facility-Administered Medications:  acetylcysteine 3 mL Nebulization TID Reba Hurst MD    heparin (porcine) 7,500 Units Subcutaneous [facility] Stacia To PA-C    hydrALAZINE 5 mg Intravenous Q6H PRN [place] Wiseman DO    HYDROmorphone 0 2 mg Intravenous Q4H PRN Sherrell Philippe PA-C    HYDROmorphone 0 5 mg Intravenous Q4H PRN [employer], PA-C insulin lispro 1-6 Units Subcutaneous Q6H Jay Middleton Petit-Me    levalbuterol 1 25 mg Nebulization TID Yuriy Pimentel MD    magnesium sulfate 2 g Intravenous Once IFEANYI Trujillo-C    metoprolol 2 5 mg Intravenous Q6H PRN Nayeli Rojo DO    multi-electrolyte 50 mL/hr Intravenous Continuous [address] [facility] [address] [place], [place] Last Rate: 50 mL/hr (08/04/19 1256)   ondansetron 4 mg Intravenous Q4H PRN Evie Cifuentes MD    pantoprazole 40 mg Intravenous Q24H [address] Sherrell Philippe PA-C    phenol 1 spray Mouth/Throat Q2H PRN IFEANYI Cuevas-C    potassium chloride 20 mEq Intravenous Once Linnette Ruggiero PA-C    Followed by        potassium chloride 20 mEq Intravenous Once Linnette Ruggiero PA-C      Continuous Infusions:  multi-electrolyte 50 mL/hr Last Rate: 50 mL/hr (08/04/19 1256)     PRN Meds:    hydrALAZINE 5 mg Q6H PRN   HYDROmorphone 0 2 mg Q4H PRN   HYDROmorphone 0 5 mg Q4H PRN   metoprolol 2 5 mg Q6H PRN   ondansetron 4 mg Q4H PRN   phenol 1 spray Q2H PRN     VTE Pharmacologic Prophylaxis: Heparin  VTE Mechanical Prophylaxis: sequential compression device  Invasive lines and devices: Invasive Devices     Peripheral Intravenous Line            Peripheral IV 08/04/19 Left;Ventral (anterior) Forearm less than 1 day    Peripheral IV 08/04/19 Right;Upper;Ventral (anterior) Arm less than 1 day          Drain            Colostomy LLQ 21 days    Urethral Catheter Non-latex 18 Fr  15 days    NG/OG Tube Orogastric Center mouth 10 days                   Counseling / Coordination of Care  Total Critical Care time spent 41 minutes excluding procedures, teaching and family updates  Code Status: Level 3 - DNAR and DNI    Portions of the record may have been created with voice recognition software  Occasional wrong word or "sound a like" substitutions may have occurred due to the inherent limitations of voice recognition software    Read the chart carefully and recognize, using context, where substitutions have occurred      Meliza Jones PA-C

## 2019-08-06 NOTE — PROGRESS NOTES
Progress Note - Arden Montes 1940, 77 y o  male MRN: [id number]    Unit/Bed#: MICU 10 Encounter: [id number]    Primary Care Provider: Scooby Sherwood DO   Date and time admitted to hospital: 7/15/2019  2:17 AM        * Parastomal hernia  Assessment & Plan  S/p exploratory laparotomy, lysis of adhesions, small bowel resection, primary repair of parastomal hernia, colonoscopy 7/15  7/25 Transferred to MICU for worsening respiratory status  Emergently intubated  R IJ and R radial sandy placed  CXR w free air  Emergent OR for exploration, bowel resection  7/26 Ex lap w bowel resection, abdominal washout, wound vac application  0/06 Re-exploration, abdominal washout and anastomosis small bowel  7/31 Suture tied downs, vac removed  8/2 Extubated  8/3 Patient had bradycardic episode and desaturated, still on 6L NC now  8/4 KUB showed unchanged diffuse large bowel distention, pt refused NGT replacement overnight  8/5 Blood cx x2 no growth to date  8/5: Feculent drainage noted coming from pts midline abdominal wound  Recommend Ct scan with PO contrast  Pt likely will not tolerate PO contrast, discussions had with family concerning goals of care and therapeutic options  Assessment and Plan:  Patient was noted to have feculent drainage from his midline abdominal wound and further had decompensated respiratory status requiring High Flow Nasal Cannula  Patient's wife was alerted and upon arrival to hospital and discussions with Colorectal and Critical Care teams decided to transition patient to Comfort Care Measures  All invasive testing was ceased and patient started on fentanyl drip for comfort     - Overnight patient remained comfortable with adequate vitals  Patient this morning 8/6 sleeping with only Nasal Cannula in place      Plan:  Comfort Care Measures  Palliative Team consult for family and patient support and next steps                Subjective/Objective     Subjective: Patient resting comfortably in bed this AM on Nasal Cannula  Patient's daughter will be arriving from out of town today  Objective:     Blood pressure 116/77, pulse 88, temperature 97 8 °F (36 6 °C), temperature source Oral, resp  rate (!) 33, height 5' 6" (1 676 m), weight 109 kg (240 lb 1 3 oz), SpO2 94 %  ,Body mass index is 38 75 kg/m²        Intake/Output Summary (Last 24 hours) at 8/6/2019 0546  Last data filed at 8/5/2019 1749  Gross per 24 hour   Intake 828 33 ml   Output 1665 ml   Net -836 67 ml       Invasive Devices     Peripheral Intravenous Line            Peripheral IV 08/04/19 Left;Ventral (anterior) Forearm 1 day    Peripheral IV 08/04/19 Right;Upper;Ventral (anterior) Arm 1 day          Drain            Colostomy LLQ 22 days    Urethral Catheter Non-latex 18 Fr  16 days    NG/OG Tube Orogastric Center mouth 11 days                Physical Exam limited for patient's comfort:  General: Sleeping in bed on Nasal Cannula and Fentanyl drip  Respiratory: Noted tachypnea     This was all assessed from doorway without disturbing the patient      Lab, Imaging and other studies:  CBC:   No results found for: WBC, HGB, HCT, MCV, PLT, ADJUSTEDWBC, MCH, MCHC, RDW, MPV, NRBC, CMP:   No results found for: SODIUM, K, CL, CO2, ANIONGAP, BUN, CREATININE, GLUCOSE, CALCIUM, AST, ALT, ALKPHOS, PROT, BILITOT, EGFR  VTE Pharmacologic Prophylaxis: Heparin  VTE Mechanical Prophylaxis: sequential compression device

## 2019-08-06 NOTE — PROGRESS NOTES
08/06/19 3101 Mao Escalante Involvement Patient active with [employer];Zoroastrianism active in support   Plan of Care   Comments Informed by nurse of situation  Provided pastoral support to pt's wife and family  Explored Zoro[employer]ism resources and informed their family  about the updated situation     Assessment Completed by: Unit visit

## 2019-08-06 NOTE — PROGRESS NOTES
Progress Note - Critical Care   Arden Montes 77 y o  male MRN: [id number]  [facility] [id number]    Assessment:   Principal Problem:    Parastomal hernia  Active Problems:    DM type 2 (diabetes mellitus, type 2) ([facility])    GERD without esophagitis    Hyperlipidemia    Malignant neoplasm of colon ([facility] [address] )    Acute kidney injury ([facility] )    Chronic kidney disease    Hypertension    Leukocytosis    UTI (urinary tract infection)    Thrombocytopenia (HCC)    Protein-calorie malnutrition (HCC)    Sepsis (HCC)    Anemia  Resolved Problems:    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    Septic shock ([facility])    Oliguria    Hypernatremia    Plan:   · Patient noted to have feculent drainage from midline abdominal wound yesterday suggesting colonic perforation  · Wife was notified and decided to make patient level 4 comfort care  · Palliative care consult pending  · Patient sleeping on exam this morning and appears comfortable with 1L NC in place  · Continue fentanyl 100mcg/hr  PRN Ativan and PRN Dilaudid  · Continue to monitor      Disposition:   · Possibly transfer to hospice house    ______________________________________________________________________  Chief Complaint: Patient sleeping on exam       HPI/24hr events: Patient noticed to have feculent drainage from his midline abdominal wound yesterday  Initially required HFNC  Wife notified and was at bedside   Patient made level 4 comfort care      ______________________________________________________________________  Temperature:   Temp (24hrs), Av 8 °F (36 6 °C), Min:97 8 °F (36 6 °C), Max:97 8 °F (36 6 °C)    Current Temperature: 97 8 °F (36 6 °C)    Vitals:    19 1521 19 1532 19 1600 19 1700   BP:   166/81 116/77   BP Location:       Pulse:   86 88   Resp:   (!) 40 (!) 33   Temp:   97 8 °F (36 6 °C)    TempSrc:   Oral    SpO2: 97% 96% 97% 94%   Weight:       Height:         Arterial Line BP: 116/52  Arterial Line MAP (mmHg): 70 mmHg     Weights:   IBW: 63 8 kg    Body mass index is 38 75 kg/m²  Weight (last 2 days)     Date/Time   Weight    08/04/19 0556   109 (240 08)            Height: 5' 6" (167 6 cm)  Hemodynamic Monitoring:  N/A       Ventilator Settings:   Vent Mode: AC/VC  Resp Rate (BPM): 14 BPM  Vt (mL): 550 mL  FIO2 (%): 40 %  PEEP (cmH2O): 5 cmH2O  SpO2: 94 %    Lab Results   Component Value Date    PHART 7 503 (H) 08/05/2019    PXQ2CSJ 27 2 (LL) 08/05/2019    PO2ART 69 0 (L) 08/05/2019    JQZ0VEQ 20 9 (L) 08/05/2019    BEART -1 5 08/05/2019    SOURCE Radial, Left 08/05/2019     SpO2: SpO2: 94 %    ______________________________________________________________________  Physical Exam:  [place] Agitation Sedation Scale (RASS): Alert and calm  Physical Exam   Constitutional: No distress  Elderly male sleeping in no acute distress  HENT:   Head: Normocephalic and atraumatic  Right Ear: External ear normal    Left Ear: External ear normal    Eyes: Right eye exhibits no discharge  Left eye exhibits no discharge  Cardiovascular: Normal rate and regular rhythm  Murmur heard  Pulmonary/Chest: Effort normal  No stridor  Tachypnea noted  No respiratory distress  He has no wheezes  He has rales  1L NC in place  Crackles at bases  Abdominal: Soft  He exhibits distension  There is no tenderness  Abdomen distended  Tympanic to percussion  No pain elicited on palpation  Musculoskeletal: He exhibits no deformity  Neurological:   Patient sleeping comfortably on exam    Skin: Skin is warm and dry  He is not diaphoretic  Nursing note and vitals reviewed  ______________________________________________________________________  Intake and Outputs:  I/O       08/04 0701 - 08/05 0700 08/05 0701 - 08/06 0700 08/06 0701 - 08/07 0700    P  O        I V  (mL/kg) 1200 (11) 628 3 (5 8)     IV Piggyback  100     Total Intake(mL/kg) 1200 (11) 728 3 (6 7)     Urine (mL/kg/hr) 3450 (1 3) 840 (0 3)     Emesis/NG output 1575      Stool 150 650     Total Output 7668 7657     Net -3975 -761 7            Unmeasured Stool Occurrence  1 x         Nutrition:        Diet Orders   (From admission, onward)             Start     Ordered    08/04/19 0828  Diet NPO; Ice chips  Diet effective now     Comments:  Chips and sips   Question Answer Comment   Diet Type NPO    NPO Except: Ice chips    RD to adjust diet per protocol? Yes        08/04/19 0833                Labs:   Results from last 7 days   Lab Units 08/05/19 0518 08/04/19  0429 08/03/19  0431   WBC Thousand/uL 11 59* 12 42* 14 43*   HEMOGLOBIN g/dL 8 5* 7 5* 7 4*   HEMATOCRIT % 28 4* 25 5* 24 2*   PLATELETS Thousands/uL 260 209 158   NEUTROS PCT % 80* 80* 80*   MONOS PCT % 10 9 10    Results from last 7 days   Lab Units 08/05/19 0518 08/04/19 0429 08/03/19  0431 08/02/19  0451   SODIUM mmol/L 148* 146* 146* 145   POTASSIUM mmol/L 3 3* 3 6 3 3* 3 8   CHLORIDE mmol/L 114* 113* 113* 116*   CO2 mmol/L 24 24 25 24   BUN mg/dL 33* 40* 40* 38*   CREATININE mg/dL 1 93* 2 01* 2 10* 1 97*   CALCIUM mg/dL 8 2* 7 7* 7 8* 7 6*   ALK PHOS U/L  --   --   --  81   ALT U/L  --   --   --  15   AST U/L  --   --   --  20     Results from last 7 days   Lab Units 08/05/19 0518 08/03/19  0431 08/01/19  0509   MAGNESIUM mg/dL 1 9 1 7 1 7     Results from last 7 days   Lab Units 08/05/19 0518 08/03/19  0431 08/01/19  0509   PHOSPHORUS mg/dL 3 0 4 4* 3 0          Results from last 7 days   Lab Units 08/01/19  1038   LACTIC ACID mmol/L 1 2     0   Lab Value Date/Time    TROPONINI 0 02 07/26/2019 1210    TROPONINI 0 02 07/26/2019 1000    TROPONINI 0 03 07/26/2019 0712    TROPONINI 0 03 07/25/2019 1144    TROPONINI 0 02 07/25/2019 0841     Imaging:  I have personally reviewed pertinent reports  EKG: NSR on monitor  Micro:  Blood Culture:   Lab Results   Component Value Date    BLOODCX No Growth After 4 Days  08/05/2019    BLOODCX No Growth After 4 Days  08/01/2019    BLOODCX No Growth After 5 Days  07/30/2019    BLOODCX No Growth After 5 Days  07/30/2019    BLOODCX No Growth After 5 Days  07/26/2019    BLOODCX Enterococcus faecalis (A) 07/26/2019    BLOODCX No Growth After 5 Days  07/14/2019    BLOODCX No Growth After 5 Days  07/14/2019     Urine Culture:   Lab Results   Component Value Date    URINECX >100,000 cfu/ml Escherichia coli (A) 07/24/2019    URINECX 40,000-49,000 cfu/ml Enterococcus faecalis (A) 07/24/2019    URINECX No Growth <1000 cfu/mL 11/04/2016     Sputum Culture: No components found for: SPUTUMCX  Wound Culure: No results found for: WOUNDCULT    Lab Results   Component Value Date    BLOODCX No Growth After 4 Days  08/05/2019    BLOODCX No Growth After 4 Days  08/01/2019    BLOODCX No Growth After 5 Days  07/30/2019    URINECX >100,000 cfu/ml Escherichia coli (A) 07/24/2019    URINECX 40,000-49,000 cfu/ml Enterococcus faecalis (A) 07/24/2019    URINECX No Growth <1000 cfu/mL 11/04/2016     Allergies:    Allergies   Allergen Reactions    Atorvastatin Myalgia    Lyrica [Pregabalin]      Medications:   Scheduled Meds:  Current Facility-Administered Medications:  fentaNYL 100 mcg/hr Intravenous Continuous Da Bright MD Last Rate: 100 mcg/hr (08/06/19 0239)   HYDROmorphone 0 2 mg Intravenous Q4H PRN Sherrell Philippe PA-C    HYDROmorphone 0 5 mg Intravenous Q1H PRN Da Bright MD    LORazepam 2 mg Intravenous Q5 Min PRN Da Bright MD    OLANZapine 10 mg Oral HS Dana Borja PA-C    ondansetron 4 mg Intravenous Q4H PRN Urszula Willoughby MD    pantoprazole 40 mg Intravenous Q24H [address] Sherrell Philippe PA-C    phenol 1 spray Mouth/Throat Q2H PRN Pauly Mcmanus PA-C    scopolamine 1 patch Transdermal Q72H Fiona Gabriel Petit-Me      Continuous Infusions:  fentaNYL 100 mcg/hr Last Rate: 100 mcg/hr (08/06/19 0239)     PRN Meds:    HYDROmorphone 0 2 mg Q4H PRN   HYDROmorphone 0 5 mg Q1H PRN   LORazepam 2 mg Q5 Min PRN   ondansetron 4 mg Q4H PRN   phenol 1 spray Q2H PRN     VTE Pharmacologic Prophylaxis:   Pharmacologic: Pharmacologic VTE Prophylaxis contraindicated due to Comfort care patient  Mechanical VTE Prophylaxis in Place: Yes    Invasive lines and devices: Invasive Devices     Peripheral Intravenous Line            Peripheral IV 08/04/19 Left;Ventral (anterior) Forearm 1 day    Peripheral IV 08/04/19 Right;Upper;Ventral (anterior) Arm 1 day          Drain            Colostomy LLQ 22 days    Urethral Catheter Non-latex 18 Fr  16 days    NG/OG Tube Orogastric Center mouth 11 days                   Counseling / Coordination of Care  Total Critical Care time spent 0 minutes excluding procedures, teaching and family updates        Code Status: Level 4 -   Zoila Carrero 85, PA-C

## 2019-08-06 NOTE — SOCIAL WORK
CM consulted for hospice  TC to pt wife, Armando Fontenot, to discuss same  Joi's friend, Desiree Ga, answered and informed CM Arjeanne Fontenot is sleeping (was at hospital until 4 am)  CM requested Armando Fontenot call CM when she wakes up  CM provided direct contact information  Update: TC received from pt wife, Armando Fontenot  CM reviewed Hospice and potential options  FOC offered  Armando Fontenot requested referral to [facility]  CM sent referral  As per Armando Fontenot she will be here around 2 pm  CM informed Hospice Liaison of same  CM to follow

## 2019-08-06 NOTE — DISCHARGE SUMMARY
Discharge Summary - Petr Mena 77 y o  male MRN: [id number]    [facility]: [id number]    Admission Date:   Admission Orders (From admission, onward)     Ordered        07/15/19 0321  Inpatient Admission  Once                     Admitting Diagnosis: Hernia, epigastric [K43 9]  Malignant neoplasm of colon, unspecified part of colon ([address] ) [C18 9]    HPI: Per Dr Gayathri Adamson H&P on 7/15:  "Petr Mena is a 77 y o  male who presents with nausea and vomiting  He has not had colostomy function for 5 days  He has never had this problem before  He is not really having any pain, and he denies fevers and chills  He has a history of a very low anterior resection with end colostomy for a rectal cancer in 1998  His only other abdominal surgery was an appendectomy  He notices appetite has been reducing over the past 5 days, and he has not been able to keep anything down starting yesterday  He presented with an elevated creatinine and lactic acid  He has past medical history significant for diabetes, colon cancer, and neurogenic bladder for which the patient straight caths himself at home "    Procedures Performed:   Orders Placed This Encounter   Procedures   [address]    Intubation    Temporary HD Catheter       Summary of Hospital Course:   Patient was taken for an elective ex-lap, bowel resection, and parastomal hernia repair on 7/15  Postoperatively, his course was complicated by anastomotic dehiscence requiring urgent exploratory laparotomy for source control  Patient was admitted to the ICU postoperatively  Patient returned to OR the next day for second look laparotomy, re-anastomosis, and primary closure  His course was complicated by septic shock, acute renal failure requiring CVVH, acute respiratory failure requiring mechanical ventilation, and severe protein-calorie malnutrition requiring TPN  A small bowel primary anastomosis on 7/28   Patient's renal function gradually improved and he was ultimately extubated on 8/2  On 8/5, patient began draining feculent material from his midline abdominal incision  Patient was delirious at this time  Patient's wife was called to bedside who decided to make patient a level 4, comfort care status  Please refer to patient's chart for more specific details  Significant Findings, Care, Treatment and Services Provided:   · 7/15: Ex-lap, SBR, hernia repair  · 7/26: Ex-lap, SBR, ABThera vac  · 7/26: Intubated   · 7/26: L IJ TLC, R IJ dialysis catheter   · 7/26: Started on CVVH  · 7/28: Primary small bowel anastomosis   · 8/2: Extubated   · 8/3: Rapid response for bradycardia and unresponsiveness in CT  · 8/5: Feculent material leaking from surgical wound    Complications: Anastomotic dehiscence, septic shock, JELLY on CKD    Discharge Diagnosis:   1  Malignant neoplasm of colon, unspecified part of colon ([facility] [address] )    2  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    3  Acute kidney injury ([facility] [address] )    4  Chronic kidney disease, unspecified CKD stage    5  Hypertension, unspecified type    6  Acute respiratory failure ([address] )    7  Septic shock ([facility] [address] )          Resolved Problems  Date Reviewed: 8/6/2019          Resolved    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 7/31/2019     Resolved by  Blanche Roper PA-C    Overview Signed 7/26/2019 12:20 AM by Saima Farias     Added automatically from request for surgery [id number]         Septic shock ([facility] [address] ) 7/29/2019     Resolved by  Marylene Acton, PA-C    Oliguria 7/31/2019     Resolved by  Rashad Landa PA-C    Hypernatremia 8/2/2019     Resolved by  Marylene Acton, PA-C          Condition at Discharge: Dead         Discharge instructions/Information to patient and family:   See after visit summary for information provided to patient and family        Provisions for Follow-Up Care:  See after visit summary for information related to follow-up care and any pertinent home health orders  PCP: Scooby Sherwood DO    Disposition: {Discharge Disposition:08925}    Planned Readmission: {EXAM; YES/NO:02559::"No"}    Discharge Statement   I spent *** minutes discharging the patient  This time was spent on the day of discharge  I had direct contact with the patient on the day of discharge  Additional documentation is required if more than 30 minutes were spent on discharge  Discharge Medications:  See after visit summary for reconciled discharge medications provided to patient and family

## 2019-08-06 NOTE — DISCHARGE SUMMARY
Discharge Summary - Griselda Salomon 77 y o  male MRN: [id number]    [facility] [id number] PCP: Wilbur Feng DO    Admission Date:   Admission Orders (From admission, onward)     Ordered        07/15/19 0321  Inpatient Admission  Once                     Admitting Diagnosis: Hernia, epigastric [K43 9]  Malignant neoplasm of colon, unspecified part of colon ([address] ) [C18 9]    HPI: Per Dr Yaya Benavidez H&P on 7/15:  "Griselda Salomon is a 77 y o  male who presents with nausea and vomiting  He has not had colostomy function for 5 days  He has never had this problem before  He is not really having any pain, and he denies fevers and chills  He has a history of a very low anterior resection with end colostomy for a rectal cancer in 1998  His only other abdominal surgery was an appendectomy  He notices appetite has been reducing over the past 5 days, and he has not been able to keep anything down starting yesterday  He presented with an elevated creatinine and lactic acid  He has past medical history significant for diabetes, colon cancer, and neurogenic bladder for which the patient straight caths himself at home "    Procedures Performed:   Orders Placed This Encounter   Procedures   [address]    Intubation    Temporary HD Catheter       Summary of Hospital Course:   Patient was taken for an elective ex-lap, bowel resection, and parastomal hernia repair on 7/15  Postoperatively, his course was complicated by anastomotic dehiscence requiring urgent exploratory laparotomy for source control  Patient was admitted to the ICU postoperatively  Patient returned to OR the next day for second look laparotomy, re-anastomosis, and primary closure  His course was complicated by septic shock, acute renal failure requiring CVVH, acute respiratory failure requiring mechanical ventilation, and severe protein-calorie malnutrition requiring TPN   A small bowel primary anastomosis was performed on   Patient's renal function gradually improved and he was ultimately extubated on   On , patient began draining feculent material from his midline abdominal incision  Patient was delirious and diaphoretic at this time  Patient's wife was called to bedside who decided to make patient a level 4, comfort care status  Patient was pronounced dead on        Please refer to patient's chart for more specific details  Significant Findings, Care, Treatment and Services Provided:   · 7/15: Ex-lap, SBR, hernia repair  · :  Ex-lap, SBR, ABThera vac  · : Intubated   · : L IJ TLC, R IJ dialysis catheter   · : Started on CVVH  · : Primary small bowel anastomosis   · : Extubated   · 8/3: Rapid response for bradycardia and unresponsiveness in CT  · : Feculent material leaking from surgical wound    Complications: Anastomotic dehiscence, septic shock, JELLY on CKD    Disposition:      Final Diagnosis:     Resolved Problems  Date Reviewed: 2019          Resolved    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 2019     Resolved by  Peter Marie PA-C    Overview Signed 2019 12:20 AM by Siobhan Velasco     Added automatically from request for surgery [id number]         Septic shock ([facility] [address] ) 2019     Resolved by  Irena Rodriguez PA-C    Oliguria 2019     Resolved by  Marek Braswell PA-C    Hypernatremia 2019     Resolved by  Irena Rodriguez PA-C          Condition at Time of Death: No pulses, absent respirations and heart sounds     Date, Time and Cause of Death    Date of Death:  19  Time of Death:   2:33 PM  Preliminary Cause of Death:  Septic shock ([facility] [address] )  Entered by:  Declan Gold PA-C[MM1 1]     Attribution     MM1 1 Deondre Holguin PA-C 19 15:00

## 2019-08-06 NOTE — DEATH NOTE
INPATIENT DEATH NOTE  Belen Nyhan 77 y o  male MRN: [id number]  Unit/Bed#: [facility] 10 Encounter: [id number]    Date, Time and Cause of Death    Date of Death:  19  Time of Death:   2:33 PM  Preliminary Cause of Death:  Septic shock ([facility] [address] )  Entered by:  Kinga Mcgregor PA-C[MM1 1]     Attribution     MM1 1 Fluor ALEXEI Anderson 19 15:00           Patient's Information  Pronounced by: Kylah Jefferson PA-C  Did the patient's death occur in the ED?: No  Did the patient's death occur in the OR?: No  Did the patient's death occur less than 10 days post-op?: Yes  Did the patient's death occur within 24 hours of admission?: No  Was code status DNR at the time of death?: Yes    PHYSICAL EXAM:  Spontaneous respirations absent, Breath sounds absent, Carotid pulse absent and Heart sounds absent    Medical Examiner notification criteria:  NONE APPLICABLE   Medical Examiner's office notified?:  No, does not meet ME notification criteria   Medical Examiner accepted case?:  No  Name of Medical Examiner: N/a    Family Notification  Was the family notified?: Yes  Date Notified: 19  Time Notified: 8363  Notified by: Juan Antonio Baez RN   Relationship to Patient: Spouse, Brother  Family Notification Route:  At bedside  Was the family told to contact a  home?: Yes  Name of  Home[de-identified] [place] in [place]    Autopsy Options:  Post-mortem examination declined by next of kin    Primary Service Attending Physician notified?:  yes - Attending:  Elsy Coyne MD    Physician/Resident responsible for completing Discharge Summary:  Kinga Mcgregor PA-C

## 2019-08-27 LAB
ATRIAL RATE: 153 BPM
QRS AXIS: 23 DEGREES
QRSD INTERVAL: 79 MS
QT INTERVAL: 258 MS
QTC INTERVAL: 412 MS
T WAVE AXIS: 246 DEGREES
VENTRICULAR RATE: 153 BPM

## 2021-11-08 NOTE — ASSESSMENT & PLAN NOTE
BP improved fairly well controlled  Continue present treatment and follow a low-salt diet more carefully  Pt came in requesting new letter.  Dr Mylene Orozco and signed it

## 2022-09-18 NOTE — OP NOTE
OPERATIVE REPORT  PATIENT NAME: Caden Gallego    :  1940  MRN: [id number]  Pt Location: BE OR ROOM 09    SURGERY DATE: 2019    Surgeon(s) and Role:     * Cori White MD - Primary     * Uma Shannon - Assisting    Preop Diagnosis:  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 [K43 5]    Post-Op Diagnosis Codes:     * 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 [K43 5]    Procedure(s) (LRB):  Second look LAPAROTOMY EXPLORATORY, removal of Abthera VAC, abdominal washout, lysis of adhesions,  end to end small bowel anastomosis, Vac dressing application (N/A)    Specimen(s):  ID Type Source Tests Collected by Time Destination   1 : stapled end small intestines Tissue Small Bowel, NOS TISSUE EXAM Cori White MD 2019 1117        Estimated Blood Loss:   Minimal    Drains:  Negative Pressure Wound Therapy (V A C ) Abdomen Anterior;Mid (Active)   Black foam- # applied 2 2019 12:46 PM   Blue foam- # applied 3 2019  1:00 AM   Cycle Continuous 2019 12:46 PM   Target Pressure (mmHg) 125 2019 12:46 PM   Canister Changed Yes 2019 12:46 PM   Dressing Status Clean;Dry; Intact 2019  7:56 AM   Blue foam- # removed 1 2019 10:19 AM   Output (mL) 50 mL 2019  6:00 AM   Number of days: 2       NG/OG/Enteral Tube (Active)   Placement Reverification Auscultation 2019  7:56 AM   Site Assessment Clean;Dry; Intact 2019  7:56 AM   Enteral feeding tube interventions Flushed 2019  7:56 AM   Status Suction-low intermittent 2019  7:56 AM   Drainage Appearance Brown 2019  7:56 AM   Output (mL) 100 mL 2019  6:00 AM   Number of days: 3       Colostomy LLQ (Active)   Stomal Appliance 1 piece 2019  1:00 PM   Stoma Assessment [place] 2019  1:00 PM   Stoma size (in) 2 25 in 7/15/2019 10:41 AM   Stoma Shape Round;Budded 2019  7:56 AM   Peristomal Assessment DOMINICK 2019  4:00 AM   Treatment Bag change 2019  1:00 PM   Output (mL) 100 mL 7/28/2019  6:00 AM   Number of days: 13       Urethral Catheter Non-latex 18 Fr  (Active)   Reasons to continue Urinary Catheter  Accurate I&O assessment in critically ill patients (48 hr  max) 7/28/2019  6:08 AM   Goal for Removal Remove after 48 hrs of I/O monitoring 7/28/2019  6:08 AM   Site Assessment Clean;Skin intact 7/28/2019  8:39 AM   Collection Container Standard drainage bag 7/28/2019  8:39 AM   Securement Method Securing device (Describe) 7/28/2019  8:39 AM   Output (mL) 15 mL 7/28/2019  8:01 AM   Number of days: 8       NG/OG Tube Orogastric Center mouth (Active)   Number of days: 2       Anesthesia Type:   General    Operative Indications:  Second look abdominal exploration for removal of ABThera VAC and reconstitution of the intestinal continuity    Operative Findings: On entry into the abdominal cavity by removal of ABThera VAC large amount of adhesions and and inflammation was noted  Abdominal washout was performed and occasional area of fluid collection some serous some seropurulent discharge in different segments of the abdomen between the small bowel and abdominal wall were detected  These were all washed out and cleaned out  Significant small-bowel and large bowel adhesions were present which were lysed as needed without too much separation just identified the 2 ends of the staple small-bowel  One end in the midline lower quadrant and the 2nd and in the left upper quadrant area  Some lysis of adhesions were done around both ends until the 2 ends were  from other area and could be reached to each other without any tension  Satisfactory arterial blood supply was present on both ends of the small bowel and no tension could be noted when placed [id number] to each other after the lysis of adhesion  Inflammation and thickened wall was the only concern as expected    End-to-end anastomosis using 4 0 PDS running suture was done satisfactorily with a satisfactory anastomosis and completed  Due to the condition of the patient fascia was closed and VAC dressing was placed subcutaneously after preliminary sutures placed on the skin to allow bedside skin closure  Complications:   None    Procedure and Technique:  Patient after proper facial nerve identification a supine position was placed under satisfactory general anesthesia  He was intubated in ICU and the tube was continued to be used  Time-out was performed  Abdomen was prepped with Betadine solution after removal of the skin cover of the [facility] ABThera dressing and he was draped in sterile usual manner  The ABThera VAC in the abdominal cavity was removed without any problem  Some secretions and collections were present which were suctioned out  And exploration of the small bowel was made which was adhesed to each other very adherent leak throughout the abdomen  Carefully some lysis of adhesions were performed to allow exploration without any damage to any segment of the small or large bowel  Collections of serosanguineous and seropurulent fluid were present in the abdominal cavity between the small bowel and the wall which were drained out and washed out  The 2 ends of the small bowel stapled off were identified in the midline lower abdomen and left upper quadrant area  Dissection was made around these areas to lyse the adhesions to allow the 2 ends of the bowel free to reach each other for anastomosis  Once the 2 ends could be reached together the staple line was removed and good arterial blood supply was noted on both ends of the small bowel without concern for blood supply  End-to-end anastomosis using 4 0 PDS running suture was made satisfactorily with the good anastomosis completed in the situation present  The thickness of the bowel and inflammation around it naturally would be the major concern for the anastomosis and potential leakage of the anastomosis      Once the anastomosis was completed abdominal cavity was irrigated with large amount of saline solution and further exploration did not detect any further collections  Hemostasis was well affected to avoid any hematoma to further promote abscess formation  Abdominal cavity was closed using 1  PDS loop running suture carefully at the site of colostomy to avoid the bowel  Once the fascia was closed RF detection of the sponge and counts were all correct  Digital examination of the colostomy was made and no suture entry in the colon  from the fascia closure could be palpated  The skin was closed temporarily using 3 0 nylon interrupted mattress sutures without closing for allowing closure later in 3-5 days  Subcutaneous VAC dressing was applied for wound dressing  Appliance was placed on the colostomy         I was present for the entire procedure    Patient Disposition:  Critical Care Unit    SIGNATURE: Micaela Lowe MD  DATE: July 28, 2019  TIME: 1:33 PM Gastric adenocarcinoma Pulmonary embolism

## 2023-02-15 NOTE — PROGRESS NOTES
Hospitalist Progress Note      PCP: YANN Blanchard - CNP    Date of Admission: 2/13/2023    Chief Complaint: SOB     Hospital Course: reviewed      Subjective: denies sob currently, on 5L(up from 2-3 in ER), Dr. Herberth Gruber also at bedside       Medications:  Reviewed    Infusion Medications    heparin (PORCINE) Infusion 1,130 Units/hr (02/14/23 1215)    sodium chloride 1,000 mL (02/14/23 1031)    sodium chloride       Scheduled Medications    losartan  50 mg Oral BID    And    hydroCHLOROthiazide  12.5 mg Oral Daily    pantoprazole  40 mg Oral QAM AC    dexamethasone  6 mg IntraVENous Q24H    aspirin  81 mg Oral Daily    atorvastatin  40 mg Oral Nightly    [START ON 2/15/2023] thyroid  30 mg Oral Once per day on Mon Wed Fri    thyroid  60 mg Oral Once per day on Sun Tue Thu Sat    sodium chloride flush  5-40 mL IntraVENous 2 times per day    cefTRIAXone (ROCEPHIN) IV  1,000 mg IntraVENous Q24H    azithromycin  250 mg IntraVENous Q24H     PRN Meds: heparin (porcine), heparin (porcine), sodium chloride flush, sodium chloride, ondansetron **OR** ondansetron, polyethylene glycol, acetaminophen **OR** acetaminophen, perflutren lipid microspheres, ipratropium-albuterol      Intake/Output Summary (Last 24 hours) at 2/14/2023 1930  Last data filed at 2/14/2023 1606  Gross per 24 hour   Intake 720 ml   Output 1200 ml   Net -480 ml       Physical Exam Performed:    /76   Pulse (!) 112   Temp 98.1 °F (36.7 °C) (Oral)   Resp 18   Ht 5' 1\" (1.549 m)   Wt 190 lb 1.6 oz (86.2 kg)   SpO2 91%   BMI 35.92 kg/m²     General appearance: No apparent distress, appears stated age and cooperative. HEENT: Pupils equal, round, and reactive to light. Conjunctivae/corneas clear. Neck: Supple, with full range of motion. No jugular venous distention. Trachea midline. Respiratory:  inc'd respiratory effort. Clear to auscultation, bilaterally without Wheezes/Rhonchi.  Noted scattered fine rales  Cardiovascular: Regular rate Pt very agitated, stating that he is going to "call police" and demanding to go home  Neymar Wilcox MD  At bedside Yvonne Mckeon called wife per patient request  César Puckett to conclusion that nursing staff would not bother patient rest of night  and rhythm with normal S1/S2 without murmurs, rubs or gallops. Abdomen: Soft, non-tender, non-distended with normal bowel sounds. Musculoskeletal: No clubbing, cyanosis or edema bilaterally. Full range of motion without deformity. Skin: Skin color, texture, turgor normal.  No rashes or lesions. Neurologic:  Neurovascularly intact without any focal sensory/motor deficits. Cranial nerves: II-XII intact, grossly non-focal.  Psychiatric: Alert and oriented, thought content appropriate, normal insight  Capillary Refill: Brisk, 3 seconds, normal   Peripheral Pulses: +2 palpable, equal bilaterally       Labs:   Recent Labs     02/13/23  1112 02/14/23  0357   WBC 17.8* 17.3*   HGB 12.9 11.2*   HCT 39.7 34.1*   * 150     Recent Labs     02/13/23  1112 02/14/23  0357    141   K 3.3* 3.4*    106   CO2 24 23   BUN 22* 20   CREATININE 0.8 0.7   CALCIUM 10.4 9.1     Recent Labs     02/13/23  1112   AST 34   ALT 21   BILITOT 0.9   ALKPHOS 351*     No results for input(s): INR in the last 72 hours. Recent Labs     02/14/23  0136 02/14/23  0357 02/14/23  0836   TROPONINI 0.08* 0.08* 0.07*       Urinalysis:    No results found for: Darene Surry, BACTERIA, RBCUA, BLOODU, SPECGRAV, GLUCOSEU    Radiology:  CT CHEST PULMONARY EMBOLISM W CONTRAST   Final Result   1. Acute bilateral pulmonary embolism is confirmed. 2. No significant right ventricular strain. 3. 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 Pulmonary follow-up is recommended. 4. Mediastinal and bilateral hilar lymphadenopathy. 5. Multiple small pulmonary nodules bilaterally. Pulmonary metastasis cannot   be excluded. 6. Suspected malignant left pleural effusion. 7. Suspected hepatic metastasis. 8. Mosaic attenuation throughout the lungs likely in association with known   pulmonary emboli. 9. Small pericardial effusion noted incidentally.    Findings were discussed with the emergency room clinician at 1:15 pm on   2/13/2023. VL Extremity Venous Left   Final Result      XR CHEST PORTABLE   Final Result   1. Prominence of the left superior mediastinal border. It is unclear if   this represents a paramediastinal mass or lymphadenopathy. Recommend further   evaluation with a CT chest as there are no prior studies for comparison. 2.  Diffuse airspace infiltrates. These are indeterminate this could   represent pneumonia or edema. IP CONSULT TO CARDIOLOGY  IP CONSULT TO PULMONOLOGY  IP CONSULT TO ONCOLOGY  IP CONSULT TO VASCULAR SURGERY    Assessment/Plan:    Active Hospital Problems    Diagnosis     Acute deep vein thrombosis (DVT) of proximal vein of left lower extremity (HCC) [I82.4Y2]      Priority: Medium    Bilateral pulmonary embolism (HCC) [I26.99]      Priority: Medium    Troponin level elevated [R77.8]      Priority: Medium    Deep venous thrombosis (HCC) [I82.409]      Priority: Medium    Pericardial effusion [I31.39]      Priority: Medium    Pleural effusion [J90]      Priority: Medium    Acute respiratory failure (HCC) [J96.00]      Priority: Medium       Acute hypoxic resp failure- likely multifactorial(pna, PE, ?new lung mass)  -tx underlying isuses  -supp ox, weaned as tolerated  -pulm consulted, apprec recs   -iv dexamethasone started 2/14 for covid    Abn CT- with lung mass/suspected liver mets  -hemonc consulted/pulm consulted  -IR biopsy planned    Possible pna- ?related to COVID infection, pt is vaccinated with Moderna(4 shots)  -Iv rocephin/azithro continued  -precautions  -Bc pending  -Checked procal and wnl     VTE- with new PE and DVT, POA  -heparin ggt started     Elevated BNP/troponin - ?related to above  -Asa given  -Echo  -Cards consulted  -trended angel     Htn-held hctz/arb for now     Hld- on statin     Thyroid- on armour home dosing     Gerd- on ppi        DVT Prophylaxis: heparin ggt  Diet: ADULT DIET;  Regular  Code Status: Full Code  PT/OT Eval Status: not ordered    Dispo - pending improved resp status, ?by Monday    Appropriate for A1 Discharge Unit: Cecilia Salcedo MD

## 2023-04-17 NOTE — PROGRESS NOTES
Patient continues to have Restrepo catheter in place status post small bowel resection and repair of parastomal hernia  His creatinine is now stabilized  I recommend removing the Restrepo catheter on the morning of July 26, 2019 for a void trial   It is likely that he will require clean intermittent catheterization which he has been performing at baseline  Continue antibiotics for greater than 100,000 gram-negative rods in the urine  No

## (undated) DEVICE — LIGACLIP MCA MULTIPLE CLIP APPLIERS, 20 SMALL CLIPS: Brand: LIGACLIP

## (undated) DEVICE — SUT ETHILON 3-0 FSLX 30 IN 1673H

## (undated) DEVICE — LIGHT GLOVE GREEN

## (undated) DEVICE — ASTOUND STANDARD SURGICAL GOWN, XL: Brand: CONVERTORS

## (undated) DEVICE — GLOVE INDICATOR PI UNDERGLOVE SZ 8.5 BLUE

## (undated) DEVICE — GOWN ,SIRUS ,NONREINFORCED 4XL: Brand: MEDLINE

## (undated) DEVICE — 3M™ IOBAN™ 2 ANTIMICROBIAL INCISE DRAPE 6650EZ: Brand: IOBAN™ 2

## (undated) DEVICE — GAUZE SPONGES,16 PLY: Brand: CURITY

## (undated) DEVICE — Device: Brand: DEFENDO AIR/WATER/SUCTION AND BIOPSY VALVE

## (undated) DEVICE — GLOVE INDICATOR PI UNDERGLOVE SZ 6.5 BLUE

## (undated) DEVICE — INTENDED FOR TISSUE SEPARATION, AND OTHER PROCEDURES THAT REQUIRE A SHARP SURGICAL BLADE TO PUNCTURE OR CUT.: Brand: BARD-PARKER SAFETY BLADES SIZE 10, STERILE

## (undated) DEVICE — GLOVE INDICATOR UNDERGLOVE SZ 6 BLUE

## (undated) DEVICE — MEDI-VAC YANKAUER SUCTION HANDLE W/STRAIGHT TIP & CONTROL VENT: Brand: CARDINAL HEALTH

## (undated) DEVICE — SUT VICRYL 2 TP-1 27 IN J849G

## (undated) DEVICE — TRAY FOLEY 16FR URIMETER SURESTEP

## (undated) DEVICE — SPONGE STICK WITH PVP-I: Brand: KENDALL

## (undated) DEVICE — SUT MONOCRYL 4-0 PS-2 18 IN Y496G

## (undated) DEVICE — GLOVE SRG BIOGEL 7.5

## (undated) DEVICE — 3M™ V.A.C.® GRANUFOAM™ DRESSING KIT, M8275052, MEDIUM: Brand: 3M™ V.A.C.® GRANUFOAM™

## (undated) DEVICE — POOLE SUCTION HANDLE: Brand: CARDINAL HEALTH

## (undated) DEVICE — PLUMEPEN PRO 10FT

## (undated) DEVICE — GLOVE INDICATOR PI UNDERGLOVE SZ 8 BLUE

## (undated) DEVICE — 40529 DERMAPROX PAD 11'' X 15'' X 1'': Brand: 40529 DERMAPROX PAD 11'' X 15'' X 1''

## (undated) DEVICE — PROXIMATE LINEAR CUTTER RELOADS (STANDARD)-100MM: Brand: PROXIMATE

## (undated) DEVICE — SUT VICRYL 2-0 REEL 54 IN J286G

## (undated) DEVICE — VAC CANISTER 500ML

## (undated) DEVICE — GLOVE SRG BIOGEL 8

## (undated) DEVICE — PENCIL ELECTROSURG E-Z CLEAN -0035H

## (undated) DEVICE — PACK PBDS STERILE LAP LITHOTOMY RF

## (undated) DEVICE — SUT PDS II 1 CTX 36 IN Z371T

## (undated) DEVICE — VEST COOLING PLUS SZ SNGL USE

## (undated) DEVICE — GLOVE SRG BIOGEL ECLIPSE 6

## (undated) DEVICE — SUT PDS II 4-0 SH 27 IN Z315H

## (undated) DEVICE — CO2 AND WATER TUBING/CAP SET FOR OLYMPUS® SCOPES & UCR: Brand: ERBE

## (undated) DEVICE — 3000CC GUARDIAN II: Brand: GUARDIAN

## (undated) DEVICE — GLOVE INDICATOR PI UNDERGLOVE SZ 7.5 BLUE

## (undated) DEVICE — 3M™ TEGADERM™ TRANSPARENT FILM DRESSING FRAME STYLE, 1628, 6 IN X 8 IN (15 CM X 20 CM), 10/CT 8CT/CASE: Brand: 3M™ TEGADERM™

## (undated) DEVICE — SUT VICRYL 3-0 SH 27 IN J416H

## (undated) DEVICE — PROXIMATE LINEAR CUTTER RELOAD, BLUE, 75MM: Brand: PROXIMATE

## (undated) DEVICE — ELECTRODE BLADE MOD  E-Z CLEAN 6.5IN -0014M

## (undated) DEVICE — CHLORAPREP HI-LITE 26ML ORANGE

## (undated) DEVICE — SUT VICRYL 2-0 SH 27 IN UNDYED J417H

## (undated) DEVICE — SUT VICRYL 0 REEL 54 IN J287G

## (undated) DEVICE — MAYO STAND COVER: Brand: CONVERTORS

## (undated) DEVICE — BETHLEHEM MAJOR GENERAL PACK: Brand: CARDINAL HEALTH

## (undated) DEVICE — PROXIMATE RELOADABLE LINEAR CUTTER WITH SAFETY LOCK-OUT, 75MM: Brand: PROXIMATE

## (undated) DEVICE — PROXIMATE RELOADABLE LINEAR CUTTER WITH SAFETY LOCK-OUT, 100MM: Brand: PROXIMATE

## (undated) DEVICE — ADHESIVE SKN CLSR HISTOACRYL FLEX 0.5ML LF